# Patient Record
Sex: FEMALE | Race: WHITE | NOT HISPANIC OR LATINO | Employment: OTHER | URBAN - METROPOLITAN AREA
[De-identification: names, ages, dates, MRNs, and addresses within clinical notes are randomized per-mention and may not be internally consistent; named-entity substitution may affect disease eponyms.]

---

## 2017-01-18 ENCOUNTER — COMMUNICATION - HEALTHEAST (OUTPATIENT)
Dept: INTERNAL MEDICINE | Facility: CLINIC | Age: 72
End: 2017-01-18

## 2017-01-18 DIAGNOSIS — M06.9 RA (RHEUMATOID ARTHRITIS) (H): ICD-10-CM

## 2017-03-31 ENCOUNTER — COMMUNICATION - HEALTHEAST (OUTPATIENT)
Dept: INTERNAL MEDICINE | Facility: CLINIC | Age: 72
End: 2017-03-31

## 2017-04-03 ENCOUNTER — COMMUNICATION - HEALTHEAST (OUTPATIENT)
Dept: INTERNAL MEDICINE | Facility: CLINIC | Age: 72
End: 2017-04-03

## 2017-04-03 DIAGNOSIS — E78.5 HYPERLIPEMIA: ICD-10-CM

## 2017-04-03 DIAGNOSIS — F41.9 ANXIETY: ICD-10-CM

## 2017-04-12 ENCOUNTER — RECORDS - HEALTHEAST (OUTPATIENT)
Dept: ADMINISTRATIVE | Facility: OTHER | Age: 72
End: 2017-04-12

## 2017-04-17 ENCOUNTER — OFFICE VISIT - HEALTHEAST (OUTPATIENT)
Dept: INTERNAL MEDICINE | Facility: CLINIC | Age: 72
End: 2017-04-17

## 2017-04-17 DIAGNOSIS — E78.5 HYPERLIPIDEMIA: ICD-10-CM

## 2017-04-17 ASSESSMENT — MIFFLIN-ST. JEOR: SCORE: 1323.93

## 2017-04-25 ENCOUNTER — RECORDS - HEALTHEAST (OUTPATIENT)
Dept: ADMINISTRATIVE | Facility: OTHER | Age: 72
End: 2017-04-25

## 2017-05-05 ENCOUNTER — COMMUNICATION - HEALTHEAST (OUTPATIENT)
Dept: INTERNAL MEDICINE | Facility: CLINIC | Age: 72
End: 2017-05-05

## 2017-05-05 DIAGNOSIS — F41.9 ANXIETY: ICD-10-CM

## 2017-06-12 ENCOUNTER — COMMUNICATION - HEALTHEAST (OUTPATIENT)
Dept: INTERNAL MEDICINE | Facility: CLINIC | Age: 72
End: 2017-06-12

## 2017-06-12 DIAGNOSIS — K21.9 ESOPHAGEAL REFLUX: ICD-10-CM

## 2017-07-13 ENCOUNTER — COMMUNICATION - HEALTHEAST (OUTPATIENT)
Dept: INTERNAL MEDICINE | Facility: CLINIC | Age: 72
End: 2017-07-13

## 2017-07-13 DIAGNOSIS — E78.5 HYPERLIPEMIA: ICD-10-CM

## 2017-08-03 ENCOUNTER — TELEPHONE (OUTPATIENT)
Dept: OBGYN | Facility: CLINIC | Age: 72
End: 2017-08-03

## 2017-08-03 DIAGNOSIS — N95.9 MENOPAUSAL AND POSTMENOPAUSAL DISORDER: ICD-10-CM

## 2017-08-03 DIAGNOSIS — B37.31 YEAST INFECTION OF THE VAGINA: ICD-10-CM

## 2017-08-03 NOTE — TELEPHONE ENCOUNTER
Spoke with Tangela about her prometrium, it was denied because she needs to be seen for repeat pap. Patient moving September 1st and really wanted to see Naseem. Nurse helped patient make appt. And gave temporary refill to get her through until her appointment.

## 2017-08-03 NOTE — TELEPHONE ENCOUNTER
Left  for Tangela to call  Nurse triage to discuss HRT refills. Her last annual was 8/2016 so she needs to be seen in clinic. Colposcopy done 10/2016 with note from Dr Gusman:Normal appearing biopsy!!  The follow up, as we discussed should be a repeat pap and HPV test in 1 year.

## 2017-08-28 ENCOUNTER — OFFICE VISIT (OUTPATIENT)
Dept: OBGYN | Facility: CLINIC | Age: 72
End: 2017-08-28
Attending: OBSTETRICS & GYNECOLOGY
Payer: MEDICARE

## 2017-08-28 ENCOUNTER — RECORDS - HEALTHEAST (OUTPATIENT)
Dept: ADMINISTRATIVE | Facility: OTHER | Age: 72
End: 2017-08-28

## 2017-08-28 VITALS
BODY MASS INDEX: 29.65 KG/M2 | HEIGHT: 64 IN | DIASTOLIC BLOOD PRESSURE: 78 MMHG | HEART RATE: 60 BPM | SYSTOLIC BLOOD PRESSURE: 127 MMHG | WEIGHT: 173.7 LBS

## 2017-08-28 DIAGNOSIS — B37.31 YEAST INFECTION OF THE VAGINA: ICD-10-CM

## 2017-08-28 DIAGNOSIS — B97.7 HIGH RISK HPV INFECTION: ICD-10-CM

## 2017-08-28 DIAGNOSIS — N95.9 MENOPAUSAL AND POSTMENOPAUSAL DISORDER: ICD-10-CM

## 2017-08-28 DIAGNOSIS — R10.32 ABDOMINAL PAIN, LEFT LOWER QUADRANT: Primary | ICD-10-CM

## 2017-08-28 PROCEDURE — 99212 OFFICE O/P EST SF 10 MIN: CPT | Mod: ZF

## 2017-08-28 PROCEDURE — 88175 CYTOPATH C/V AUTO FLUID REDO: CPT | Performed by: OBSTETRICS & GYNECOLOGY

## 2017-08-28 PROCEDURE — G0476 HPV COMBO ASSAY CA SCREEN: HCPCS | Performed by: OBSTETRICS & GYNECOLOGY

## 2017-08-28 PROCEDURE — 87624 HPV HI-RISK TYP POOLED RSLT: CPT | Performed by: OBSTETRICS & GYNECOLOGY

## 2017-08-28 RX ORDER — ESTRADIOL 0.5 MG/1
0.5 TABLET ORAL DAILY
Qty: 90 TABLET | Refills: 4 | Status: SHIPPED | OUTPATIENT
Start: 2017-08-28 | End: 2018-09-07

## 2017-08-28 ASSESSMENT — ANXIETY QUESTIONNAIRES
6. BECOMING EASILY ANNOYED OR IRRITABLE: NOT AT ALL
2. NOT BEING ABLE TO STOP OR CONTROL WORRYING: NOT AT ALL
GAD7 TOTAL SCORE: 2
7. FEELING AFRAID AS IF SOMETHING AWFUL MIGHT HAPPEN: NOT AT ALL
1. FEELING NERVOUS, ANXIOUS, OR ON EDGE: NOT AT ALL
3. WORRYING TOO MUCH ABOUT DIFFERENT THINGS: NOT AT ALL
5. BEING SO RESTLESS THAT IT IS HARD TO SIT STILL: SEVERAL DAYS

## 2017-08-28 ASSESSMENT — PATIENT HEALTH QUESTIONNAIRE - PHQ9
SUM OF ALL RESPONSES TO PHQ QUESTIONS 1-9: 3
5. POOR APPETITE OR OVEREATING: SEVERAL DAYS

## 2017-08-28 NOTE — MR AVS SNAPSHOT
After Visit Summary   8/28/2017    Tangela Chapa    MRN: [id number]           Patient Information     Date Of Birth          1945        Visit Information        Provider Department      8/28/2017 2:30 PM Macarena Gusman MD [facility]        Today's Diagnoses     Abdominal pain, left lower quadrant    -  1    Menopausal and postmenopausal disorder        Yeast infection of the vagina        High risk HPV infection           Follow-ups after your visit        Your next 10 appointments already scheduled     Sep 06, 2017 11:00 AM CDT   ULTRASOUND with [facility] ULTRASOUND   [facility] ([facility])    [facility]  [address]  [address]  [facility] [id number] [phone]              Future tests that were ordered for you today     Open Future Orders        Priority Expected Expires Ordered    US GYN Complete Transvaginal - 75857 (In Clinic) Routine  12/26/2017 8/28/2017            Who to contact     Please call your clinic at [phone] to:    Ask questions about your health    Make or cancel appointments    Discuss your medicines    Learn about your test results    Speak to your doctor   If you have compliments or concerns about an experience at your clinic, or if you wish to file a complaint, please contact [facility] Patient Relations at [phone] or email us at [email or web address]         Additional Information About Your Visit        MyChart Information     [employer] gives you secure access to your electronic health record. If you see a primary care provider, you can also send messages to your care team and make appointments. If you have questions, please call your primary care clinic.  If you do not have a primary care provider, please call [phone] and they will assist you.      [email or web address] is an electronic gateway that provides easy, online access to your  "medical records. With [email or web address], you can request a clinic appointment, read your test results, renew a prescription or communicate with your care team.     To access your existing account, please contact your [facility] or call [phone] for assistance.        Care EveryWhere ID     This is your Care EveryWhere ID. This could be used by other organizations to access your [place] medical records  [id number]        Your Vitals Were     Pulse Height BMI (Body Mass Index)             60 1.626 m (5' 4\") 29.82 kg/m2          Blood Pressure from Last 3 Encounters:   08/28/17 127/78   10/04/16 126/71   08/18/16 140/74    Weight from Last 3 Encounters:   08/28/17 78.8 kg (173 lb 11.2 oz)   10/04/16 79.4 kg (175 lb)   08/18/16 80.6 kg (177 lb 12.8 oz)              We Performed the Following     HPV High Risk Types DNA Cervical     Pap imaged thin layer diagnostic with HPV (select HPV order below)          Today's Medication Changes          These changes are accurate as of: 8/28/17 11:59 PM.  If you have any questions, ask your nurse or doctor.               Start taking these medicines.        Dose/Directions    progesterone 100 MG capsule   Commonly known as:  PROMETRIUM   Used for:  Menopausal and postmenopausal disorder, Yeast infection of the vagina   Started by:  Macarena Gusman MD        Dose:  100 mg   Take 1 capsule (100 mg) by mouth At Bedtime   Quantity:  90 capsule   Refills:  3            Where to get your medicines      These medications were sent to [address] [address]     Phone:  [phone]     estradiol 0.5 MG tablet    estradiol 2 MG vaginal ring    progesterone 100 MG capsule                Primary Care Provider Office Phone # Fax #    Logan Aguilar [phone]       [address] [address]        Equal Access to Services     BAILEY POWELL AH: " Hadii aad ku hadiaino Sotammyali, waaxda luqadaha, qaybta kaalangelica arango, mo ortez. So [facility] [phone].    ATENCIÓN: Si rahul luna, tiene a cadet disposición servicios gratuitos de asistencia lingüística. Llame al [phone].    We comply with applicable federal civil rights laws and [place] laws. We do not discriminate on the basis of race, color, national origin, age, disability sex, sexual orientation or gender identity.            Thank you!     Thank you for choosing [facility]  for your care. Our goal is always to provide you with excellent care. Hearing back from our patients is one way we can continue to improve our services. Please take a few minutes to complete the written survey that you may receive in the mail after your visit with us. Thank you!             Your Updated Medication List - Protect others around you: Learn how to safely use, store and throw away your medicines at www.disposemymeds.org.          This list is accurate as of: 8/28/17 11:59 PM.  Always use your most recent med list.                   Brand Name Dispense Instructions for use Diagnosis    B COMPLEX + C TR PO      Take  by mouth.        calcium 500-125 MG-UNIT Tabs      Take 1 tablet by mouth daily. 2        citalopram 20 MG tablet    celeXA     Take 20 mg by mouth daily.        co-enzyme Q-10 100 MG Caps capsule      Take  by mouth daily.        estradiol 0.5 MG tablet    ESTRACE    90 tablet    Take 1 tablet (0.5 mg) by mouth daily    Menopausal and postmenopausal disorder       estradiol 2 MG vaginal ring    ESTRING    1 each    Place once vaginally and refill as instructed    Menopausal and postmenopausal disorder       EYE DROPS OP      Apply  to eye. Hydro eye        fish oil-omega-3 fatty acids 1000 MG capsule      Take 1 capsule by mouth daily.        flaxseed oil 1000 MG Caps      Take 2 tablets by mouth 2 times daily.        magnesium 100 MG Tabs      Take 3  tablets by mouth At Bedtime.        NASONEX 50 MCG/ACT spray   Generic drug:  mometasone      2 sprays by Both Nostrils route daily.        NONFORMULARY      1 Dose daily henrik 128 apply small amount to eyes at night        priLOSEC 20 MG CR capsule   Generic drug:  omeprazole      Take 1 capsule by mouth as needed.        progesterone 100 MG capsule    PROMETRIUM    90 capsule    Take 1 capsule (100 mg) by mouth At Bedtime    Menopausal and postmenopausal disorder, Yeast infection of the vagina       RESTASIS 0.05 % ophthalmic emulsion   Generic drug:  cycloSPORINE      1 drop every 12 hours.        simvastatin 40 MG tablet    ZOCOR     Take 1 tablet by mouth At Bedtime.        traZODone 100 MG tablet    DESYREL     Take 1 tablet by mouth At Bedtime.        XALATAN OP      Apply 2 drops to eye At Bedtime. 2.5

## 2017-08-28 NOTE — LETTER
2017       RE: Tangela Chapa  [address] [facility] [address]     Dear Colleague,    Thank you for referring your patient, Tangela Chapa, to the [facility] at [facility]. Please see a copy of my visit note below.    Progress Note    SUBJECTIVE:  Tangela Chapa is an 72 year old  , who requests a breast and pelvic exam.     Concerns today include: refill of hormones    Menstrual History:  Menstrual History 2016   Period Cycle (Days) menapausal    Reviewed Today Yes       Last    Lab Results   Component Value Date    PAP NIL 2016       Last   Lab Results   Component Value Date    HPV16 Negative 2016     Last   Lab Results   Component Value Date    HPV18 Negative 2016     Last   Lab Results   Component Value Date    HRHPV Positive 2016       Mammogram current: yes    HISTORY:  Prescription Medications as of 2017             estradiol (ESTRING) 2 MG vaginal ring Place once vaginally and refill as instructed    estradiol (ESTRACE) 0.5 MG tablet Take 1 tablet (0.5 mg) by mouth daily    progesterone (PROMETRIUM) 100 MG capsule Take 1 capsule (100 mg) by mouth At Bedtime    NONFORMULARY 1 Dose daily henrik 128 apply small amount to eyes at night    Latanoprost (XALATAN OP) Apply 2 drops to eye At Bedtime. 2.5    co-enzyme Q-10 (COQ10) 100 MG CAPS Take  by mouth daily.    B Complex-C-Folic Acid (B COMPLEX + C TR PO) Take  by mouth.    Tetrahydrozoline HCl (EYE DROPS OP) Apply  to eye. Hydro eye    citalopram (CELEXA) 20 MG tablet Take 20 mg by mouth daily.    cycloSPORINE (RESTASIS) 0.05 % ophthalmic emulsion 1 drop every 12 hours.    Calcium 500-125 MG-UNIT TABS Take 1 tablet by mouth daily. 2    fish oil-omega-3 fatty acids (FISH OIL) 1000 MG capsule Take 1 capsule by mouth daily.    Flaxseed, Linseed, (FLAXSEED OIL) 1000 MG CAPS Take 2 tablets by mouth 2 times daily.    Magnesium 100 MG TABS Take 3 tablets by  mouth At Bedtime.    mometasone (NASONEX) 50 MCG/ACT nasal spray 2 sprays by Both Nostrils route daily.    omeprazole (PRILOSEC) 20 MG capsule Take 1 capsule by mouth as needed.    simvastatin (ZOCOR) 40 MG tablet Take 1 tablet by mouth At Bedtime.    TRAZodone (DESYREL) 100 MG tablet Take 1 tablet by mouth At Bedtime.        Allergies   Allergen Reactions     Dust Mite Extract      Nkda [No Known Drug Allergies]      Seasonal Allergies        There is no immunization history on file for this patient.    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     Past Surgical History:   Procedure Laterality Date     ABDOMINOPLASTY       ARTHROSCOPY KNEE IRRIGATION AND DEBRIDEMENT      RT-Articular Cartilage     BIOPSY BREAST      benign open brest biopsy     COLPOSCOPY, BIOPSY, COMBINED  2/16/10    TZ-not seen     ENDOMETRIAL SAMPLING (BIOPSY)  3/31/05    benign     GENITOURINARY SURGERY  2011    cystoscopy with +1 trabeculaion     JOINT REPLACEMENT, HIP RT/LT  2008    Joint Replacement Hip RT/LT     RELEASE CARPAL TUNNEL BILATERAL       Family History   Problem Relation Age of Onset     Alcohol/Drug Father      Depression Father      Alcohol/Drug Brother      Breast Cancer Mother 60     and PC xs 2 and MC age 69     Depression Mother      DIABETES Maternal Grandfather      CANCER Brother 68     blood cancer     Social History     Social History     Marital status: Single     Spouse name: N/A     Number of children: N/A     Years of education: N/A     Social History Main Topics     Smoking status: Former Smoker     Smokeless tobacco: Never Used      Comment: quit 30 years ago     Alcohol use 0.0 - 0.5 oz/week      "0 - 1 drink(s) per week      Comment: Rare     Drug use: No     Sexual activity: Not Currently     Partners: Male     Birth control/ protection: Post-menopausal     Other Topics Concern      Service No     Blood Transfusions No     Caffeine Concern No     3-4 pop/d (diet)     Occupational Exposure No     Hobby Hazards No     Sleep Concern Yes     sleeps too much -- tired after 9-10 hrs/nite     Stress Concern Yes     holiday lonliness     Weight Concern Yes     admits to eating more than she uses -- declines wt today     Special Diet No     Back Care No     Exercise Yes     sedentary -- plans to restart     Bike Helmet No     Seat Belt No     Self-Exams No     Social History Narrative    How much exercise per week? Stationary bike, recent weight loss    How much calcium per day? Supplement       How much caffeine per day? 2 cans a day    How much vitamin D per day? supplment    Do you/your family wear seatbelts?  Yes    Do you/your family use safety helmets? Yes    Do you/your family use sunscreen? Yes    Do you/your family keep firearms in the home? Yes, locked    Do you/your family have a smoke detector(s)? Yes        Do you feel safe in your home? Yes    Has anyone ever touched you in an unwanted manner? No     Explain     Updated 8/28 CHIKI Santiago                ROS    EXAM:  Blood pressure 127/78, pulse 60, height 1.626 m (5' 4\"), weight 78.8 kg (173 lb 11.2 oz), not currently breastfeeding. Body mass index is 29.82 kg/(m^2).  General appearance: Pleasant female in no acute distress.     BREAST EXAM:  Breast: Without visible skin changes. No dimpling or lesions seen.   Breasts supple, non-tender with palpation, no dominant mass, nodularity, or nipple discharge noted bilaterally. Axillary nodes negative.      PELVIC EXAM:  EG/BUS: Normal genital architecture without lesions, erythema or abnormal secretions Bartholin's, Urethra, Warden's normal   Urethral meatus: normal   Urethra: no masses, tenderness, or " scarring   Bladder: no masses or tenderness   Vagina: moist, atrophic  Cervix: no lesions and pale, dry, stenotic  Uterus: midposition, and small, smooth, firm, mobile w/o pain  Adnexa: Within normal limits and No masses, nodularity, tenderness  Rectum:anus normal     ASSESSMENT:  Encounter Diagnoses   Name Primary?     Menopausal and postmenopausal disorder      Yeast infection of the vagina      Abdominal pain, left lower quadrant Yes     High risk HPV infection       72 year old Female Pelvic and Breast Exam  HRT Surveillance    PLAN:   Orders Placed This Encounter   Procedures     US GYN Complete Transvaginal - 67025 (In Clinic)     Pap imaged thin layer diagnostic with HPV (select HPV order below)     HPV High Risk Types DNA Cervical   Refill of HT    Return in one year/PRN for preventive care or problems/concerns.     Verbalized understanding and agreement with visit plan.    Macarena Gusman MD, [facility]  OB/GYN    8/29/2017  3:00 PM

## 2017-08-29 ASSESSMENT — ANXIETY QUESTIONNAIRES: GAD7 TOTAL SCORE: 2

## 2017-08-29 NOTE — PROGRESS NOTES
Progress Note    SUBJECTIVE:  Tangela Chapa is an 72 year old  , who requests a breast and pelvic exam.     Concerns today include: refill of hormones    Menstrual History:  Menstrual History 2016   Period Cycle (Days) menapausal    Reviewed Today Yes       Last    Lab Results   Component Value Date    PAP NIL 2016       Last   Lab Results   Component Value Date    HPV16 Negative 2016     Last   Lab Results   Component Value Date    HPV18 Negative 2016     Last   Lab Results   Component Value Date    HRHPV Positive 2016       Mammogram current: yes    HISTORY:  Prescription Medications as of 2017             estradiol (ESTRING) 2 MG vaginal ring Place once vaginally and refill as instructed    estradiol (ESTRACE) 0.5 MG tablet Take 1 tablet (0.5 mg) by mouth daily    progesterone (PROMETRIUM) 100 MG capsule Take 1 capsule (100 mg) by mouth At Bedtime    NONFORMULARY 1 Dose daily henrik 128 apply small amount to eyes at night    Latanoprost (XALATAN OP) Apply 2 drops to eye At Bedtime. 2.5    co-enzyme Q-10 (COQ10) 100 MG CAPS Take  by mouth daily.    B Complex-C-Folic Acid (B COMPLEX + C TR PO) Take  by mouth.    Tetrahydrozoline HCl (EYE DROPS OP) Apply  to eye. Hydro eye    citalopram (CELEXA) 20 MG tablet Take 20 mg by mouth daily.    cycloSPORINE (RESTASIS) 0.05 % ophthalmic emulsion 1 drop every 12 hours.    Calcium 500-125 MG-UNIT TABS Take 1 tablet by mouth daily. 2    fish oil-omega-3 fatty acids (FISH OIL) 1000 MG capsule Take 1 capsule by mouth daily.    Flaxseed, Linseed, (FLAXSEED OIL) 1000 MG CAPS Take 2 tablets by mouth 2 times daily.    Magnesium 100 MG TABS Take 3 tablets by mouth At Bedtime.    mometasone (NASONEX) 50 MCG/ACT nasal spray 2 sprays by Both Nostrils route daily.    omeprazole (PRILOSEC) 20 MG capsule Take 1 capsule by mouth as needed.    simvastatin (ZOCOR) 40 MG tablet Take 1 tablet by mouth At Bedtime.    TRAZodone (DESYREL) 100 MG tablet  Take 1 tablet by mouth At Bedtime.        Allergies   Allergen Reactions     Dust Mite Extract      Nkda [No Known Drug Allergies]      Seasonal Allergies        There is no immunization history on file for this patient.    Obstetric History       T0      L0     SAB0   TAB0   Ectopic0   Multiple0   Live Births0      Past Medical History:   Diagnosis Date     Chronic UTI     controlled w PO and vaginal estrogen tx     YESSICA I (cervical intraepithelial neoplasia I) 14: Pap NIL; HR HPV neg -> D/C screening     Vaginal dysplasia     cryo     Past Surgical History:   Procedure Laterality Date     ABDOMINOPLASTY       ARTHROSCOPY KNEE IRRIGATION AND DEBRIDEMENT      RT-Articular Cartilage     BIOPSY BREAST      benign open brest biopsy     COLPOSCOPY, BIOPSY, COMBINED  2/16/10    TZ-not seen     ENDOMETRIAL SAMPLING (BIOPSY)  3/31/05    benign     GENITOURINARY SURGERY  2011    cystoscopy with +1 trabeculaion     JOINT REPLACEMENT, HIP RT/LT  2008    Joint Replacement Hip RT/LT     RELEASE CARPAL TUNNEL BILATERAL       Family History   Problem Relation Age of Onset     Alcohol/Drug Father      Depression Father      Alcohol/Drug Brother      Breast Cancer Mother 60     and PC xs 2 and MC age 69     Depression Mother      DIABETES Maternal Grandfather      CANCER Brother 68     blood cancer     Social History     Social History     Marital status: Single     Spouse name: N/A     Number of children: N/A     Years of education: N/A     Social History Main Topics     Smoking status: Former Smoker     Smokeless tobacco: Never Used      Comment: quit 30 years ago     Alcohol use 0.0 - 0.5 oz/week     0 - 1 drink(s) per week      Comment: Rare     Drug use: No     Sexual activity: Not Currently     Partners: Male     Birth control/ protection: Post-menopausal     Other Topics Concern      Service No     Blood Transfusions No     Caffeine Concern No     3-4 pop/d (diet)      "Occupational Exposure No     Hobby Hazards No     Sleep Concern Yes     sleeps too much -- tired after 9-10 hrs/nite     Stress Concern Yes     holiday lonliness     Weight Concern Yes     admits to eating more than she uses -- declines wt today     Special Diet No     Back Care No     Exercise Yes     sedentary -- plans to restart     Bike Helmet No     Seat Belt No     Self-Exams No     Social History Narrative    How much exercise per week? Stationary bike, recent weight loss    How much calcium per day? Supplement       How much caffeine per day? 2 cans a day    How much vitamin D per day? supplment    Do you/your family wear seatbelts?  Yes    Do you/your family use safety helmets? Yes    Do you/your family use sunscreen? Yes    Do you/your family keep firearms in the home? Yes, locked    Do you/your family have a smoke detector(s)? Yes        Do you feel safe in your home? Yes    Has anyone ever touched you in an unwanted manner? No     Explain     Updated 8/28 CHIKI Santiago    EXAM:  Blood pressure 127/78, pulse 60, height 1.626 m (5' 4\"), weight 78.8 kg (173 lb 11.2 oz), not currently breastfeeding. Body mass index is 29.82 kg/(m^2).  General appearance: Pleasant female in no acute distress.     BREAST EXAM:  Breast: Without visible skin changes. No dimpling or lesions seen.   Breasts supple, non-tender with palpation, no dominant mass, nodularity, or nipple discharge noted bilaterally. Axillary nodes negative.      PELVIC EXAM:  EG/BUS: Normal genital architecture without lesions, erythema or abnormal secretions Bartholin's, Urethra, Oglala's normal   Urethral meatus: normal   Urethra: no masses, tenderness, or scarring   Bladder: no masses or tenderness   Vagina: moist, atrophic  Cervix: no lesions and pale, dry, stenotic  Uterus: midposition, and small, smooth, firm, mobile w/o pain  Adnexa: Within normal limits and No masses, nodularity, tenderness  Rectum:anus normal "     ASSESSMENT:  Encounter Diagnoses   Name Primary?     Menopausal and postmenopausal disorder      Yeast infection of the vagina      Abdominal pain, left lower quadrant Yes     High risk HPV infection       72 year old Female Pelvic and Breast Exam  HRT Surveillance    PLAN:   Orders Placed This Encounter   Procedures     US GYN Complete Transvaginal - 65542 (In Clinic)     Pap imaged thin layer diagnostic with HPV (select HPV order below)     HPV High Risk Types DNA Cervical   Refill of HT    Return in one year/PRN for preventive care or problems/concerns.     Verbalized understanding and agreement with visit plan.    Macarena Gusman MD, [facility] Staff  OB/GYN    8/29/2017  3:00 PM

## 2017-08-31 LAB
COPATH REPORT: NORMAL
PAP: NORMAL

## 2017-09-01 LAB
FINAL DIAGNOSIS: ABNORMAL
HPV HR 12 DNA CVX QL NAA+PROBE: POSITIVE
HPV16 DNA SPEC QL NAA+PROBE: NEGATIVE
HPV18 DNA SPEC QL NAA+PROBE: NEGATIVE
SPECIMEN DESCRIPTION: ABNORMAL

## 2017-09-06 ENCOUNTER — OFFICE VISIT (OUTPATIENT)
Dept: OBGYN | Facility: CLINIC | Age: 72
End: 2017-09-06
Attending: OBSTETRICS & GYNECOLOGY
Payer: MEDICARE

## 2017-09-06 ENCOUNTER — RECORDS - HEALTHEAST (OUTPATIENT)
Dept: ADMINISTRATIVE | Facility: OTHER | Age: 72
End: 2017-09-06

## 2017-09-06 DIAGNOSIS — R10.32 ABDOMINAL PAIN, LEFT LOWER QUADRANT: ICD-10-CM

## 2017-09-06 PROCEDURE — 76830 TRANSVAGINAL US NON-OB: CPT | Mod: ZF

## 2017-09-06 NOTE — LETTER
9/6/2017     RE: Tangela Chapa  [address] [facility] [address]     Dear Colleague,    Thank you for referring your patient, Tangela Chapa, to the [facility] at [facility]. Please see a copy of my visit note below.    72 year old female presents for gynecologic ultrasound indicated by left sided pelvic pain.  This study was done transvaginally.    Uterine findings:   Presence: Visible Size: Normal 3.7 x 3.8 x 2.7 cm.  Endometrium = 4.3 mm. Irregular   Cx length = 21.3 mm.      Flexion:  Anteverted Position: Midline Margins: Smooth Shape: Normal   Contour: Regular Texture: Homogeneous Cavity: Abnormal Masses: Normal    Pelvic findings:    Right Adnexa: Normal   Left Adnexa: Normal   Bladder:  Normal         Cul - de - sac fluid: None    Ovarian follicles:   Right ovary:  Not visualized      Left ovary:   Not visualized     Comments:  Slightly thickened endometrium for postmenopausal status, clinical correlation recommended. Ovaries not visualized, but no sonographic cause of pain.     RADHA Baron MD

## 2017-09-06 NOTE — MR AVS SNAPSHOT
After Visit Summary   9/6/2017    Tangela Chapa    MRN: [id number]           Patient Information     Date Of Birth          1945        Visit Information        Provider Department      9/6/2017 11:00 AM [facility] ULTRASOUND [facility]        Today's Diagnoses     Abdominal pain, left lower quadrant           Follow-ups after your visit        Who to contact     Please call your clinic at [phone] to:    Ask questions about your health    Make or cancel appointments    Discuss your medicines    Learn about your test results    Speak to your doctor   If you have compliments or concerns about an experience at your clinic, or if you wish to file a complaint, please contact [facility] Patient Relations at [phone] or email us at [email or web address]         Additional Information About Your Visit        MyChart Information     [employer] gives you secure access to your electronic health record. If you see a primary care provider, you can also send messages to your care team and make appointments. If you have questions, please call your primary care clinic.  If you do not have a primary care provider, please call [phone] and they will assist you.      [employer] is an electronic gateway that provides easy, online access to your medical records. With [employer], you can request a clinic appointment, read your test results, renew a prescription or communicate with your care team.     To access your existing account, please contact your [facility] or call [phone] for assistance.        Care EveryWhere ID     This is your Care EveryWhere ID. This could be used by other organizations to access your [facility] medical records  [id number]         Blood Pressure from Last 3 Encounters:   08/28/17 127/78   10/04/16 126/71   08/18/16 140/74    Weight from Last 3 Encounters:   08/28/17 78.8 kg (173 lb 11.2 oz)    10/04/16 79.4 kg (175 lb)   08/18/16 80.6 kg (177 lb 12.8 oz)              We Performed the Following     US GYN Complete Transvaginal - [address] (In Clinic)        Primary Care Provider Office Phone # Fax #    Logan Aguilar [phone]       [facility] [address] [address]        Equal Access to Services     BAILEY POWELL : Hadii aad ku hadasho Soomaali, waaxda luqadaha, qaybta kaalmada adeegyada, waxay idiin hayaan adeeg fermin lamaikn . So [facility] [phone].    ATENCIÓN: Si habla cheryl, tiene a cadet disposición servicios gratuitos de asistencia lingüística. Llame al [phone].    We comply with applicable federal civil rights laws and [place] laws. We do not discriminate on the basis of race, color, national origin, age, disability sex, sexual orientation or gender identity.            Thank you!     Thank you for choosing [facility]  for your care. Our goal is always to provide you with excellent care. Hearing back from our patients is one way we can continue to improve our services. Please take a few minutes to complete the written survey that you may receive in the mail after your visit with us. Thank you!             Your Updated Medication List - Protect others around you: Learn how to safely use, store and throw away your medicines at www.disposemymeds.org.          This list is accurate as of: 9/6/17  3:36 PM.  Always use your most recent med list.                   Brand Name Dispense Instructions for use Diagnosis    B COMPLEX + C TR PO      Take  by mouth.        calcium 500-125 MG-UNIT Tabs      Take 1 tablet by mouth daily. 2        citalopram 20 MG tablet    celeXA     Take 20 mg by mouth daily.        co-enzyme Q-10 100 MG Caps capsule      Take  by mouth daily.        estradiol 0.5 MG tablet    ESTRACE    90 tablet    Take 1 tablet (0.5 mg) by mouth daily    Menopausal and postmenopausal disorder       estradiol 2 MG vaginal  ring    ESTRING    1 each    Place once vaginally and refill as instructed    Menopausal and postmenopausal disorder       EYE DROPS OP      Apply  to eye. Hydro eye        fish oil-omega-3 fatty acids 1000 MG capsule      Take 1 capsule by mouth daily.        flaxseed oil 1000 MG Caps      Take 2 tablets by mouth 2 times daily.        magnesium 100 MG Tabs      Take 3 tablets by mouth At Bedtime.        NASONEX 50 MCG/ACT spray   Generic drug:  mometasone      2 sprays by Both Nostrils route daily.        NONFORMULARY      1 Dose daily henrik 128 apply small amount to eyes at night        priLOSEC 20 MG CR capsule   Generic drug:  omeprazole      Take 1 capsule by mouth as needed.        progesterone 100 MG capsule    PROMETRIUM    90 capsule    Take 1 capsule (100 mg) by mouth At Bedtime    Menopausal and postmenopausal disorder, Yeast infection of the vagina       RESTASIS 0.05 % ophthalmic emulsion   Generic drug:  cycloSPORINE      1 drop every 12 hours.        simvastatin 40 MG tablet    ZOCOR     Take 1 tablet by mouth At Bedtime.        traZODone 100 MG tablet    DESYREL     Take 1 tablet by mouth At Bedtime.        XALATAN OP      Apply 2 drops to eye At Bedtime. 2.5

## 2017-09-06 NOTE — PROGRESS NOTES
72 year old female presents for gynecologic ultrasound indicated by left sided pelvic pain.  This study was done transvaginally.    Uterine findings:   Presence: Visible Size: Normal 3.7 x 3.8 x 2.7 cm.  Endometrium = 4.3 mm. Irregular   Cx length = 21.3 mm.      Flexion:  Anteverted Position: Midline Margins: Smooth Shape: Normal   Contour: Regular Texture: Homogeneous Cavity: Abnormal Masses: Normal    Pelvic findings:    Right Adnexa: Normal   Left Adnexa: Normal   Bladder:  Normal         Cul - de - sac fluid: None    Ovarian follicles:   Right ovary:  Not visualized          Left ovary:   Not visualized       Comments:  Slightly thickened endometrium for postmenopausal status, clinical correlation recommended. Ovaries not visualized, but no sonographic cause of pain.     RADHA Baron MD

## 2017-09-18 ENCOUNTER — OFFICE VISIT (OUTPATIENT)
Dept: OBGYN | Facility: CLINIC | Age: 72
End: 2017-09-18
Attending: OBSTETRICS & GYNECOLOGY
Payer: MEDICARE

## 2017-09-18 VITALS
SYSTOLIC BLOOD PRESSURE: 133 MMHG | WEIGHT: 168 LBS | HEART RATE: 67 BPM | BODY MASS INDEX: 28.84 KG/M2 | DIASTOLIC BLOOD PRESSURE: 80 MMHG

## 2017-09-18 DIAGNOSIS — R93.89 INCREASED ENDOMETRIAL STRIPE THICKNESS: ICD-10-CM

## 2017-09-18 DIAGNOSIS — L08.9 LOCAL INFECTION OF SKIN AND SUBCUTANEOUS TISSUE: Primary | ICD-10-CM

## 2017-09-18 DIAGNOSIS — B97.7 HPV IN FEMALE: ICD-10-CM

## 2017-09-18 PROCEDURE — 88305 TISSUE EXAM BY PATHOLOGIST: CPT | Performed by: OBSTETRICS & GYNECOLOGY

## 2017-09-18 PROCEDURE — 58100 BIOPSY OF UTERUS LINING: CPT | Mod: ZF | Performed by: OBSTETRICS & GYNECOLOGY

## 2017-09-18 RX ORDER — CEPHALEXIN 500 MG/1
500 CAPSULE ORAL 3 TIMES DAILY
Qty: 30 CAPSULE | Refills: 0 | Status: SHIPPED | OUTPATIENT
Start: 2017-09-18 | End: 2017-09-28

## 2017-09-18 ASSESSMENT — PAIN SCALES - GENERAL: PAINLEVEL: NO PAIN (0)

## 2017-09-18 NOTE — MR AVS SNAPSHOT
After Visit Summary   9/18/2017    Tangela Chapa    MRN: [id number]           Patient Information     Date Of Birth          1945        Visit Information        Provider Department      9/18/2017 2:45 PM Macarena Gusman MD [facility]        Today's Diagnoses     Local infection of skin and subcutaneous tissue    -  1    Increased endometrial stripe thickness        HPV in female           Follow-ups after your visit        Who to contact     Please call your clinic at [phone] to:    Ask questions about your health    Make or cancel appointments    Discuss your medicines    Learn about your test results    Speak to your doctor   If you have compliments or concerns about an experience at your clinic, or if you wish to file a complaint, please contact [facility] Patient Relations at [phone] or email us at [email or web address]         Additional Information About Your Visit        MyChart Information     [employer] gives you secure access to your electronic health record. If you see a primary care provider, you can also send messages to your care team and make appointments. If you have questions, please call your primary care clinic.  If you do not have a primary care provider, please call [phone] and they will assist you.      [employer] is an electronic gateway that provides easy, online access to your medical records. With [employer], you can request a clinic appointment, read your test results, renew a prescription or communicate with your care team.     To access your existing account, please contact your [facility] or call [phone] for assistance.        Care EveryWhere ID     This is your Care EveryWhere ID. This could be used by other organizations to access your [facility] medical records  [id number]        Your Vitals Were     Pulse Breastfeeding? BMI (Body Mass Index)              67 No 28.84 kg/m2          Blood Pressure from Last 3 Encounters:   09/18/17 133/80   08/28/17 127/78   10/04/16 126/71    Weight from Last 3 Encounters:   09/18/17 76.2 kg (168 lb)   08/28/17 78.8 kg (173 lb 11.2 oz)   10/04/16 79.4 kg (175 lb)              We Performed the Following     Endometrial Biopsy without Cervical Dilation [[id number]]     Sobeida-Operative Worksheet (WHS)     Surgical pathology exam [[id number]]          Today's Medication Changes          These changes are accurate as of: 9/18/17 11:59 PM.  If you have any questions, ask your nurse or doctor.               Start taking these medicines.        Dose/Directions    cephALEXin 500 MG capsule   Commonly known as:  KEFLEX   Used for:  Local infection of skin and subcutaneous tissue   Started by:  Macarena Gusman MD        Dose:  500 mg   Take 1 capsule (500 mg) by mouth 3 times daily for 10 days   Quantity:  30 capsule   Refills:  0            Where to get your medicines      These medications were sent to [address] [address]     Phone:  [phone]     cephALEXin 500 MG capsule                Primary Care Provider Office Phone # Fax #    Logan Aguilar [phone]       [facility] [address] [place] [address]        Equal Access to Services     BAILEY POWELL AH: Hadii roverto sosa hadasho Soomaali, waaxda luqadaha, qaybta kaalmada conchita, mo ortez. So [facility] [phone].    ATENCIÓN: Si habla español, tiene a cadet disposición servicios gratuitos de asistencia lingüística. Ragini al [phone].    We comply with applicable federal civil rights laws and [place] laws. We do not discriminate on the basis of race, color, national origin, age, disability sex, sexual orientation or gender identity.            Thank you!     Thank you for choosing [facility]  for your care. Our goal is always to  provide you with excellent care. Hearing back from our patients is one way we can continue to improve our services. Please take a few minutes to complete the written survey that you may receive in the mail after your visit with us. Thank you!             Your Updated Medication List - Protect others around you: Learn how to safely use, store and throw away your medicines at www.disposemymeds.org.          This list is accurate as of: 9/18/17 11:59 PM.  Always use your most recent med list.                   Brand Name Dispense Instructions for use Diagnosis    B COMPLEX + C TR PO      Take  by mouth.        calcium 500-125 MG-UNIT Tabs      Take 1 tablet by mouth daily. 2        cephALEXin 500 MG capsule    KEFLEX    30 capsule    Take 1 capsule (500 mg) by mouth 3 times daily for 10 days    Local infection of skin and subcutaneous tissue       citalopram 20 MG tablet    celeXA     Take 20 mg by mouth daily.        co-enzyme Q-10 100 MG Caps capsule      Take  by mouth daily.        estradiol 0.5 MG tablet    ESTRACE    90 tablet    Take 1 tablet (0.5 mg) by mouth daily    Menopausal and postmenopausal disorder       estradiol 2 MG vaginal ring    ESTRING    1 each    Place once vaginally and refill as instructed    Menopausal and postmenopausal disorder       EYE DROPS OP      Apply  to eye. Hydro eye        fish oil-omega-3 fatty acids 1000 MG capsule      Take 1 capsule by mouth daily.        flaxseed oil 1000 MG Caps      Take 2 tablets by mouth 2 times daily.        magnesium 100 MG Tabs      Take 3 tablets by mouth At Bedtime.        NASONEX 50 MCG/ACT spray   Generic drug:  mometasone      2 sprays by Both Nostrils route daily.        NONFORMULARY      1 Dose daily henrik 128 apply small amount to eyes at night        priLOSEC 20 MG CR capsule   Generic drug:  omeprazole      Take 1 capsule by mouth as needed.        progesterone 100 MG capsule    PROMETRIUM    90 capsule    Take 1 capsule (100 mg) by mouth At  Bedtime    Menopausal and postmenopausal disorder, Yeast infection of the vagina       RESTASIS 0.05 % ophthalmic emulsion   Generic drug:  cycloSPORINE      1 drop every 12 hours.        simvastatin 40 MG tablet    ZOCOR     Take 1 tablet by mouth At Bedtime.        traZODone 100 MG tablet    DESYREL     Take 1 tablet by mouth At Bedtime.        XALATAN OP      Apply 2 drops to eye At Bedtime. 2.5

## 2017-09-18 NOTE — LETTER
"9/18/2017       RE: Tangela Chapa  [address] [address]     Dear Colleague,    Thank you for referring your patient, Tangela Chapa, to the [facility] at [facility]. Please see a copy of my visit note below.    73 yo  female w/ u/s showing thickened EM lining in setting of pelvic pain.  Rec EMB.      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     Endometrial Biopsy                                                                       Time Out - \"Pause for the Cause\"  Just before the procedure begins, through verbal and active participation of team members, verify:    Initials   Patient Name    MM   Patient Date of Birth MM   Procedure to be performed  MM   Site, laterality, level or multiples, noting patient position   MM   Relevant images or diagnostics available/displayed   MM   Implants, special equipment or special requirements        MM     Consent:  Written consent signed and scanned into medical record.    Indication: thick EM stripe    Using a medium Mindy speculum, the cervix was visualized. The cervix was prepped with Betadine.  A single tooth tenaculum was applied to the anterior lip of the cervix. The endometrial pipelle was advanced through the cervix without difficulty and a sample collected. One additional pass was made.  The tenaculum was removed from the cervix and the tenaculum site made hemostatic with Silver Nitrate sticks .    EBL: 5cc  Complications:  none  Pathology: EMB sample was sent to pathology.  Tolerance of Procedure:  Patient did tolerate the procedure well.     Patient was instructed to call if she experiences any heavy bleeding, severe cramping, or abnormal vaginal discharge.  May take ibuprofen 400-800 mg PO TID PRN or naproxen 500 mg PO BID for cramping.    Will notify patient of results.    Given persistent HR HPV other, pt desires some type of treatment. " We discussed LEEP as tx option. Pt is aware that continued surveillance is recommended vs. LEEP.    ACOG handout given and pt will be called to schedule office LEEP.     Macarena Gusman MD, FACOG  [facility]  OB/GYN    9/19/2017  9:57 AM

## 2017-09-19 ENCOUNTER — TELEPHONE (OUTPATIENT)
Dept: OBGYN | Facility: CLINIC | Age: 72
End: 2017-09-19

## 2017-09-19 NOTE — PROGRESS NOTES
"73 yo  female w/ u/s showing thickened EM lining in setting of pelvic pain.  Rec EMB.      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     Endometrial Biopsy                                                                       Time Out - \"Pause for the Cause\"  Just before the procedure begins, through verbal and active participation of team members, verify:    Initials   Patient Name    MM   Patient Date of Birth MM   Procedure to be performed  MM   Site, laterality, level or multiples, noting patient position   MM   Relevant images or diagnostics available/displayed   MM   Implants, special equipment or special requirements        MM     Consent:  Written consent signed and scanned into medical record.    Indication: thick EM stripe    Using a medium Mindy speculum, the cervix was visualized. The cervix was prepped with Betadine.  A single tooth tenaculum was applied to the anterior lip of the cervix. The endometrial pipelle was advanced through the cervix without difficulty and a sample collected. One additional pass was made.  The tenaculum was removed from the cervix and the tenaculum site made hemostatic with Silver Nitrate sticks .    EBL: 5cc  Complications:  none  Pathology: EMB sample was sent to pathology.  Tolerance of Procedure:  Patient did tolerate the procedure well.     Patient was instructed to call if she experiences any heavy bleeding, severe cramping, or abnormal vaginal discharge.  May take ibuprofen 400-800 mg PO TID PRN or naproxen 500 mg PO BID for cramping.    Will notify patient of results.    Given persistent HR HPV other, pt desires some type of treatment. We discussed LEEP as tx option. Pt is aware that continued surveillance is recommended vs. LEEP.    ACOG handout given and pt will be called to schedule office LEEP.     Macarena Gusman MD, FACOG  [facility]  OB/GYN    9/19/2017  9:57 AM      "

## 2017-09-20 LAB — COPATH REPORT: NORMAL

## 2017-10-09 ENCOUNTER — RECORDS - HEALTHEAST (OUTPATIENT)
Dept: ADMINISTRATIVE | Facility: OTHER | Age: 72
End: 2017-10-09

## 2017-10-09 ENCOUNTER — OFFICE VISIT (OUTPATIENT)
Dept: OBGYN | Facility: CLINIC | Age: 72
End: 2017-10-09
Attending: OBSTETRICS & GYNECOLOGY
Payer: MEDICARE

## 2017-10-09 VITALS
DIASTOLIC BLOOD PRESSURE: 73 MMHG | WEIGHT: 168.6 LBS | SYSTOLIC BLOOD PRESSURE: 123 MMHG | BODY MASS INDEX: 28.94 KG/M2 | HEART RATE: 64 BPM

## 2017-10-09 DIAGNOSIS — B97.7 HIGH RISK HPV INFECTION: Primary | ICD-10-CM

## 2017-10-09 PROCEDURE — 88307 TISSUE EXAM BY PATHOLOGIST: CPT | Performed by: OBSTETRICS & GYNECOLOGY

## 2017-10-09 PROCEDURE — 57522 CONIZATION OF CERVIX: CPT | Mod: ZF | Performed by: OBSTETRICS & GYNECOLOGY

## 2017-10-09 PROCEDURE — 40000269 ZZH STATISTIC NO CHARGE FACILITY FEE

## 2017-10-09 PROCEDURE — 99212 OFFICE O/P EST SF 10 MIN: CPT | Mod: 25,ZF

## 2017-10-09 ASSESSMENT — PAIN SCALES - GENERAL: PAINLEVEL: NO PAIN (0)

## 2017-10-09 NOTE — LETTER
"10/9/2017       RE: Tangela Chapa  [address] [facility] [address]     Dear Colleague,    Thank you for referring your patient, Tangela Chapa, to the [facility] at [facility]. Please see a copy of my visit note below.      LEEP NOTE    Menstrual History:  Menstrual History 2016   Period Cycle (Days) menapausal    Reviewed Today Yes       Time Out - \"Pause for the Cause\"  Just before the procedure begins, through verbal and active participation of team members, verify:   Initials   Patient Name MM   Patient  MM   Procedure to be performed MM                     Faculty:  Naseem      Indication/History:  persistent HPV    Consent: Verbal and written consent obtained, prior to sedative, discussed risks and benefits of treatment including damage to surrounding tissues, increased risk of  labor, bleeding, infection, and pain and no treatment including risk of dysplasia progression.       Colposcopy findings: no obvious abnormality    Anesthesia: 1% Lidocaine without EPI in radial pattern: 10 mL    Intracervical block  Prep: Betadine    EBL:  3 mL   Complications:  none    Settings:   Cut 50      Coag 50       Blend 50       LOOP size/type: green    Bleeding controlled with ball tipped cautery and pressure.    Pathology:  Jar #1:  Cervix LEEP    Follow up: prn path    Call if bleeding > 1 pph, fever, abdominal pain, foul smelling discharge.     Macarena Gusman MD, FACOG  [facility]  OB/GYN    10/9/2017  2:56 PM         "

## 2017-10-09 NOTE — PROGRESS NOTES
"  LEEP NOTE    Menstrual History:  Menstrual History 2016   Period Cycle (Days) menapausal    Reviewed Today Yes       Time Out - \"Pause for the Cause\"  Just before the procedure begins, through verbal and active participation of team members, verify:   Initials   Patient Name MM   Patient  MM   Procedure to be performed MM                     Faculty:  [employer]      Indication/History:  persistent HPV    Consent: Verbal and written consent obtained, prior to sedative, discussed risks and benefits of treatment including damage to surrounding tissues, increased risk of  labor, bleeding, infection, and pain and no treatment including risk of dysplasia progression.       Colposcopy findings: no obvious abnormality    Anesthesia: 1% Lidocaine without EPI in radial pattern: 10 mL    Intracervical block  Prep: Betadine    EBL:  3 mL   Complications:  none    Settings:   Cut 50      Coag 50       Blend 50       LOOP size/type: green    Bleeding controlled with ball tipped cautery and pressure.    Pathology:  Jar #1:  Cervix LEEP    Follow up: prn path    Call if bleeding > 1 pph, fever, abdominal pain, foul smelling discharge.     Macarena Gusman MD, FACOG  [facility]  OB/GYN    10/9/2017  2:56 PM       "

## 2017-10-09 NOTE — PATIENT INSTRUCTIONS
LEEP (LOOP EXCISION ELECTROCAUTERY PROCEDURE)    The LEEP procedure is done using a local anesthetic to numb the cervix.  While visualizing the area with a colposcopy, a small thing wire loopexcises the abnormal tissue from the cervix.  Cautery is used to control any bleeding.    POST-LEEP INSTRUCTIONS      Limit your activity to 1-2 days following the LEEP procedure.  Avoid strenuous exercises for one week.    To prevent infection or trauma to the surgical site, you should not put anything into the vagina for three weeks.  This means no tampons, no intercourse and no douching.    You may have brown, yellowish, pink or red discharge for a few days after the procedure.  As the cervix heals, your vaginal discharge may be clear and watery for a few weeks then gradually tapering off.    You may also experience some bleeding immediately after the LEEP procedure and/or again 7-10 days later as the cervical area heals.  The bleeding should not be heavier than a normal menses.    Do not be concerned if your next menses is delayed and/or the flow is heavier than normal.  This can also be due to the LEEP procedure.    CALL for any of the following concerns:    Fever over 101 degrees F.    Pain not controlled by over the counter analgesics such as Tylenol or Ibuprofen.    Foul-smelling discharge.    Bleeding: more than one pad an hour for two hours.    Any questions regarding the LEEP procedure.    [facility]: [phone] 24 hours a day *Ask for the MD on call

## 2017-10-09 NOTE — MR AVS SNAPSHOT
After Visit Summary   10/9/2017    Tangela Chapa    MRN: [id number]           Patient Information     Date Of Birth          1945        Visit Information        Provider Department      10/9/2017 2:30 PM Macarena Gusman MD; [facility] RESOURCE PROCEDURE [facility]        Today's Diagnoses     High risk HPV infection    -  1      Care Instructions      LEEP (LOOP EXCISION ELECTROCAUTERY PROCEDURE)    The LEEP procedure is done using a local anesthetic to numb the cervix.  While visualizing the area with a colposcopy, a small thing wire loopexcises the abnormal tissue from the cervix.  Cautery is used to control any bleeding.    POST-LEEP INSTRUCTIONS      Limit your activity to 1-2 days following the LEEP procedure.  Avoid strenuous exercises for one week.    To prevent infection or trauma to the surgical site, you should not put anything into the vagina for three weeks.  This means no tampons, no intercourse and no douching.    You may have brown, yellowish, pink or red discharge for a few days after the procedure.  As the cervix heals, your vaginal discharge may be clear and watery for a few weeks then gradually tapering off.    You may also experience some bleeding immediately after the LEEP procedure and/or again 7-10 days later as the cervical area heals.  The bleeding should not be heavier than a normal menses.    Do not be concerned if your next menses is delayed and/or the flow is heavier than normal.  This can also be due to the LEEP procedure.    CALL for any of the following concerns:    Fever over 101 degrees F.    Pain not controlled by over the counter analgesics such as Tylenol or Ibuprofen.    Foul-smelling discharge.    Bleeding: more than one pad an hour for two hours.    Any questions regarding the LEEP procedure.    [facility]: [phone] 24 hours a day *Ask for the MD on call          Follow-ups after your visit        Follow-up  notes from your care team     Return if symptoms worsen or fail to improve, for pending Pathology results.      Who to contact     Please call your clinic at [phone] to:    Ask questions about your health    Make or cancel appointments    Discuss your medicines    Learn about your test results    Speak to your doctor   If you have compliments or concerns about an experience at your clinic, or if you wish to file a complaint, please contact [facility] Patient Relations at [phone] or email us at [email or web address]         Additional Information About Your Visit        [employer] Information     Home Delivery Service (HDS) gives you secure access to your electronic health record. If you see a primary care provider, you can also send messages to your care team and make appointments. If you have questions, please call your primary care clinic.  If you do not have a primary care provider, please call [phone] and they will assist you.      [employer] Service (HDS) is an electronic gateway that provides easy, online access to your medical records. With [employer] Service (HDS), you can request a clinic appointment, read your test results, renew a prescription or communicate with your care team.     To access your existing account, please contact your [facility] or call [phone] for assistance.        Care EveryWhere ID     This is your Care EveryWhere ID. This could be used by other organizations to access your [place] medical records  [id number]        Your Vitals Were     Pulse Breastfeeding? BMI (Body Mass Index)             64 No 28.94 kg/m2          Blood Pressure from Last 3 Encounters:   10/09/17 123/73   09/18/17 133/80   08/28/17 127/78    Weight from Last 3 Encounters:   10/09/17 76.5 kg (168 lb 9.6 oz)   09/18/17 76.2 kg (168 lb)   08/28/17 78.8 kg (173 lb 11.2 oz)              We Performed the Following     LEEP  without colposcopy [[id number]]     Surgical pathology exam [[id number]]         Primary Care Provider Office Phone # Fax #    Logan Aguilar [phone]       [facility] [address] [address]        Equal Access to Services     BAILEY POWELL : Hadii roverto ku suhao Sotammyali, waaxda luqadaha, qaybta kaalmada adebrandon, mo dashdoyle ortez. So [facility] [phone].    ATENCIÓN: Si habla español, tiene a cadet disposición servicios gratuitos de asistencia lingüística. Ayahame al [phone].    We comply with applicable federal civil rights laws and [place] laws. We do not discriminate on the basis of race, color, national origin, age, disability, sex, sexual orientation, or gender identity.            Thank you!     Thank you for choosing [facility]  for your care. Our goal is always to provide you with excellent care. Hearing back from our patients is one way we can continue to improve our services. Please take a few minutes to complete the written survey that you may receive in the mail after your visit with us. Thank you!             Your Updated Medication List - Protect others around you: Learn how to safely use, store and throw away your medicines at www.disposemymeds.org.          This list is accurate as of: 10/9/17  2:57 PM.  Always use your most recent med list.                   Brand Name Dispense Instructions for use Diagnosis    B COMPLEX + C TR PO      Take  by mouth.        calcium 500-125 MG-UNIT Tabs      Take 1 tablet by mouth daily. 2        citalopram 20 MG tablet    celeXA     Take 20 mg by mouth daily.        co-enzyme Q-10 100 MG Caps capsule      Take  by mouth daily.        estradiol 0.5 MG tablet    ESTRACE    90 tablet    Take 1 tablet (0.5 mg) by mouth daily    Menopausal and postmenopausal disorder       estradiol 2 MG vaginal ring    ESTRING    1 each    Place once vaginally and refill as instructed    Menopausal and postmenopausal disorder       EYE DROPS OP      Apply  to  eye. Hydro eye        fish oil-omega-3 fatty acids 1000 MG capsule      Take 1 capsule by mouth daily.        flaxseed oil 1000 MG Caps      Take 2 tablets by mouth 2 times daily.        magnesium 100 MG Tabs      Take 3 tablets by mouth At Bedtime.        NASONEX 50 MCG/ACT spray   Generic drug:  mometasone      2 sprays by Both Nostrils route daily.        NONFORMULARY      1 Dose daily henrik 128 apply small amount to eyes at night        priLOSEC 20 MG CR capsule   Generic drug:  omeprazole      Take 1 capsule by mouth as needed.        progesterone 100 MG capsule    PROMETRIUM    90 capsule    Take 1 capsule (100 mg) by mouth At Bedtime    Menopausal and postmenopausal disorder, Yeast infection of the vagina       RESTASIS 0.05 % ophthalmic emulsion   Generic drug:  cycloSPORINE      1 drop every 12 hours.        simvastatin 40 MG tablet    ZOCOR     Take 1 tablet by mouth At Bedtime.        traZODone 100 MG tablet    DESYREL     Take 1 tablet by mouth At Bedtime.        XALATAN OP      Apply 2 drops to eye At Bedtime. 2.5

## 2017-10-12 LAB — COPATH REPORT: NORMAL

## 2017-10-17 ENCOUNTER — RECORDS - HEALTHEAST (OUTPATIENT)
Dept: ADMINISTRATIVE | Facility: OTHER | Age: 72
End: 2017-10-17

## 2017-10-23 ENCOUNTER — RECORDS - HEALTHEAST (OUTPATIENT)
Dept: MAMMOGRAPHY | Facility: CLINIC | Age: 72
End: 2017-10-23

## 2017-10-23 DIAGNOSIS — Z12.31 ENCOUNTER FOR SCREENING MAMMOGRAM FOR MALIGNANT NEOPLASM OF BREAST: ICD-10-CM

## 2017-10-25 ENCOUNTER — TELEPHONE (OUTPATIENT)
Dept: OBGYN | Facility: CLINIC | Age: 72
End: 2017-10-25

## 2017-10-25 NOTE — TELEPHONE ENCOUNTER
Received call from Tangela who states she is having some very light bleeding since her LEEP on 10/9. She has had a continuous light bleeding since about 2 days after her LEEP. She also notes a foul odor but states that has also been present since immediately after LEEP. No abdominal pain, fevers, body aches, chills. She wants to wait a few more days to see if discharge improves because this has been the same since her procedure and has not worsened. She has also been more active lately going up and down ladders. Instructed her to call if things worsen or do not improve over weekend. She understood plan and had no further questions.

## 2017-10-26 ENCOUNTER — OFFICE VISIT - HEALTHEAST (OUTPATIENT)
Dept: INTERNAL MEDICINE | Facility: CLINIC | Age: 72
End: 2017-10-26

## 2017-10-26 DIAGNOSIS — E78.2 HYPERLIPEMIA, MIXED: ICD-10-CM

## 2017-10-26 DIAGNOSIS — R10.9 LEFT FLANK PAIN: ICD-10-CM

## 2017-10-26 DIAGNOSIS — Z51.81 MEDICATION MONITORING ENCOUNTER: ICD-10-CM

## 2017-10-26 DIAGNOSIS — K63.5 COLON POLYPS: ICD-10-CM

## 2017-10-26 DIAGNOSIS — Z23 NEED FOR INFLUENZA VACCINATION: ICD-10-CM

## 2017-10-26 LAB
CHOLEST SERPL-MCNC: 185 MG/DL
FASTING STATUS PATIENT QL REPORTED: NO
HDLC SERPL-MCNC: 41 MG/DL
LDLC SERPL CALC-MCNC: 109 MG/DL
TRIGL SERPL-MCNC: 176 MG/DL

## 2017-10-26 ASSESSMENT — MIFFLIN-ST. JEOR: SCORE: 1243.19

## 2017-10-30 ENCOUNTER — RECORDS - HEALTHEAST (OUTPATIENT)
Dept: ADMINISTRATIVE | Facility: OTHER | Age: 72
End: 2017-10-30

## 2017-10-30 ENCOUNTER — TELEPHONE (OUTPATIENT)
Dept: OBGYN | Facility: CLINIC | Age: 72
End: 2017-10-30

## 2017-10-30 ENCOUNTER — COMMUNICATION - HEALTHEAST (OUTPATIENT)
Dept: INTERNAL MEDICINE | Facility: CLINIC | Age: 72
End: 2017-10-30

## 2017-10-30 ENCOUNTER — OFFICE VISIT (OUTPATIENT)
Dept: OBGYN | Facility: CLINIC | Age: 72
End: 2017-10-30
Attending: OBSTETRICS & GYNECOLOGY
Payer: MEDICARE

## 2017-10-30 VITALS
BODY MASS INDEX: 28.84 KG/M2 | HEART RATE: 62 BPM | WEIGHT: 168 LBS | SYSTOLIC BLOOD PRESSURE: 147 MMHG | DIASTOLIC BLOOD PRESSURE: 77 MMHG

## 2017-10-30 DIAGNOSIS — Z98.890 S/P LEEP: Primary | ICD-10-CM

## 2017-10-30 PROCEDURE — 99212 OFFICE O/P EST SF 10 MIN: CPT | Mod: ZF

## 2017-10-30 ASSESSMENT — PAIN SCALES - GENERAL: PAINLEVEL: NO PAIN (0)

## 2017-10-30 NOTE — NURSING NOTE
Chief Complaint   Patient presents with     RECHECK     C/O bleeding /foul odor post LEEP   Alyssa Gomez LPN

## 2017-10-30 NOTE — MR AVS SNAPSHOT
After Visit Summary   10/30/2017    Tangela Chapa    MRN: [id number]           Patient Information     Date Of Birth          1945        Visit Information        Provider Department      10/30/2017 4:15 PM Macarena Gusman MD [facility]        Today's Diagnoses     S/P LEEP    -  1       Follow-ups after your visit        Who to contact     Please call your clinic at [phone] to:    Ask questions about your health    Make or cancel appointments    Discuss your medicines    Learn about your test results    Speak to your doctor   If you have compliments or concerns about an experience at your clinic, or if you wish to file a complaint, please contact [facility] Patient Relations at [phone] or email us at [email or web address]         Additional Information About Your Visit        MyChart Information     [employer] gives you secure access to your electronic health record. If you see a primary care provider, you can also send messages to your care team and make appointments. If you have questions, please call your primary care clinic.  If you do not have a primary care provider, please call [phone] and they will assist you.      [employer]TM[employer] is an electronic gateway that provides easy, online access to your medical records. With [employer]TM[employer], you can request a clinic appointment, read your test results, renew a prescription or communicate with your care team.     To access your existing account, please contact your [facility] or call [phone] for assistance.        Care EveryWhere ID     This is your Care EveryWhere ID. This could be used by other organizations to access your [place] medical records  [id number]        Your Vitals Were     Pulse Breastfeeding? BMI (Body Mass Index)             62 No 28.84 kg/m2          Blood Pressure from Last 3 Encounters:   10/30/17 147/77   10/09/17 123/73    09/18/17 133/80    Weight from Last 3 Encounters:   10/30/17 76.2 kg (168 lb)   10/09/17 76.5 kg (168 lb 9.6 oz)   09/18/17 76.2 kg (168 lb)              Today, you had the following     No orders found for display       Primary Care Provider Office Phone # Fax #    Logan Aguilar [phone]       [facility] [address] [address]        Equal Access to Services     BAILEY POWELL : Hadii aad ku hadasho Soomaali, waaxda luqadaha, qaybta kaalmada adeegyada, waxay idiin hayaan sandy chaudhari . So [facility] [phone].    ATENCIÓN: Si habla español, tiene a cadet disposición servicios gratuitos de asistencia lingüística. [facility] [phone].    We comply with applicable federal civil rights laws and [place] laws. We do not discriminate on the basis of race, color, national origin, age, disability, sex, sexual orientation, or gender identity.            Thank you!     Thank you for choosing [facility]  for your care. Our goal is always to provide you with excellent care. Hearing back from our patients is one way we can continue to improve our services. Please take a few minutes to complete the written survey that you may receive in the mail after your visit with us. Thank you!             Your Updated Medication List - Protect others around you: Learn how to safely use, store and throw away your medicines at www.disposemymeds.org.          This list is accurate as of: 10/30/17  8:05 PM.  Always use your most recent med list.                   Brand Name Dispense Instructions for use Diagnosis    B COMPLEX + C TR PO      Take  by mouth.        calcium 500-125 MG-UNIT Tabs      Take 1 tablet by mouth daily. 2        citalopram 20 MG tablet    celeXA     Take 20 mg by mouth daily.        co-enzyme Q-10 100 MG Caps capsule      Take  by mouth daily.        estradiol 0.5 MG tablet    ESTRACE    90 tablet    Take 1 tablet (0.5 mg) by mouth daily     Menopausal and postmenopausal disorder       estradiol 2 MG vaginal ring    ESTRING    1 each    Place once vaginally and refill as instructed    Menopausal and postmenopausal disorder       EYE DROPS OP      Apply  to eye. Hydro eye        fish oil-omega-3 fatty acids 1000 MG capsule      Take 1 capsule by mouth daily.        flaxseed oil 1000 MG Caps      Take 2 tablets by mouth 2 times daily.        magnesium 100 MG Tabs      Take 3 tablets by mouth At Bedtime.        NASONEX 50 MCG/ACT spray   Generic drug:  mometasone      2 sprays by Both Nostrils route daily.        NONFORMULARY      1 Dose daily henrik 128 apply small amount to eyes at night        priLOSEC 20 MG CR capsule   Generic drug:  omeprazole      Take 1 capsule by mouth as needed.        progesterone 100 MG capsule    PROMETRIUM    90 capsule    Take 1 capsule (100 mg) by mouth At Bedtime    Menopausal and postmenopausal disorder, Yeast infection of the vagina       RESTASIS 0.05 % ophthalmic emulsion   Generic drug:  cycloSPORINE      1 drop every 12 hours.        simvastatin 40 MG tablet    ZOCOR     Take 1 tablet by mouth At Bedtime.        traZODone 100 MG tablet    DESYREL     Take 1 tablet by mouth At Bedtime.        XALATAN OP      Apply 2 drops to eye At Bedtime. 2.5

## 2017-10-30 NOTE — TELEPHONE ENCOUNTER
Spoke to Tangela who continues to have vaginal bleeding with odor noted since her LEEP 10/5/17,scheduled her with Dr Gusman for this afternoon.Pt indicated understanding and agreed with plan.

## 2017-10-31 NOTE — PROGRESS NOTES
Bleeding and malodorous d/c since LEEP 10/9.  Path neg.      Vitals:    10/30/17 1621   BP: 147/77   Pulse: 62   Weight: 76.2 kg (168 lb)     Spec exam w/ raw LEEP bed, no infectious appearance.   Small bleeding from leep bed.     A/p:  73 yo  female w/ ongoing HPV infection s/p LEEP 10/9 with ongoing bleeding.      1. LEEP bed cauterized with silver nitrate and monsels solution.      2. F/u prn    Macarena Gusman MD, FACOG  [facility]  OB/GYN    10/30/2017  8:02 PM

## 2017-12-04 ENCOUNTER — COMMUNICATION - HEALTHEAST (OUTPATIENT)
Dept: INTERNAL MEDICINE | Facility: CLINIC | Age: 72
End: 2017-12-04

## 2017-12-04 DIAGNOSIS — E78.5 HYPERLIPEMIA: ICD-10-CM

## 2018-01-03 ENCOUNTER — COMMUNICATION - HEALTHEAST (OUTPATIENT)
Dept: INTERNAL MEDICINE | Facility: CLINIC | Age: 73
End: 2018-01-03

## 2018-01-04 ENCOUNTER — TRANSFERRED RECORDS (OUTPATIENT)
Dept: HEALTH INFORMATION MANAGEMENT | Facility: CLINIC | Age: 73
End: 2018-01-04

## 2018-01-04 ENCOUNTER — RECORDS - HEALTHEAST (OUTPATIENT)
Dept: ADMINISTRATIVE | Facility: OTHER | Age: 73
End: 2018-01-04

## 2018-01-05 ENCOUNTER — RECORDS - HEALTHEAST (OUTPATIENT)
Dept: ADMINISTRATIVE | Facility: OTHER | Age: 73
End: 2018-01-05

## 2018-01-15 ENCOUNTER — RECORDS - HEALTHEAST (OUTPATIENT)
Dept: ADMINISTRATIVE | Facility: OTHER | Age: 73
End: 2018-01-15

## 2018-01-16 ENCOUNTER — COMMUNICATION - HEALTHEAST (OUTPATIENT)
Dept: INTERNAL MEDICINE | Facility: CLINIC | Age: 73
End: 2018-01-16

## 2018-01-16 DIAGNOSIS — M06.9 RA (RHEUMATOID ARTHRITIS) (H): ICD-10-CM

## 2018-02-26 ENCOUNTER — COMMUNICATION - HEALTHEAST (OUTPATIENT)
Dept: INTERNAL MEDICINE | Facility: CLINIC | Age: 73
End: 2018-02-26

## 2018-02-26 DIAGNOSIS — G47.00 INSOMNIA: ICD-10-CM

## 2018-03-22 ENCOUNTER — COMMUNICATION - HEALTHEAST (OUTPATIENT)
Dept: INTERNAL MEDICINE | Facility: CLINIC | Age: 73
End: 2018-03-22

## 2018-03-22 DIAGNOSIS — K21.9 ESOPHAGEAL REFLUX: ICD-10-CM

## 2018-03-23 ENCOUNTER — COMMUNICATION - HEALTHEAST (OUTPATIENT)
Dept: SCHEDULING | Facility: CLINIC | Age: 73
End: 2018-03-23

## 2018-03-23 DIAGNOSIS — J40 BRONCHITIS: ICD-10-CM

## 2018-05-10 ENCOUNTER — COMMUNICATION - HEALTHEAST (OUTPATIENT)
Dept: INTERNAL MEDICINE | Facility: CLINIC | Age: 73
End: 2018-05-10

## 2018-05-10 DIAGNOSIS — F41.9 ANXIETY: ICD-10-CM

## 2018-06-06 ENCOUNTER — COMMUNICATION - HEALTHEAST (OUTPATIENT)
Dept: INTERNAL MEDICINE | Facility: CLINIC | Age: 73
End: 2018-06-06

## 2018-06-06 DIAGNOSIS — E78.5 HYPERLIPEMIA: ICD-10-CM

## 2018-07-02 ENCOUNTER — OFFICE VISIT - HEALTHEAST (OUTPATIENT)
Dept: PODIATRY | Facility: CLINIC | Age: 73
End: 2018-07-02

## 2018-07-02 DIAGNOSIS — L84 TYLOMA: ICD-10-CM

## 2018-07-02 DIAGNOSIS — L60.0 INGROWN TOENAIL: ICD-10-CM

## 2018-07-06 ENCOUNTER — COMMUNICATION - HEALTHEAST (OUTPATIENT)
Dept: INTERNAL MEDICINE | Facility: CLINIC | Age: 73
End: 2018-07-06

## 2018-07-09 ENCOUNTER — OFFICE VISIT - HEALTHEAST (OUTPATIENT)
Dept: PODIATRY | Facility: CLINIC | Age: 73
End: 2018-07-09

## 2018-07-09 DIAGNOSIS — L60.0 INGROWN TOENAIL: ICD-10-CM

## 2018-09-07 DIAGNOSIS — B37.31 YEAST INFECTION OF THE VAGINA: ICD-10-CM

## 2018-09-07 DIAGNOSIS — N95.9 MENOPAUSAL AND POSTMENOPAUSAL DISORDER: ICD-10-CM

## 2018-09-07 RX ORDER — ESTRADIOL 0.5 MG/1
0.5 TABLET ORAL DAILY
Qty: 30 TABLET | Refills: 0 | Status: SHIPPED | OUTPATIENT
Start: 2018-09-07 | End: 2018-10-29

## 2018-09-07 NOTE — TELEPHONE ENCOUNTER
Received refill request for progesterone.  Last in clinic for annual 8/2017 (several focused visits after that).     Tried to reach Rosmery but received voicemail.  Left message that refill request was received and a one month supply can be sent to pharmacy  - will also send refill of others medicine also - but office visit is due for further refills. Please call [phone] to schedule.

## 2018-09-26 ENCOUNTER — COMMUNICATION - HEALTHEAST (OUTPATIENT)
Dept: INTERNAL MEDICINE | Facility: CLINIC | Age: 73
End: 2018-09-26

## 2018-09-26 DIAGNOSIS — K21.9 ESOPHAGEAL REFLUX: ICD-10-CM

## 2018-09-26 DIAGNOSIS — M06.9 RA (RHEUMATOID ARTHRITIS) (H): ICD-10-CM

## 2018-10-12 ENCOUNTER — OFFICE VISIT - HEALTHEAST (OUTPATIENT)
Dept: INTERNAL MEDICINE | Facility: CLINIC | Age: 73
End: 2018-10-12

## 2018-10-12 DIAGNOSIS — Z23 FLU VACCINE NEED: ICD-10-CM

## 2018-10-12 DIAGNOSIS — Z00.00 MEDICARE ANNUAL WELLNESS VISIT, SUBSEQUENT: ICD-10-CM

## 2018-10-12 DIAGNOSIS — F90.2 ATTENTION DEFICIT HYPERACTIVITY DISORDER (ADHD), COMBINED TYPE: ICD-10-CM

## 2018-10-12 DIAGNOSIS — Z51.81 MEDICATION MONITORING ENCOUNTER: ICD-10-CM

## 2018-10-12 DIAGNOSIS — E78.2 MIXED HYPERLIPIDEMIA: ICD-10-CM

## 2018-10-12 DIAGNOSIS — E66.9 OBESITY WITH BODY MASS INDEX (BMI) OF 30.0 TO 39.9: ICD-10-CM

## 2018-10-12 DIAGNOSIS — D12.6 BENIGN NEOPLASM OF COLON, UNSPECIFIED PART OF COLON: ICD-10-CM

## 2018-10-12 DIAGNOSIS — F33.42 MAJOR DEPRESSIVE DISORDER, RECURRENT EPISODE, IN FULL REMISSION (H): ICD-10-CM

## 2018-10-12 LAB
ALBUMIN SERPL-MCNC: 3.4 G/DL (ref 3.5–5)
ALBUMIN UR-MCNC: NEGATIVE MG/DL
ALP SERPL-CCNC: 61 U/L (ref 45–120)
ALT SERPL W P-5'-P-CCNC: 20 U/L (ref 0–45)
ANION GAP SERPL CALCULATED.3IONS-SCNC: 9 MMOL/L (ref 5–18)
APPEARANCE UR: ABNORMAL
AST SERPL W P-5'-P-CCNC: 19 U/L (ref 0–40)
BACTERIA #/AREA URNS HPF: ABNORMAL HPF
BILIRUB SERPL-MCNC: 0.7 MG/DL (ref 0–1)
BILIRUB UR QL STRIP: NEGATIVE
BUN SERPL-MCNC: 17 MG/DL (ref 8–28)
CALCIUM SERPL-MCNC: 8.6 MG/DL (ref 8.5–10.5)
CHLORIDE BLD-SCNC: 106 MMOL/L (ref 98–107)
CHOLEST SERPL-MCNC: 191 MG/DL
CO2 SERPL-SCNC: 26 MMOL/L (ref 22–31)
COLOR UR AUTO: YELLOW
CREAT SERPL-MCNC: 0.66 MG/DL (ref 0.6–1.1)
ERYTHROCYTE [DISTWIDTH] IN BLOOD BY AUTOMATED COUNT: 12 % (ref 11–14.5)
FASTING STATUS PATIENT QL REPORTED: YES
GFR SERPL CREATININE-BSD FRML MDRD: >60 ML/MIN/1.73M2
GLUCOSE BLD-MCNC: 88 MG/DL (ref 70–125)
GLUCOSE UR STRIP-MCNC: NEGATIVE MG/DL
HCT VFR BLD AUTO: 37.1 % (ref 35–47)
HDLC SERPL-MCNC: 57 MG/DL
HGB BLD-MCNC: 12.4 G/DL (ref 12–16)
HGB UR QL STRIP: ABNORMAL
KETONES UR STRIP-MCNC: NEGATIVE MG/DL
LDLC SERPL CALC-MCNC: 109 MG/DL
LEUKOCYTE ESTERASE UR QL STRIP: ABNORMAL
MAGNESIUM SERPL-MCNC: 2.2 MG/DL (ref 1.8–2.6)
MCH RBC QN AUTO: 30.9 PG (ref 27–34)
MCHC RBC AUTO-ENTMCNC: 33.4 G/DL (ref 32–36)
MCV RBC AUTO: 92 FL (ref 80–100)
NITRATE UR QL: NEGATIVE
PH UR STRIP: 6 [PH] (ref 5–8)
PLATELET # BLD AUTO: 249 THOU/UL (ref 140–440)
PMV BLD AUTO: 7.6 FL (ref 7–10)
POTASSIUM BLD-SCNC: 3.9 MMOL/L (ref 3.5–5)
PROT SERPL-MCNC: 6.4 G/DL (ref 6–8)
RBC # BLD AUTO: 4.02 MILL/UL (ref 3.8–5.4)
RBC #/AREA URNS AUTO: ABNORMAL HPF
SODIUM SERPL-SCNC: 141 MMOL/L (ref 136–145)
SP GR UR STRIP: 1.02 (ref 1–1.03)
SQUAMOUS #/AREA URNS AUTO: ABNORMAL LPF
TRIGL SERPL-MCNC: 125 MG/DL
UROBILINOGEN UR STRIP-ACNC: ABNORMAL
WBC #/AREA URNS AUTO: ABNORMAL HPF
WBC: 6.3 THOU/UL (ref 4–11)

## 2018-10-12 ASSESSMENT — MIFFLIN-ST. JEOR: SCORE: 1230.18

## 2018-10-14 LAB — BACTERIA SPEC CULT: ABNORMAL

## 2018-10-15 ENCOUNTER — COMMUNICATION - HEALTHEAST (OUTPATIENT)
Dept: INTERNAL MEDICINE | Facility: CLINIC | Age: 73
End: 2018-10-15

## 2018-10-29 ENCOUNTER — OFFICE VISIT (OUTPATIENT)
Dept: OBGYN | Facility: CLINIC | Age: 73
End: 2018-10-29
Attending: OBSTETRICS & GYNECOLOGY
Payer: MEDICARE

## 2018-10-29 ENCOUNTER — RECORDS - HEALTHEAST (OUTPATIENT)
Dept: ADMINISTRATIVE | Facility: OTHER | Age: 73
End: 2018-10-29

## 2018-10-29 VITALS
DIASTOLIC BLOOD PRESSURE: 75 MMHG | HEIGHT: 64 IN | WEIGHT: 168 LBS | SYSTOLIC BLOOD PRESSURE: 124 MMHG | BODY MASS INDEX: 28.68 KG/M2 | HEART RATE: 61 BPM

## 2018-10-29 DIAGNOSIS — B37.31 YEAST INFECTION OF THE VAGINA: ICD-10-CM

## 2018-10-29 DIAGNOSIS — N95.9 MENOPAUSAL AND POSTMENOPAUSAL DISORDER: ICD-10-CM

## 2018-10-29 DIAGNOSIS — Z86.19 HISTORY OF HPV INFECTION: Primary | ICD-10-CM

## 2018-10-29 LAB
HPV_EXT - HISTORICAL: ABNORMAL
PAP SMEAR - HIM PATIENT REPORTED: NORMAL

## 2018-10-29 PROCEDURE — G0463 HOSPITAL OUTPT CLINIC VISIT: HCPCS | Mod: ZF

## 2018-10-29 PROCEDURE — 88175 CYTOPATH C/V AUTO FLUID REDO: CPT | Performed by: OBSTETRICS & GYNECOLOGY

## 2018-10-29 PROCEDURE — 87624 HPV HI-RISK TYP POOLED RSLT: CPT | Performed by: OBSTETRICS & GYNECOLOGY

## 2018-10-29 PROCEDURE — G0476 HPV COMBO ASSAY CA SCREEN: HCPCS | Performed by: OBSTETRICS & GYNECOLOGY

## 2018-10-29 RX ORDER — SIMVASTATIN 40 MG
40 TABLET ORAL AT BEDTIME
Qty: 30 TABLET | Status: CANCELLED | OUTPATIENT
Start: 2018-10-29

## 2018-10-29 RX ORDER — ESTRADIOL 0.5 MG/1
0.5 TABLET ORAL DAILY
Qty: 90 TABLET | Refills: 3 | Status: SHIPPED | OUTPATIENT
Start: 2018-10-29 | End: 2019-11-26

## 2018-10-29 ASSESSMENT — ANXIETY QUESTIONNAIRES
1. FEELING NERVOUS, ANXIOUS, OR ON EDGE: SEVERAL DAYS
6. BECOMING EASILY ANNOYED OR IRRITABLE: SEVERAL DAYS
5. BEING SO RESTLESS THAT IT IS HARD TO SIT STILL: SEVERAL DAYS
3. WORRYING TOO MUCH ABOUT DIFFERENT THINGS: SEVERAL DAYS
2. NOT BEING ABLE TO STOP OR CONTROL WORRYING: SEVERAL DAYS
7. FEELING AFRAID AS IF SOMETHING AWFUL MIGHT HAPPEN: SEVERAL DAYS
GAD7 TOTAL SCORE: 7

## 2018-10-29 ASSESSMENT — PAIN SCALES - GENERAL: PAINLEVEL: NO PAIN (0)

## 2018-10-29 ASSESSMENT — PATIENT HEALTH QUESTIONNAIRE - PHQ9
SUM OF ALL RESPONSES TO PHQ QUESTIONS 1-9: 1
5. POOR APPETITE OR OVEREATING: SEVERAL DAYS

## 2018-10-29 NOTE — MR AVS SNAPSHOT
"              After Visit Summary   10/29/2018    Tangela Chapa    MRN: [id number]           Patient Information     Date Of Birth          1945        Visit Information        Provider Department      10/29/2018 3:00 PM Macarena Gusman MD [facility]        Today's Diagnoses     History of HPV infection    -  1    Menopausal and postmenopausal disorder        Yeast infection of the vagina           Follow-ups after your visit        Who to contact     Please call your clinic at [phone] to:    Ask questions about your health    Make or cancel appointments    Discuss your medicines    Learn about your test results    Speak to your doctor            Additional Information About Your Visit        MyChart Information     [employer] gives you secure access to your electronic health record. If you see a primary care provider, you can also send messages to your care team and make appointments. If you have questions, please call your primary care clinic.  If you do not have a primary care provider, please call [phone] and they will assist you.      [employer] is an electronic gateway that provides easy, online access to your medical records. With [employer], you can request a clinic appointment, read your test results, renew a prescription or communicate with your care team.     To access your existing account, please contact your [facility] or call [phone] for assistance.        Care EveryWhere ID     This is your Care EveryWhere ID. This could be used by other organizations to access your [place] medical records  [id number]        Your Vitals Were     Pulse Height Breastfeeding? BMI (Body Mass Index)          61 1.626 m (5' 4\") No 28.84 kg/m2         Blood Pressure from Last 3 Encounters:   10/29/18 124/75   10/30/17 147/77   10/09/17 123/73    Weight from Last 3 Encounters:   10/29/18 76.2 kg (168 lb)   10/30/17 76.2 kg (168 lb)   10/09/17 76.5 " kg (168 lb 9.6 oz)              We Performed the Following     HPV High Risk Types DNA Cervical     Pap imaged thin layer diagnostic with HPV (select HPV order below)          Where to get your medicines      These medications were sent to [facility] [address]  [address] [address]     Phone:  [phone]     estradiol 0.5 MG tablet    estradiol 2 MG vaginal ring    progesterone 100 MG capsule          Primary Care Provider Office Phone # Fax #    Logan Aguilar [phone]       [facility] [address] [address]        Equal Access to Services     [facility]: Hadii aad ku hadasho Soomaali, waaxda luqadaha, qaybta kaalmada adeegyada, mo annein haydoyle chaudhari . So [facility] [phone].    ATENCIÓN: Si habla español, tiene a cadet disposición servicios gratuitos de asistencia lingüística. [facility] [phone].    We comply with applicable federal civil rights laws and [place] laws. We do not discriminate on the basis of race, color, national origin, age, disability, sex, sexual orientation, or gender identity.            Thank you!     Thank you for choosing [facility]  for your care. Our goal is always to provide you with excellent care. Hearing back from our patients is one way we can continue to improve our services. Please take a few minutes to complete the written survey that you may receive in the mail after your visit with us. Thank you!             Your Updated Medication List - Protect others around you: Learn how to safely use, store and throw away your medicines at www.disposemymeds.org.          This list is accurate as of 10/29/18 11:59 PM.  Always use your most recent med list.                   Brand Name Dispense Instructions for use Diagnosis    B COMPLEX + C TR PO      Take  by mouth.        calcium 500-125 MG-UNIT Tabs      Take 1 tablet by mouth daily. 2         citalopram 20 MG tablet    celeXA     Take 20 mg by mouth daily.        co-enzyme Q-10 100 MG Caps capsule      Take  by mouth daily.        estradiol 0.5 MG tablet    ESTRACE    90 tablet    Take 1 tablet (0.5 mg) by mouth daily    Menopausal and postmenopausal disorder       estradiol 2 MG vaginal ring    ESTRING    1 each    Place once vaginally and refill as instructed    Menopausal and postmenopausal disorder       EYE DROPS OP      Apply  to eye. Hydro eye        fish oil-omega-3 fatty acids 1000 MG capsule      Take 1 capsule by mouth daily.        flaxseed oil 1000 MG Caps      Take 2 tablets by mouth 2 times daily.        magnesium 100 MG Tabs      Take 3 tablets by mouth At Bedtime.        NASONEX 50 MCG/ACT spray   Generic drug:  mometasone      2 sprays by Both Nostrils route daily.        NONFORMULARY      1 Dose daily henrik 128 apply small amount to eyes at night        priLOSEC 20 MG CR capsule   Generic drug:  omeprazole      Take 1 capsule by mouth as needed.        progesterone 100 MG capsule    PROMETRIUM    90 capsule    Take 1 capsule (100 mg) by mouth At Bedtime    Menopausal and postmenopausal disorder, Yeast infection of the vagina       RESTASIS 0.05 % ophthalmic emulsion   Generic drug:  cycloSPORINE      1 drop every 12 hours.        simvastatin 40 MG tablet    ZOCOR     Take 1 tablet by mouth At Bedtime.        traZODone 100 MG tablet    DESYREL     Take 1 tablet by mouth At Bedtime.        XALATAN OP      Apply 2 drops to eye At Bedtime. 2.5

## 2018-10-29 NOTE — LETTER
10/29/2018       RE: Tangela Chapa  [address] [facility] [address]     Dear Colleague,    Thank you for referring your patient, Tangela Chapa, to the [facility] at [facility]. Please see a copy of my visit note below.    Progress Note    SUBJECTIVE:  Tangela Chapa is an 73 year old , who requests a breast and pelvic exam.    Concerns today include: hormone replacement refill. Declines any dose change or discontinuation of hormones.     Menstrual History:  Menstrual History 2016   Period Cycle (Days) menapausal    Reviewed Today Yes       Last    Lab Results   Component Value Date    PAP NIL 2017     History of abnormal Pap smear:   NO - age 67 - see link Cervical Cytology Screening Guidelines  Last 3 Pap and HPV Results:   PAP / HPV Latest Ref Rng & Units 2017   PAP - NIL NIL NIL   HPV 16 DNA NEG:Negative Negative Negative -   HPV 18 DNA NEG:Negative Negative Negative -   OTHER HR HPV NEG:Negative Positive(A) Positive(A) -   HPVSUR RESULT - - - -       Last   Lab Results   Component Value Date    HPV16 Negative 2017     Last   Lab Results   Component Value Date    HPV18 Negative 2017     Last   Lab Results   Component Value Date    HRHPV Positive 2017       Mammogram current: yes and not applicable    HISTORY:  Prescription Medications as of 10/30/2018             B Complex-C-Folic Acid (B COMPLEX + C TR PO) Take  by mouth.    Calcium 500-125 MG-UNIT TABS Take 1 tablet by mouth daily. 2    citalopram (CELEXA) 20 MG tablet Take 20 mg by mouth daily.    co-enzyme Q-10 (COQ10) 100 MG CAPS Take  by mouth daily.    cycloSPORINE (RESTASIS) 0.05 % ophthalmic emulsion 1 drop every 12 hours.    estradiol (ESTRACE) 0.5 MG tablet Take 1 tablet (0.5 mg) by mouth daily    estradiol (ESTRING) 2 MG vaginal ring Place once vaginally and refill as instructed    fish oil-omega-3 fatty acids (FISH OIL)  1000 MG capsule Take 1 capsule by mouth daily.    Flaxseed, Linseed, (FLAXSEED OIL) 1000 MG CAPS Take 2 tablets by mouth 2 times daily.    Latanoprost (XALATAN OP) Apply 2 drops to eye At Bedtime. 2.5    Magnesium 100 MG TABS Take 3 tablets by mouth At Bedtime.    mometasone (NASONEX) 50 MCG/ACT nasal spray 2 sprays by Both Nostrils route daily.    NONFORMULARY 1 Dose daily henrik 128 apply small amount to eyes at night    omeprazole (PRILOSEC) 20 MG capsule Take 1 capsule by mouth as needed.    progesterone (PROMETRIUM) 100 MG capsule Take 1 capsule (100 mg) by mouth At Bedtime    simvastatin (ZOCOR) 40 MG tablet Take 1 tablet by mouth At Bedtime.    Tetrahydrozoline HCl (EYE DROPS OP) Apply  to eye. Hydro eye    TRAZodone (DESYREL) 100 MG tablet Take 1 tablet by mouth At Bedtime.        Allergies   Allergen Reactions     Dust Mite Extract      Nkda [No Known Drug Allergies]      Seasonal Allergies        There is no immunization history on file for this patient.    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     Past Surgical History:   Procedure Laterality Date     ABDOMINOPLASTY       ARTHROSCOPY KNEE IRRIGATION AND DEBRIDEMENT      RT-Articular Cartilage     BIOPSY BREAST      benign open brest biopsy     COLPOSCOPY, BIOPSY, COMBINED  2/16/10    TZ-not seen     ENDOMETRIAL SAMPLING (BIOPSY)  3/31/05    benign     GENITOURINARY SURGERY  2011    cystoscopy with +1 trabeculaion     JOINT REPLACEMENT, HIP RT/LT  2008    Joint Replacement Hip RT/LT     RELEASE CARPAL TUNNEL BILATERAL       Family History   Problem Relation Age of Onset     Alcohol/Drug Father      Depression Father      Alcohol/Drug  "Brother      Breast Cancer Mother 60     and PC xs 2 and MC age 69     Depression Mother      Diabetes Maternal Grandfather      Cancer Brother 68     blood cancer     Social History     Social History     Marital status: Single     Spouse name: N/A     Number of children: N/A     Years of education: N/A     Social History Main Topics     Smoking status: Former Smoker     Smokeless tobacco: Never Used      Comment: quit 30 years ago     Alcohol use 0.0 - 0.5 oz/week     0 - 1 drink(s) per week      Comment: Rare     Drug use: No     Sexual activity: Not Currently     Partners: Male     Birth control/ protection: Post-menopausal     Other Topics Concern      Service No     Blood Transfusions No     Caffeine Concern No     3-4 pop/d (diet)     Occupational Exposure No     Hobby Hazards No     Sleep Concern Yes     sleeps too much -- tired after 9-10 hrs/nite     Stress Concern Yes     holiday lonliness     Weight Concern Yes     admits to eating more than she uses -- declines wt today     Special Diet No     Back Care No     Exercise Yes     sedentary -- plans to restart     Bike Helmet No     Seat Belt No     Self-Exams No     Social History Narrative    How much exercise per week? Stationary bike, recent weight loss    How much calcium per day? Supplement       How much caffeine per day? 2 cans a day    How much vitamin D per day? supplment    Do you/your family wear seatbelts?  Yes    Do you/your family use safety helmets? Yes    Do you/your family use sunscreen? Yes    Do you/your family keep firearms in the home? Yes, locked    Do you/your family have a smoke detector(s)? Yes        Do you feel safe in your home? Yes    Has anyone ever touched you in an unwanted manner? No     Explain     Updated 8/28 CHIKI Santiago              EXAM:  Blood pressure 124/75, pulse 61, height 1.626 m (5' 4\"), weight 76.2 kg (168 lb), not currently breastfeeding. Body mass index is 28.84 kg/(m^2).  General appearance: Pleasant " female in no acute distress.     BREAST EXAM:  Breast: Without visible skin changes. No dimpling or lesions seen.   Breasts supple, non-tender with palpation, no dominant mass, nodularity, or nipple discharge noted bilaterally. Axillary nodes negative.      PELVIC EXAM:  EG/BUS: Normal genital architecture without lesions, erythema or abnormal secretions. Normal genital architecture without lesions, erythema or abnormal secretions Bartholin's, Urethra, [place]'s normal   Urethral meatus: normal   Urethra: no masses, tenderness, or scarring   Bladder: no masses or tenderness   Vagina: moist, pink, rugae and atrophic, thin, dry with odorless and physiologic discharge  secretions  Cervix: no lesions and pale, dry, stenotic  Uterus: midposition, and small, smooth, firm, mobile w/o pain  Adnexa: Within normal limits and No masses, nodularity, tenderness  Rectum:anus normal       ASSESSMENT:  Encounter Diagnoses   Name Primary?     Menopausal and postmenopausal disorder      Yeast infection of the vagina      History of HPV infection Yes      73 year old Female Pelvic and Breast Exam  HRT Surveillance    PLAN:   Orders Placed This Encounter   Procedures     Pap imaged thin layer diagnostic with HPV (select HPV order below)     HPV High Risk Types DNA Cervical     Had LEEP and EMB 10/17 with negative findings.  Due for f/u LEEP.  Return in one year/PRN for preventive care or problems/concerns.    Refill of rx for HT.      Verbalized understanding and agreement with visit plan.    Macarena Gusman MD, FACOG  [facility]  OB/GYN    10/30/2018  10:31 AM

## 2018-10-30 ASSESSMENT — ANXIETY QUESTIONNAIRES: GAD7 TOTAL SCORE: 7

## 2018-10-30 NOTE — PROGRESS NOTES
Progress Note    SUBJECTIVE:  Tangela Chapa is an 73 year old , who requests a breast and pelvic exam.    Concerns today include: hormone replacement refill. Declines any dose change or discontinuation of hormones.     Menstrual History:  Menstrual History 2016   Period Cycle (Days) menapausal    Reviewed Today Yes       Last    Lab Results   Component Value Date    PAP NIL 2017     History of abnormal Pap smear:   NO - age 67 - see link Cervical Cytology Screening Guidelines  Last 3 Pap and HPV Results:   PAP / HPV Latest Ref Rng & Units 2017   PAP - NIL NIL NIL   HPV 16 DNA NEG:Negative Negative Negative -   HPV 18 DNA NEG:Negative Negative Negative -   OTHER HR HPV NEG:Negative Positive(A) Positive(A) -   HPVSUR RESULT - - - -       Last   Lab Results   Component Value Date    HPV16 Negative 2017     Last   Lab Results   Component Value Date    HPV18 Negative 2017     Last   Lab Results   Component Value Date    HRHPV Positive 2017       Mammogram current: yes and not applicable    HISTORY:  Prescription Medications as of 10/30/2018             B Complex-C-Folic Acid (B COMPLEX + C TR PO) Take  by mouth.    Calcium 500-125 MG-UNIT TABS Take 1 tablet by mouth daily. 2    citalopram (CELEXA) 20 MG tablet Take 20 mg by mouth daily.    co-enzyme Q-10 (COQ10) 100 MG CAPS Take  by mouth daily.    cycloSPORINE (RESTASIS) 0.05 % ophthalmic emulsion 1 drop every 12 hours.    estradiol (ESTRACE) 0.5 MG tablet Take 1 tablet (0.5 mg) by mouth daily    estradiol (ESTRING) 2 MG vaginal ring Place once vaginally and refill as instructed    fish oil-omega-3 fatty acids (FISH OIL) 1000 MG capsule Take 1 capsule by mouth daily.    Flaxseed, Linseed, (FLAXSEED OIL) 1000 MG CAPS Take 2 tablets by mouth 2 times daily.    Latanoprost (XALATAN OP) Apply 2 drops to eye At Bedtime. 2.5    Magnesium 100 MG TABS Take 3 tablets by mouth At Bedtime.    mometasone (NASONEX) 50  MCG/ACT nasal spray 2 sprays by Both Nostrils route daily.    NONFORMULARY 1 Dose daily henrik 128 apply small amount to eyes at night    omeprazole (PRILOSEC) 20 MG capsule Take 1 capsule by mouth as needed.    progesterone (PROMETRIUM) 100 MG capsule Take 1 capsule (100 mg) by mouth At Bedtime    simvastatin (ZOCOR) 40 MG tablet Take 1 tablet by mouth At Bedtime.    Tetrahydrozoline HCl (EYE DROPS OP) Apply  to eye. Hydro eye    TRAZodone (DESYREL) 100 MG tablet Take 1 tablet by mouth At Bedtime.        Allergies   Allergen Reactions     Dust Mite Extract      Nkda [No Known Drug Allergies]      Seasonal Allergies        There is no immunization history on file for this patient.    Obstetric History       T0      L0     SAB0   TAB0   Ectopic0   Multiple0   Live Births0      Past Medical History:   Diagnosis Date     Chronic UTI     controlled w PO and vaginal estrogen tx     YESSICA I (cervical intraepithelial neoplasia I) 14: Pap NIL; HR HPV neg -> D/C screening     Vaginal dysplasia     cryo     Past Surgical History:   Procedure Laterality Date     ABDOMINOPLASTY       ARTHROSCOPY KNEE IRRIGATION AND DEBRIDEMENT      RT-Articular Cartilage     BIOPSY BREAST      benign open brest biopsy     COLPOSCOPY, BIOPSY, COMBINED  2/16/10    TZ-not seen     ENDOMETRIAL SAMPLING (BIOPSY)  3/31/05    benign     GENITOURINARY SURGERY  2011    cystoscopy with +1 trabeculaion     JOINT REPLACEMENT, HIP RT/LT  2008    Joint Replacement Hip RT/LT     RELEASE CARPAL TUNNEL BILATERAL       Family History   Problem Relation Age of Onset     Alcohol/Drug Father      Depression Father      Alcohol/Drug Brother      Breast Cancer Mother 60     and PC xs 2 and MC age 69     Depression Mother      Diabetes Maternal Grandfather      Cancer Brother 68     blood cancer     Social History     Social History     Marital status: Single     Spouse name: N/A     Number of children: N/A     Years of  "education: N/A     Social History Main Topics     Smoking status: Former Smoker     Smokeless tobacco: Never Used      Comment: quit 30 years ago     Alcohol use 0.0 - 0.5 oz/week     0 - 1 drink(s) per week      Comment: Rare     Drug use: No     Sexual activity: Not Currently     Partners: Male     Birth control/ protection: Post-menopausal     Other Topics Concern      Service No     Blood Transfusions No     Caffeine Concern No     3-4 pop/d (diet)     Occupational Exposure No     Hobby Hazards No     Sleep Concern Yes     sleeps too much -- tired after 9-10 hrs/nite     Stress Concern Yes     holiday lonliness     Weight Concern Yes     admits to eating more than she uses -- declines wt today     Special Diet No     Back Care No     Exercise Yes     sedentary -- plans to restart     Bike Helmet No     Seat Belt No     Self-Exams No     Social History Narrative    How much exercise per week? Stationary bike, recent weight loss    How much calcium per day? Supplement       How much caffeine per day? 2 cans a day    How much vitamin D per day? supplment    Do you/your family wear seatbelts?  Yes    Do you/your family use safety helmets? Yes    Do you/your family use sunscreen? Yes    Do you/your family keep firearms in the home? Yes, locked    Do you/your family have a smoke detector(s)? Yes        Do you feel safe in your home? Yes    Has anyone ever touched you in an unwanted manner? No     Explain     Updated 8/28 CHIKI Santiago    EXAM:  Blood pressure 124/75, pulse 61, height 1.626 m (5' 4\"), weight 76.2 kg (168 lb), not currently breastfeeding. Body mass index is 28.84 kg/(m^2).  General appearance: Pleasant female in no acute distress.     BREAST EXAM:  Breast: Without visible skin changes. No dimpling or lesions seen.   Breasts supple, non-tender with palpation, no dominant mass, nodularity, or nipple discharge noted bilaterally. Axillary nodes negative.      PELVIC EXAM:  EG/BUS: " Normal genital architecture without lesions, erythema or abnormal secretions. Normal genital architecture without lesions, erythema or abnormal secretions Bartholin's, Urethra, [place]'s normal   Urethral meatus: normal   Urethra: no masses, tenderness, or scarring   Bladder: no masses or tenderness   Vagina: moist, pink, rugae and atrophic, thin, dry with odorless and physiologic discharge  secretions  Cervix: no lesions and pale, dry, stenotic  Uterus: midposition, and small, smooth, firm, mobile w/o pain  Adnexa: Within normal limits and No masses, nodularity, tenderness  Rectum:anus normal       ASSESSMENT:  Encounter Diagnoses   Name Primary?     Menopausal and postmenopausal disorder      Yeast infection of the vagina      History of HPV infection Yes      73 year old Female Pelvic and Breast Exam  HRT Surveillance    PLAN:   Orders Placed This Encounter   Procedures     Pap imaged thin layer diagnostic with HPV (select HPV order below)     HPV High Risk Types DNA Cervical     Had LEEP and EMB 10/17 with negative findings.  Due for f/u LEEP.  Return in one year/PRN for preventive care or problems/concerns.    Refill of rx for HT.      Verbalized understanding and agreement with visit plan.    Macarena Gusman MD, FACOG  [facility]  OB/GYN    10/30/2018  10:31 AM

## 2018-11-01 LAB
COPATH REPORT: NORMAL
PAP: NORMAL

## 2018-11-02 LAB
FINAL DIAGNOSIS: ABNORMAL
HPV HR 12 DNA CVX QL NAA+PROBE: POSITIVE
HPV16 DNA SPEC QL NAA+PROBE: NEGATIVE
HPV18 DNA SPEC QL NAA+PROBE: NEGATIVE
SPECIMEN DESCRIPTION: ABNORMAL
SPECIMEN SOURCE CVX/VAG CYTO: ABNORMAL

## 2018-11-07 ENCOUNTER — COMMUNICATION - HEALTHEAST (OUTPATIENT)
Dept: INTERNAL MEDICINE | Facility: CLINIC | Age: 73
End: 2018-11-07

## 2018-11-07 DIAGNOSIS — Z12.31 ENCOUNTER FOR SCREENING MAMMOGRAM FOR BREAST CANCER: ICD-10-CM

## 2018-11-24 ENCOUNTER — COMMUNICATION - HEALTHEAST (OUTPATIENT)
Dept: INTERNAL MEDICINE | Facility: CLINIC | Age: 73
End: 2018-11-24

## 2018-11-24 DIAGNOSIS — F41.9 ANXIETY: ICD-10-CM

## 2018-12-08 ENCOUNTER — COMMUNICATION - HEALTHEAST (OUTPATIENT)
Dept: INTERNAL MEDICINE | Facility: CLINIC | Age: 73
End: 2018-12-08

## 2018-12-08 DIAGNOSIS — E78.5 HYPERLIPEMIA: ICD-10-CM

## 2018-12-26 ENCOUNTER — COMMUNICATION - HEALTHEAST (OUTPATIENT)
Dept: INTERNAL MEDICINE | Facility: CLINIC | Age: 73
End: 2018-12-26

## 2018-12-26 DIAGNOSIS — K21.9 ESOPHAGEAL REFLUX: ICD-10-CM

## 2019-01-23 ENCOUNTER — HOSPITAL ENCOUNTER (OUTPATIENT)
Dept: MAMMOGRAPHY | Facility: CLINIC | Age: 74
Discharge: HOME | End: 2019-01-23
Attending: INTERNAL MEDICINE

## 2019-01-23 DIAGNOSIS — Z12.31 VISIT FOR SCREENING MAMMOGRAM: ICD-10-CM

## 2019-01-28 ENCOUNTER — RECORDS - HEALTHEAST (OUTPATIENT)
Dept: ADMINISTRATIVE | Facility: OTHER | Age: 74
End: 2019-01-28

## 2019-01-28 ENCOUNTER — OFFICE VISIT (OUTPATIENT)
Dept: OBGYN | Facility: CLINIC | Age: 74
End: 2019-01-28
Attending: OBSTETRICS & GYNECOLOGY
Payer: MEDICARE

## 2019-01-28 VITALS
BODY MASS INDEX: 28.84 KG/M2 | SYSTOLIC BLOOD PRESSURE: 130 MMHG | HEIGHT: 64 IN | HEART RATE: 57 BPM | DIASTOLIC BLOOD PRESSURE: 78 MMHG

## 2019-01-28 DIAGNOSIS — N76.0 BV (BACTERIAL VAGINOSIS): Primary | ICD-10-CM

## 2019-01-28 DIAGNOSIS — B96.89 BV (BACTERIAL VAGINOSIS): Primary | ICD-10-CM

## 2019-01-28 DIAGNOSIS — R87.810 CERVICAL HIGH RISK HUMAN PAPILLOMAVIRUS (HPV) DNA TEST POSITIVE: ICD-10-CM

## 2019-01-28 PROCEDURE — G0463 HOSPITAL OUTPT CLINIC VISIT: HCPCS | Mod: ZF

## 2019-01-28 PROCEDURE — 88305 TISSUE EXAM BY PATHOLOGIST: CPT | Performed by: OBSTETRICS & GYNECOLOGY

## 2019-01-28 PROCEDURE — 57454 BX/CURETT OF CERVIX W/SCOPE: CPT | Mod: ZF | Performed by: OBSTETRICS & GYNECOLOGY

## 2019-01-28 RX ORDER — METRONIDAZOLE 500 MG/1
500 TABLET ORAL 2 TIMES DAILY
Qty: 14 TABLET | Refills: 0 | Status: SHIPPED | OUTPATIENT
Start: 2019-01-28 | End: 2019-02-04

## 2019-01-28 NOTE — LETTER
"  RE: Tangela Chapa   [address]  [facility] [address]     Dear Colleague,    Thank you for referring your patient, Tangela Chapa, to the [facility] at [facility]. Please see a copy of my visit note below.    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     Menstrual History:  Menstrual History 2016   Period Cycle (Days) menapausal    Reviewed Today Yes       Lab Results   Component Value Date    PAP NIL 10/29/2018    PAP NIL 2017    PAP NIL 2016       Last   Lab Results   Component Value Date    HPV16 Negative 10/29/2018     Last   Lab Results   Component Value Date    HPV18 Negative 10/29/2018     Last   Lab Results   Component Value Date    HRHPV Positive 10/29/2018       Dates/results of previous cervical pathology: HPV +         Dates of previous cervical pathology treatment: LEEP     History   Smoking Status     Former Smoker   Smokeless Tobacco     Never Used     Comment: quit 30 years ago       Allergies as of 2019 - Reviewed 2019   Allergen Reaction Noted     Dust mite extract  2012     Nkda [no known drug allergies]  2013     Seasonal allergies  2012        Colposcopy Procedure:    Consent:  Details of the colposcopic procedure were reviewed. Risks, benefits of treatment, and alternate forms of evaluation were discussed.  Patient's questions were elicited and answered.   Written consent was obtained and scanned into medical record.     Verification of Procedure  Just before the procedure begins, through verbal and active participation of team members, I verified:   Initials   Patient Name smd   Patient  smd   Procedure to be performed smd       OBJECTIVE: /78 (BP Location: Left arm, Patient Position: Chair)   Pulse 57   Ht 1.626 m (5' 4\")   Breastfeeding? No   BMI 28.84 " kg/m       Pelvic Exam:  EG/BUS: Normal genital architecture without lesions, erythema or abnormal secretions. Bartholin's, Urethra, [place]'s glands are normal.   Vagina: atrophic, thin, dry with creamy, white and foul smellingsecretions  Cervix: no lesions and scar c/w prior LEEP  Rectum:anus normal    PROCEDURE:    Wet prep + clue cells and whiff test.     Acetic acid and/or Lugol's solution applied to cervix.  Colposcopic exam of the cervix and apex of the vagina was conducted in the usual fashion.     Findings:  SCJ was NOT seen entirely and the exam UNsatisfactory.    There were no concerning findings    A random biospy was  obtained at 7 and placed in labeled Formalin Jar.    ECC: was obtained and placed in labeled Formalin Jar.      Assessment: 74 yo  with Persistant HVP HR not 16 or 18.  BV.     Plan:     BV- rx sent    Biopsies sent to pathology.  Will contact patient with results and recommended follow-up plan.     Patient advised to contact clinic with heavy vaginal bleeding, fever over 101 degrees F, or any other concerns.    Advised that evaluation of sexual contacts is NOT warranted as she can not get the same virus again. Risk of exposure to a NEW virus is possible with partner change.    Verbalized understanding and agreement with plan.    Macarena Gusman MD, [facility]  OB/GYN    1/28/2019  3:57 PM

## 2019-01-28 NOTE — PROGRESS NOTES
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     Menstrual History:  Menstrual History 2016   Period Cycle (Days) menapausal    Reviewed Today Yes       Lab Results   Component Value Date    PAP NIL 10/29/2018    PAP NIL 2017    PAP NIL 2016       Last   Lab Results   Component Value Date    HPV16 Negative 10/29/2018     Last   Lab Results   Component Value Date    HPV18 Negative 10/29/2018     Last   Lab Results   Component Value Date    HRHPV Positive 10/29/2018       Dates/results of previous cervical pathology: HPV +         Dates of previous cervical pathology treatment: LEEP     History   Smoking Status     Former Smoker   Smokeless Tobacco     Never Used     Comment: quit 30 years ago       Allergies as of 2019 - Reviewed 2019   Allergen Reaction Noted     Dust mite extract  2012     Nkda [no known drug allergies]  2013     Seasonal allergies  2012        Colposcopy Procedure:    Consent:  Details of the colposcopic procedure were reviewed. Risks, benefits of treatment, and alternate forms of evaluation were discussed.  Patient's questions were elicited and answered.   Written consent was obtained and scanned into medical record.     Verification of Procedure  Just before the procedure begins, through verbal and active participation of team members, I verified:   Initials   Patient Name smd   Patient  smd   Procedure to be performed smd       OBJECTIVE: /78 (BP Location: Left arm, Patient Position: Chair)   Pulse 57   Ht 1.626 m (5' 4\")   Breastfeeding? No   BMI 28.84 kg/m      Pelvic Exam:  EG/BUS: Normal genital architecture without lesions, erythema or abnormal secretions. Bartholin's, Urethra, [place]'s glands are normal.   Vagina: atrophic, thin, dry with creamy, white and foul smellingsecretions  Cervix: no lesions and scar c/w prior " LEEP  Rectum:anus normal    PROCEDURE:    Wet prep + clue cells and whiff test.     Acetic acid and/or Lugol's solution applied to cervix.  Colposcopic exam of the cervix and apex of the vagina was conducted in the usual fashion.     Findings:  SCJ was NOT seen entirely and the exam UNsatisfactory.    There were no concerning findings    A random biospy was  obtained at 7 and placed in labeled Formalin Jar.    ECC: was obtained and placed in labeled Formalin Jar.      Assessment: 72 yo  with Persistant HVP HR not 16 or 18.  BV.     Plan:     BV- rx sent    Biopsies sent to pathology.  Will contact patient with results and recommended follow-up plan.     Patient advised to contact clinic with heavy vaginal bleeding, fever over 101 degrees F, or any other concerns.    Advised that evaluation of sexual contacts is NOT warranted as she can not get the same virus again. Risk of exposure to a NEW virus is possible with partner change.    Verbalized understanding and agreement with plan.    Macarena Gusman MD, [facility] Staff  OB/GYN    1/28/2019  3:57 PM

## 2019-01-28 NOTE — PATIENT INSTRUCTIONS

## 2019-01-31 LAB — COPATH REPORT: NORMAL

## 2019-02-06 ENCOUNTER — TELEPHONE (OUTPATIENT)
Dept: OBGYN | Facility: CLINIC | Age: 74
End: 2019-02-06

## 2019-03-12 ENCOUNTER — OFFICE VISIT (OUTPATIENT)
Dept: OBGYN | Facility: CLINIC | Age: 74
End: 2019-03-12
Payer: MEDICARE

## 2019-03-12 VITALS
BODY MASS INDEX: 28.68 KG/M2 | SYSTOLIC BLOOD PRESSURE: 130 MMHG | WEIGHT: 168 LBS | HEART RATE: 78 BPM | DIASTOLIC BLOOD PRESSURE: 80 MMHG | HEIGHT: 64 IN

## 2019-03-12 DIAGNOSIS — N89.8 VAGINAL ODOR: Primary | ICD-10-CM

## 2019-03-12 LAB
CLUE CELLS: NORMAL
TRICHOMONAS (WET PREP): NORMAL
YEAST (WET PREP): NORMAL

## 2019-03-12 PROCEDURE — G0463 HOSPITAL OUTPT CLINIC VISIT: HCPCS

## 2019-03-12 PROCEDURE — 87210 SMEAR WET MOUNT SALINE/INK: CPT | Mod: ZF | Performed by: OBSTETRICS & GYNECOLOGY

## 2019-03-12 ASSESSMENT — MIFFLIN-ST. JEOR: SCORE: 1262.04

## 2019-03-12 NOTE — PROGRESS NOTES
"SUBJECTIVE:   73 year old  female with h/o persistent HPV and complaint of foul vaginal discharge for several months  Denies abnormal vaginal bleeding or significant pelvic pain or fever. Reports chronic recurrent UTI with \"strongly smelling urine\". Denies history of known exposure to STD. Denies dyspareunia.    No LMP recorded. Patient is postmenopausal.    OBJECTIVE: /80   Pulse 78   Ht 1.626 m (5' 4\")   Wt 76.2 kg (168 lb)   BMI 28.84 kg/m    She appears well, afebrile.  Abdomen: benign, soft, nontender, no masses.  Pelvic Exam: normal vagina and vulva, normal cervix without lesions or tenderness, uterus normal size anteverted, adenxa normal in size without tenderness    Wet prep exam was performed and demonstrated normal epithelial cells    ASSESSMENT:   Vaginal odor   Wet prep was normal with no signs of bacterial vaginosis and urinalysis pending. Odor may be variation of normal physiology.    PLAN:  Normal pelvic and wet prep exam means clinical index of suspicion for infection or inflammation of lower genital tract.     Treatment:   Urinalysis pending - will treat definitively if culture is positive.    Return to clinic if symptoms do not resolve or worsen.    Sy Tejeda, MS3    I was present with the medical student who participated in the service and in the documentation of the note. I have verified the history and personally performed the physical exam and medical decision making. I agree with the assessment and plan of care as documented in the note.    Macarena Gusman MD      "

## 2019-04-04 ENCOUNTER — OFFICE VISIT - HEALTHEAST (OUTPATIENT)
Dept: INTERNAL MEDICINE | Facility: CLINIC | Age: 74
End: 2019-04-04

## 2019-04-04 ENCOUNTER — COMMUNICATION - HEALTHEAST (OUTPATIENT)
Dept: SCHEDULING | Facility: CLINIC | Age: 74
End: 2019-04-04

## 2019-04-04 DIAGNOSIS — J20.9 ACUTE BRONCHITIS, UNSPECIFIED ORGANISM: ICD-10-CM

## 2019-04-04 ASSESSMENT — MIFFLIN-ST. JEOR: SCORE: 1215.29

## 2019-05-07 ENCOUNTER — COMMUNICATION - HEALTHEAST (OUTPATIENT)
Dept: INTERNAL MEDICINE | Facility: CLINIC | Age: 74
End: 2019-05-07

## 2019-05-07 DIAGNOSIS — G47.00 INSOMNIA: ICD-10-CM

## 2019-05-29 ENCOUNTER — COMMUNICATION - HEALTHEAST (OUTPATIENT)
Dept: SCHEDULING | Facility: CLINIC | Age: 74
End: 2019-05-29

## 2019-05-31 ENCOUNTER — OFFICE VISIT - HEALTHEAST (OUTPATIENT)
Dept: INTERNAL MEDICINE | Facility: CLINIC | Age: 74
End: 2019-05-31

## 2019-05-31 DIAGNOSIS — F33.42 MAJOR DEPRESSIVE DISORDER, RECURRENT EPISODE, IN FULL REMISSION (H): ICD-10-CM

## 2019-05-31 DIAGNOSIS — J20.9 ACUTE BRONCHITIS, UNSPECIFIED ORGANISM: ICD-10-CM

## 2019-05-31 ASSESSMENT — MIFFLIN-ST. JEOR: SCORE: 1217.14

## 2019-06-10 ENCOUNTER — COMMUNICATION - HEALTHEAST (OUTPATIENT)
Dept: INTERNAL MEDICINE | Facility: CLINIC | Age: 74
End: 2019-06-10

## 2019-06-26 ENCOUNTER — COMMUNICATION - HEALTHEAST (OUTPATIENT)
Dept: INTERNAL MEDICINE | Facility: CLINIC | Age: 74
End: 2019-06-26

## 2019-06-26 ENCOUNTER — COMMUNICATION - HEALTHEAST (OUTPATIENT)
Dept: SCHEDULING | Facility: CLINIC | Age: 74
End: 2019-06-26

## 2019-06-26 DIAGNOSIS — J30.2 SEASONAL ALLERGIC RHINITIS, UNSPECIFIED TRIGGER: ICD-10-CM

## 2019-06-27 ENCOUNTER — OFFICE VISIT - HEALTHEAST (OUTPATIENT)
Dept: INTERNAL MEDICINE | Facility: CLINIC | Age: 74
End: 2019-06-27

## 2019-06-27 DIAGNOSIS — R05.9 COUGH: ICD-10-CM

## 2019-06-27 DIAGNOSIS — L30.9 DERMATITIS: ICD-10-CM

## 2019-07-03 ENCOUNTER — RECORDS - HEALTHEAST (OUTPATIENT)
Dept: ADMINISTRATIVE | Facility: OTHER | Age: 74
End: 2019-07-03

## 2019-07-03 ENCOUNTER — OFFICE VISIT - HEALTHEAST (OUTPATIENT)
Dept: ALLERGY | Facility: CLINIC | Age: 74
End: 2019-07-03

## 2019-07-03 DIAGNOSIS — R05.9 COUGH: ICD-10-CM

## 2019-07-03 DIAGNOSIS — R09.81 NASAL CONGESTION: ICD-10-CM

## 2019-07-03 DIAGNOSIS — Z01.82 ENCOUNTER FOR ALLERGY TESTING: ICD-10-CM

## 2019-07-03 ASSESSMENT — MIFFLIN-ST. JEOR: SCORE: 1217.11

## 2019-07-16 ENCOUNTER — COMMUNICATION - HEALTHEAST (OUTPATIENT)
Dept: INTERNAL MEDICINE | Facility: CLINIC | Age: 74
End: 2019-07-16

## 2019-07-16 ENCOUNTER — RECORDS - HEALTHEAST (OUTPATIENT)
Dept: ADMINISTRATIVE | Facility: OTHER | Age: 74
End: 2019-07-16

## 2019-07-16 DIAGNOSIS — J30.9 ALLERGIC RHINITIS: ICD-10-CM

## 2019-07-29 ENCOUNTER — COMMUNICATION - HEALTHEAST (OUTPATIENT)
Dept: SCHEDULING | Facility: CLINIC | Age: 74
End: 2019-07-29

## 2019-08-06 ENCOUNTER — OFFICE VISIT - HEALTHEAST (OUTPATIENT)
Dept: INTERNAL MEDICINE | Facility: CLINIC | Age: 74
End: 2019-08-06

## 2019-08-06 DIAGNOSIS — R05.9 COUGH: ICD-10-CM

## 2019-08-06 DIAGNOSIS — J32.9 CHRONIC SINUSITIS, UNSPECIFIED LOCATION: ICD-10-CM

## 2019-08-06 DIAGNOSIS — Z13.820 SCREENING FOR OSTEOPOROSIS: ICD-10-CM

## 2019-08-06 DIAGNOSIS — Z11.59 NEED FOR HEPATITIS C SCREENING TEST: ICD-10-CM

## 2019-08-06 ASSESSMENT — MIFFLIN-ST. JEOR: SCORE: 1221.64

## 2019-08-13 ENCOUNTER — RECORDS - HEALTHEAST (OUTPATIENT)
Dept: ADMINISTRATIVE | Facility: OTHER | Age: 74
End: 2019-08-13

## 2019-08-19 ENCOUNTER — HOSPITAL ENCOUNTER (OUTPATIENT)
Dept: CT IMAGING | Facility: HOSPITAL | Age: 74
Discharge: HOME | End: 2019-08-19
Attending: INTERNAL MEDICINE

## 2019-08-19 DIAGNOSIS — R05.9 COUGH: ICD-10-CM

## 2019-08-19 DIAGNOSIS — J32.9 CHRONIC SINUSITIS, UNSPECIFIED LOCATION: ICD-10-CM

## 2019-08-20 ENCOUNTER — COMMUNICATION - HEALTHEAST (OUTPATIENT)
Dept: INTERNAL MEDICINE | Facility: CLINIC | Age: 74
End: 2019-08-20

## 2019-09-20 ENCOUNTER — COMMUNICATION - HEALTHEAST (OUTPATIENT)
Dept: INTERNAL MEDICINE | Facility: CLINIC | Age: 74
End: 2019-09-20

## 2019-09-20 DIAGNOSIS — M06.9 RA (RHEUMATOID ARTHRITIS) (H): ICD-10-CM

## 2019-09-27 ENCOUNTER — COMMUNICATION - HEALTHEAST (OUTPATIENT)
Dept: INTERNAL MEDICINE | Facility: CLINIC | Age: 74
End: 2019-09-27

## 2019-09-27 DIAGNOSIS — K21.9 ESOPHAGEAL REFLUX: ICD-10-CM

## 2019-10-03 ENCOUNTER — HEALTH MAINTENANCE LETTER (OUTPATIENT)
Age: 74
End: 2019-10-03

## 2019-10-31 ENCOUNTER — RECORDS - HEALTHEAST (OUTPATIENT)
Dept: ADMINISTRATIVE | Facility: OTHER | Age: 74
End: 2019-10-31

## 2019-11-26 DIAGNOSIS — B37.31 YEAST INFECTION OF THE VAGINA: ICD-10-CM

## 2019-11-26 DIAGNOSIS — N95.9 MENOPAUSAL AND POSTMENOPAUSAL DISORDER: ICD-10-CM

## 2019-11-26 RX ORDER — ESTRADIOL 0.5 MG/1
0.5 TABLET ORAL DAILY
Qty: 30 TABLET | Refills: 0 | Status: SHIPPED | OUTPATIENT
Start: 2019-11-26 | End: 2021-03-29

## 2019-11-26 NOTE — TELEPHONE ENCOUNTER
Received refill request for estradiol & progesterone. Has clinic appointment in December. One month refill sent.

## 2019-11-29 ENCOUNTER — COMMUNICATION - HEALTHEAST (OUTPATIENT)
Dept: INTERNAL MEDICINE | Facility: CLINIC | Age: 74
End: 2019-11-29

## 2019-11-29 DIAGNOSIS — F41.9 ANXIETY: ICD-10-CM

## 2019-12-03 ENCOUNTER — COMMUNICATION - HEALTHEAST (OUTPATIENT)
Dept: INTERNAL MEDICINE | Facility: CLINIC | Age: 74
End: 2019-12-03

## 2019-12-03 DIAGNOSIS — E78.5 HYPERLIPEMIA: ICD-10-CM

## 2019-12-11 ENCOUNTER — OFFICE VISIT (OUTPATIENT)
Dept: OBGYN | Facility: CLINIC | Age: 74
End: 2019-12-11
Attending: OBSTETRICS & GYNECOLOGY
Payer: MEDICARE

## 2019-12-11 ENCOUNTER — RECORDS - HEALTHEAST (OUTPATIENT)
Dept: ADMINISTRATIVE | Facility: OTHER | Age: 74
End: 2019-12-11

## 2019-12-11 VITALS
HEART RATE: 63 BPM | SYSTOLIC BLOOD PRESSURE: 133 MMHG | BODY MASS INDEX: 29.81 KG/M2 | HEIGHT: 64 IN | WEIGHT: 174.6 LBS | DIASTOLIC BLOOD PRESSURE: 81 MMHG

## 2019-12-11 DIAGNOSIS — R30.0 DYSURIA: ICD-10-CM

## 2019-12-11 DIAGNOSIS — Z01.419 ENCOUNTER FOR GYNECOLOGICAL EXAMINATION WITHOUT ABNORMAL FINDING: ICD-10-CM

## 2019-12-11 DIAGNOSIS — B97.7 HPV IN FEMALE: Primary | ICD-10-CM

## 2019-12-11 LAB
ALBUMIN UR-MCNC: NEGATIVE MG/DL
APPEARANCE UR: CLEAR
BILIRUB UR QL STRIP: NEGATIVE
COLOR UR AUTO: YELLOW
GLUCOSE UR STRIP-MCNC: NEGATIVE MG/DL
HGB UR QL STRIP: ABNORMAL
HPV_EXT - HISTORICAL: ABNORMAL
KETONES UR STRIP-MCNC: NEGATIVE MG/DL
LEUKOCYTE ESTERASE UR QL STRIP: ABNORMAL
NITRATE UR QL: NEGATIVE
PAP SMEAR - HIM PATIENT REPORTED: NORMAL
PH UR STRIP: 5.5 PH (ref 5–7)
SP GR UR STRIP: >1.03 (ref 1–1.03)
UROBILINOGEN UR STRIP-ACNC: 0.2 EU/DL (ref 0.2–1)

## 2019-12-11 PROCEDURE — G0463 HOSPITAL OUTPT CLINIC VISIT: HCPCS | Mod: ZF

## 2019-12-11 PROCEDURE — 87186 SC STD MICRODIL/AGAR DIL: CPT | Performed by: OBSTETRICS & GYNECOLOGY

## 2019-12-11 PROCEDURE — 88175 CYTOPATH C/V AUTO FLUID REDO: CPT | Performed by: OBSTETRICS & GYNECOLOGY

## 2019-12-11 PROCEDURE — 87088 URINE BACTERIA CULTURE: CPT | Performed by: OBSTETRICS & GYNECOLOGY

## 2019-12-11 PROCEDURE — 87624 HPV HI-RISK TYP POOLED RSLT: CPT | Performed by: OBSTETRICS & GYNECOLOGY

## 2019-12-11 PROCEDURE — G0476 HPV COMBO ASSAY CA SCREEN: HCPCS | Performed by: OBSTETRICS & GYNECOLOGY

## 2019-12-11 PROCEDURE — 87086 URINE CULTURE/COLONY COUNT: CPT | Performed by: OBSTETRICS & GYNECOLOGY

## 2019-12-11 PROCEDURE — 81003 URINALYSIS AUTO W/O SCOPE: CPT

## 2019-12-11 ASSESSMENT — ANXIETY QUESTIONNAIRES
5. BEING SO RESTLESS THAT IT IS HARD TO SIT STILL: NOT AT ALL
GAD7 TOTAL SCORE: 5
6. BECOMING EASILY ANNOYED OR IRRITABLE: NOT AT ALL
7. FEELING AFRAID AS IF SOMETHING AWFUL MIGHT HAPPEN: SEVERAL DAYS
3. WORRYING TOO MUCH ABOUT DIFFERENT THINGS: SEVERAL DAYS
2. NOT BEING ABLE TO STOP OR CONTROL WORRYING: SEVERAL DAYS
1. FEELING NERVOUS, ANXIOUS, OR ON EDGE: SEVERAL DAYS

## 2019-12-11 ASSESSMENT — PATIENT HEALTH QUESTIONNAIRE - PHQ9
SUM OF ALL RESPONSES TO PHQ QUESTIONS 1-9: 5
5. POOR APPETITE OR OVEREATING: SEVERAL DAYS

## 2019-12-11 ASSESSMENT — MIFFLIN-ST. JEOR: SCORE: 1286.98

## 2019-12-11 NOTE — LETTER
2019       RE: Tangela Chapa  [address] [address]     Dear Colleague,    Thank you for referring your patient, Tangela Chapa, to the [facility] at [facility]. Please see a copy of my visit note below.    Progress Note    SUBJECTIVE:  Tangela Chapa is an 74 year old , who requests a breast and pelvic exam.    Patient is followed by Logan Aguilar  for primary care.    Concerns today include: follow up pap w/ h/o HPV    Menstrual History:  Menstrual History 2016   Period Cycle (Days) menapausal    Reviewed Today Yes     Last    Lab Results   Component Value Date    PAP NIL 10/29/2018     Last   Lab Results   Component Value Date    HPV16 Negative 10/29/2018     Last   Lab Results   Component Value Date    HPV18 Negative 10/29/2018     Last   Lab Results   Component Value Date    HRHPV Positive 10/29/2018     Mammogram current: yes    HISTORY:  Prescription Medications as of 2019       Rx Number Disp Refills Start End Last Dispensed Date Next Fill Date Owning Pharmacy    B Complex-C-Folic Acid (B COMPLEX + C TR PO)            Sig: Take  by mouth.    Class: Historical    Route: Oral    Calcium 500-125 MG-UNIT TABS            Sig: Take 1 tablet by mouth daily. 2    Class: Historical    Route: Oral    citalopram (CELEXA) 20 MG tablet            Sig: Take 20 mg by mouth daily.    Class: Historical    Route: Oral    co-enzyme Q-10 (COQ10) 100 MG CAPS            Sig: Take  by mouth daily.    Class: Historical    Route: Oral    cycloSPORINE (RESTASIS) 0.05 % ophthalmic emulsion            Si drop every 12 hours.    Class: Historical    estradiol (ESTRACE) 0.5 MG tablet  30 tablet 0 2019    [place] [address]    Sig: Take 1 tablet (0.5 mg) by mouth daily    Class: E-Prescribe    Route: Oral    estradiol (ESTRACE) 0.5 MG tablet  90 tablet 2 10/22/2014 2015   [facility]  [address]    Sig: Take 1 tablet (0.5 mg) by mouth daily    Class: E-Prescribe    Route: Oral    estradiol (ESTRING) 2 MG vaginal ring  1 each 11 10/29/2018    [address]    Sig: Place once vaginally and refill as instructed    Class: E-Prescribe    fish oil-omega-3 fatty acids (FISH OIL) 1000 MG capsule            Sig: Take 1 capsule by mouth daily.    Class: Historical    Route: Oral    Flaxseed, Linseed, (FLAXSEED OIL) 1000 MG CAPS            Sig: Take 2 tablets by mouth 2 times daily.    Class: Historical    Route: Oral    Latanoprost (XALATAN OP)            Sig: Apply 2 drops to eye At Bedtime. 2.5    Class: Historical    Route: Ophthalmic    Magnesium 100 MG TABS            Sig: Take 3 tablets by mouth At Bedtime.    Class: Historical    Route: Oral    mometasone (NASONEX) 50 MCG/ACT nasal spray            Si sprays by Both Nostrils route daily.    Class: Historical    Route: Both Nostrils    NONFORMULARY            Si Dose daily henrik 128 apply small amount to eyes at night    Class: Historical    omeprazole (PRILOSEC) 20 MG capsule            Sig: Take 1 capsule by mouth as needed.    Class: Historical    Route: Oral    PHENTERMINE HCL PO        [address]    Class: Historical    Route: Oral    progesterone (PROMETRIUM) 100 MG capsule  30 capsule 0 2019    [address]    Sig: Take 1 capsule (100 mg) by mouth At Bedtime    Class: E-Prescribe    Route: Oral    simvastatin (ZOCOR) 40 MG tablet            Sig: Take 1 tablet by mouth At Bedtime.    Class: Historical    Route: Oral    Tetrahydrozoline HCl (EYE DROPS OP)            Sig: Apply  to eye. Hydro eye    Class: Historical    Route: Ophthalmic    TRAZodone (DESYREL) 100 MG tablet            Sig: Take 1 tablet by mouth At Bedtime.    Class: Historical    Route: Oral        Allergies   Allergen Reactions     Dust Mite  Extract      Nkda [No Known Drug Allergies]      Seasonal Allergies        There is no immunization history on file for this patient.    OB History    Para Term  AB Living   0 0 0 0 0 0   SAB TAB Ectopic Multiple Live Births   0 0 0 0 0     Past Medical History:   Diagnosis Date     Chronic UTI     controlled w PO and vaginal estrogen tx     YESSICA I (cervical intraepithelial neoplasia I) 14: Pap NIL; HR HPV neg -> D/C screening     Vaginal dysplasia     cryo     Past Surgical History:   Procedure Laterality Date     ABDOMINOPLASTY       ARTHROSCOPY KNEE IRRIGATION AND DEBRIDEMENT      RT-Articular Cartilage     BIOPSY BREAST      benign open brest biopsy     COLPOSCOPY, BIOPSY, COMBINED  2/16/10    TZ-not seen     ENDOMETRIAL SAMPLING (BIOPSY)  3/31/05    benign     GENITOURINARY SURGERY  2011    cystoscopy with +1 trabeculaion     JOINT REPLACEMENT, HIP RT/LT      Joint Replacement Hip RT/LT     RELEASE CARPAL TUNNEL BILATERAL       Family History   Problem Relation Age of Onset     Alcohol/Drug Father      Depression Father      Alcohol/Drug Brother      Breast Cancer Mother 60        and PC xs 2 and MC age 69     Depression Mother      Diabetes Maternal Grandfather      Cancer Brother 68        blood cancer     Social History     Socioeconomic History     Marital status: Single     Spouse name: None     Number of children: None     Years of education: None     Highest education level: None   Occupational History     None   Social Needs     Financial resource strain: None     Food insecurity:     Worry: None     Inability: None     Transportation needs:     Medical: None     Non-medical: None   Tobacco Use     Smoking status: Former Smoker     Smokeless tobacco: Never Used     Tobacco comment: quit 30 years ago   Substance and Sexual Activity     Alcohol use: Yes     Alcohol/week: 0.0 - 0.8 standard drinks     Comment: Rare     Drug use: No     Sexual activity:  "Not Currently     Partners: Male     Birth control/protection: Post-menopausal   Lifestyle     Physical activity:     Days per week: None     Minutes per session: None     Stress: None   Relationships     Social connections:     Talks on phone: None     Gets together: None     Attends [employer] service: None     Active member of club or organization: None     Attends meetings of clubs or organizations: None     Relationship status: None     Intimate partner violence:     Fear of current or ex partner: None     Emotionally abused: None     Physically abused: None     Forced sexual activity: None   Other Topics Concern      Service No     Blood Transfusions No     Caffeine Concern No     Comment: 3-4 pop/d (diet)     Occupational Exposure No     Hobby Hazards No     Sleep Concern Yes     Comment: sleeps too much -- tired after 9-10 hrs/nite     Stress Concern Yes     Comment: holiday lonliness     Weight Concern Yes     Comment: admits to eating more than she uses -- declines wt today     Special Diet No     Back Care No     Exercise Yes     Comment: sedentary -- plans to restart     Bike Helmet No     Seat Belt No     Self-Exams No     Parent/sibling w/ CABG, MI or angioplasty before 65F 55M? Not Asked   Social History Narrative    How much exercise per week? Stationary bike, recent weight loss    How much calcium per day? Supplement       How much caffeine per day? 2 cans a day    How much vitamin D per day? supplment    Do you/your family wear seatbelts?  Yes    Do you/your family use safety helmets? Yes    Do you/your family use sunscreen? Yes    Do you/your family keep firearms in the home? Yes, locked    Do you/your family have a smoke detector(s)? Yes        Do you feel safe in your home? Yes    Has anyone ever touched you in an unwanted manner? No     Explain     Updated 8/28 CHIKI Santiago            ROS    EXAM:  Blood pressure 133/81, pulse 63, height 1.626 m (5' 4\"), weight 79.2 kg (174 lb 9.6 oz), not " currently breastfeeding. Body mass index is 29.97 kg/m .  General appearance: Pleasant female in no acute distress.     BREAST EXAM:  Breast: Without visible skin changes. No dimpling or lesions seen.   Breasts supple, non-tender with palpation, no dominant mass, nodularity, or nipple discharge noted bilaterally. Axillary nodes negative.      PELVIC EXAM:  EG/BUS: Normal genital architecture without lesions, erythema or abnormal secretions. Normal genital architecture without lesions, erythema or abnormal secretions Bartholin's, Urethra, [place] normal   Urethral meatus: normal   Urethra: no masses, tenderness, or scarring   Bladder: no masses or tenderness   Vagina: moist, pink. Ring present creamy, white and odorless  secretions  Cervix: no lesions  Uterus: midposition,  Adnexa: Within normal limits and No masses, nodularity, tenderness  Rectum:anus normal       ASSESSMENT:  Encounter Diagnoses   Name Primary?     HPV in female Yes     Dysuria       74 year old Female Pelvic and Breast Exam    PLAN:   Orders Placed This Encounter   Procedures     Pap imaged thin layer diagnostic with HPV (select HPV order below)     HPV High Risk Types DNA Cervical     Clinitek Urine Macroscopic POCT     Return in one year/PRN for preventive care or problems/concerns.     Verbalized understanding and agreement with visit plan.    Macarena Gusman MD, [facility]  OB/GYN  12/13/2019  5:28 PM

## 2019-12-12 ASSESSMENT — ANXIETY QUESTIONNAIRES: GAD7 TOTAL SCORE: 5

## 2019-12-13 LAB
BACTERIA SPEC CULT: ABNORMAL
BACTERIA SPEC CULT: ABNORMAL
SPECIMEN SOURCE: ABNORMAL

## 2019-12-13 NOTE — PROGRESS NOTES
Progress Note    SUBJECTIVE:  Tangela Chapa is an 74 year old , who requests a breast and pelvic exam.    Patient is followed by Logan Aguilar  for primary care.    Concerns today include: follow up pap w/ h/o HPV    Menstrual History:  Menstrual History 2016   Period Cycle (Days) menapausal    Reviewed Today Yes     Last    Lab Results   Component Value Date    PAP NIL 10/29/2018     Last   Lab Results   Component Value Date    HPV16 Negative 10/29/2018     Last   Lab Results   Component Value Date    HPV18 Negative 10/29/2018     Last   Lab Results   Component Value Date    HRHPV Positive 10/29/2018     Mammogram current: yes    HISTORY:  Prescription Medications as of 2019       Rx Number Disp Refills Start End Last Dispensed Date Next Fill Date Owning Pharmacy    B Complex-C-Folic Acid (B COMPLEX + C TR PO)            Sig: Take  by mouth.    Class: Historical    Route: Oral    Calcium 500-125 MG-UNIT TABS            Sig: Take 1 tablet by mouth daily. 2    Class: Historical    Route: Oral    citalopram (CELEXA) 20 MG tablet            Sig: Take 20 mg by mouth daily.    Class: Historical    Route: Oral    co-enzyme Q-10 (COQ10) 100 MG CAPS            Sig: Take  by mouth daily.    Class: Historical    Route: Oral    cycloSPORINE (RESTASIS) 0.05 % ophthalmic emulsion            Si drop every 12 hours.    Class: Historical    estradiol (ESTRACE) 0.5 MG tablet  30 tablet 0 2019    [address]    Sig: Take 1 tablet (0.5 mg) by mouth daily    Class: E-Prescribe    Route: Oral    estradiol (ESTRACE) 0.5 MG tablet  90 tablet 2 10/22/2014 2015   [place] [address] [address]     Sig: Take 1 tablet (0.5 mg) by mouth daily    Class: E-Prescribe    Route: Oral    estradiol (ESTRING) 2 MG vaginal ring  1 each 11 10/29/2018    [address]    Sig: Place once vaginally and refill as instructed    Class:  E-Prescribe    fish oil-omega-3 fatty acids (FISH OIL) 1000 MG capsule            Sig: Take 1 capsule by mouth daily.    Class: Historical    Route: Oral    Flaxseed, Linseed, (FLAXSEED OIL) 1000 MG CAPS            Sig: Take 2 tablets by mouth 2 times daily.    Class: Historical    Route: Oral    Latanoprost (XALATAN OP)            Sig: Apply 2 drops to eye At Bedtime. 2.5    Class: Historical    Route: Ophthalmic    Magnesium 100 MG TABS            Sig: Take 3 tablets by mouth At Bedtime.    Class: Historical    Route: Oral    mometasone (NASONEX) 50 MCG/ACT nasal spray            Si sprays by Both Nostrils route daily.    Class: Historical    Route: Both Nostrils    NONFORMULARY            Si Dose daily henrik 128 apply small amount to eyes at night    Class: Historical    omeprazole (PRILOSEC) 20 MG capsule            Sig: Take 1 capsule by mouth as needed.    Class: Historical    Route: Oral    PHENTERMINE HCL PO        [address]    Class: Historical    Route: Oral    progesterone (PROMETRIUM) 100 MG capsule  30 capsule 0 2019    [address]    Sig: Take 1 capsule (100 mg) by mouth At Bedtime    Class: E-Prescribe    Route: Oral    simvastatin (ZOCOR) 40 MG tablet            Sig: Take 1 tablet by mouth At Bedtime.    Class: Historical    Route: Oral    Tetrahydrozoline HCl (EYE DROPS OP)            Sig: Apply  to eye. Hydro eye    Class: Historical    Route: Ophthalmic    TRAZodone (DESYREL) 100 MG tablet            Sig: Take 1 tablet by mouth At Bedtime.    Class: Historical    Route: Oral        Allergies   Allergen Reactions     Dust Mite Extract      Nkda [No Known Drug Allergies]      Seasonal Allergies        There is no immunization history on file for this patient.    OB History    Para Term  AB Living   0 0 0 0 0 0   SAB TAB Ectopic Multiple Live Births   0 0 0 0 0     Past Medical History:   Diagnosis Date      Chronic UTI 2010    controlled w PO and vaginal estrogen tx     YESSICA I (cervical intraepithelial neoplasia I) 16 Feb 2010 5/28/14: Pap NIL; HR HPV neg -> D/C screening     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     Past Surgical History:   Procedure Laterality Date     ABDOMINOPLASTY       ARTHROSCOPY KNEE IRRIGATION AND DEBRIDEMENT      RT-Articular Cartilage     BIOPSY BREAST      benign open brest biopsy     COLPOSCOPY, BIOPSY, COMBINED  2/16/10    TZ-not seen     ENDOMETRIAL SAMPLING (BIOPSY)  3/31/05    benign     GENITOURINARY SURGERY  6 April 2011    cystoscopy with +1 trabeculaion     JOINT REPLACEMENT, HIP RT/LT  2008    Joint Replacement Hip RT/LT     RELEASE CARPAL TUNNEL BILATERAL       Family History   Problem Relation Age of Onset     Alcohol/Drug Father      Depression Father      Alcohol/Drug Brother      Breast Cancer Mother 60        and PC xs 2 and MC age 69     Depression Mother      Diabetes Maternal Grandfather      Cancer Brother 68        blood cancer     Social History     Socioeconomic History     Marital status: Single     Spouse name: None     Number of children: None     Years of education: None     Highest education level: None   Occupational History     None   Social Needs     Financial resource strain: None     Food insecurity:     Worry: None     Inability: None     Transportation needs:     Medical: None     Non-medical: None   Tobacco Use     Smoking status: Former Smoker     Smokeless tobacco: Never Used     Tobacco comment: quit 30 years ago   Substance and Sexual Activity     Alcohol use: Yes     Alcohol/week: 0.0 - 0.8 standard drinks     Comment: Rare     Drug use: No     Sexual activity: Not Currently     Partners: Male     Birth control/protection: Post-menopausal   Lifestyle     Physical activity:     Days per week: None     Minutes per session: None     Stress: None   Relationships     Social connections:     Talks on phone: None     Gets together: None     Attends Latter day  "service: None     Active member of club or organization: None     Attends meetings of clubs or organizations: None     Relationship status: None     Intimate partner violence:     Fear of current or ex partner: None     Emotionally abused: None     Physically abused: None     Forced sexual activity: None   Other Topics Concern      Service No     Blood Transfusions No     Caffeine Concern No     Comment: 3-4 pop/d (diet)     Occupational Exposure No     Hobby Hazards No     Sleep Concern Yes     Comment: sleeps too much -- tired after 9-10 hrs/nite     Stress Concern Yes     Comment: holiday lonliness     Weight Concern Yes     Comment: admits to eating more than she uses -- declines wt today     Special Diet No     Back Care No     Exercise Yes     Comment: sedentary -- plans to restart     Bike Helmet No     Seat Belt No     Self-Exams No     Parent/sibling w/ CABG, MI or angioplasty before 65F 55M? Not Asked   Social History Narrative    How much exercise per week? Stationary bike, recent weight loss    How much calcium per day? Supplement       How much caffeine per day? 2 cans a day    How much vitamin D per day? supplment    Do you/your family wear seatbelts?  Yes    Do you/your family use safety helmets? Yes    Do you/your family use sunscreen? Yes    Do you/your family keep firearms in the home? Yes, locked    Do you/your family have a smoke detector(s)? Yes        Do you feel safe in your home? Yes    Has anyone ever touched you in an unwanted manner? No     Explain     Updated 8/28 CHIKI Santiago            ROS    EXAM:  Blood pressure 133/81, pulse 63, height 1.626 m (5' 4\"), weight 79.2 kg (174 lb 9.6 oz), not currently breastfeeding. Body mass index is 29.97 kg/m .  General appearance: Pleasant female in no acute distress.     BREAST EXAM:  Breast: Without visible skin changes. No dimpling or lesions seen.   Breasts supple, non-tender with palpation, no dominant mass, nodularity, or nipple discharge " noted bilaterally. Axillary nodes negative.      PELVIC EXAM:  EG/BUS: Normal genital architecture without lesions, erythema or abnormal secretions. Normal genital architecture without lesions, erythema or abnormal secretions Bartholin's, Urethra, [place]'s normal   Urethral meatus: normal   Urethra: no masses, tenderness, or scarring   Bladder: no masses or tenderness   Vagina: moist, pink. Ring present creamy, white and odorless  secretions  Cervix: no lesions  Uterus: midposition,  Adnexa: Within normal limits and No masses, nodularity, tenderness  Rectum:anus normal       ASSESSMENT:  Encounter Diagnoses   Name Primary?     HPV in female Yes     Dysuria       74 year old Female Pelvic and Breast Exam    PLAN:   Orders Placed This Encounter   Procedures     Pap imaged thin layer diagnostic with HPV (select HPV order below)     HPV High Risk Types DNA Cervical     Clinitek Urine Macroscopic POCT     Return in one year/PRN for preventive care or problems/concerns.     Verbalized understanding and agreement with visit plan.    Macarena Gusman MD, [facility] Staff  OB/GYN    12/13/2019  5:28 PM

## 2019-12-15 DIAGNOSIS — N39.0 URINARY TRACT INFECTION WITHOUT HEMATURIA, SITE UNSPECIFIED: Primary | ICD-10-CM

## 2019-12-15 RX ORDER — NITROFURANTOIN MACROCRYSTAL 100 MG
100 CAPSULE ORAL 2 TIMES DAILY
Qty: 14 CAPSULE | Refills: 0 | Status: SHIPPED | OUTPATIENT
Start: 2019-12-15 | End: 2019-12-17

## 2019-12-16 LAB
COPATH REPORT: NORMAL
PAP: NORMAL

## 2019-12-17 ENCOUNTER — TELEPHONE (OUTPATIENT)
Dept: OBGYN | Facility: CLINIC | Age: 74
End: 2019-12-17

## 2019-12-17 DIAGNOSIS — N39.0 URINARY TRACT INFECTION: Primary | ICD-10-CM

## 2019-12-17 RX ORDER — NITROFURANTOIN 25; 75 MG/1; MG/1
100 CAPSULE ORAL 2 TIMES DAILY
Qty: 14 CAPSULE | Refills: 0 | Status: SHIPPED | OUTPATIENT
Start: 2019-12-17 | End: 2021-09-07

## 2019-12-17 NOTE — TELEPHONE ENCOUNTER
Spoke with Briseida, Pharmacist at [facility], asking for clarification for macrodantin prescription as it is typically given 4x/day and this is prescribed for 2x/day which is more typical for macrobid.    Discussed with Dr. Gusman who advised changing to macrobid. This was done and Briseida at [facility] notified.

## 2019-12-19 ENCOUNTER — RECORDS - HEALTHEAST (OUTPATIENT)
Dept: HEALTH INFORMATION MANAGEMENT | Facility: CLINIC | Age: 74
End: 2019-12-19

## 2019-12-20 DIAGNOSIS — N95.9 MENOPAUSAL AND POSTMENOPAUSAL DISORDER: ICD-10-CM

## 2019-12-20 RX ORDER — ESTRADIOL 0.5 MG/1
0.5 TABLET ORAL DAILY
Qty: 90 TABLET | Refills: 3 | Status: SHIPPED | OUTPATIENT
Start: 2019-12-20 | End: 2020-12-29

## 2020-01-14 NOTE — ADDENDUM NOTE
Addended by: TAVO KNIGHT on: 1/13/2020 08:00 PM     Modules accepted: Orders, Level of Service, SmartSet

## 2020-02-05 ENCOUNTER — RECORDS - HEALTHEAST (OUTPATIENT)
Dept: ADMINISTRATIVE | Facility: OTHER | Age: 75
End: 2020-02-05

## 2020-02-05 ENCOUNTER — OFFICE VISIT (OUTPATIENT)
Dept: OBGYN | Facility: CLINIC | Age: 75
End: 2020-02-05
Attending: OBSTETRICS & GYNECOLOGY
Payer: MEDICARE

## 2020-02-05 VITALS — SYSTOLIC BLOOD PRESSURE: 120 MMHG | DIASTOLIC BLOOD PRESSURE: 76 MMHG | HEART RATE: 66 BPM

## 2020-02-05 DIAGNOSIS — B97.7 HPV IN FEMALE: Primary | ICD-10-CM

## 2020-02-05 PROCEDURE — G0463 HOSPITAL OUTPT CLINIC VISIT: HCPCS | Mod: ZF

## 2020-02-05 PROCEDURE — 57454 BX/CURETT OF CERVIX W/SCOPE: CPT | Performed by: OBSTETRICS & GYNECOLOGY

## 2020-02-05 PROCEDURE — 88305 TISSUE EXAM BY PATHOLOGIST: CPT | Performed by: OBSTETRICS & GYNECOLOGY

## 2020-02-05 ASSESSMENT — ANXIETY QUESTIONNAIRES
3. WORRYING TOO MUCH ABOUT DIFFERENT THINGS: SEVERAL DAYS
1. FEELING NERVOUS, ANXIOUS, OR ON EDGE: SEVERAL DAYS
GAD7 TOTAL SCORE: 4
6. BECOMING EASILY ANNOYED OR IRRITABLE: NOT AT ALL
5. BEING SO RESTLESS THAT IT IS HARD TO SIT STILL: NOT AT ALL
2. NOT BEING ABLE TO STOP OR CONTROL WORRYING: SEVERAL DAYS
7. FEELING AFRAID AS IF SOMETHING AWFUL MIGHT HAPPEN: NOT AT ALL

## 2020-02-05 ASSESSMENT — PATIENT HEALTH QUESTIONNAIRE - PHQ9
5. POOR APPETITE OR OVEREATING: SEVERAL DAYS
SUM OF ALL RESPONSES TO PHQ QUESTIONS 1-9: 1

## 2020-02-05 NOTE — LETTER
2020     RE: Tangela Chapa  [address] [facility] [address]     Dear Colleague,    Thank you for referring your patient, Tangela Chapa, to the [facility] at [facility]. Please see a copy of my visit note below.    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  S/p LEEP 10/2017 in effort to eradicate HPV.      Menstrual History:  Menstrual History 2016   Period Cycle (Days) menapausal    Reviewed Today Yes       Lab Results   Component Value Date    PAP NIL 2019    PAP NIL 10/29/2018    PAP NIL 2017       Last   Lab Results   Component Value Date    HPV16 Negative 2019     Last   Lab Results   Component Value Date    HPV18 Negative 2019     Last   Lab Results   Component Value Date    HRHPV Positive 2019       Dates/results of previous cervical pathology: YESSICA 1, nothing High Grade        Dates of previous cervical pathology treatment:  LEEP    History   Smoking Status     Former Smoker   Smokeless Tobacco     Never Used     Comment: quit 30 years ago       Allergies as of 2020 - Reviewed 2020   Allergen Reaction Noted     Dust mite extract  2012     Nkda [no known drug allergies]  2013     Seasonal allergies  2012        Colposcopy Procedure:    Consent:  Details of the colposcopic procedure were reviewed. Risks, benefits of treatment, and alternate forms of evaluation were discussed.  Patient's questions were elicited and answered.   Written consent was obtained and scanned into medical record.     Verification of Procedure  Just before the procedure begins, through verbal and active participation of team members, I verified:   Initials   Patient Name smd   Patient  smd   Procedure to be performed smd       OBJECTIVE: /76 (BP Location: Left arm, Patient Position: Chair)    Pulse 66     Pelvic Exam:  EG/BUS: Normal genital architecture without lesions, erythema or abnormal secretions. Bartholin's, Urethra, [place]'s glands are normal.   Vagina: moist, pink, rugae with creamy, white, odorless secretions, Estring in place. Removed for procedure.   Cervix: no lesions and appears c/w prior LEEP.   Rectum:anus normal    PROCEDURE:    Acetic acid and/or Lugol's solution applied to cervix.  Colposcopic exam of the cervix and apex of the vagina was conducted in the usual fashion.     Findings:  SCJ was NOT seen entirely and the exam UNsatisfactory.    There were no visible lesions and no concerning findings    ECC: was obtained and placed in labeled Formalin Jar.      Assessment: Normal exam without visible pathology, HPV persistent.     Plan:   Biopsies sent to pathology.  Will contact patient with results and recommended follow-up plan.       Patient advised to contact clinic with heavy vaginal bleeding, fever over 101 degrees F, or any other concerns.    Advised that evaluation of sexual contacts is NOT warranted as she can not get the same virus again. Risk of exposure to a NEW virus is possible with partner change.    Verbalized understanding and agreement with plan.    Macarena Gusman MD, FACOG  [facility]  OB/GYN    2/5/2020  1:41 PM

## 2020-02-05 NOTE — PROGRESS NOTES
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  S/p LEEP 10/2017 in effort to eradicate HPV.      Menstrual History:  Menstrual History 2016   Period Cycle (Days) menapausal    Reviewed Today Yes       Lab Results   Component Value Date    PAP NIL 2019    PAP NIL 10/29/2018    PAP NIL 2017       Last   Lab Results   Component Value Date    HPV16 Negative 2019     Last   Lab Results   Component Value Date    HPV18 Negative 2019     Last   Lab Results   Component Value Date    HRHPV Positive 2019       Dates/results of previous cervical pathology: YESSICA 1, nothing High Grade        Dates of previous cervical pathology treatment:  LEEP    History   Smoking Status     Former Smoker   Smokeless Tobacco     Never Used     Comment: quit 30 years ago       Allergies as of 2020 - Reviewed 2020   Allergen Reaction Noted     Dust mite extract  2012     Nkda [no known drug allergies]  2013     Seasonal allergies  2012        Colposcopy Procedure:    Consent:  Details of the colposcopic procedure were reviewed. Risks, benefits of treatment, and alternate forms of evaluation were discussed.  Patient's questions were elicited and answered.   Written consent was obtained and scanned into medical record.     Verification of Procedure  Just before the procedure begins, through verbal and active participation of team members, I verified:   Initials   Patient Name smd   Patient  smd   Procedure to be performed smd       OBJECTIVE: /76 (BP Location: Left arm, Patient Position: Chair)   Pulse 66     Pelvic Exam:  EG/BUS: Normal genital architecture without lesions, erythema or abnormal secretions. Bartholin's, Urethra, [place] glands are normal.   Vagina: moist, pink, rugae with creamy, white, odorless secretions, Estring in place. Removed for procedure.   Cervix: no  lesions and appears c/w prior LEEP.   Rectum:anus normal    PROCEDURE:    Acetic acid and/or Lugol's solution applied to cervix.  Colposcopic exam of the cervix and apex of the vagina was conducted in the usual fashion.     Findings:  SCJ was NOT seen entirely and the exam UNsatisfactory.    There were no visible lesions and no concerning findings    ECC: was obtained and placed in labeled Formalin Jar.      Assessment: Normal exam without visible pathology, HPV persistent.     Plan:   Biopsies sent to pathology.  Will contact patient with results and recommended follow-up plan.       Patient advised to contact clinic with heavy vaginal bleeding, fever over 101 degrees F, or any other concerns.    Advised that evaluation of sexual contacts is NOT warranted as she can not get the same virus again. Risk of exposure to a NEW virus is possible with partner change.    Verbalized understanding and agreement with plan.    Macarena Gusman MD, FACOG  [facility]  OB/GYN    2/5/2020  1:41 PM

## 2020-02-06 ASSESSMENT — ANXIETY QUESTIONNAIRES: GAD7 TOTAL SCORE: 4

## 2020-02-08 ENCOUNTER — HEALTH MAINTENANCE LETTER (OUTPATIENT)
Age: 75
End: 2020-02-08

## 2020-02-10 ENCOUNTER — RECORDS - HEALTHEAST (OUTPATIENT)
Dept: MAMMOGRAPHY | Facility: CLINIC | Age: 75
End: 2020-02-10

## 2020-02-10 DIAGNOSIS — Z12.31 ENCOUNTER FOR SCREENING MAMMOGRAM FOR MALIGNANT NEOPLASM OF BREAST: ICD-10-CM

## 2020-02-14 LAB — COPATH REPORT: NORMAL

## 2020-03-02 ENCOUNTER — OFFICE VISIT - HEALTHEAST (OUTPATIENT)
Dept: PODIATRY | Facility: CLINIC | Age: 75
End: 2020-03-02

## 2020-03-02 DIAGNOSIS — I73.00 RAYNAUD'S DISEASE WITHOUT GANGRENE: ICD-10-CM

## 2020-03-02 ASSESSMENT — MIFFLIN-ST. JEOR: SCORE: 1221.64

## 2020-03-03 ENCOUNTER — COMMUNICATION - HEALTHEAST (OUTPATIENT)
Dept: INTERNAL MEDICINE | Facility: CLINIC | Age: 75
End: 2020-03-03

## 2020-03-03 DIAGNOSIS — F41.9 ANXIETY: ICD-10-CM

## 2020-04-30 ENCOUNTER — COMMUNICATION - HEALTHEAST (OUTPATIENT)
Dept: INTERNAL MEDICINE | Facility: CLINIC | Age: 75
End: 2020-04-30

## 2020-04-30 DIAGNOSIS — G47.00 INSOMNIA: ICD-10-CM

## 2020-05-18 ENCOUNTER — COMMUNICATION - HEALTHEAST (OUTPATIENT)
Dept: VASCULAR SURGERY | Facility: CLINIC | Age: 75
End: 2020-05-18

## 2020-05-19 ENCOUNTER — AMBULATORY - HEALTHEAST (OUTPATIENT)
Dept: PODIATRY | Facility: CLINIC | Age: 75
End: 2020-05-19

## 2020-05-19 DIAGNOSIS — I73.00 RAYNAUD'S DISEASE WITHOUT GANGRENE: ICD-10-CM

## 2020-07-31 ENCOUNTER — COMMUNICATION - HEALTHEAST (OUTPATIENT)
Dept: INTERNAL MEDICINE | Facility: CLINIC | Age: 75
End: 2020-07-31

## 2020-07-31 DIAGNOSIS — G47.00 INSOMNIA: ICD-10-CM

## 2020-09-03 ENCOUNTER — COMMUNICATION - HEALTHEAST (OUTPATIENT)
Dept: INTERNAL MEDICINE | Facility: CLINIC | Age: 75
End: 2020-09-03

## 2020-09-03 DIAGNOSIS — F41.9 ANXIETY: ICD-10-CM

## 2020-09-14 ENCOUNTER — COMMUNICATION - HEALTHEAST (OUTPATIENT)
Dept: PODIATRY | Facility: CLINIC | Age: 75
End: 2020-09-14

## 2020-09-14 DIAGNOSIS — I73.00 RAYNAUD'S DISEASE WITHOUT GANGRENE: ICD-10-CM

## 2020-09-22 ENCOUNTER — COMMUNICATION - HEALTHEAST (OUTPATIENT)
Dept: INTERNAL MEDICINE | Facility: CLINIC | Age: 75
End: 2020-09-22

## 2020-09-22 DIAGNOSIS — M06.9 RA (RHEUMATOID ARTHRITIS) (H): ICD-10-CM

## 2020-10-04 ENCOUNTER — COMMUNICATION - HEALTHEAST (OUTPATIENT)
Dept: PODIATRY | Facility: CLINIC | Age: 75
End: 2020-10-04

## 2020-10-04 DIAGNOSIS — I73.00 RAYNAUD'S DISEASE WITHOUT GANGRENE: ICD-10-CM

## 2020-10-05 ENCOUNTER — AMBULATORY - HEALTHEAST (OUTPATIENT)
Dept: PODIATRY | Facility: CLINIC | Age: 75
End: 2020-10-05

## 2020-10-05 ENCOUNTER — COMMUNICATION - HEALTHEAST (OUTPATIENT)
Dept: VASCULAR SURGERY | Facility: CLINIC | Age: 75
End: 2020-10-05

## 2020-10-05 DIAGNOSIS — I73.00 RAYNAUD'S DISEASE WITHOUT GANGRENE: ICD-10-CM

## 2020-10-29 ENCOUNTER — COMMUNICATION - HEALTHEAST (OUTPATIENT)
Dept: INTERNAL MEDICINE | Facility: CLINIC | Age: 75
End: 2020-10-29

## 2020-10-29 DIAGNOSIS — G47.00 INSOMNIA: ICD-10-CM

## 2020-11-07 ENCOUNTER — HEALTH MAINTENANCE LETTER (OUTPATIENT)
Age: 75
End: 2020-11-07

## 2020-11-07 ENCOUNTER — AMBULATORY - HEALTHEAST (OUTPATIENT)
Dept: NURSING | Facility: CLINIC | Age: 75
End: 2020-11-07

## 2020-11-10 ENCOUNTER — COMMUNICATION - HEALTHEAST (OUTPATIENT)
Dept: SCHEDULING | Facility: CLINIC | Age: 75
End: 2020-11-10

## 2020-11-13 ENCOUNTER — OFFICE VISIT - HEALTHEAST (OUTPATIENT)
Dept: INTERNAL MEDICINE | Facility: CLINIC | Age: 75
End: 2020-11-13

## 2020-11-13 DIAGNOSIS — F33.42 MAJOR DEPRESSIVE DISORDER, RECURRENT EPISODE, IN FULL REMISSION (H): ICD-10-CM

## 2020-11-13 DIAGNOSIS — R35.0 URINARY FREQUENCY: ICD-10-CM

## 2020-11-13 DIAGNOSIS — M54.6 BILATERAL THORACIC BACK PAIN: ICD-10-CM

## 2020-11-13 DIAGNOSIS — E78.2 MIXED HYPERLIPIDEMIA: ICD-10-CM

## 2020-11-13 DIAGNOSIS — Z51.81 ENCOUNTER FOR THERAPEUTIC DRUG MONITORING: ICD-10-CM

## 2020-11-13 LAB
ALBUMIN SERPL-MCNC: 3.9 G/DL (ref 3.5–5)
ALBUMIN UR-MCNC: NEGATIVE MG/DL
ALP SERPL-CCNC: 73 U/L (ref 45–120)
ALT SERPL W P-5'-P-CCNC: 26 U/L (ref 0–45)
ANION GAP SERPL CALCULATED.3IONS-SCNC: 11 MMOL/L (ref 5–18)
APPEARANCE UR: CLEAR
AST SERPL W P-5'-P-CCNC: 22 U/L (ref 0–40)
BACTERIA #/AREA URNS HPF: ABNORMAL HPF
BILIRUB SERPL-MCNC: 0.6 MG/DL (ref 0–1)
BILIRUB UR QL STRIP: NEGATIVE
BUN SERPL-MCNC: 18 MG/DL (ref 8–28)
CALCIUM SERPL-MCNC: 10 MG/DL (ref 8.5–10.5)
CHLORIDE BLD-SCNC: 107 MMOL/L (ref 98–107)
CHOLEST SERPL-MCNC: 187 MG/DL
CO2 SERPL-SCNC: 22 MMOL/L (ref 22–31)
COLOR UR AUTO: YELLOW
CREAT SERPL-MCNC: 0.8 MG/DL (ref 0.6–1.1)
ERYTHROCYTE [DISTWIDTH] IN BLOOD BY AUTOMATED COUNT: 12.5 % (ref 11–14.5)
FASTING STATUS PATIENT QL REPORTED: YES
GFR SERPL CREATININE-BSD FRML MDRD: >60 ML/MIN/1.73M2
GLUCOSE BLD-MCNC: 113 MG/DL (ref 70–125)
GLUCOSE UR STRIP-MCNC: NEGATIVE MG/DL
HCT VFR BLD AUTO: 45.9 % (ref 35–47)
HDLC SERPL-MCNC: 55 MG/DL
HGB BLD-MCNC: 15.4 G/DL (ref 12–16)
HGB UR QL STRIP: ABNORMAL
KETONES UR STRIP-MCNC: NEGATIVE MG/DL
LDLC SERPL CALC-MCNC: 107 MG/DL
LEUKOCYTE ESTERASE UR QL STRIP: ABNORMAL
MCH RBC QN AUTO: 32.1 PG (ref 27–34)
MCHC RBC AUTO-ENTMCNC: 33.6 G/DL (ref 32–36)
MCV RBC AUTO: 96 FL (ref 80–100)
NITRATE UR QL: NEGATIVE
PH UR STRIP: 5.5 [PH] (ref 5–8)
PLATELET # BLD AUTO: 215 THOU/UL (ref 140–440)
PMV BLD AUTO: 8.3 FL (ref 7–10)
POTASSIUM BLD-SCNC: 4.4 MMOL/L (ref 3.5–5)
PROT SERPL-MCNC: 7.3 G/DL (ref 6–8)
RBC # BLD AUTO: 4.8 MILL/UL (ref 3.8–5.4)
RBC #/AREA URNS AUTO: ABNORMAL HPF
SODIUM SERPL-SCNC: 140 MMOL/L (ref 136–145)
SP GR UR STRIP: >=1.03 (ref 1–1.03)
SQUAMOUS #/AREA URNS AUTO: ABNORMAL LPF
TRIGL SERPL-MCNC: 127 MG/DL
UROBILINOGEN UR STRIP-ACNC: ABNORMAL
WBC #/AREA URNS AUTO: ABNORMAL HPF
WBC: 7.1 THOU/UL (ref 4–11)

## 2020-11-13 ASSESSMENT — MIFFLIN-ST. JEOR: SCORE: 1254.98

## 2020-11-14 LAB — BACTERIA SPEC CULT: ABNORMAL

## 2020-11-25 ENCOUNTER — RECORDS - HEALTHEAST (OUTPATIENT)
Dept: ADMINISTRATIVE | Facility: OTHER | Age: 75
End: 2020-11-25

## 2020-12-03 ENCOUNTER — COMMUNICATION - HEALTHEAST (OUTPATIENT)
Dept: INTERNAL MEDICINE | Facility: CLINIC | Age: 75
End: 2020-12-03

## 2020-12-03 DIAGNOSIS — E78.5 HYPERLIPEMIA: ICD-10-CM

## 2020-12-04 DIAGNOSIS — N95.9 MENOPAUSAL AND POSTMENOPAUSAL DISORDER: ICD-10-CM

## 2020-12-04 RX ORDER — ESTRADIOL 2 MG/1
RING VAGINAL
Qty: 1 EACH | Refills: 3 | Status: SHIPPED | OUTPATIENT
Start: 2020-12-04 | End: 2021-02-09

## 2020-12-16 DIAGNOSIS — N95.9 MENOPAUSAL AND POSTMENOPAUSAL DISORDER: ICD-10-CM

## 2020-12-21 ENCOUNTER — COMMUNICATION - HEALTHEAST (OUTPATIENT)
Dept: INTERNAL MEDICINE | Facility: CLINIC | Age: 75
End: 2020-12-21

## 2020-12-21 DIAGNOSIS — K21.9 ESOPHAGEAL REFLUX: ICD-10-CM

## 2020-12-29 DIAGNOSIS — N95.9 MENOPAUSAL AND POSTMENOPAUSAL DISORDER: ICD-10-CM

## 2020-12-29 RX ORDER — ESTRADIOL 0.5 MG/1
0.5 TABLET ORAL DAILY
Qty: 90 TABLET | Refills: 0 | Status: SHIPPED | OUTPATIENT
Start: 2020-12-29 | End: 2021-02-09

## 2021-01-22 ENCOUNTER — COMMUNICATION - HEALTHEAST (OUTPATIENT)
Dept: INTERNAL MEDICINE | Facility: CLINIC | Age: 76
End: 2021-01-22

## 2021-02-06 ASSESSMENT — ENCOUNTER SYMPTOMS
EYE WATERING: 0
BLOOD IN STOOL: 0
RECTAL PAIN: 0
EYE PAIN: 0
VOMITING: 0
HEARTBURN: 1
CONSTIPATION: 0
JAUNDICE: 0
BOWEL INCONTINENCE: 0
DIARRHEA: 0
EYE REDNESS: 0
BLOATING: 0
DOUBLE VISION: 0
NAUSEA: 0
EYE IRRITATION: 1
ABDOMINAL PAIN: 0

## 2021-02-06 ASSESSMENT — ANXIETY QUESTIONNAIRES
4. TROUBLE RELAXING: NOT AT ALL
7. FEELING AFRAID AS IF SOMETHING AWFUL MIGHT HAPPEN: SEVERAL DAYS
1. FEELING NERVOUS, ANXIOUS, OR ON EDGE: SEVERAL DAYS
5. BEING SO RESTLESS THAT IT IS HARD TO SIT STILL: NOT AT ALL
7. FEELING AFRAID AS IF SOMETHING AWFUL MIGHT HAPPEN: SEVERAL DAYS
6. BECOMING EASILY ANNOYED OR IRRITABLE: NOT AT ALL
3. WORRYING TOO MUCH ABOUT DIFFERENT THINGS: NOT AT ALL
GAD7 TOTAL SCORE: 2
GAD7 TOTAL SCORE: 2
2. NOT BEING ABLE TO STOP OR CONTROL WORRYING: NOT AT ALL

## 2021-02-07 ASSESSMENT — ANXIETY QUESTIONNAIRES: GAD7 TOTAL SCORE: 2

## 2021-02-09 ENCOUNTER — AMBULATORY - HEALTHEAST (OUTPATIENT)
Dept: MULTI SPECIALTY CLINIC | Facility: CLINIC | Age: 76
End: 2021-02-09

## 2021-02-09 ENCOUNTER — OFFICE VISIT (OUTPATIENT)
Dept: OBGYN | Facility: CLINIC | Age: 76
End: 2021-02-09
Attending: OBSTETRICS & GYNECOLOGY
Payer: COMMERCIAL

## 2021-02-09 ENCOUNTER — RECORDS - HEALTHEAST (OUTPATIENT)
Dept: ADMINISTRATIVE | Facility: OTHER | Age: 76
End: 2021-02-09

## 2021-02-09 VITALS
BODY MASS INDEX: 30.42 KG/M2 | HEIGHT: 64 IN | DIASTOLIC BLOOD PRESSURE: 79 MMHG | SYSTOLIC BLOOD PRESSURE: 147 MMHG | WEIGHT: 178.2 LBS | HEART RATE: 45 BPM

## 2021-02-09 DIAGNOSIS — B97.7 HPV IN FEMALE: Primary | ICD-10-CM

## 2021-02-09 DIAGNOSIS — N95.9 MENOPAUSAL AND POSTMENOPAUSAL DISORDER: ICD-10-CM

## 2021-02-09 LAB
HPV_EXT - HISTORICAL: ABNORMAL
PAP SMEAR - HIM PATIENT REPORTED: NORMAL

## 2021-02-09 PROCEDURE — 87624 HPV HI-RISK TYP POOLED RSLT: CPT | Performed by: OBSTETRICS & GYNECOLOGY

## 2021-02-09 PROCEDURE — G0463 HOSPITAL OUTPT CLINIC VISIT: HCPCS

## 2021-02-09 PROCEDURE — 88175 CYTOPATH C/V AUTO FLUID REDO: CPT | Mod: TC | Performed by: OBSTETRICS & GYNECOLOGY

## 2021-02-09 PROCEDURE — 99397 PER PM REEVAL EST PAT 65+ YR: CPT | Performed by: OBSTETRICS & GYNECOLOGY

## 2021-02-09 RX ORDER — ESTRADIOL 2 MG/1
RING VAGINAL
Qty: 1 EACH | Refills: 3 | Status: SHIPPED | OUTPATIENT
Start: 2021-02-09 | End: 2022-05-09

## 2021-02-09 RX ORDER — ESTRADIOL 0.5 MG/1
0.5 TABLET ORAL DAILY
Qty: 90 TABLET | Refills: 3 | Status: SHIPPED | OUTPATIENT
Start: 2021-02-09 | End: 2022-03-18

## 2021-02-09 ASSESSMENT — ANXIETY QUESTIONNAIRES
3. WORRYING TOO MUCH ABOUT DIFFERENT THINGS: SEVERAL DAYS
6. BECOMING EASILY ANNOYED OR IRRITABLE: NOT AT ALL
GAD7 TOTAL SCORE: 2
5. BEING SO RESTLESS THAT IT IS HARD TO SIT STILL: NOT AT ALL
7. FEELING AFRAID AS IF SOMETHING AWFUL MIGHT HAPPEN: NOT AT ALL
2. NOT BEING ABLE TO STOP OR CONTROL WORRYING: NOT AT ALL
1. FEELING NERVOUS, ANXIOUS, OR ON EDGE: SEVERAL DAYS

## 2021-02-09 ASSESSMENT — PATIENT HEALTH QUESTIONNAIRE - PHQ9: 5. POOR APPETITE OR OVEREATING: NOT AT ALL

## 2021-02-09 ASSESSMENT — MIFFLIN-ST. JEOR: SCORE: 1298.31

## 2021-02-09 NOTE — PROGRESS NOTES
[facility]  Gynecology Visit    Reason for Visit: annual exam     HPI:    Tangela Chapa is a 75 year old here for annual exam and pap smear. She has a hx of persistent HR HPV positive pap smears, s/p LEEP in 2017.     Pap smear hx:  2/5/20 - colpo visually normal, ECC negative  12/11/19 - pap NILM, HR HPV positive  1/29/19 - colpo biopsy normal, ECC negative  10/29/18 - pap NILM, HR HPV positive  10/9/17 - LEEP negative  8/28/17 - pap NILM, HR HPV positive  10/4/16 - colpo biopsy normal, ECC negative  8/18/16 - pap NILM, HR HPV positive     GYN History  Periods: postmenopausal, no postmenopausal bleeding  Sexually partners-male, but not for some time  History of STIs: no  Pap Smears: As above  History of abnormal paps: as above  Contraception: n/a      PMH  YESSICA 1, persistent HPV positive pap   Vaginal dysplasia     PSH  Past Surgical History:   Procedure Laterality Date     ABDOMINOPLASTY       ARTHROSCOPY KNEE IRRIGATION AND DEBRIDEMENT      RT-Articular Cartilage     BIOPSY BREAST      benign open brest biopsy     COLPOSCOPY, BIOPSY, COMBINED  2/16/10    TZ-not seen     ENDOMETRIAL SAMPLING (BIOPSY)  3/31/05    benign     GENITOURINARY SURGERY  6 April 2011    cystoscopy with +1 trabeculaion     JOINT REPLACEMENT, HIP RT/LT  2008    Joint Replacement Hip RT/LT     RELEASE CARPAL TUNNEL BILATERAL         Medications    Current Outpatient Medications:      B Complex-C-Folic Acid (B COMPLEX + C TR PO), Take  by mouth., Disp: , Rfl:      Calcium 500-125 MG-UNIT TABS, Take 1 tablet by mouth daily. 2, Disp: , Rfl:      citalopram (CELEXA) 20 MG tablet, Take 20 mg by mouth daily., Disp: , Rfl:      co-enzyme Q-10 (COQ10) 100 MG CAPS, Take  by mouth daily., Disp: , Rfl:      cycloSPORINE (RESTASIS) 0.05 % ophthalmic emulsion, 1 drop every 12 hours., Disp: , Rfl:      estradiol (ESTRACE) 0.5 MG tablet, Take 1 tablet (0.5 mg) by mouth daily, Disp: 90 tablet, Rfl: 0     estradiol (ESTRACE) 0.5 MG tablet, Take 1  tablet (0.5 mg) by mouth daily, Disp: 30 tablet, Rfl: 0     estradiol (ESTRING) 2 MG vaginal ring, Place once vaginally and refill as instructed, Disp: 1 each, Rfl: 3     fish oil-omega-3 fatty acids (FISH OIL) 1000 MG capsule, Take 1 capsule by mouth daily., Disp: , Rfl:      Flaxseed, Linseed, (FLAXSEED OIL) 1000 MG CAPS, Take 2 tablets by mouth 2 times daily., Disp: , Rfl:      Latanoprost (XALATAN OP), Apply 2 drops to eye At Bedtime. 2.5, Disp: , Rfl:      Magnesium 100 MG TABS, Take 3 tablets by mouth At Bedtime., Disp: , Rfl:      mometasone (NASONEX) 50 MCG/ACT nasal spray, 2 sprays by Both Nostrils route daily., Disp: , Rfl:      nitroFURantoin macrocrystal-monohydrate (MACROBID) 100 MG capsule, Take 1 capsule (100 mg) by mouth 2 times daily, Disp: 14 capsule, Rfl: 0     NONFORMULARY, 1 Dose daily henrik 128 apply small amount to eyes at night, Disp: , Rfl:      omeprazole (PRILOSEC) 20 MG capsule, Take 1 capsule by mouth as needed., Disp: , Rfl:      PHENTERMINE HCL PO, , Disp: , Rfl:      progesterone (PROMETRIUM) 100 MG capsule, Take 1 capsule (100 mg) by mouth At Bedtime, Disp: 90 capsule, Rfl: 3     simvastatin (ZOCOR) 40 MG tablet, Take 1 tablet by mouth At Bedtime., Disp: , Rfl:      Tetrahydrozoline HCl (EYE DROPS OP), Apply  to eye. Hydro eye, Disp: , Rfl:      TRAZodone (DESYREL) 100 MG tablet, Take 1 tablet by mouth At Bedtime., Disp: , Rfl:     Allergies  Allergies   Allergen Reactions     Dust Mite Extract      Nkda [No Known Drug Allergies]      Seasonal Allergies        Family Hx  Mother with history of breast cancer in 60s. Otherwise no known family history of breast, ovarian, uterine, or cervical cancer. No known family hx of bleeding disorder, clotting disorder.     Social Hx  Tobacco use - not currently, former smoker   Drug use - no  Retired, lives alone. Feels safe at home.     ROS: 10-Point ROS negative except as noted in HPI    Physical Exam  There were no vitals taken for this  visit.  Gen: Well-appearing, NAD  HEENT: Normocephalic, atraumatic  Neck: Thyroid is not enlarged, no appreciable masses palpated. Non-tender  Abd: Soft, non-tender, non-distended  Ext: No LE edema, extremities warm and well perfused    Breast: Symmetric, no nipple discharge or retraction, no axillary lymphadenopathy, no palpable nodules or masses bilaterally=    Pelvic:  Normal appearing external female genitalia. Normal hair distribution. Vagina is without lesions.  discharge. Cervix healthy, no lesions, no cervical motion tenderness. Uterus is small, mobile, non-tender. No adnexal tenderness or masses      Assessment/Plan:  Tangela Chapa is a 75 year old  here for annual exam and pap smear due to history of persistent HR HPV positive pap smears.    # Routine preventative care  - Pap smear: obtained today, will call or MyChart with results  - Mammogram: 1/2019 BIRADS-1, due for repeat  - Colonoscopy: last 1/2018, no further screening recommended    - Immunizations: will get covid vaccine when able.       Return to clinic annually    Macarena Gusman MD, FACOG  (she/her/hers)    Department of Ob/Gyn/Women's Health  [employer] [facility] [employer]  [facility] [facility]  [address] [place] [address]  [email or web address]  p. [phone]  f. [phone]

## 2021-02-09 NOTE — LETTER
2/9/2021       RE: Tangela Chapa  [address] [facility] [address]     Dear Colleague,    Thank you for referring your patient, Tangela Chapa, to the [facility] [place] at [facility]. Please see a copy of my visit note below.    [facility]  Gynecology Visit    Reason for Visit: annual exam     HPI:    Tangela Chapa is a 75 year old here for annual exam and pap smear. She has a hx of persistent HR HPV positive pap smears, s/p LEEP in 2017.     Pap smear hx:  2/5/20 - colpo visually normal, ECC negative  12/11/19 - pap NILM, HR HPV positive  1/29/19 - colpo biopsy normal, ECC negative  10/29/18 - pap NILM, HR HPV positive  10/9/17 - LEEP negative  8/28/17 - pap NILM, HR HPV positive  10/4/16 - colpo biopsy normal, ECC negative  8/18/16 - pap NILM, HR HPV positive     GYN History  Periods: postmenopausal, no postmenopausal bleeding  Sexually partners-male, but not for some time  History of STIs: no  Pap Smears: As above  History of abnormal paps: as above  Contraception: n/a      PMH  YESSICA 1, persistent HPV positive pap   Vaginal dysplasia     PSH  Past Surgical History:   Procedure Laterality Date     ABDOMINOPLASTY       ARTHROSCOPY KNEE IRRIGATION AND DEBRIDEMENT      RT-Articular Cartilage     BIOPSY BREAST      benign open brest biopsy     COLPOSCOPY, BIOPSY, COMBINED  2/16/10    TZ-not seen     ENDOMETRIAL SAMPLING (BIOPSY)  3/31/05    benign     GENITOURINARY SURGERY  6 April 2011    cystoscopy with +1 trabeculaion     JOINT REPLACEMENT, HIP RT/LT  2008    Joint Replacement Hip RT/LT     RELEASE CARPAL TUNNEL BILATERAL         Medications    Current Outpatient Medications:      B Complex-C-Folic Acid (B COMPLEX + C TR PO), Take  by mouth., Disp: , Rfl:      Calcium 500-125 MG-UNIT TABS, Take 1 tablet by mouth daily. 2, Disp: , Rfl:      citalopram (CELEXA) 20 MG tablet, Take 20 mg by mouth daily., Disp: , Rfl:       co-enzyme Q-10 (COQ10) 100 MG CAPS, Take  by mouth daily., Disp: , Rfl:      cycloSPORINE (RESTASIS) 0.05 % ophthalmic emulsion, 1 drop every 12 hours., Disp: , Rfl:      estradiol (ESTRACE) 0.5 MG tablet, Take 1 tablet (0.5 mg) by mouth daily, Disp: 90 tablet, Rfl: 0     estradiol (ESTRACE) 0.5 MG tablet, Take 1 tablet (0.5 mg) by mouth daily, Disp: 30 tablet, Rfl: 0     estradiol (ESTRING) 2 MG vaginal ring, Place once vaginally and refill as instructed, Disp: 1 each, Rfl: 3     fish oil-omega-3 fatty acids (FISH OIL) 1000 MG capsule, Take 1 capsule by mouth daily., Disp: , Rfl:      Flaxseed, Linseed, (FLAXSEED OIL) 1000 MG CAPS, Take 2 tablets by mouth 2 times daily., Disp: , Rfl:      Latanoprost (XALATAN OP), Apply 2 drops to eye At Bedtime. 2.5, Disp: , Rfl:      Magnesium 100 MG TABS, Take 3 tablets by mouth At Bedtime., Disp: , Rfl:      mometasone (NASONEX) 50 MCG/ACT nasal spray, 2 sprays by Both Nostrils route daily., Disp: , Rfl:      nitroFURantoin macrocrystal-monohydrate (MACROBID) 100 MG capsule, Take 1 capsule (100 mg) by mouth 2 times daily, Disp: 14 capsule, Rfl: 0     NONFORMULARY, 1 Dose daily henrik 128 apply small amount to eyes at night, Disp: , Rfl:      omeprazole (PRILOSEC) 20 MG capsule, Take 1 capsule by mouth as needed., Disp: , Rfl:      PHENTERMINE HCL PO, , Disp: , Rfl:      progesterone (PROMETRIUM) 100 MG capsule, Take 1 capsule (100 mg) by mouth At Bedtime, Disp: 90 capsule, Rfl: 3     simvastatin (ZOCOR) 40 MG tablet, Take 1 tablet by mouth At Bedtime., Disp: , Rfl:      Tetrahydrozoline HCl (EYE DROPS OP), Apply  to eye. Hydro eye, Disp: , Rfl:      TRAZodone (DESYREL) 100 MG tablet, Take 1 tablet by mouth At Bedtime., Disp: , Rfl:     Allergies  Allergies   Allergen Reactions     Dust Mite Extract      Nkda [No Known Drug Allergies]      Seasonal Allergies        Family Hx  Mother with history of breast cancer in 60s. Otherwise no known family history of breast, ovarian,  uterine, or cervical cancer. No known family hx of bleeding disorder, clotting disorder.     Social Hx  Tobacco use - not currently, former smoker   Drug use - no  Retired, lives alone. Feels safe at home.     ROS: 10-Point ROS negative except as noted in HPI    Physical Exam  There were no vitals taken for this visit.  Gen: Well-appearing, NAD  HEENT: Normocephalic, atraumatic  Neck: Thyroid is not enlarged, no appreciable masses palpated. Non-tender  Abd: Soft, non-tender, non-distended  Ext: No LE edema, extremities warm and well perfused    Breast: Symmetric, no nipple discharge or retraction, no axillary lymphadenopathy, no palpable nodules or masses bilaterally=    Pelvic:  Normal appearing external female genitalia. Normal hair distribution. Vagina is without lesions.  discharge. Cervix healthy, no lesions, no cervical motion tenderness. Uterus is small, mobile, non-tender. No adnexal tenderness or masses      Assessment/Plan:  Tangela Chapa is a 75 year old  here for annual exam and pap smear due to history of persistent HR HPV positive pap smears.    # Routine preventative care  - Pap smear: obtained today, will call or MyChart with results  - Mammogram: 1/2019 BIRADS-1, due for repeat  - Colonoscopy: last 1/2018, no further screening recommended    - Immunizations: will get covid vaccine when able.       Return to clinic annually    Macarena Gusman MD, FACOG  (she/her/hers)    Department of Ob/Gyn/Women's Health  [employer]  [place] [facility]  [address] [place] [address]  [email or web address]  p. [phone]  f. [phone]

## 2021-02-12 ENCOUNTER — TELEPHONE (OUTPATIENT)
Dept: OBGYN | Facility: CLINIC | Age: 76
End: 2021-02-12

## 2021-02-12 ENCOUNTER — RECORDS - HEALTHEAST (OUTPATIENT)
Dept: HEALTH INFORMATION MANAGEMENT | Facility: CLINIC | Age: 76
End: 2021-02-12

## 2021-02-12 LAB
COPATH REPORT: NORMAL
PAP: NORMAL

## 2021-02-12 NOTE — TELEPHONE ENCOUNTER
----- Message from Jessica Tilley RN sent at 2/12/2021  3:09 PM CST -----  Regarding: pap results  Hx of HSV - does she need to do anything further

## 2021-02-12 NOTE — TELEPHONE ENCOUNTER
Tried to reach Elizabeth but received voicemail.  Left message that we are still awaiting HPV results from this week.  Once those are received, Dr Gusman will make follow up recommendations. To call back if further questions.

## 2021-02-12 NOTE — TELEPHONE ENCOUNTER
Message received from patient regarding questions about pap smear and HPV results.    Called and left voicemail message instructing patient to call back to discuss questions.

## 2021-02-24 ENCOUNTER — TELEPHONE (OUTPATIENT)
Dept: OBGYN | Facility: CLINIC | Age: 76
End: 2021-02-24

## 2021-02-24 NOTE — TELEPHONE ENCOUNTER
Message received from patient regarding pap smear results. Pap results NIL, HPV positive. Colposcopy recommended by Dr. Gusman.     Called and spoke with patient, discussed results and recommendation for colposcopy. Pt requested to be transferred to schedule. Pt verbalized understanding and denied further questions. Call transferred as requested.

## 2021-03-04 ENCOUNTER — COMMUNICATION - HEALTHEAST (OUTPATIENT)
Dept: PODIATRY | Facility: CLINIC | Age: 76
End: 2021-03-04

## 2021-03-04 DIAGNOSIS — I73.00 RAYNAUD'S DISEASE WITHOUT GANGRENE: ICD-10-CM

## 2021-03-05 ENCOUNTER — AMBULATORY - HEALTHEAST (OUTPATIENT)
Dept: VASCULAR SURGERY | Facility: CLINIC | Age: 76
End: 2021-03-05

## 2021-03-05 ENCOUNTER — COMMUNICATION - HEALTHEAST (OUTPATIENT)
Dept: PODIATRY | Facility: CLINIC | Age: 76
End: 2021-03-05

## 2021-03-05 DIAGNOSIS — I73.00 RAYNAUD'S DISEASE WITHOUT GANGRENE: ICD-10-CM

## 2021-03-08 ENCOUNTER — COMMUNICATION - HEALTHEAST (OUTPATIENT)
Dept: INTERNAL MEDICINE | Facility: CLINIC | Age: 76
End: 2021-03-08

## 2021-03-22 ENCOUNTER — OFFICE VISIT - HEALTHEAST (OUTPATIENT)
Dept: PODIATRY | Facility: CLINIC | Age: 76
End: 2021-03-22

## 2021-03-22 ENCOUNTER — AMBULATORY - HEALTHEAST (OUTPATIENT)
Dept: VASCULAR SURGERY | Facility: CLINIC | Age: 76
End: 2021-03-22

## 2021-03-22 DIAGNOSIS — M79.671 RIGHT FOOT PAIN: ICD-10-CM

## 2021-03-27 ENCOUNTER — HEALTH MAINTENANCE LETTER (OUTPATIENT)
Age: 76
End: 2021-03-27

## 2021-03-29 ENCOUNTER — ANCILLARY PROCEDURE (OUTPATIENT)
Dept: MAMMOGRAPHY | Facility: CLINIC | Age: 76
End: 2021-03-29
Attending: OBSTETRICS & GYNECOLOGY
Payer: COMMERCIAL

## 2021-03-29 ENCOUNTER — RECORDS - HEALTHEAST (OUTPATIENT)
Dept: ADMINISTRATIVE | Facility: OTHER | Age: 76
End: 2021-03-29

## 2021-03-29 ENCOUNTER — OFFICE VISIT (OUTPATIENT)
Dept: OBGYN | Facility: CLINIC | Age: 76
End: 2021-03-29
Attending: OBSTETRICS & GYNECOLOGY
Payer: COMMERCIAL

## 2021-03-29 VITALS
BODY MASS INDEX: 30.04 KG/M2 | HEART RATE: 51 BPM | DIASTOLIC BLOOD PRESSURE: 81 MMHG | SYSTOLIC BLOOD PRESSURE: 168 MMHG | WEIGHT: 175 LBS

## 2021-03-29 DIAGNOSIS — B97.7 HPV IN FEMALE: Primary | ICD-10-CM

## 2021-03-29 DIAGNOSIS — R87.810 CERVICAL HIGH RISK HUMAN PAPILLOMAVIRUS (HPV) DNA TEST POSITIVE: ICD-10-CM

## 2021-03-29 DIAGNOSIS — Z12.31 VISIT FOR SCREENING MAMMOGRAM: ICD-10-CM

## 2021-03-29 PROCEDURE — 57454 BX/CURETT OF CERVIX W/SCOPE: CPT | Performed by: OBSTETRICS & GYNECOLOGY

## 2021-03-29 PROCEDURE — 77067 SCR MAMMO BI INCL CAD: CPT | Mod: 26 | Performed by: STUDENT IN AN ORGANIZED HEALTH CARE EDUCATION/TRAINING PROGRAM

## 2021-03-29 PROCEDURE — G0463 HOSPITAL OUTPT CLINIC VISIT: HCPCS | Mod: 25

## 2021-03-29 PROCEDURE — 999N000105 HC STATISTIC NO DOCUMENTATION TO SUPPORT CHARGE

## 2021-03-29 PROCEDURE — 77067 SCR MAMMO BI INCL CAD: CPT

## 2021-03-29 PROCEDURE — 88305 TISSUE EXAM BY PATHOLOGIST: CPT | Mod: 26 | Performed by: PATHOLOGY

## 2021-03-29 PROCEDURE — 88305 TISSUE EXAM BY PATHOLOGIST: CPT | Mod: TC | Performed by: OBSTETRICS & GYNECOLOGY

## 2021-03-29 ASSESSMENT — PAIN SCALES - GENERAL: PAINLEVEL: NO PAIN (0)

## 2021-03-29 NOTE — PROGRESS NOTES
Colposcopy Visit/Procedure Note:    Tangela Chapa is an 76 year old, , who comes in for diagnosis of persistent HR HPV other, despite LEEP in 2017.      Menstrual History:  Menstrual History 2016   Period Cycle (Days) menapausal    Reviewed Today Yes       Lab Results   Component Value Date    PAP NIL 2021    PAP NIL 2019    PAP NIL 10/29/2018       Last   Lab Results   Component Value Date    HPV16 Negative 2021     Last   Lab Results   Component Value Date    HPV18 Negative 2021     Last   Lab Results   Component Value Date    HRHPV Positive 2021       Dates/results of previous cervical pathology:   Pap smear hx:  20 - colpo visually normal, ECC negative  19 - pap NILM, HR HPV positive  19 - colpo biopsy normal, ECC negative  10/29/18 - pap NILM, HR HPV positive  10/9/17 - LEEP negative  17 - pap NILM, HR HPV positive  10/4/16 - colpo biopsy normal, ECC negative  16 - pap NILM, HR HPV positive            Dates of previous cervical pathology treatment: LEEP .     History   Smoking Status     Former Smoker   Smokeless Tobacco     Never Used     Comment: quit 30 years ago       Allergies as of 2021 - Reviewed 2021   Allergen Reaction Noted     Dust mite extract  2012     Nkda [no known drug allergies]  2013     Seasonal allergies  2012        Colposcopy Procedure:    Consent:  Details of the colposcopic procedure were reviewed. Risks, benefits of treatment, and alternate forms of evaluation were discussed.  Patient's questions were elicited and answered.   Written consent was obtained and scanned into medical record.     Verification of Procedure  Just before the procedure begins, through verbal and active participation of team members, I verified:   Initials   Patient Name smd   Patient  smd   Procedure to be performed smd       OBJECTIVE: BP (!) 168/81   Pulse 51   Wt 79.4 kg (175 lb)   Breastfeeding No    BMI 30.04 kg/m      Pelvic Exam:  EG/BUS: Normal genital architecture without lesions, erythema or abnormal secretions. Bartholin's, Urethra, [place]'s glands are normal.   Vagina: moist, pink, rugae with creamy, white and odorlesssecretions  Cervix: no lesions, scar c/w prior LEEP.   Rectum:anus normal    PROCEDURE:    Acetic acid and/or Lugol's solution applied to cervix.  Colposcopic exam of the cervix and apex of the vagina was conducted in the usual fashion.     Findings:  SCJ was NOT seen entirely and the exam UNsatisfactory.    There were no visible lesions    Biopsies were  obtained at 11 and placed in labeled Formalin Jar.    ECC: was  obtained and placed in labeled Formalin Jar.    Assessment  HR HPV other.  Normal exam without visible pathology    Plan:     Biopsies sent to pathology.  Will contact patient with results and recommended follow-up plan.      Patient advised to contact clinic with heavy vaginal bleeding, fever over 101 degrees F, or any other concerns.    Advised that evaluation of sexual contacts is NOT warranted as she can not get the same virus again. Risk of exposure to a NEW virus is possible with partner change.    Verbalized understanding and agreement with plan.      Macarena Gusman MD, FACOG  (she/her/hers)    Department of Ob/Gyn/Women's Health  [employer]  [facility]  [address] [place] [address]. [place] [address]  [email or web address]  p. [phone]  f. [phone]

## 2021-03-29 NOTE — LETTER
3/29/2021       RE: Tangela Chapa  [address] [facility] [address]     Dear Colleague,    Thank you for referring your patient, Tangela Chapa, to the [facility] [place] at [facility]. Please see a copy of my visit note below.    Colposcopy Visit/Procedure Note:    Tangela Chapa is an 76 year old, , who comes in for diagnosis of persistent HR HPV other, despite LEEP in 2017.      Menstrual History:  Menstrual History 2016   Period Cycle (Days) menapausal    Reviewed Today Yes       Lab Results   Component Value Date    PAP NIL 2021    PAP NIL 2019    PAP NIL 10/29/2018       Last   Lab Results   Component Value Date    HPV16 Negative 2021     Last   Lab Results   Component Value Date    HPV18 Negative 2021     Last   Lab Results   Component Value Date    HRHPV Positive 2021       Dates/results of previous cervical pathology:   Pap smear hx:  20 - colpo visually normal, ECC negative  19 - pap NILM, HR HPV positive  19 - colpo biopsy normal, ECC negative  10/29/18 - pap NILM, HR HPV positive  10/9/17 - LEEP negative  17 - pap NILM, HR HPV positive  10/4/16 - colpo biopsy normal, ECC negative  16 - pap NILM, HR HPV positive            Dates of previous cervical pathology treatment: LEEP .     History   Smoking Status     Former Smoker   Smokeless Tobacco     Never Used     Comment: quit 30 years ago       Allergies as of 2021 - Reviewed 2021   Allergen Reaction Noted     Dust mite extract  2012     Nkda [no known drug allergies]  2013     Seasonal allergies  2012        Colposcopy Procedure:    Consent:  Details of the colposcopic procedure were reviewed. Risks, benefits of treatment, and alternate forms of evaluation were discussed.  Patient's questions were elicited and answered.   Written consent was obtained and scanned into  medical record.     Verification of Procedure  Just before the procedure begins, through verbal and active participation of team members, I verified:   Initials   Patient Name smd   Patient  smd   Procedure to be performed smd       OBJECTIVE: BP (!) 168/81   Pulse 51   Wt 79.4 kg (175 lb)   Breastfeeding No   BMI 30.04 kg/m      Pelvic Exam:  EG/BUS: Normal genital architecture without lesions, erythema or abnormal secretions. Bartholin's, Urethra, [place]'s glands are normal.   Vagina: moist, pink, rugae with creamy, white and odorlesssecretions  Cervix: no lesions, scar c/w prior LEEP.   Rectum:anus normal    PROCEDURE:    Acetic acid and/or Lugol's solution applied to cervix.  Colposcopic exam of the cervix and apex of the vagina was conducted in the usual fashion.     Findings:  SCJ was NOT seen entirely and the exam UNsatisfactory.    There were no visible lesions    Biopsies were  obtained at 11 and placed in labeled Formalin Jar.    ECC: was  obtained and placed in labeled Formalin Jar.    Assessment  HR HPV other.  Normal exam without visible pathology    Plan:     Biopsies sent to pathology.  Will contact patient with results and recommended follow-up plan.      Patient advised to contact clinic with heavy vaginal bleeding, fever over 101 degrees F, or any other concerns.    Advised that evaluation of sexual contacts is NOT warranted as she can not get the same virus again. Risk of exposure to a NEW virus is possible with partner change.    Verbalized understanding and agreement with plan.      Macarena Gusman MD, FACOG  (she/her/hers)    Department of Ob/Gyn/Women's Health  [employer] [facility] [employer] [facility] [employer]  [facility] [facility]  [address] [place], [place] [address] [address]  [email or web address]  p. [phone]  f. [phone]

## 2021-03-31 LAB — COPATH REPORT: NORMAL

## 2021-05-13 ENCOUNTER — COMMUNICATION - HEALTHEAST (OUTPATIENT)
Dept: PODIATRY | Facility: CLINIC | Age: 76
End: 2021-05-13

## 2021-05-13 DIAGNOSIS — I73.00 RAYNAUD'S DISEASE WITHOUT GANGRENE: ICD-10-CM

## 2021-05-24 ENCOUNTER — RECORDS - HEALTHEAST (OUTPATIENT)
Dept: ADMINISTRATIVE | Facility: CLINIC | Age: 76
End: 2021-05-24

## 2021-05-25 ENCOUNTER — RECORDS - HEALTHEAST (OUTPATIENT)
Dept: ADMINISTRATIVE | Facility: CLINIC | Age: 76
End: 2021-05-25

## 2021-05-28 ENCOUNTER — RECORDS - HEALTHEAST (OUTPATIENT)
Dept: ADMINISTRATIVE | Facility: CLINIC | Age: 76
End: 2021-05-28

## 2021-05-29 ENCOUNTER — RECORDS - HEALTHEAST (OUTPATIENT)
Dept: ADMINISTRATIVE | Facility: CLINIC | Age: 76
End: 2021-05-29

## 2021-05-31 ENCOUNTER — RECORDS - HEALTHEAST (OUTPATIENT)
Dept: ADMINISTRATIVE | Facility: CLINIC | Age: 76
End: 2021-05-31

## 2021-06-01 ENCOUNTER — RECORDS - HEALTHEAST (OUTPATIENT)
Dept: ADMINISTRATIVE | Facility: CLINIC | Age: 76
End: 2021-06-01

## 2021-06-24 ENCOUNTER — COMMUNICATION - HEALTHEAST (OUTPATIENT)
Dept: PODIATRY | Facility: CLINIC | Age: 76
End: 2021-06-24

## 2021-06-24 DIAGNOSIS — I73.00 RAYNAUD'S DISEASE WITHOUT GANGRENE: ICD-10-CM

## 2021-07-20 VITALS — BODY MASS INDEX: 30.73 KG/M2 | WEIGHT: 167 LBS | HEIGHT: 62 IN | WEIGHT: 167.13 LBS | BODY MASS INDEX: 30.17 KG/M2

## 2021-07-20 VITALS
HEART RATE: 42 BPM | WEIGHT: 175 LBS | BODY MASS INDEX: 31.01 KG/M2 | SYSTOLIC BLOOD PRESSURE: 134 MMHG | OXYGEN SATURATION: 98 % | DIASTOLIC BLOOD PRESSURE: 78 MMHG | HEIGHT: 63 IN

## 2021-07-20 VITALS — HEIGHT: 62 IN | WEIGHT: 167 LBS | BODY MASS INDEX: 30.73 KG/M2

## 2021-07-20 VITALS — WEIGHT: 168.5 LBS | HEIGHT: 63 IN | BODY MASS INDEX: 29.86 KG/M2

## 2021-07-20 VITALS — WEIGHT: 168.9 LBS | BODY MASS INDEX: 28.83 KG/M2 | HEIGHT: 64 IN

## 2021-07-20 VITALS — WEIGHT: 168 LBS | BODY MASS INDEX: 30.91 KG/M2 | HEIGHT: 62 IN

## 2021-07-20 VITALS
SYSTOLIC BLOOD PRESSURE: 142 MMHG | RESPIRATION RATE: 18 BRPM | TEMPERATURE: 98.5 F | DIASTOLIC BLOOD PRESSURE: 80 MMHG | HEART RATE: 58 BPM

## 2021-07-20 VITALS
HEIGHT: 62 IN | WEIGHT: 168 LBS | OXYGEN SATURATION: 94 % | DIASTOLIC BLOOD PRESSURE: 68 MMHG | BODY MASS INDEX: 30.91 KG/M2 | SYSTOLIC BLOOD PRESSURE: 118 MMHG | HEART RATE: 64 BPM

## 2021-07-20 VITALS — WEIGHT: 186.7 LBS | HEIGHT: 64 IN | BODY MASS INDEX: 31.88 KG/M2

## 2021-07-20 VITALS — BODY MASS INDEX: 30.91 KG/M2 | WEIGHT: 168 LBS | HEIGHT: 62 IN

## 2021-07-20 NOTE — PROGRESS NOTES
Re Chapa returned to the clinic today complaining of painful  ingrown toenails on both sides of both great toenails.      OBJECTIVE FINDINGS:  Nails bilateral feet, normal length and normal color.  The medial and lateral borders of both great toenails are severely incurvated.  Skin bilaterally warm, supple, and intact.  DP and PT pulses +2/4  bilaterally.  Capillary refill less than 2 seconds bilaterally.  Negative  clonus and negative Babinski bilaterally.  Range of motion within normal  limits, bilateral feet and muscle power +5/5 within all compartments of both  lower extremities.  There is large round raised hyperkeratotic nucleated  lesion on the lateral aspect of right great toe, which is somewhat tender on  palpation.      ASSESSMENT: Ingrown toenail both great toes.      PLAN:  I performed a partial nail excision and matrixectomy of the medial and lateral borders of both great toenails today under local anesthesia of 2% lidocaine plain via the phenol and alcohol technique.  The patient tolerated the procedures and anesthesia well.  She was given both written and verbal postoperative instructions.

## 2021-07-20 NOTE — PROGRESS NOTES
ASSESSMENT:  1. Cough  Percent Dr. Parkinson for symptoms of acute bronchitis in 4/4.  She was treated with Zithromax.  She was seen a second time on 5/31 again with persistent cough.  She received Ceftin then.  Symptoms have improved somewhat, but cough is still troublesome.  I would favor trying albuterol for to see if she may have cough equivalent bronchospasm following her bronchitis.  - albuterol (PROAIR HFA;PROVENTIL HFA;VENTOLIN HFA) 90 mcg/actuation inhaler; Inhale 2 puffs every 6 (six) hours as needed for wheezing.  Dispense: 1 each; Refill: 0    2. Dermatitis  She has developed an extensive pruritic dermatitis on the shins.  She does gardening.  There are no vesicular lesions to suggest poison ivy  - triamcinolone (KENALOG) 0.1 % cream; Apply twice daily to involved area until resolved  Dispense: 80 g; Refill: 1    3.  Possible seasonal allergies  She complains of rhinitis and eye irritation.  She is interested in an allergy assessment    PLAN:  Patient Instructions   1.  Triamcinolone 1% cream:   use twice daily  For dermatitis on legs    2.  Zyrtec 10 mg twice daily as needed for itching    3.  Albuterol two puffs four times daily as needed for cough.    4.  See for yearly exam    5.  Call if rash is not better in two weeks    6.  Yearly exam due in October 7.  Allergy consultation    There are no discontinued medications.    Return in about 5 months (around 11/27/2019) for Annual physical.      ASSESSED PROBLEMS:  1. Cough  albuterol (PROAIR HFA;PROVENTIL HFA;VENTOLIN HFA) 90 mcg/actuation inhaler   2. Dermatitis  triamcinolone (KENALOG) 0.1 % cream       CHIEF COMPLAINT:  Chief Complaint   Patient presents with     Rash     pt states red and itchy rash in both legs      Cough     pt states productive cough        HISTORY OF PRESENT ILLNESS:  Tangela is a 74 y.o. female presenting to the clinic today with complaints of a rash and a cough.     Rash: She has an itchy rash on her bilateral lower legs. She  was initially using Calamine lotion, which was helping a little, but she has since switched to Cortizone, which has been helping a little more. She has not been hiking in the woods but has spent some time in the sun. She has been doing gardening recently, but she does not use chemicals and thinks it was a couple days after gardening that the rash showed up. There are not weeds or ditches that she would have walked through. She has not done anything new or unusual for her that she can think of. She has been around paint and varnish, but it has not been on her legs. The only medications she has changed is her stool softener and Mucinex. She purchased Claritin but that has not helped.     Cough: She has continued to have a cough. Taking Mucinex helped, but it has not resolved. She does not have any wheezing, but she occasionally has a little pressure in the chest. She has not been short of breath. The cough is during the day and at night. It is productive. She has been able to ride the stationary bike without triggering the cough. She has never used an inhaler before.     REVIEW OF SYSTEMS:   She has dry eyes and takes medications for them. She has not been sleeping well with the cough and rash. All other systems are negative.    PFSH:  She has been doing some gardening. She has been riding a stationary bike. She used to smoke.     TOBACCO USE:  Social History     Tobacco Use   Smoking Status Former Smoker   Smokeless Tobacco Never Used       VITALS:  Vitals:    06/27/19 1352   BP: 128/74   Patient Site: Left Arm   Patient Position: Sitting   Cuff Size: Adult Regular   Pulse: 64   Resp: 20   Weight: 167 lb 2 oz (75.8 kg)     Wt Readings from Last 3 Encounters:   06/27/19 167 lb 2 oz (75.8 kg)   05/31/19 167 lb (75.8 kg)   04/04/19 168 lb (76.2 kg)     Body mass index is 30.17 kg/m .    PHYSICAL EXAM:  Constitutional:   Reveals an alert, talkative woman. Affect appropriate. Does not appear acutely ill.  Vitals: per  nursing notes.  HEENT:  Oropharynx:   Mouth and throat clear, no thrush or exudate.  Neck:  Supple, no carotid bruits or adenopathy.  Back:  No spine or CVA pain.  Lungs: Clear to A&P without rales or wheezes.  Respiratory effort normal.  Skin: Erythematous, patchy dermatitis on anterior aspects of lower legs with minimal dermatitis above knees. No involvement of arms or back. No vesicles.   Psychiatric:  Memory intact, mood appropriate.    ADDITIONAL HISTORY SUMMARIZED (2): Reviewed nurse triage from yesterday regarding rash and cough  DECISION TO OBTAIN EXTRA INFORMATION (1): None.   RADIOLOGY TESTS (1): None.  LABS (1): None.  MEDICINE TESTS (1): None.  INDEPENDENT REVIEW (2 each): None.     The visit lasted a total of 20 minutes face to face with the patient. Over 50% of the time was spent counseling and educating the patient about her rash and cough.    ILogan, am scribing for and in the presence of, Dr. Aguilar.    I, Logan Aguilar MD, personally performed the services described in this documentation, as scribed by Logan Hernandez in my presence, and it is both accurate and complete.    Dragon dictation was used for this note.  Speech recognition errors are a possibility.    MEDICATIONS:  Current Outpatient Medications   Medication Sig Dispense Refill     CALCIUM ORAL Take 1 tablet by mouth 2 (two) times a day. 600-200 MG-UNIT Oral Tablet       cholecalciferol, vitamin D3, 1,000 unit tablet Take 1,000 Units by mouth daily.       citalopram (CELEXA) 20 MG tablet TAKE ONE TABLET BY MOUTH ONCE DAILY 90 tablet 3     co-enzyme Q-10 30 mg capsule Take 30 mg by mouth 3 (three) times a day.       cycloSPORINE (RESTASIS) 0.05 % ophthalmic emulsion Administer 1 drop to both eyes every 12 (twelve) hours. Daily.       DOCOSAHEXANOIC ACID/EPA (FISH OIL ORAL) Take 4 capsules by mouth daily. 1000 mg oral capsule.       erythromycin with ethanol (ERYDERM) 2 % external solution APPLY SPARINGLY TO AFFECTED AREA(S)  TWICE DAILY. 60 mL 2     estradiol (ESTRING) 2 mg vaginal ring Insert 2 mg into the vagina every 3 (three) months. follow package directions       estrogens conjugated, synthetic A, (CENESTIN) 0.625 MG tablet Take 0.625 mg by mouth daily.       latanoprost (XALATAN) 0.005 % ophthalmic solution        magnesium 30 mg tablet Take 30 mg by mouth 2 (two) times a day.       mometasone (NASONEX) 50 mcg/actuation nasal spray 2 sprays into each nostril daily.       MULTIVITAMIN ORAL Take 1 tablet by mouth daily. TABS       NEVANAC 0.1 % ophthalmic suspension        omeprazole (PRILOSEC) 20 MG capsule TAKE ONE CAPSULE BY MOUTH DAILY. 90 capsule 2     phentermine 37.5 MG capsule Take 37.5 mg by mouth every morning.       pravastatin (PRAVACHOL) 40 MG tablet TAKE ONE TABLET DAILY IN THE EVENING. 90 tablet 3     prednisoLONE acetate (PRED-FORTE) 1 % ophthalmic suspension        progesterone (PROMETRIUM) 100 MG capsule        sulindac (CLINORIL) 200 MG tablet TAKE 1 TABLET BY MOUTH 2 TIMES DAILY 180 tablet 3     traZODone (DESYREL) 100 MG tablet TAKE 1/2- 1 TABLET BY MOUTH AT BEDTIME. 90 tablet 3     vitamin E 100 UNIT capsule Take 100 Units by mouth daily.       albuterol (PROAIR HFA;PROVENTIL HFA;VENTOLIN HFA) 90 mcg/actuation inhaler Inhale 2 puffs every 6 (six) hours as needed for wheezing. 1 each 0     triamcinolone (KENALOG) 0.1 % cream Apply twice daily to involved area until resolved 80 g 1     No current facility-administered medications for this visit.        Total data points: 2

## 2021-07-20 NOTE — TELEPHONE ENCOUNTER
Refill Approved    Rx renewed per Medication Renewal Policy. Medication was last renewed on 12/4/19.    Hailey Naqvi, [facility] Triage/Med Refill 12/4/2020     Requested Prescriptions   Pending Prescriptions Disp Refills     pravastatin (PRAVACHOL) 40 MG tablet [Pharmacy Med Name: PRAVASTATIN NA 40 MG TAB 40 Tablet] 90 tablet 3     Sig: TAKE ONE TABLET DAILY       Statins Refill Protocol (Hmg CoA Reductase Inhibitors) Passed - 12/3/2020 11:57 AM        Passed - PCP or prescribing provider visit in past 12 months      Last office visit with prescriber/PCP: 11/13/2020 Logan Aguilar MD OR same dept: 11/13/2020 Logan Aguilar MD OR same specialty: 11/13/2020 Logan Aguilar MD  Last physical: 10/12/2018 Last MTM visit: Visit date not found   Next visit within 3 mo: Visit date not found  Next physical within 3 mo: Visit date not found  Prescriber OR PCP: Logan Aguilar MD  Last diagnosis associated with med order: 1. Hyperlipemia  - pravastatin (PRAVACHOL) 40 MG tablet [Pharmacy Med Name: PRAVASTATIN NA 40 MG TAB 40 Tablet]; TAKE ONE TABLET DAILY  Dispense: 90 tablet; Refill: 3    If protocol passes may refill for 12 months if within 3 months of last provider visit (or a total of 15 months).

## 2021-07-20 NOTE — TELEPHONE ENCOUNTER
Pt LOV on 3/2020, was seen for Raynauds disease without gangrene bilateral feet. She is requesting a 3 month refill of pentoxifylline. Please advise.

## 2021-07-20 NOTE — TELEPHONE ENCOUNTER
Patient states she has had a cough, productive for atleast 2 months.  She was given a Z-PAC, and states it did  Nothing.  She states she thinks she has pneumonia.  Patient only wants to see Dr. Aguilar, and an appointment was made for Friday at 2:35pm.    Jessica Chacon RN  [facility] Triage/refill nurse

## 2021-07-20 NOTE — TELEPHONE ENCOUNTER
RN cannot approve Refill Request    RN can NOT refill this medication PCP messaged that patient is overdue for Office Visit. Last office visit: 8/6/2019 Logan Aguilar MD Last Physical: 10/12/2018 Last MTM visit: Visit date not found Last visit same specialty: 8/6/2019 Logan Aguilar MD.  Next visit within 3 mo: Visit date not found  Next physical within 3 mo: Visit date not found      Madelyn Quintanilla, [facility] Triage/Med Refill 9/5/2020    Requested Prescriptions   Pending Prescriptions Disp Refills     citalopram (CELEXA) 20 MG tablet [Pharmacy Med Name: CITALOPRAM HBR 20 MG TABLET] 90 tablet 0     Sig: TAKE ONE TABLET BY MOUTH ONCE DAILY       [facility] Refill Protocol  Failed - 9/3/2020  8:03 AM        Failed - PCP or prescribing provider visit in last year     Last office visit with prescriber/PCP: 8/6/2019 Logan Aguilar MD OR same dept: Visit date not found OR same specialty: 8/6/2019 Logan Aguilar MD  Last physical: 10/12/2018 Last MTM visit: Visit date not found   Next visit within 3 mo: Visit date not found  Next physical within 3 mo: Visit date not found  Prescriber OR PCP: Logan Aguilar MD  Last diagnosis associated with med order: 1. Anxiety  - citalopram (CELEXA) 20 MG tablet [Pharmacy Med Name: CITALOPRAM HBR 20 MG TABLET]; TAKE ONE TABLET BY MOUTH ONCE DAILY  Dispense: 90 tablet; Refill: 0    If protocol passes may refill for 12 months if within 3 months of last provider visit (or a total of 15 months).

## 2021-07-20 NOTE — PATIENT INSTRUCTIONS - HE
1.  Chest x-ray was done and reviewed.  There were no vertebral fractures, pulmonary infiltrates or masses.    2.  Conservative care for back pain was reviewed including using local heat, gentle stretching, and sulindac on discomfort is severe.  Call if not improving in 2 weeks    3.  Labs as outlined for follow-up of mixed hyperlipidemia and medication monitoring.  She will be notified of test results    4.  Next mammogram is due in February    5.  Annual wellness visit in 1 year

## 2021-07-20 NOTE — PATIENT INSTRUCTIONS - HE
1.  Ceftin 500 mg twice daily for ten days    2.  Robitussin DM for cough    3.  Annual wellness due in October    4.  Call if symptoms do not improve with Ceftin

## 2021-07-20 NOTE — TELEPHONE ENCOUNTER
Triage call:   Concerns about allergies- nose drainage and coughing- large amounts mucous- Mucinex helps during the day but not at night- has a large amount of mucous production which sometimes makes her feel like she is choking but able to clear the mucous. Indicates that the mucous used to be colored yellow or green but has since changed since she started mucinex.     Also developed Hives on her calves- very itchy and not sleeping - denies exposure to environmental - bright red and raised- has not broken the skin with scratching. Only new medications she has been on is a new stool softner and brand name mucinex.      Patient would also like a referral to an allergist.     Triaged to be seen today- reviewed additional care advice and patient verbalizes understanding. Patient warm transferred to scheduling for appointment. Patient only wants to see her PCP- Patient warm transferred to scheduling for appointment. Appointment @ 1:45 pm tomorrow with PCP.      Regine Ibrahim RN BSBA [facility] Triage/Med Refill 6/26/2019 10:50 AM    Reason for Disposition    SEVERE itching    Protocols used: RASH OR REDNESS - WIDESPREAD-A-OH

## 2021-07-20 NOTE — PROGRESS NOTES
FOOT AND ANKLE SURGERY/PODIATRY Progress Note        ASSESSMENT:   Neuritis right foot  Right foot pain    HPI: Tangela Chapa was seen again today complaining of a small painful lump on the bottom of her right heel.  Patient indicated that she has had this pain for approximately 2 months.  The pain is aggravated with weightbearing.  She does not recall any particular trauma to the right foot.  The pain is easily relieved with nonweightbearing.  She has not had any redness, swelling, drainage or bleeding.  The pain is mild to moderate.  The patient also complained of pain, burning and tingling along the big toe joint of her right foot.  This area of her foot is aggravated by touch.  She has no pain while wearing her shoe gear.  She has some mild pain while resting at night.  The pain is intermittent.  She has not had any redness or swelling of the right foot.  Denies any other previous treatment.    Past Medical History:   Diagnosis Date     Herpes zoster     Left facial        Past Surgical History:   Procedure Laterality Date     ABDOMINOPLASTY       BREAST BIOPSY Left     benign x2     CATARACT EXTRACTION W/ INTRAOCULAR LENS IMPLANT Left 01/2016     CATARACT EXTRACTION, BILATERAL  04/2015     CERVICAL BIOPSY  W/ LOOP ELECTRODE EXCISION  10/09/2017     MI BIOPSY OF BREAST, INCISIONAL      Description: Biopsy Breast Open;  Recorded: 06/06/2008;  Comments: BENIGN     MI KNEE SCOPE,DIAGNOSTIC      Description: Arthroscopy Knee Right;  Recorded: 06/06/2008;  Comments: FOR MENISCUS TEAR     MI OSTEOTOMY METATARSAL (NOT 1ST)      Description: Metatarsal Osteotomy Of The Fifth Metatarsal;  Proc Date: 02/01/2007;     MI TOTAL HIP ARTHROPLASTY      Description: Total Hip Replacement;  Proc Date: 06/01/2008;  Comments: RIGHT     MI TOTAL HIP ARTHROPLASTY      Description: Total Hip Replacement;  Proc Date: 08/01/2008;       Allergies   Allergen Reactions     Mite Extract Unknown     Simvastatin Myalgia         Current  Outpatient Medications:      albuterol (PROAIR HFA;PROVENTIL HFA;VENTOLIN HFA) 90 mcg/actuation inhaler, Inhale 2 puffs every 6 (six) hours as needed for wheezing., Disp: 1 each, Rfl: 0     CALCIUM ORAL, Take 1 tablet by mouth 2 (two) times a day. 600-200 MG-UNIT Oral Tablet, Disp: , Rfl:      cholecalciferol, vitamin D3, 1,000 unit tablet, Take 1,000 Units by mouth daily., Disp: , Rfl:      citalopram (CELEXA) 20 MG tablet, TAKE ONE TABLET BY MOUTH ONCE DAILY, Disp: 90 tablet, Rfl: 3     cycloSPORINE (RESTASIS) 0.05 % ophthalmic emulsion, Administer 1 drop to both eyes every 12 (twelve) hours. Daily., Disp: , Rfl:      DOCOSAHEXANOIC ACID/EPA (FISH OIL ORAL), Take 4 capsules by mouth daily. 1000 mg oral capsule., Disp: , Rfl:      dorzolamide-timoloL (COSOPT) 22.3-6.8 mg/mL ophthalmic solution, , Disp: , Rfl:      erythromycin with ethanol (ERYDERM) 2 % external solution, APPLY SPARINGLY TO AFFECTED AREA(S) TWICE DAILY., Disp: 60 mL, Rfl: 2     estradiol (ESTRING) 2 mg vaginal ring, Insert 2 mg into the vagina every 3 (three) months. follow package directions, Disp: , Rfl:      estrogens conjugated, synthetic A, (CENESTIN) 0.625 MG tablet, Take 0.625 mg by mouth daily., Disp: , Rfl:      latanoprost (XALATAN) 0.005 % ophthalmic solution, , Disp: , Rfl:      mometasone (NASONEX) 50 mcg/actuation nasal spray, USE 2 PUFFS IN EACH NOSTRIL DAILY, Disp: 17 g, Rfl: 6     MULTIVITAMIN ORAL, Take 1 tablet by mouth daily. TABS, Disp: , Rfl:      NEVANAC 0.1 % ophthalmic suspension, , Disp: , Rfl:      omeprazole (PRILOSEC) 20 MG capsule, TAKE ONE CAPSULE BY MOUTH DAILY., Disp: 90 capsule, Rfl: 2     omeprazole (PRILOSEC) 20 MG capsule, Take 1 capsule (20 mg total) by mouth daily., Disp: 90 capsule, Rfl: 3     omeprazole (PRILOSEC) 20 MG capsule, TAKE ONE CAPSULE BY MOUTH DAILY., Disp: 90 capsule, Rfl: 3     pentoxifylline (TRENTAL) 400 mg CR tablet, TAKE 1 TABLET BY MOUTH 3 TIMES A DAY WITH MEALS, Disp: 90 tablet, Rfl: 0      phentermine 37.5 MG capsule, Take 37.5 mg by mouth every morning., Disp: , Rfl:      pravastatin (PRAVACHOL) 40 MG tablet, TAKE ONE TABLET DAILY, Disp: 90 tablet, Rfl: 3     prednisoLONE acetate (PRED-FORTE) 1 % ophthalmic suspension, , Disp: , Rfl:      progesterone (PROMETRIUM) 100 MG capsule, , Disp: , Rfl:      sulindac (CLINORIL) 200 MG tablet, TAKE 1 TABLET BY MOUTH 2 TIMES DAILY, Disp: 180 tablet, Rfl: 3     traZODone (DESYREL) 100 MG tablet, TAKE 1/2- 1 TABLET BY MOUTH AT BEDTIME., Disp: 90 tablet, Rfl: 3     triamcinolone (KENALOG) 0.1 % cream, Apply twice daily to involved area until resolved, Disp: 80 g, Rfl: 1     vitamin E 100 UNIT capsule, Take 100 Units by mouth daily., Disp: , Rfl:     Family History   Problem Relation Age of Onset     Breast cancer Mother      Depression Mother      Breast cancer Cousin      Breast cancer Cousin      Breast cancer Cousin      Depression Father      Leukemia Brother        Social History     Socioeconomic History     Marital status: Single     Spouse name: Not on file     Number of children: Not on file     Years of education: Not on file     Highest education level: Not on file   Occupational History     Occupation: retired   Social Needs     Financial resource strain: Not on file     Food insecurity     Worry: Not on file     Inability: Not on file     Transportation needs     Medical: Not on file     Non-medical: Not on file   Tobacco Use     Smoking status: Former Smoker     Smokeless tobacco: Never Used   Substance and Sexual Activity     Alcohol use: Yes     Comment: pt quit about 30 years ag     Drug use: No     Sexual activity: Not on file   Lifestyle     Physical activity     Days per week: Not on file     Minutes per session: Not on file     Stress: Not on file   Relationships     Social connections     Talks on phone: Not on file     Gets together: Not on file     Attends Yazidi service: Not on file     Active member of club or organization: Not on file      Attends meetings of clubs or organizations: Not on file     Relationship status: Not on file     Intimate partner violence     Fear of current or ex partner: Not on file     Emotionally abused: Not on file     Physically abused: Not on file     Forced sexual activity: Not on file   Other Topics Concern     Not on file   Social History Narrative    She is retired.        10 point Review of Systems is negative       Vitals:    03/22/21 1358   BP: 142/80   Pulse: (!) 58   Resp: 18   Temp: 98.5  F (36.9  C)       BMI= There is no height or weight on file to calculate BMI.    OBJECTIVE:  General appearance: Patient is alert and fully cooperative with history & exam.  No sign of distress is noted during the visit.  Vascular: Dorsalis pedis and posterior tibial pulses are palpable. There is no pedal hair growth laterally.  CFT < 3 sec from anterior tibial surface to distal digits bilaterally. There is no appreciable edema noted.  Dermatologic: Turgor and texture are within normal limits. No coloration or temperature changes. No primary or secondary lesions noted.  Neurologic: All epicritic and proprioceptive sensations are grossly intact bilaterally.  Musculoskeletal: All active and passive ankle, subtalar, midtarsal, and 1st MPJ range of motion are grossly intact without pain or crepitus, with the exception of none. Manual muscle strength is within normal limits bilaterally. All dorsiflexors, plantarflexors, invertors, evertors are intact bilaterally. Tenderness present to the plantar aspect of the right heel and the medial aspect of the first metatarsal head right foot on palpation.  No tenderness to the first MPJ right foot with range of motion. Calf is soft/non-tender without warmth/induration    Imaging:         No results found.         TREATMENT:  I informed the patient that she may have a foreign body.  An x-ray will be taken of the right foot to rule out foreign body of the right heel.  I informed the patient  that the mild neuritis of the medial dorsal cutaneous nerve of the right foot should improve over the next several weeks.  I do not recommend any treatment at this time.  She is to monitor her condition.  If her symptoms progress I recommend a cortisone injection of the medial dorsal cutaneous nerve right foot.        Miguel Doherty; [facility]  [facility]

## 2021-07-20 NOTE — TELEPHONE ENCOUNTER
Patient called to request a refill on pentoxifylline. She stated she is completely out of medication.

## 2021-07-20 NOTE — TELEPHONE ENCOUNTER
RN cannot approve Refill Request    RN can NOT refill this medication med is not covered by policy/route to provider     . Last office visit: 8/6/2019 Logan Aguilar MD Last Physical: 10/12/2018 Last MTM visit: Visit date not found Last visit same specialty: 8/6/2019 Logan Aguilar MD.  Next visit within 3 mo: Visit date not found  Next physical within 3 mo: Visit date not found      Hailey Naqvi, [facility] Triage/Med Refill 9/20/2019    Requested Prescriptions   Pending Prescriptions Disp Refills     sulindac (CLINORIL) 200 MG tablet [Pharmacy Med Name: SULINDAC 200 MG TABLET] 180 tablet 0     Sig: TAKE 1 TABLET BY MOUTH 2 TIMES DAILY       There is no refill protocol information for this order

## 2021-07-20 NOTE — TELEPHONE ENCOUNTER
Refill Approved    Rx renewed per Medication Renewal Policy. Medication was last renewed on 9/28/19.    Hailey Naqvi, [facility] Triage/Med Refill 12/23/2020     Requested Prescriptions   Pending Prescriptions Disp Refills     omeprazole (PRILOSEC) 20 MG capsule [Pharmacy Med Name: OMEPRAZOLE 20 MG CPDR 20 Capsule] 90 capsule 3     Sig: TAKE ONE CAPSULE BY MOUTH DAILY.       GI Medications Refill Protocol Passed - 12/21/2020  8:09 AM        Passed - PCP or prescribing provider visit in last 12 or next 3 months.     Last office visit with prescriber/PCP: 11/13/2020 Logan Aguilar MD OR same dept: 11/13/2020 Logan Aguilar MD OR same specialty: 11/13/2020 Logan Aguilar MD  Last physical: 10/12/2018 Last MTM visit: Visit date not found   Next visit within 3 mo: Visit date not found  Next physical within 3 mo: Visit date not found  Prescriber OR PCP: Logan Aguilar MD  Last diagnosis associated with med order: There are no diagnoses linked to this encounter.  If protocol passes may refill for 12 months if within 3 months of last provider visit (or a total of 15 months).

## 2021-07-20 NOTE — PROGRESS NOTES
ASSESSMENT:  1.  History of depression:  Zehra remains on Celexa 20 mg daily with trazodone at bedtime.  Overall, she is doing well.  She wishes to continue current medications.  2.  Hyperlipidemia:  On pravastatin.  Lipids will be checked.  3.  History of colonic polyps:  Colonoscopy is due 2018  4.  History of low bone mass:  Her previous bone density study was reviewed.  The study from January 2016 had returned into the normal range    A recheck in 3-4 years is advised.  5.  Joint pain due to osteoarthritis:  She uses sulindac as needed with benefit.  6.  Esophageal reflux:  Acceptable control with Tums  as needed.   She does use omeprazole for flares  7.  Obesity:  She recently saw Dr. Suarez and will be starting a weight loss program    PLAN:  1.  Obtain labs from Dr. Gale for review  2.  Medications were updated and reviewed  3.  Mammogram due after September 16.  Colonoscopy due 2018.  Recheck bone density in 3-4 years.  4.  Clinic follow-up in 6 months or as needed    Medications Discontinued During This Encounter   Medication Reason     codeine-guaiFENesin (GUAIFENESIN AC)  mg/5 mL liquid Therapy completed           ASSESSED PROBLEMS:  No diagnosis found.    CHIEF COMPLAINT:  Chief Complaint   Patient presents with     Follow-up     review meds       HISTORY OF PRESENT ILLNESS:  Tangela is a 72 y.o. female presenting to the clinic today for follow up and medication review.     Weight Issues: She has not been exercising for the past 6 weeks due to illness and putting her house on the market. She recently consulted with obesity and endocrine specialist, Dr. Chowdhury. So far, she had lab work done and will follow up with consultation in about a week.     GERD: She treats with several TUMS per night.       Osteoarthritis of the Hips: She has recently received cortisone injections in each hip which has been helpful. She cannot walk on the treadmill anymore due to hip pain, but  pls call regarding epinephrine script "she can use a stationary bike.     Vision Issues: She had cataract surgery which has left her with decreased vision in the lower field. She has glaucoma in the left eye and dry eyes. She continues on a multitude of eye drops. She has been using prednisolone drops during the winter but her eye doctor has said that she does not need to continue with these.     Health Maintenance: She had a colonoscopy in 2013, which was normal. She will repeat in 5 years due to previous polyp removal. She had a DXA on 1/26/16 which showed normal bone density with a T-score of 0.0, an improvement over her last DXA in 2011.       REVIEW OF SYSTEMS:   She reports a recent upper respiratory illness. She contacted the office and was prescribed an antibiotic and cough suppressant with codeine. These were helpful and she is feeling better though she continues to have a cough.     All other systems are negative.    PFSH:  She is moving temporarily to a rental house to decrease yard work. She has been in her house for 30 years. Her father had colon polyps.     TOBACCO USE:  History   Smoking Status     Former Smoker   Smokeless Tobacco     Not on file       VITALS:  Vitals:    04/17/17 1037 04/17/17 1121   BP: 120/72 130/82   Patient Site: Left Arm Right Arm   Patient Position: Sitting Sitting   Cuff Size: Adult Regular    Pulse: 60    Weight: 186 lb 11.2 oz (84.7 kg)    Height: 5' 3.5\" (1.613 m)      Wt Readings from Last 3 Encounters:   04/17/17 186 lb 11.2 oz (84.7 kg)   09/16/16 180 lb 1.6 oz (81.7 kg)   01/26/16 182 lb 8 oz (82.8 kg)     Body mass index is 32.55 kg/(m^2).    PHYSICAL EXAM:  Constitutional:   Reveals an alert pleasant talkative woman.  Affect appropriate. Does not seem acutely ill. Vitals: per nursing notes.  BP recheck by MD: 130/82, right arm, sitting.   HEENT:  Atraumatic.   Neck:  Supple, no carotid bruits or adenopathy.  Back:  No spine or CVA pain.  Thorax:  No bony deformities.  Lungs: Clear to A&P without rales or " wheezes.  Respiratory effort normal.  Cardiac:   Regular rate and rhythm, normal S1, S2, no murmur.  Abdomen:  Soft, moderately obese. Lower abdominal horizontal scar. No bruits, mass, or tenderness.  Skin:  Clear.   Neuro:  Alert and pleasant. No gross focal deficits.    QUALITY MEASURES:  The following high BMI interventions were performed this visit: encouragement to exercise    ADDITIONAL HISTORY SUMMARIZED (2): Reviewed colonoscopy of 2013.   DECISION TO OBTAIN EXTRA INFORMATION (1): OSEI for Care Everywhere obtained.   RADIOLOGY TESTS (1): Reviewed DXA of 2016.   LABS (1): None.  MEDICINE TESTS (1): None.  INDEPENDENT REVIEW (2 each): None.     The visit lasted a total of 14 minutes face to face with the patient. Over 50% of the time was spent counseling and educating the patient about obesity.    I, Jorge Winston, am scribing for and in the presence of, Dr. Aguilar.    I, Dr. Aguilar, personally performed the services described in this documentation, as scribed by Jorge Winston in my presence, and it is both accurate and complete.    Dragon dictation was used for this note.  Speech recognition errors are a possibility.    MEDICATIONS:  Current Outpatient Prescriptions   Medication Sig Dispense Refill     CALCIUM ORAL Take 1 tablet by mouth 2 (two) times a day. 600-200 MG-UNIT Oral Tablet       cholecalciferol, vitamin D3, 1,000 unit tablet Take 1,000 Units by mouth daily.       citalopram (CELEXA) 20 MG tablet TAKE ONE TABLET BY MOUTH DAILY 30 tablet 0     co-enzyme Q-10 30 mg capsule Take 30 mg by mouth 3 (three) times a day.       cycloSPORINE (RESTASIS) 0.05 % ophthalmic emulsion Administer 1 drop to both eyes every 12 (twelve) hours. Daily.       DOCOSAHEXANOIC ACID/EPA (FISH OIL ORAL) Take 4 capsules by mouth daily. 1000 mg oral capsule.       erythromycin with ethanol (ERYDERM) 2 % external solution APPLY SPARINGLY TO AFFECTED AREA(S) TWICE DAILY. 60 mL 2     estradiol (ESTRING) 2 mg vaginal ring Insert 2  mg into the vagina every 3 (three) months. follow package directions       estrogens conjugated, synthetic A, (CENESTIN) 0.625 MG tablet Take 0.625 mg by mouth daily.       gatifloxacin (ZYMAXID) 0.5 % Drop ophthalmic solution        latanoprost (XALATAN) 0.005 % ophthalmic solution        magnesium 30 mg tablet Take 30 mg by mouth 2 (two) times a day.       mometasone (NASONEX) 50 mcg/actuation nasal spray 2 sprays into each nostril daily.       MULTIVITAMIN ORAL Take 1 tablet by mouth daily. TABS       NEVANAC 0.1 % ophthalmic suspension        omeprazole (PRILOSEC) 20 MG capsule TAKE ONE CAPSULE BY MOUTH DAILY. 90 capsule 3     pravastatin (PRAVACHOL) 40 MG tablet TAKE ONE TABLET DAILY IN THE EVENING. 30 tablet 2     prednisoLONE acetate (PRED-FORTE) 1 % ophthalmic suspension        progesterone (PROMETRIUM) 100 MG capsule        sulindac (CLINORIL) 200 MG tablet TAKE 1 TABLET TWICE DAILY 180 tablet 3     traZODone (DESYREL) 100 MG tablet TAKE 1/2-1 TABLET BY MOUTH AT BEDTIME. 90 tablet 3     vitamin E 100 UNIT capsule Take 100 Units by mouth daily.       No current facility-administered medications for this visit.        Total data points: 4

## 2021-07-20 NOTE — PATIENT INSTRUCTIONS - HE
Assessment:    Persistent cough.  No improvement with 2 courses of antibiotics.  No clear improvement with albuterol.  Symptoms suggestive of drainage and drainage cough.  Drainage may be secondary to nonallergic vasomotor rhinitis.  Negative allergy testing today.   Normal spirometry and nitric oxide testing today.  This does not exclude asthma however makes it less likely.  At this time I do not think that cough is from underlying asthma.    Lower leg dermatitis.  This appears to be an eczematous rash.  However, no history of previous eczema.  Based on location and appearance, consider early stasis dermatitis.  I think a contact allergy is unlikely.    Plan:    For cough, recommend addressing drainage.  Recommend starting a nasal saline nasal wash.  Recommend Astelin 2 sprays each nostril twice daily.  Mucinex can be continued as before.    If cough drainage continue to be a problem consider doing a CT scan of sinuses before any additional antibiotics.  We will hold on CT scan for now while making medication changes.    Regarding the rash, continue topical steroids as prescribed by Dr. Lewis.  If not improving, consider return to Dr. Lewis/dermatology referral.

## 2021-07-20 NOTE — TELEPHONE ENCOUNTER
Last seen June, chronic cough.    Had seen allergist.  Made her cough more.    Tried mucinex.  Clearer mucus, used to be yellow and green.    Wakes her at night.    No fever.  Can still ride stationary bike.    No shortness of breath     Coughs more laying down.  Coughs less when laying on side.    Last smoked 35 years ago.    Inhaler made her cough more.    Acid reflux she feels is not under control.  Takes omeprazole and takes tums in middle of night.    Patient would like recheck on her cough with pcp.    Transferred to scheduling for an appointment.    Odalys Dias, RN, [facility] Nurse Triage/Med Refills RN       Reason for Disposition    Cough has been present for > 3 weeks    Protocols used: COUGH-A-OH

## 2021-07-20 NOTE — PROGRESS NOTES
ASSESSMENT:  1.  Midthoracic back pain:  X-ray done today shows no pulmonary infiltrates or masses.  There is no evidence for vertebral fracture.  I suspect symptoms are musculoskeletal.  Conservative management was reviewed.    2.  Mixed hyperlipidemia:  He is on pravastatin.  Lipids have not been checked 2 years.  Lipid cascade will be reported    3.  History of major depression:  Now scores 11 on PHQ-9.  She is on Celexa and feels it is of benefit.  She attributes her worsening largely to situational factors such as Covid and the election.  She is not interested in a change of medications at this time    4.  Medication monitoring:  Monitoring labs will be checked    5.  Urinary frequency:  Urine analysis will be checked    6.  Health maintenance:  He recently had an influenza vaccine.  She did complete the Shingrix series, and is up-to-date on other immunizations.  Last mammogram was in February.  Continued yearly follow-up was recommended  PLAN:  1.  Chest x-ray was done and reviewed.    2.  Use heat to the back with gentle stretching.  Sulindac could be used for more severe pain    3.  Continue Celexa for depressive symptoms    4.  She will be notified of laboratory test results    5.  Continue yearly mammograms.    6.  See in 1 year for annual wellness visit.  Call if back pain persist beyond 2 weeks    Orders Placed This Encounter   Procedures     Culture, Urine     XR Chest 2 Views     Order Specific Question:   Can the procedure be changed per Radiologist protocol?     Answer:   Yes     Lipid Cascade     Order Specific Question:   Fasting is required?     Answer:   Unknown     Comprehensive Metabolic Panel     HM2(CBC w/o Differential)     Urinalysis-UC if Indicated     There are no discontinued medications.        ASSESSED PROBLEMS:  1. Bilateral thoracic back pain  XR Chest 2 Views   2. Mixed hyperlipidemia  Lipid Cascade   3. Urinary frequency  Urinalysis-UC if Indicated    Culture, Urine   4. Encounter  "for therapeutic drug monitoring  Comprehensive Metabolic Panel    HM2(CBC w/o Differential)       CHIEF COMPLAINT:  Chief Complaint   Patient presents with     Back Pain     x3 weeks       HISTORY OF PRESENT ILLNESS:  Tangela is a 75 y.o. female seen for evaluation of mid thoracic back pain.  Symptoms have been present for several weeks.  She has not had falls or trauma.  She is unsure what brought this on.  The pain is at about the bra line and extends to the sides.  It varies somewhat with movement and inspiration.  She has a chronic cough which has not changed since symptoms began.  There is no associated wheezing or shortness of breath.  Last DEXA scan in 2016 revealed bone density in the spine at the young adult mean.    She otherwise feels well.  She is on pravastatin for mixed hyperlipidemia and would like to have lipids checked.    She remains on Celexa for depressive symptoms.  Currently scores 11 on PHQ-9.  (Score was 0 in 2018).  She attributes the worsening largely to Covid induced isolation.    Other medical issues are stable.  She does have regular GYN checks and had a mammogram in February.  Last colonoscopy was in January 2018.  No polyps were noted at that time.  No further screening was recommended    REVIEW OF SYSTEMS:    Comprehensive review of systems is negative.    PFSH:  Single.  Retired.    TOBACCO USE:  Social History     Tobacco Use   Smoking Status Former Smoker   Smokeless Tobacco Never Used       VITALS:  Vitals:    11/13/20 0825   BP: 134/78   Patient Site: Left Arm   Patient Position: Sitting   Cuff Size: Adult Regular   Pulse: (!) 42   SpO2: 98%   Weight: 175 lb (79.4 kg)   Height: 5' 2.5\" (1.588 m)     Wt Readings from Last 3 Encounters:   11/13/20 175 lb (79.4 kg)   03/02/20 168 lb (76.2 kg)   08/06/19 168 lb (76.2 kg)       PHYSICAL EXAM:  Constitutional:   Reveals an talkative woman with appropriate affect.  She does not appear in acute distress vitals: per nursing notes.  HEENT: " Atraumatic  Eyes: No conjunctival hyperemia  Oropharynx:   Mouth and throat clear, no thrush or exudate.  Neck:  Supple, no carotid bruits or adenopathy.  Back:  No spine or CVA pain.  No abnormal kyphosis or scoliosis.  No point tenderness over the spine  Thorax:  No bony deformities.  Lungs: Clear to A&P without rales or wheezes.  Respiratory effort normal.  Cardiac:   Regular rate and rhythm, normal S1, S2, no murmur or gallop.  Abdomen:  Soft, active bowel sounds without bruits, mass, or tenderness..  Extremities:   No peripheral edema   Skin:  No jaundice, peripheral cyanosis or lesions to suggest malignancy.  Neuro:  Alert and oriented. .  No gross focal deficits.  Psychiatric:  Memory intact, mood appropriate.    DATA REVIEWED:  Additional History from Old Records Summarized (2): None.  Decision to Obtain Records (1): None.   Radiology Tests Summarized or Ordered (1): Mammogram and DEXA scan reviewed  Labs Reviewed or Ordered (1): Labs ordered  Medicine Test Summarized or Ordered (1): None.   Independent Review of EKG or X-RAY(2 each): Chest x-ray was done and reviewed    The visit lasted a total of 25 minutes face to face with the patient. Over 50% of the time was spent counseling and educating the patient about management of thoracic back pain and general health maintenance issues  .    Dragon dictation was used for this note. Speech recognition errors are a possibility.     MEDICATIONS:  Current Outpatient Medications   Medication Sig Dispense Refill     albuterol (PROAIR HFA;PROVENTIL HFA;VENTOLIN HFA) 90 mcg/actuation inhaler Inhale 2 puffs every 6 (six) hours as needed for wheezing. 1 each 0     CALCIUM ORAL Take 1 tablet by mouth 2 (two) times a day. 600-200 MG-UNIT Oral Tablet       cholecalciferol, vitamin D3, 1,000 unit tablet Take 1,000 Units by mouth daily.       citalopram (CELEXA) 20 MG tablet TAKE ONE TABLET BY MOUTH ONCE DAILY 90 tablet 3     cycloSPORINE (RESTASIS) 0.05 % ophthalmic emulsion  Administer 1 drop to both eyes every 12 (twelve) hours. Daily.       DOCOSAHEXANOIC ACID/EPA (FISH OIL ORAL) Take 4 capsules by mouth daily. 1000 mg oral capsule.       dorzolamide-timoloL (COSOPT) 22.3-6.8 mg/mL ophthalmic solution        erythromycin with ethanol (ERYDERM) 2 % external solution APPLY SPARINGLY TO AFFECTED AREA(S) TWICE DAILY. 60 mL 2     estradiol (ESTRING) 2 mg vaginal ring Insert 2 mg into the vagina every 3 (three) months. follow package directions       estrogens conjugated, synthetic A, (CENESTIN) 0.625 MG tablet Take 0.625 mg by mouth daily.       latanoprost (XALATAN) 0.005 % ophthalmic solution        mometasone (NASONEX) 50 mcg/actuation nasal spray USE 2 PUFFS IN EACH NOSTRIL DAILY 17 g 6     MULTIVITAMIN ORAL Take 1 tablet by mouth daily. TABS       NEVANAC 0.1 % ophthalmic suspension        omeprazole (PRILOSEC) 20 MG capsule TAKE ONE CAPSULE BY MOUTH DAILY. 90 capsule 2     omeprazole (PRILOSEC) 20 MG capsule Take 1 capsule (20 mg total) by mouth daily. 90 capsule 3     pentoxifylline (TRENTAL) 400 mg CR tablet TAKE 1 TABLET BY MOUTH 3 TIMES A DAY WITH MEALS 90 tablet 0     phentermine 37.5 MG capsule Take 37.5 mg by mouth every morning.       pravastatin (PRAVACHOL) 40 MG tablet TAKE ONE TABLET DAILY 90 tablet 3     prednisoLONE acetate (PRED-FORTE) 1 % ophthalmic suspension        progesterone (PROMETRIUM) 100 MG capsule        sulindac (CLINORIL) 200 MG tablet TAKE 1 TABLET BY MOUTH 2 TIMES DAILY 180 tablet 3     traZODone (DESYREL) 100 MG tablet TAKE 1/2- 1 TABLET BY MOUTH AT BEDTIME. 90 tablet 3     triamcinolone (KENALOG) 0.1 % cream Apply twice daily to involved area until resolved 80 g 1     vitamin E 100 UNIT capsule Take 100 Units by mouth daily.       No current facility-administered medications for this visit.

## 2021-07-20 NOTE — PATIENT INSTRUCTIONS - HE
1.  Triamcinolone 1% cream:   use twice daily  For dermatitis on legs    2.  Zyrtec 10 mg twice daily as needed for itching    3.  Albuterol two puffs four times daily as needed for itching    4.  See for yearly exam    5.  Call if rash is not better in two weeks    6.  Yearly exam due in October

## 2021-07-20 NOTE — TELEPHONE ENCOUNTER
Refill Approved    Rx renewed per Medication Renewal Policy. Medication was last renewed on 12/27/18.    Antonella Bennett, [facility] Triage/Med Refill 9/28/2019     Requested Prescriptions   Pending Prescriptions Disp Refills     omeprazole (PRILOSEC) 20 MG capsule [Pharmacy Med Name: OMEPRAZOLE DR 20 MG CAPSULE] 90 capsule 0     Sig: TAKE ONE CAPSULE BY MOUTH DAILY.       GI Medications Refill Protocol Passed - 9/27/2019  6:23 PM        Passed - PCP or prescribing provider visit in last 12 or next 3 months.     Last office visit with prescriber/PCP: 8/6/2019 Logan Aguilar MD OR same dept: 8/6/2019 Logan Aguilar MD OR same specialty: 8/6/2019 Logan Aguilar MD  Last physical: 10/12/2018 Last MTM visit: Visit date not found   Next visit within 3 mo: Visit date not found  Next physical within 3 mo: Visit date not found  Prescriber OR PCP: Logan Aguilar MD  Last diagnosis associated with med order: 1. Esophageal reflux  - omeprazole (PRILOSEC) 20 MG capsule [Pharmacy Med Name: OMEPRAZOLE DR 20 MG CAPSULE]; TAKE ONE CAPSULE BY MOUTH DAILY.  Dispense: 90 capsule; Refill: 0    If protocol passes may refill for 12 months if within 3 months of last provider visit (or a total of 15 months).

## 2021-07-20 NOTE — PATIENT INSTRUCTIONS - HE
1.  Schedule sinus CT     2.  I will notify you of the test results    3. Yearly exam due in October

## 2021-07-20 NOTE — PROGRESS NOTES
FOOT AND ANKLE SURGERY/PODIATRY Progress Note        ASSESSMENT:   Raynauds disease without gangrene bilateral feet    HPI: Tangela Chapa was seen again today complaining of pain involving the toes of both feet.  She stated that the toes also are bluish in color and sometimes bright red in color.  She has had this for several months.  She has difficult time keeping her toes warm.  Is not had any open wounds.  She is not had any drainage or bleeding.  Her pain is moderate.  It is aggravated by her shoe gear.  She denies any other previous treatment.    Past Medical History:   Diagnosis Date     Herpes zoster     Left facial        Past Surgical History:   Procedure Laterality Date     ABDOMINOPLASTY       BREAST BIOPSY Left     benign x2     CATARACT EXTRACTION W/ INTRAOCULAR LENS IMPLANT Left 01/2016     CATARACT EXTRACTION, BILATERAL  04/2015     CERVICAL BIOPSY  W/ LOOP ELECTRODE EXCISION  10/09/2017     IN BIOPSY OF BREAST, INCISIONAL      Description: Biopsy Breast Open;  Recorded: 06/06/2008;  Comments: BENIGN     IN KNEE SCOPE,DIAGNOSTIC      Description: Arthroscopy Knee Right;  Recorded: 06/06/2008;  Comments: FOR MENISCUS TEAR     IN OSTEOTOMY METATARSAL (NOT 1ST)      Description: Metatarsal Osteotomy Of The Fifth Metatarsal;  Proc Date: 02/01/2007;     IN TOTAL HIP ARTHROPLASTY      Description: Total Hip Replacement;  Proc Date: 06/01/2008;  Comments: RIGHT     IN TOTAL HIP ARTHROPLASTY      Description: Total Hip Replacement;  Proc Date: 08/01/2008;       Allergies   Allergen Reactions     Mite Extract Unknown     Simvastatin Myalgia         Current Outpatient Medications:      albuterol (PROAIR HFA;PROVENTIL HFA;VENTOLIN HFA) 90 mcg/actuation inhaler, Inhale 2 puffs every 6 (six) hours as needed for wheezing., Disp: 1 each, Rfl: 0     azelastine (ASTELIN) 137 mcg (0.1 %) nasal spray, 2 sprays into each nostril 2 (two) times a day. Use in each nostril as directed, Disp: 30 mL, Rfl: 11     CALCIUM  ORAL, Take 1 tablet by mouth 2 (two) times a day. 600-200 MG-UNIT Oral Tablet, Disp: , Rfl:      cholecalciferol, vitamin D3, 1,000 unit tablet, Take 1,000 Units by mouth daily., Disp: , Rfl:      citalopram (CELEXA) 20 MG tablet, Take 1 tablet (20 mg total) by mouth daily., Disp: 90 tablet, Rfl: 0     cycloSPORINE (RESTASIS) 0.05 % ophthalmic emulsion, Administer 1 drop to both eyes every 12 (twelve) hours. Daily., Disp: , Rfl:      DOCOSAHEXANOIC ACID/EPA (FISH OIL ORAL), Take 4 capsules by mouth daily. 1000 mg oral capsule., Disp: , Rfl:      erythromycin with ethanol (ERYDERM) 2 % external solution, APPLY SPARINGLY TO AFFECTED AREA(S) TWICE DAILY., Disp: 60 mL, Rfl: 2     estradiol (ESTRING) 2 mg vaginal ring, Insert 2 mg into the vagina every 3 (three) months. follow package directions, Disp: , Rfl:      estrogens conjugated, synthetic A, (CENESTIN) 0.625 MG tablet, Take 0.625 mg by mouth daily., Disp: , Rfl:      latanoprost (XALATAN) 0.005 % ophthalmic solution, , Disp: , Rfl:      mometasone (NASONEX) 50 mcg/actuation nasal spray, USE 2 PUFFS IN EACH NOSTRIL DAILY, Disp: 17 g, Rfl: 6     MULTIVITAMIN ORAL, Take 1 tablet by mouth daily. TABS, Disp: , Rfl:      NEVANAC 0.1 % ophthalmic suspension, , Disp: , Rfl:      omeprazole (PRILOSEC) 20 MG capsule, TAKE ONE CAPSULE BY MOUTH DAILY., Disp: 90 capsule, Rfl: 2     omeprazole (PRILOSEC) 20 MG capsule, Take 1 capsule (20 mg total) by mouth daily., Disp: 90 capsule, Rfl: 3     phentermine 37.5 MG capsule, Take 37.5 mg by mouth every morning., Disp: , Rfl:      pravastatin (PRAVACHOL) 40 MG tablet, TAKE ONE TABLET DAILY, Disp: 90 tablet, Rfl: 3     prednisoLONE acetate (PRED-FORTE) 1 % ophthalmic suspension, , Disp: , Rfl:      progesterone (PROMETRIUM) 100 MG capsule, , Disp: , Rfl:      sulindac (CLINORIL) 200 MG tablet, TAKE 1 TABLET BY MOUTH 2 TIMES DAILY, Disp: 180 tablet, Rfl: 3     traZODone (DESYREL) 100 MG tablet, TAKE 1/2- 1 TABLET BY MOUTH AT BEDTIME.,  Disp: 90 tablet, Rfl: 3     triamcinolone (KENALOG) 0.1 % cream, Apply twice daily to involved area until resolved, Disp: 80 g, Rfl: 1     vitamin E 100 UNIT capsule, Take 100 Units by mouth daily., Disp: , Rfl:      pentoxifylline (TRENTAL) 400 mg CR tablet, Take 1 tablet (400 mg total) by mouth 3 (three) times a day with meals., Disp: 90 tablet, Rfl: 1    Family History   Problem Relation Age of Onset     Breast cancer Mother      Depression Mother      Breast cancer Cousin      Breast cancer Cousin      Breast cancer Cousin      Depression Father      Leukemia Brother        Social History     Socioeconomic History     Marital status: Single     Spouse name: Not on file     Number of children: Not on file     Years of education: Not on file     Highest education level: Not on file   Occupational History     Occupation: retired   Social Needs     Financial resource strain: Not on file     Food insecurity:     Worry: Not on file     Inability: Not on file     Transportation needs:     Medical: Not on file     Non-medical: Not on file   Tobacco Use     Smoking status: Former Smoker     Smokeless tobacco: Never Used   Substance and Sexual Activity     Alcohol use: Yes     Comment: pt quit about 30 years ag     Drug use: No     Sexual activity: Not on file   Lifestyle     Physical activity:     Days per week: Not on file     Minutes per session: Not on file     Stress: Not on file   Relationships     Social connections:     Talks on phone: Not on file     Gets together: Not on file     Attends Congregation service: Not on file     Active member of club or organization: Not on file     Attends meetings of clubs or organizations: Not on file     Relationship status: Not on file     Intimate partner violence:     Fear of current or ex partner: Not on file     Emotionally abused: Not on file     Physically abused: Not on file     Forced sexual activity: Not on file   Other Topics Concern     Not on file   Social History  Narrative    She is retired.        10 point Review of Systems is negative except as mentioned above.        Vitals:    03/02/20 1448   BP: 118/68   Pulse: 64   SpO2: 94%       BMI= Body mass index is 30.33 kg/m .    OBJECTIVE:  General appearance: Patient is alert and fully cooperative with history & exam.  No sign of distress is noted during the visit.  Vascular: Dorsalis pedis and posterior tibial pulses are palpable. There is no pedal hair growth bilaterally.  CFT < 3 sec from anterior tibial surface to distal digits bilaterally.  Cyanosis noted digits 2 through 5 bilateral feet.  Skin temperature cool to touch digits 2 through 5 bilaterally.  There is no appreciable edema noted.  Dermatologic: Turgor and texture are within normal limits. No coloration or temperature changes. No primary or secondary lesions noted.  Neurologic: All epicritic and proprioceptive sensations are grossly intact bilaterally.  Musculoskeletal: All active and passive ankle, subtalar, midtarsal, and 1st MPJ range of motion are grossly intact without pain or crepitus, with the exception of none. Manual muscle strength is within normal limits bilaterally. All dorsiflexors, plantarflexors, invertors, evertors are intact bilaterally. Tenderness present to digits 2 through 5 bilaterally on palpation.  No tenderness to digits 2 through 5 bilaterally with range of motion. Calf is soft/non-tender without warmth/induration    Imaging:       Mammo Screening Bilateral    Result Date: 2/10/2020  BILATERAL FULL FIELD DIGITAL SCREENING MAMMOGRAM Performed on: 2/10/20. Compared to: 01/23/2019 Mammo Screening Bilateral, 10/23/2017 Mammo Screening Bilateral, and 09/16/2016 Mammo Screening Bilateral Findings: The breasts have scattered areas of fibroglandular densities. There is no radiographic evidence of malignancy. This study was evaluated with the assistance of Computer-Aided Detection. Continue routine screening mammogram as recommended. ACR BI-RADS  Category 1: Negative           TREATMENT:  The patient was placed on Trental 400 mg 1 tab 3 times daily.  I have recommended the patient return to the clinic as needed.  The patient was told that should assist with her symptoms.  I also mentioned that the symptoms markedly improved with warmer ambient temperatures outdoors.     Miguel Doherty; [facility]  [facility]

## 2021-07-20 NOTE — TELEPHONE ENCOUNTER
Refill Approved    Rx renewed per Medication Renewal Policy. Medication was last renewed on 2/27/18.    Hailey Naqvi, [facility] Triage/Med Refill 5/7/2019     Requested Prescriptions   Pending Prescriptions Disp Refills     traZODone (DESYREL) 100 MG tablet [Pharmacy Med Name: TRAZODONE 100 MG TABLET] 90 tablet 0     Sig: TAKE 1/2- 1 TABLET BY MOUTH AT BEDTIME.       Tricyclics/Misc Antidepressant/Antianxiety Meds Refill Protocol Passed - 5/7/2019  4:23 PM        Passed - PCP or prescribing provider visit in last year     Last office visit with prescriber/PCP: 10/26/2017 Logan Aguilar MD OR same dept: 4/4/2019 Shahram Parkinson MD OR same specialty: 4/4/2019 Shahram Parkinson MD  Last physical: 10/12/2018 Last MTM visit: Visit date not found   Next visit within 3 mo: Visit date not found  Next physical within 3 mo: Visit date not found  Prescriber OR PCP: Logan Aguilar MD  Last diagnosis associated with med order: 1. Insomnia  - traZODone (DESYREL) 100 MG tablet [Pharmacy Med Name: TRAZODONE 100 MG TABLET]; TAKE 1/2- 1 TABLET BY MOUTH AT BEDTIME.  Dispense: 90 tablet; Refill: 0    If protocol passes may refill for 12 months if within 3 months of last provider visit (or a total of 15 months).

## 2021-07-20 NOTE — PROGRESS NOTES
Assessment and Plan:   Annual wellness assessment:  Zehra is retired and lives independently.  She is quite active, but does not have a formal exercise program.  She acknowledges that her current diet is not optimal, and will work on this.  She does not have a healthcare directive and was encouraged to work on that.  She expressed concerns about memory, but scores 5 on mini cog and does not have any evidence for forgetfulness currently  1. Benign neoplasm of colon, unspecified part of colon  Recent colonoscopy showed no significant abnormalities.  She may not require further studies since 10-year follow-up was advised  - HM2(CBC w/o Differential)    2. Recurrent Major Depression In Full Remission  She is on Celexa.  She scores 0 on PHQ 9.  She seems to be doing well.  Trazodone has been helpful for and    3. Mixed hyperlipidemia  She is on pravastatin 40 mg daily.  She has no known history of ischemic heart disease.  Fasting lipids will be checked  - Lipid Cascade    4. Medication monitoring encounter  Monitoring labs will be checked.  Magnesium level is checked since she is on omeprazole  - Comprehensive Metabolic Panel  - Urinalysis-UC if Indicated  - HM2(CBC w/o Differential)  - Magnesium    5. Flu vaccine need  Will receive an influenza vaccine  - Influenza High Dose, Seasonal    6.  HPV positive:  She has regular GYN checks    7.  Obesity with BMI 30:  She has seen Dr. Juancho Gale.  He has used phentermine with some benefit, but is been out of it for several weeks    Plan:  1.  Lab as outlined.  She will be notified of test result  2.  Flu shot  3.  Shingrix advised.  Check insurance coverage.  It may be cheaper at the pharmacy  4.  Mammogram due after 10/23  5.  Bone density should not be required for 3-4 years  6.  No further screening colonoscopies are advised  7.  The patient's current medical problems were reviewed.  Medications were updated  8.  See in 1 year or as needed  I have had an Advance  Directives discussion with the patient.  The following health maintenance schedule was reviewed with the patient and provided in printed form in the after visit summary:   Health Maintenance   Topic Date Due     DXA SCAN  02/05/2018     DEPRESSION FOLLOW UP  04/26/2018     INFLUENZA VACCINE RULE BASED (1) 08/01/2018     MAMMOGRAM  10/23/2018     FALL RISK ASSESSMENT  10/12/2019     ADVANCE DIRECTIVES DISCUSSED WITH PATIENT  04/16/2020     COLONOSCOPY  01/04/2023     TD 18+ HE  07/01/2024     PNEUMOCOCCAL POLYSACCHARIDE VACCINE AGE 65 AND OVER  Completed     PNEUMOCOCCAL CONJUGATE VACCINE FOR ADULTS (PCV13 OR PREVNAR)  Completed     ZOSTER VACCINE  Completed        Subjective:   Chief Complaint: Tangela Chapa is an 73 y.o. female here for an Annual Wellness visit.   HPI:      Memory Loss: She has had some trouble with her memory lately. She forgets names and certain words but will usually remember them later on. She does not think she was doing this six months or a year ago. She notes that she does have a lot of different things going on right now. She also states she has ADD and acknowledges that could be a factor.     Obesity: She has been following with Dr. Chowdhury on a three month basis for weight loss. She has been taking Phentermine but notes that she recently ran out of it for about ten days. She noticed an increase in appetite during that time. Overall, she thinks she has been able to lose some weight.     Abdominal Discomfort: She has been experiencing ongoing abdominal discomfort. It is not as bad as it used to be, but she still has some discomfort. She had a colonoscopy in January 2018 that showed scattered erythematous spots.     Poor Balance: She has been having more trouble with her balance lately. She has fallen in the past, but the falls have been more from tripping over things rather than balance. She has not had the sensation of the room spinning.     Depression: She continues to take  citalopram 20 mg daily, which has worked extremely well for her. Her mood has been good.     Insomnia: Taking trazodone before bed works fairly well for sleep.     Health Maintenance: She got the flu shot. She has had the old shingles vaccine but will look into getting the Shingrix. She had a colonoscopy in January of this year.     Review of Systems:    She has been feeling well, physically. Her right ear currently feels somewhat full. She has occasional blurriness in her right eye, and her eyes are frequently dry.   Please see above.  The rest of the review of systems are negative for all systems.    PFSH:  She got a garden put in near [employer]. She thinks she is involved in too many things at once. She has had shingles in the past. She has not been exercising as much lately but states she is still active. She has had cataract surgery.     Patient Care Team:  Logan Aguilar MD as PCP - General   Juancho Gale MD, endocrinology  Macarena Gusman MD gynecology  Miguel Doherty, podiatry  Terri Gonzalez MD dermatology  Patient Active Problem List   Diagnosis     ADHD, Combined Type     Migraine Headache     Limb Pain     Benign Adenomatous Polyp Of The Large Intestine     Mixed hyperlipidemia     Recurrent Major Depression In Full Remission     Allergic Rhinitis     Esophageal Reflux     Localized Osteoarthritis Of The Hip     Urine Tests Nonspecific Abnormal Findings     Edema     herpes zoster 2013     High risk HPV infection     Past Medical History:   Diagnosis Date     Herpes zoster     Left facial       Past Surgical History:   Procedure Laterality Date     ABDOMINOPLASTY       BREAST BIOPSY Left     benign      CATARACT EXTRACTION W/ INTRAOCULAR LENS IMPLANT Left 01/2016     CATARACT EXTRACTION, BILATERAL  04/2015     CERVICAL BIOPSY  W/ LOOP ELECTRODE EXCISION  10/09/2017     MD BIOPSY OF BREAST, INCISIONAL      Description: Biopsy Breast Open;  Recorded: 06/06/2008;  Comments: BENIGN     MD KNEE  SCOPE,DIAGNOSTIC      Description: Arthroscopy Knee Right;  Recorded: 06/06/2008;  Comments: FOR MENISCUS TEAR     MO OSTEOTOMY METATARSAL (NOT 1ST)      Description: Metatarsal Osteotomy Of The Fifth Metatarsal;  Proc Date: 02/01/2007;     MO TOTAL HIP ARTHROPLASTY      Description: Total Hip Replacement;  Proc Date: 06/01/2008;  Comments: RIGHT     MO TOTAL HIP ARTHROPLASTY      Description: Total Hip Replacement;  Proc Date: 08/01/2008;      Family History   Problem Relation Age of Onset     Breast cancer Mother      Depression Mother      Breast cancer Cousin      Breast cancer Cousin      Breast cancer Cousin      Depression Father      Leukemia Brother       Social History     Social History     Marital status: Single     Spouse name: N/A     Number of children: N/A     Years of education: N/A     Occupational History     retired      Social History Main Topics     Smoking status: Former Smoker     Smokeless tobacco: Never Used     Alcohol use Yes      Comment: pt quit about 30 years ag     Drug use: No     Sexual activity: Not on file     Other Topics Concern     Not on file     Social History Narrative    She is retired.       Current Outpatient Prescriptions   Medication Sig Dispense Refill     CALCIUM ORAL Take 1 tablet by mouth 2 (two) times a day. 600-200 MG-UNIT Oral Tablet       cholecalciferol, vitamin D3, 1,000 unit tablet Take 1,000 Units by mouth daily.       citalopram (CELEXA) 20 MG tablet TAKE ONE TABLET BY MOUTH ONCE DAILY 90 tablet 1     co-enzyme Q-10 30 mg capsule Take 30 mg by mouth 3 (three) times a day.       cycloSPORINE (RESTASIS) 0.05 % ophthalmic emulsion Administer 1 drop to both eyes every 12 (twelve) hours. Daily.       DOCOSAHEXANOIC ACID/EPA (FISH OIL ORAL) Take 4 capsules by mouth daily. 1000 mg oral capsule.       erythromycin with ethanol (ERYDERM) 2 % external solution APPLY SPARINGLY TO AFFECTED AREA(S) TWICE DAILY. 60 mL 2     estradiol (ESTRING) 2 mg vaginal ring Insert 2  "mg into the vagina every 3 (three) months. follow package directions       estrogens conjugated, synthetic A, (CENESTIN) 0.625 MG tablet Take 0.625 mg by mouth daily.       latanoprost (XALATAN) 0.005 % ophthalmic solution        magnesium 30 mg tablet Take 30 mg by mouth 2 (two) times a day.       mometasone (NASONEX) 50 mcg/actuation nasal spray 2 sprays into each nostril daily.       MULTIVITAMIN ORAL Take 1 tablet by mouth daily. TABS       NEVANAC 0.1 % ophthalmic suspension        omeprazole (PRILOSEC) 20 MG capsule TAKE ONE CAPSULE BY MOUTH DAILY. 90 capsule 0     phentermine 37.5 MG capsule Take 37.5 mg by mouth every morning.       pravastatin (PRAVACHOL) 40 MG tablet TAKE ONE TABLET DAILY IN THE EVENING. 90 tablet 1     prednisoLONE acetate (PRED-FORTE) 1 % ophthalmic suspension        progesterone (PROMETRIUM) 100 MG capsule        sulindac (CLINORIL) 200 MG tablet TAKE 1 TABLET BY MOUTH 2 TIMES DAILY 180 tablet 3     traZODone (DESYREL) 100 MG tablet Take 0.5-1 tablets ( mg total) by mouth at bedtime. 90 tablet 3     vitamin E 100 UNIT capsule Take 100 Units by mouth daily.       No current facility-administered medications for this visit.       Objective:   Vital Signs:   Visit Vitals     /80 (Patient Site: Left Arm, Patient Position: Sitting, Cuff Size: Adult Regular)     Pulse (!) 57     Ht 5' 2.8\" (1.595 m)     Wt 168 lb 8 oz (76.4 kg)     SpO2 99%     BMI 30.04 kg/m2        VisionScreening:  No exam data present     PHYSICAL EXAM  Constitutional:   Reveals an alert, pleasant, talkative woman. Affect appropriate. Does not appear acutely ill.  Vitals: per nursing notes.  HEENT: Eyes: Aphakia of left eye. Right pupil round and reactive.   Oropharynx:   Mouth and throat clear, no thrush or exudate.  Neck:  Supple, no carotid bruits or adenopathy.  Back:  No spine or CVA pain.  Thorax:  No bony deformities.  Lungs: Clear to A&P without rales or wheezes.  Respiratory effort normal.  Cardiac: " Bradycardic with rate of 52, no murmur or gallop.  Breasts:  Old biopsy scar left breast. No suspicious masses, no axillary adenopathy.  Abdomen:  Soft, lower abdominal horizontal scar, no bruits, mass, or tenderness. No incisional hernia.   : Not done. Sees gyn  Extremities:   No peripheral edema, pulses in the feet intact.  Removal of right great toenail, partial removal of left great toenail.   Skin: Several tattoos, no lesions to suggest malignancy.  Neuro:  Alert and oriented. Cranial nerves, motor, sensory exams are intact.  No gross focal deficits.  Psychiatric:  Memory intact, mood appropriate.    Assessment Results 10/12/2018   Activities of Daily Living No help needed   Instrumental Activities of Daily Living No help needed   Mini Cog Total Score 5   Some recent data might be hidden     A Mini-Cog score of 0-2 suggests the possibility of dementia, score of 3-5 suggests no dementia    Identified Health Risks:     She is at risk for lack of exercise and has been provided with information to increase physical activity for the benefit of her well-being.  The patient was counseled and encouraged to consider modifying their diet and eating habits. She was provided with information on recommended healthy diet options.  She is at risk for falling and has been provided with information to reduce the risk of falling at home.  Information regarding advance directives (living renae), including where she can download the appropriate form, was provided to the patient via the AVS.     ADDITIONAL HISTORY SUMMARIZED (2): Reviewed 10/17/2017 dermatology note. Reviewed 4/25/2017 Dr. Chowdhury note regarding obesity. Reviewed January 2018 colonoscopy report regarding colitis.   DECISION TO OBTAIN EXTRA INFORMATION (1): None.   RADIOLOGY TESTS (1): Reviewed 2/5/2016 DXA - normal. Reviewed 10/23/2017 mammogram - normal.   LABS (1): Labs from 10/26/2017 reviewed. Labs ordered.   MEDICINE TESTS (1): None.  INDEPENDENT  REVIEW (2 each): None.     The visit lasted a total of 25 minutes face to face with the patient. Over 50% of the time was spent counseling and educating the patient about general health maintenance.    I, Logan Hernandez, am scribing for and in the presence of, Dr. Aguilar.    I, Dr. Logan Aguilar, personally performed the services described in this documentation, as scribed by Logan Hernandez in my presence, and it is both accurate and complete.    Total data points: 4

## 2021-07-20 NOTE — PROGRESS NOTES
ASSESSMENT:  1.  Left lower quadrant abdominal pain: Causes uncertain.  Symptoms are not positional, related to urination, or bowel movement.  Zehra just had an endometrial biopsy done by Dr. Macarena Gusman.  There was no evidence for malignancy, and no explanation for her discomfort.  She is due for follow-up colonoscopy next year, and is interested in moving up the time of the procedure.  This seems reasonable.  She does have a past history of colonic polyps with last colonoscopy July 2013    2.  Obesity: She has been seen by Dr. Dolores Gale and has started on phentermine.  She feels well on this.  Weight is down 20 pounds.  She hopes to lose an additional 10 pounds.    3.  Joint pain related to osteoarthritis: She has had bilateral hip replacements.  She uses Clinoril chronically.  She denies GI upset from this.    4.  Hyperlipidemia: On pravastatin.  Fasting lipids will be checked.    5.  History of depression: Doing well.  On Celexa with trazodone at at bedtime for insomnia    6.  Estrogen replacement therapy: This undoubtedly contributed to her recent endometrial hyperplasia.  Pros and cons of continuing the supplement were discussed.  She may wish to wean off    7.  Health maintenance: Health maintenance and screening issues were reviewed  PLAN:  1.  Labs as outlined.  She will be notified of test results  2.  Schedule colonoscopy  3.  She will discuss whether to continue hormone replacement therapy with Dr. Gusman  4.  Clinic follow-up 1 year or as needed  Orders Placed This Encounter   Procedures     Culture, Urine     Influenza High Dose, Seasonal 65+ yrs     Lipid Cascade     Order Specific Question:   Fasting is required?     Answer:   Unknown     Comprehensive Metabolic Panel     HM2(CBC w/o Differential)     Urinalysis-UC if Indicated     Ambulatory referral for Colonoscopy     Referral Priority:   Routine     Referral Type:   Colonoscopy     Referral Reason:   Evaluation and Treatment      Requested Specialty:   Gastroenterology     Number of Visits Requested:   1     Medications Discontinued During This Encounter   Medication Reason     gatifloxacin (ZYMAXID) 0.5 % Drop ophthalmic solution Therapy completed       No Follow-up on file.    ASSESSED PROBLEMS:  1. Hyperlipemia, mixed  Lipid Cascade   2. Medication monitoring encounter  Comprehensive Metabolic Panel    HM2(CBC w/o Differential)    Urinalysis-UC if Indicated    Culture, Urine   3. Need for influenza vaccination  Influenza High Dose, Seasonal 65+ yrs   4. Left flank pain  Ambulatory referral for Colonoscopy   5. Colon polyps  Ambulatory referral for Colonoscopy       CHIEF COMPLAINT:  Chief Complaint   Patient presents with     Abdominal Pain     left lower abdominal pain, had uterine biopsy that was all good.         HISTORY OF PRESENT ILLNESS:  Tangela is a 72 y.o. female presenting to the clinic today with complaints of abdominal pain.     Abdominal Pain: She has pain in her left lower abdomen that bothers her from time to time. She has been meeting with her OB/GYN who decided to perform a uterine LEEP biopsy, which was negative. She would like to have a colonoscopy now to look into the abdominal pain further. Her last one was in 2013, and she was given a five year clearance. Even though she is not due until July, she would like to get it done now. Nothing seems to change the pain. It comes and goes randomly, unrelated to food or bowel movements. It can be dull or sharp, and the pain generally lasts for 30 seconds to a minute. She first noticed this pain about seven months ago and has never had this problem before.     Overweight: She has been meeting with Dr. Chowdhury and has lost about 20 pounds this year. She is taking phentermine and is exercising regularly. She has a little constipation from the medication, but it does not bother her outside of that.     Depression: She continues to take 20 mg of citalopram daily. Her spirits  "have been good lately.     Health Maintenance: She would like a flu shot today.     REVIEW OF SYSTEMS:   She takes estrogen. Her left hip has been bothering her a little more at night, but she sleeps on that side. The severity of her joint pain depends on the day. All other systems are negative.    PFSH:  She just bought a house. Her mother and cousin had breast cancer.     TOBACCO USE:  History   Smoking Status     Former Smoker   Smokeless Tobacco     Not on file       VITALS:  Vitals:    10/26/17 0836 10/26/17 0955   BP: 124/74 136/82   Patient Site: Left Arm    Patient Position: Sitting    Cuff Size: Adult Regular    Pulse: 60    Weight: 168 lb 14.4 oz (76.6 kg)    Height: 5' 3.5\" (1.613 m)      Wt Readings from Last 3 Encounters:   10/26/17 168 lb 14.4 oz (76.6 kg)   04/17/17 186 lb 11.2 oz (84.7 kg)   09/16/16 180 lb 1.6 oz (81.7 kg)     Body mass index is 29.45 kg/(m^2).    PHYSICAL EXAM:  Constitutional:   Reveals an alert, talkative woman. Affect appropriate. Does not appear acutely ill.  Vitals: per nursing notes.  Repeat BP by MD: 136/82  HEENT:  Ears:  External canals, TMs clear.    Eyes:  EOMs full, PERRL.  Oropharynx:   Mouth and throat clear, no thrush or exudate.  Neck:  Supple, no carotid bruits or adenopathy.  Back:  No spine or CVA pain.  Thorax:  No bony deformities.  Lungs: Clear to A&P without rales or wheezes.  Respiratory effort normal.  Cardiac:   Regular rate and rhythm, normal S1, S2, no murmur or gallop.  Abdomen:  Old lower abdominal scar, no focal tenderness to touch, no hernias.   Breasts: Old biopsy scar on the left. No masses, no axillary adenopathy.  : Deferred  Extremities:  Old scars over hips, full ROM of knees. No peripheral edema, pulses in the feet intact.    Skin:  Clear, no lesions to suggest malignancy.  Neuro:  Alert and oriented. Cranial nerves, motor, sensory exams are intact.  No gross focal deficits.  Psychiatric:  Memory intact, mood appropriate.    ADDITIONAL " HISTORY SUMMARIZED (2): Reviewed 2013 colonoscopy report regarding polyps and clearance.   DECISION TO OBTAIN EXTRA INFORMATION (1): None.   RADIOLOGY TESTS (1): Reviewed 2016 DXA and 2017 mammo  LABS (1): Labs from 9/16/2016 reviewed. Labs ordered.   MEDICINE TESTS (1): None.  INDEPENDENT REVIEW (2 each): None.     The visit lasted a total of 27 minutes face to face with the patient. Over 50% of the time was spent counseling and educating the patient about her abdominal pain.    ILogan, am scribing for and in the presence of, Dr. Aguilar.    STACY HURT, personally performed the services described in this documentation, as scribed by Logan Hernandez in my presence, and it is both accurate and complete.    Dragon dictation was used for this note.  Speech recognition errors are a possibility.    MEDICATIONS:  Current Outpatient Prescriptions   Medication Sig Dispense Refill     CALCIUM ORAL Take 1 tablet by mouth 2 (two) times a day. 600-200 MG-UNIT Oral Tablet       cholecalciferol, vitamin D3, 1,000 unit tablet Take 1,000 Units by mouth daily.       citalopram (CELEXA) 20 MG tablet TAKE ONE TABLET BY MOUTH DAILY 90 tablet 3     co-enzyme Q-10 30 mg capsule Take 30 mg by mouth 3 (three) times a day.       cycloSPORINE (RESTASIS) 0.05 % ophthalmic emulsion Administer 1 drop to both eyes every 12 (twelve) hours. Daily.       DOCOSAHEXANOIC ACID/EPA (FISH OIL ORAL) Take 4 capsules by mouth daily. 1000 mg oral capsule.       erythromycin with ethanol (ERYDERM) 2 % external solution APPLY SPARINGLY TO AFFECTED AREA(S) TWICE DAILY. 60 mL 2     estradiol (ESTRING) 2 mg vaginal ring Insert 2 mg into the vagina every 3 (three) months. follow package directions       estrogens conjugated, synthetic A, (CENESTIN) 0.625 MG tablet Take 0.625 mg by mouth daily.       latanoprost (XALATAN) 0.005 % ophthalmic solution        magnesium 30 mg tablet Take 30 mg by mouth 2 (two) times a day.       mometasone (NASONEX) 50  mcg/actuation nasal spray 2 sprays into each nostril daily.       MULTIVITAMIN ORAL Take 1 tablet by mouth daily. TABS       NEVANAC 0.1 % ophthalmic suspension        omeprazole (PRILOSEC) 20 MG capsule TAKE ONE CAPSULE BY MOUTH DAILY. 90 capsule 2     phentermine 37.5 MG capsule Take 37.5 mg by mouth every morning.       pravastatin (PRAVACHOL) 40 MG tablet TAKE ONE TABLET DAILY IN THE EVENING. 30 tablet 3     prednisoLONE acetate (PRED-FORTE) 1 % ophthalmic suspension        progesterone (PROMETRIUM) 100 MG capsule        sulindac (CLINORIL) 200 MG tablet TAKE 1 TABLET TWICE DAILY 180 tablet 3     traZODone (DESYREL) 100 MG tablet TAKE 1/2-1 TABLET BY MOUTH AT BEDTIME. 90 tablet 3     vitamin E 100 UNIT capsule Take 100 Units by mouth daily.       No current facility-administered medications for this visit.        Total data points: 4

## 2021-07-20 NOTE — TELEPHONE ENCOUNTER
Pt called stating she is almost out of the medication Dr. Doherty has prescribed. Pentoxifyllineer. Please call pt as she does not think she has enough until she sees Dr. Doherty on 3/22/21.

## 2021-07-20 NOTE — TELEPHONE ENCOUNTER
"  \"Worst cold in my life\"     CC:  Cold sx - been building up for the past 4 days or so      > Scratchy / sore throat   > Upper chest congestion today   > Runny nose   > Body aches   > Ears are plugged   > No fever     > \"Minor\" shortness of breath - was planning to work out today     > No sinus pain    > Appetite has been a little less      Would like to be seen today        Pt tele# [phone]        A/P:      Discussed at home routine cold / flu symptoms management including   >Fluids - body needs to stay hydrated - drink more water to stay hydrated   >Nutrition - body needs the extra calories when ill - eat healthy when you can   >Rest - rest when you can - try and get a good night's sleep   > OTC pain relievers - follow label directions for dosing   >Read labels of any OTC cold medications to prevent accidental \"double dosing\" of ingredients   > Humidifier / steam showers - these are excellent ways to help stuffy noses, nasal or chest congestion and with any coughing or sore throats      > Watch your temp and breathing - call back if you start spiking a high temperature 103F+ or have any breathing problems or if the sx change or worsen in some way            Yovani Hall, RN   Triage and Medication Refills  "

## 2021-07-20 NOTE — TELEPHONE ENCOUNTER
Refill Approved    Rx renewed per Medication Renewal Policy. Medication was last renewed on 11/29/19.    Hailey Naqvi, [facility] Triage/Med Refill 3/4/2020     Requested Prescriptions   Pending Prescriptions Disp Refills     citalopram (CELEXA) 20 MG tablet [Pharmacy Med Name: CITALOPRAM HBR 20 MG TABLET] 90 tablet 0     Sig: TAKE ONE TABLET BY MOUTH ONCE DAILY       SSRI Refill Protocol  Passed - 3/3/2020  2:25 PM        Passed - PCP or prescribing provider visit in last year     Last office visit with prescriber/PCP: 8/6/2019 Logan Aguilar MD OR same dept: 8/6/2019 Logan Aguilar MD OR same specialty: 8/6/2019 Logan Aguilar MD  Last physical: 10/12/2018 Last MTM visit: Visit date not found   Next visit within 3 mo: Visit date not found  Next physical within 3 mo: Visit date not found  Prescriber OR PCP: Logan Aguilar MD  Last diagnosis associated with med order: 1. Anxiety  - citalopram (CELEXA) 20 MG tablet [Pharmacy Med Name: CITALOPRAM HBR 20 MG TABLET]; TAKE ONE TABLET BY MOUTH ONCE DAILY  Dispense: 90 tablet; Refill: 0    If protocol passes may refill for 12 months if within 3 months of last provider visit (or a total of 15 months).

## 2021-07-20 NOTE — PROGRESS NOTES
"Tangela: Lipids are good with a total cholesterol 187 and an LDL fraction of 107.  Continue the pravastatin.  Your urine culture returned positive for Enterococcus.  If you do not have symptoms of infection, this represents \"asymptomatic bacteriuria\".  Infectious disease experts do not recommend treatment without symptoms.  Other labs including your potassium, blood sugar, hemoglobin, liver and kidney tests are normal.  It was nice to see you.    Logan Aguilar"

## 2021-07-20 NOTE — TELEPHONE ENCOUNTER
Refill Approved    Rx renewed per Medication Renewal Policy. Medication was last renewed on 4/2/15.    Hailey Naqvi, [facility] Triage/Med Refill 7/16/2019     Requested Prescriptions   Pending Prescriptions Disp Refills     mometasone (NASONEX) 50 mcg/actuation nasal spray [Pharmacy Med Name: NASONEX 50 MCG NASAL SPRAY] 17 g 0     Sig: USE 2 PUFFS IN EACH NOSTRIL DAILY       Nasal Steroid Refill Protocol Passed - 7/16/2019  9:30 AM        Passed - Patient has had office visit/physical in last 2 years     Last office visit with prescriber/PCP: 6/27/2019 OR same dept: 6/27/2019 Logan Aguilar MD OR same specialty: 6/27/2019 Logan Aguilar MD Last physical: 10/12/2018 Last MTM visit: Visit date not found    Next appt within 3 mo: Visit date not found  Next physical within 3 mo: Visit date not found  Prescriber OR PCP: Logan Aguilar MD  Last diagnosis associated with med order: There are no diagnoses linked to this encounter.   If protocol passes may refill for 12 months if within 3 months of last provider visit (or a total of 15 months).

## 2021-07-20 NOTE — TELEPHONE ENCOUNTER
RN cannot approve Refill Request    RN can NOT refill this medication Protocol failed and NO refill given. Last office visit: 8/6/2019 Logan Aguilar MD Last Physical: 10/12/2018 Last MTM visit: Visit date not found Last visit same specialty: 8/6/2019 Logan Aguilar MD.  Next visit within 3 mo: Visit date not found  Next physical within 3 mo: Visit date not found      Hailey Naqvi, [facility] [place] Connection Triage/Med Refill 11/2/2020    Requested Prescriptions   Pending Prescriptions Disp Refills     traZODone (DESYREL) 100 MG tablet [Pharmacy Med Name: TRAZODONE  MG TABS 100 Tablet] 90 tablet 0     Sig: TAKE 1/2- 1 TABLET BY MOUTH AT BEDTIME.       Tricyclics/Misc Antidepressant/Antianxiety Meds Refill Protocol Failed - 10/29/2020  7:41 AM        Failed - PCP or prescribing provider visit in last year     Last office visit with prescriber/PCP: 8/6/2019 Logan Aguilar MD OR same dept: Visit date not found OR same specialty: 8/6/2019 Logan Aguilar MD  Last physical: 10/12/2018 Last MTM visit: Visit date not found   Next visit within 3 mo: Visit date not found  Next physical within 3 mo: Visit date not found  Prescriber OR PCP: Logan Aguilar MD  Last diagnosis associated with med order: 1. Insomnia  - traZODone (DESYREL) 100 MG tablet [Pharmacy Med Name: TRAZODONE  MG TABS 100 Tablet]; TAKE 1/2- 1 TABLET BY MOUTH AT BEDTIME.  Dispense: 90 tablet; Refill: 0    If protocol passes may refill for 12 months if within 3 months of last provider visit (or a total of 15 months).

## 2021-07-20 NOTE — TELEPHONE ENCOUNTER
RN cannot approve Refill Request    RN can NOT refill this  Last ordered 5/31/19, last office visit 5/31/19    Kiersten Driver, [facility] Triage/Med Refill 6/11/2019    Requested Prescriptions   Pending Prescriptions Disp Refills     cefuroxime (CEFTIN) 500 MG tablet [Pharmacy Med Name: CEFUROXIME AXETIL 500 MG TAB] 20 tablet 0     Sig: TAKE ONE TABLET BY MOUTH 2 TIMES A DAY FOR 10 DAYS       There is no refill protocol information for this order

## 2021-07-20 NOTE — PROGRESS NOTES
ASSESSMENT AND PLAN:    1. Acute bronchitis, unspecified organism  Will treat with Z liz.  Advised, rest, fluids, acetaminophen.  Follow up if fails to improve.   - azithromycin (ZITHROMAX Z-LIZ) 250 MG tablet; Take 2 tablets (500 mg) on  Day 1,  followed by 1 tablet (250 mg) once daily on Days 2 through 5.  Dispense: 6 tablet; Refill: 0  - benzonatate (TESSALON PERLES) 100 MG capsule; Take 1 capsule (100 mg total) by mouth every 6 (six) hours as needed for cough.  Dispense: 30 capsule; Refill: 0      Patient Instructions   1. Discussed using zithromycin and benzonatate for cough.     CHIEF COMPLAINT:  Chief Complaint   Patient presents with     Sore Throat     all for 4 days     Cough     Ear Fullness     HISTORY OF PRESENT ILLNESS:  Tangela Chapa is a 74 y.o. female has had four days of congestion and cough, with malaise and arthalgia and myalgia.  No high fever, or chills.  No dyspnea, or nausea or diarrhea.      Past Medical History:   Diagnosis Date     Herpes zoster     Left facial      Social History     Tobacco Use   Smoking Status Former Smoker   Smokeless Tobacco Never Used     Family History   Problem Relation Age of Onset     Breast cancer Mother      Depression Mother      Breast cancer Cousin      Breast cancer Cousin      Breast cancer Cousin      Depression Father      Leukemia Brother      Past Surgical History:   Procedure Laterality Date     ABDOMINOPLASTY       BREAST BIOPSY Left     benign      CATARACT EXTRACTION W/ INTRAOCULAR LENS IMPLANT Left 01/2016     CATARACT EXTRACTION, BILATERAL  04/2015     CERVICAL BIOPSY  W/ LOOP ELECTRODE EXCISION  10/09/2017     [place] BIOPSY OF BREAST, INCISIONAL      Description: Biopsy Breast Open;  Recorded: 06/06/2008;  Comments: BENIGN     [place] KNEE SCOPE,DIAGNOSTIC      Description: Arthroscopy Knee Right;  Recorded: 06/06/2008;  Comments: FOR MENISCUS TEAR     [place] OSTEOTOMY METATARSAL (NOT 1ST)      Description: Metatarsal Osteotomy Of The Fifth  "Metatarsal;  Proc Date: 02/01/2007;     [place] TOTAL HIP ARTHROPLASTY      Description: Total Hip Replacement;  Proc Date: 06/01/2008;  Comments: RIGHT     [place] TOTAL HIP ARTHROPLASTY      Description: Total Hip Replacement;  Proc Date: 08/01/2008;     VITALS:  Vitals:    04/04/19 1439   BP: 124/70   Patient Site: Left Arm   Patient Position: Sitting   Cuff Size: Adult Large   Pulse: 63   Temp: 97.2  F (36.2  C)   TempSrc: Tympanic   SpO2: 99%   Weight: 168 lb (76.2 kg)   Height: 5' 2\" (1.575 m)     Wt Readings from Last 3 Encounters:   04/04/19 168 lb (76.2 kg)   10/12/18 168 lb 8 oz (76.4 kg)   10/26/17 168 lb 14.4 oz (76.6 kg)     PHYSICAL EXAM:  Constitutional:  In NAD, alert and oriented  HEENT:mild erythema of pharynx, bilateral rhinitis, without mucopurulence.   Neck: no significant adenopathy.  Cardiac:  S1 S2 without murmur  Lungs: Clear to auscultation, no wheezes or rhonchi, moves air well, DB causes harsh cough    Current Outpatient Medications   Medication Sig Dispense Refill     CALCIUM ORAL Take 1 tablet by mouth 2 (two) times a day. 600-200 MG-UNIT Oral Tablet       cholecalciferol, vitamin D3, 1,000 unit tablet Take 1,000 Units by mouth daily.       citalopram (CELEXA) 20 MG tablet TAKE ONE TABLET BY MOUTH ONCE DAILY 90 tablet 3     co-enzyme Q-10 30 mg capsule Take 30 mg by mouth 3 (three) times a day.       cycloSPORINE (RESTASIS) 0.05 % ophthalmic emulsion Administer 1 drop to both eyes every 12 (twelve) hours. Daily.       DOCOSAHEXANOIC ACID/EPA (FISH OIL ORAL) Take 4 capsules by mouth daily. 1000 mg oral capsule.       erythromycin with ethanol (ERYDERM) 2 % external solution APPLY SPARINGLY TO AFFECTED AREA(S) TWICE DAILY. 60 mL 2     estradiol (ESTRING) 2 mg vaginal ring Insert 2 mg into the vagina every 3 (three) months. follow package directions       estrogens conjugated, synthetic A, (CENESTIN) 0.625 MG tablet Take 0.625 mg by mouth daily.       latanoprost (XALATAN) 0.005 % ophthalmic " solution        magnesium 30 mg tablet Take 30 mg by mouth 2 (two) times a day.       mometasone (NASONEX) 50 mcg/actuation nasal spray 2 sprays into each nostril daily.       MULTIVITAMIN ORAL Take 1 tablet by mouth daily. TABS       NEVANAC 0.1 % ophthalmic suspension        omeprazole (PRILOSEC) 20 MG capsule TAKE ONE CAPSULE BY MOUTH DAILY. 90 capsule 2     phentermine 37.5 MG capsule Take 37.5 mg by mouth every morning.       pravastatin (PRAVACHOL) 40 MG tablet TAKE ONE TABLET DAILY IN THE EVENING. 90 tablet 3     prednisoLONE acetate (PRED-FORTE) 1 % ophthalmic suspension        progesterone (PROMETRIUM) 100 MG capsule        sulindac (CLINORIL) 200 MG tablet TAKE 1 TABLET BY MOUTH 2 TIMES DAILY 180 tablet 3     traZODone (DESYREL) 100 MG tablet Take 0.5-1 tablets ( mg total) by mouth at bedtime. 90 tablet 3     vitamin E 100 UNIT capsule Take 100 Units by mouth daily.       azithromycin (ZITHROMAX Z-LIZ) 250 MG tablet Take 2 tablets (500 mg) on  Day 1,  followed by 1 tablet (250 mg) once daily on Days 2 through 5. 6 tablet 0     benzonatate (TESSALON PERLES) 100 MG capsule Take 1 capsule (100 mg total) by mouth every 6 (six) hours as needed for cough. 30 capsule 0     No current facility-administered medications for this visit.      Shahram Parkinson MD  Internal Medicine  [facility]

## 2021-07-20 NOTE — LETTER
Letter by Logan Aguilar MD at      Author: Logan Aguilar MD Service: -- Author Type: --    Filed:  Encounter Date: 8/20/2019 Status: (Other)         Tangela Chapa  [address] [place] [address]             August 20, 2019         Dear Ms. Flaviodavid,    Below are the results from your recent visit:    Resulted Orders   CT Sinuses Without Contrast    Narrative    EXAM: CT SINUS WO CONTRAST  LOCATION: [facility]  DATE/TIME: 8/19/2019 2:51 PM    INDICATION: Recurrent sinusitis  COMPARISON: None.  TECHNIQUE: Routine without contrast. Multiplanar reformats. Dose reduction techniques were used.    FINDINGS:     FRONTAL SINUSES:  Normal.    ETHMOID SINUSES:  Normal.    SPHENOID SINUSES: Normal.     MAXILLARY SINUSES: Normal. The floors of the maxillary sinuses are intact.    NASAL CAVITY/SKULL BASE: Intact nasal septum. Becky bullosa of the bilateral middle turbinates. The cribriform plate is intact and symmetric. Pneumatized right anterior clinoid.    PARANASAL SINUS DRAINAGE PATHWAYS:  The paranasal sinus drainage pathways are patent.    NON-SINUS STRUCTURES: No abnormality of the visualized orbits or intracranial compartment. Normal temporomandibular joints.      Impression    1.  Normal paranasal sinus CT.  2.  Pneumatized right anterior clinoid.       Zehra: Sinus CT shows no evidence for sinusitis.  Let me know if your symptoms are not improving.    Please call with questions or contact us using [employer].    Sincerely,        Electronically signed by Logan Aguilar MD

## 2021-07-20 NOTE — TELEPHONE ENCOUNTER
Refill Approved    Rx renewed per Medication Renewal Policy. Medication was last renewed on 11/27/18 .    Jammie Gama, [facility] [place] Connection Triage/Med Refill 11/29/2019     Requested Prescriptions   Pending Prescriptions Disp Refills     citalopram (CELEXA) 20 MG tablet [Pharmacy Med Name: CITALOPRAM HBR 20 MG TABLET] 90 tablet 0     Sig: TAKE ONE TABLET BY MOUTH ONCE DAILY       SSRI Refill Protocol  Passed - 11/29/2019  9:52 AM        Passed - PCP or prescribing provider visit in last year     Last office visit with prescriber/PCP: 8/6/2019 Logan Aguilar MD OR same dept: 8/6/2019 Logan Aguilar MD OR same specialty: 8/6/2019 Logan Aguilar MD  Last physical: 10/12/2018 Last MTM visit: Visit date not found   Next visit within 3 mo: Visit date not found  Next physical within 3 mo: Visit date not found  Prescriber OR PCP: Logan Aguilar MD  Last diagnosis associated with med order: 1. Anxiety  - citalopram (CELEXA) 20 MG tablet [Pharmacy Med Name: CITALOPRAM HBR 20 MG TABLET]; TAKE ONE TABLET BY MOUTH ONCE DAILY  Dispense: 90 tablet; Refill: 0    If protocol passes may refill for 12 months if within 3 months of last provider visit (or a total of 15 months).

## 2021-07-20 NOTE — TELEPHONE ENCOUNTER
Refill Approved    Rx renewed per Medication Renewal Policy. Medication was last renewed on 5/7/19.    Hailey Naqvi, [facility] Triage/Med Refill 5/1/2020     Requested Prescriptions   Pending Prescriptions Disp Refills     traZODone (DESYREL) 100 MG tablet [Pharmacy Med Name: TRAZODONE 100 MG TABLET] 90 tablet 0     Sig: TAKE 1/2- 1 TABLET BY MOUTH AT BEDTIME.       Tricyclics/Misc Antidepressant/Antianxiety Meds Refill Protocol Passed - 4/30/2020  8:48 AM        Passed - PCP or prescribing provider visit in last year     Last office visit with prescriber/PCP: 8/6/2019 Logan Aguilar MD OR same dept: 8/6/2019 Logan Aguilar MD OR same specialty: 8/6/2019 Logan Aguilar MD  Last physical: 10/12/2018 Last MTM visit: Visit date not found   Next visit within 3 mo: Visit date not found  Next physical within 3 mo: Visit date not found  Prescriber OR PCP: Logan Aguilar MD  Last diagnosis associated with med order: 1. Insomnia  - traZODone (DESYREL) 100 MG tablet [Pharmacy Med Name: TRAZODONE 100 MG TABLET]; TAKE 1/2- 1 TABLET BY MOUTH AT BEDTIME.  Dispense: 90 tablet; Refill: 0    If protocol passes may refill for 12 months if within 3 months of last provider visit (or a total of 15 months).

## 2021-07-20 NOTE — TELEPHONE ENCOUNTER
"ACTION NEEDED    Pt reports \"pain across mid-back x three weeks.\"  Pain located \"at level of bra line.\"  \"Every day for 3 weeks.\"    Pt states \"Worried about pneumonia.\"  \"Feels like the pleurisy pain I had forty years ago.\"  \"Nagging cough\" which pt attributes to \"allergies.\"  Productive sputum \"every night all night.\"    No fevers or chills.  No body aches.  No shortness of breath.  Feels well otherwise.    Pt requests in-person clinic visit.  Declines virtual visit.  Will see only Dr Aguilar.  Is this possible/acceptable to squeeze pt in to clinic today?    Best phone # for patient -> [phone]    Thank you-    Kisha Correa RN  [facility] Triage     Reason for Disposition    Patient wants to be seen    Additional Information    Negative: Bluish (or gray) lips or face    Negative: SEVERE difficulty breathing (e.g., struggling for each breath, speaks in single words)    Negative: Rapid onset of cough and has hives    Negative: Coughing started suddenly after medicine, an allergic food or bee sting    Negative: Difficulty breathing after exposure to flames, smoke, or fumes    Negative: Sounds like a life-threatening emergency to the triager    Negative: Previous asthma attacks and this feels like asthma attack    Negative: Chest pain present when not coughing    Negative: Difficulty breathing    Negative: Passed out (i.e., fainted, collapsed and was not responding)    Negative: Patient sounds very sick or weak to the triager    Negative: Coughed up > 1 tablespoon (15 ml) blood (Exception: blood-tinged sputum)    Negative: Fever > 103 F (39.4 C)    Negative: Fever > 101 F (38.3 C) and over 60 years of age    Negative: Fever > 100.0 F (37.8 C) and has diabetes mellitus or a weak immune system (e.g., HIV positive, cancer chemotherapy, organ transplant, splenectomy, chronic steroids)    Negative: Fever > 100.0 F (37.8 C) and bedridden (e.g., nursing home patient, stroke, chronic illness, recovering from " "surgery)    Negative: Increasing ankle swelling    Negative: Wheezing is present    Negative: SEVERE coughing spells (e.g., whooping sound after coughing, vomiting after coughing)    Negative: Coughing up herman-colored (reddish-brown) or blood-tinged sputum    Negative: Fever present > 3 days (72 hours)    Negative: Fever returns after gone for over 24 hours and symptoms worse or not improved    Negative: Using nasal washes and pain medicine > 24 hours and sinus pain persists    Negative: Known COPD or other severe lung disease (i.e., bronchiectasis, cystic fibrosis, lung surgery) and worsening symptoms (i.e., increased sputum purulence or amount, increased breathing difficulty)    Negative: Continuous (nonstop) coughing interferes with work or school and no improvement using cough treatment per Care Advice    Protocols used: COUGH-A-OH    ______________________    COVID 19 Nurse Triage Plan/Patient Instructions    Please be aware that novel coronavirus (COVID-19) may be circulating in the community. If you develop symptoms such as fever, cough, or SOB or if you have concerns about the presence of another infection including coronavirus (COVID-19), please contact your health care provider or visit [email or web address].     Disposition/Instructions    Additional COVID19 information to add for patients.   How can I protect others?  If you have symptoms (fever, cough, body aches or trouble breathing): Stay home and away from others (self-isolate) until:    At least 10 days have passed since your symptoms started, And     You ve had no fever--and no medicine that reduces fever--for 1 full day (24 hours), And      Your other symptoms have resolved (gotten better).     If you don t have symptoms, but a test showed that you have COVID-19 (you tested positive):    Stay home and away from others (self-isolate). Follow the tips under \"How do I self-isolate?\" below for 10 days (20 days if you have a weak immune system).    You don't " need to be retested for COVID-19 before going back to school or work. As long as you're fever-free and feeling better, you can go back to school, work and other activities after waiting the 10 or 20 days.     How do I self-isolate?    Stay in your own room, even for meals. Use your own bathroom if you can.     Stay away from others in your home. No hugging, kissing or shaking hands. No visitors.    Don t go to work, school or anywhere else.     Clean  high touch  surfaces often (doorknobs, counters, handles, etc.). Use a household cleaning spray or wipes. You ll find a full list on the EPA website:  www.epa.gov/pesticide-registration/list-n-disinfectants-use-against-sars-cov-2.    Cover your mouth and nose with a mask, tissue or washcloth to avoid spreading germs.    Wash your hands and face often. Use soap and water.    Caregivers in these groups are at risk for severe illness due to COVID-19:  o People 65 years and older  o People who live in a nursing home or long-term care facility  o People with chronic disease (lung, heart, cancer, diabetes, kidney, liver, immunologic)  o People who have a weakened immune system, including those who:  - Are in cancer treatment  - Take medicine that weakens the immune system, such as corticosteroids  - Had a bone marrow or organ transplant  - Have an immune deficiency  - Have poorly controlled HIV or AIDS  - Are obese (body mass index of 40 or higher)  - Smoke regularly    Caregivers should wear gloves while washing dishes, handling laundry and cleaning bedrooms and bathrooms.    Use caution when washing and drying laundry: Don t shake dirty laundry, and use the warmest water setting that you can.    For more tips, go to www.cdc.gov/coronavirus/2019-ncov/downloads/10Things.pdf.    How can I take care of myself?  1. Get lots of rest. Drink extra fluids (unless a doctor has told you not to).     2. Take Tylenol (acetaminophen) for fever or pain. If you have liver or kidney  problems, ask your family doctor if it s okay to take Tylenol.     Adults can take either:     650 mg (two 325 mg pills) every 4 to 6 hours, or     1,000 mg (two 500 mg pills) every 8 hours as needed.     Note: Don t take more than 3,000 mg in one day.   Acetaminophen is found in many medicines (both prescribed and over-the-counter medicines). Read all labels to be sure you don t take too much.     For children, check the Tylenol bottle for the right dose. The dose is based on the child s age or weight.    3. If you have other health problems (like cancer, heart failure, an organ transplant or severe kidney disease): Call your specialty clinic if you don t feel better in the next 2 days.    4. Know when to call 911: Emergency warning signs include:    Trouble breathing or shortness of breath    Pain or pressure in the chest that doesn t go away    Feeling confused like you haven t felt before, or not being able to wake up    Bluish-colored lips or face    What are the symptoms of COVID-19?     The most common symptoms are cough, fever and trouble breathing.     Less common symptoms include body aches, chills, diarrhea (loose, watery poops), fatigue (feeling very tired), headache, runny nose, sore throat and loss of smell.    COVID-19 can cause severe coughing (bronchitis) and lung infection (pneumonia).    How does it spread?     The virus may spread when a person coughs or sneezes into the air. The virus can travel about 6 feet this way, and it can live on surfaces.      Common  (household disinfectants) will kill the virus.    Who is at risk?  Anyone can catch COVID-19 if they re around someone who has the virus.    How can others protect themselves?     Stay away from people who have COVID-19 (or symptoms of COVID-19).    Wash hands often with soap and water. Or, use hand  with at least 60% alcohol.    Avoid touching the eyes, nose or mouth.     Wear a face mask when you go out in public, when  sick or when caring for a sick person.    Where can I get more information?    [facility]: About COVID-19: [email or web address]    CDC: What to Do If You re Sick: www.cdc.gov/coronavirus/2019-ncov/about/steps-when-sick.html    CDC: Ending Home Isolation: www.cdc.gov/coronavirus/2019-ncov/hcp/disposition-in-home-patients.html     CDC: Caring for Someone: www.cdc.gov/coronavirus/2019-ncov/if-you-are-sick/care-for-someone.html     [facility]: Interim Guidance for Hospital Discharge to Home: www.[facility]/diseases/coronavirus/hcp/hospdischarge.pdf    [facility] clinical trials (COVID-19 research studies): [email or web address].[email or web address]-clinical-[email or web address]     Below are the COVID-19 hotlines at the [place] [facility] ([facility]). Interpreters are available.   o For health questions: Call 954-833-7703 or 1-920.809.6804 (7 a.m. to 7 p.m.)  o For questions about schools and childcare: Call 541-068-1337 or 1-318.274.2896 (7 a.m. to 7 p.m.)              Thank you for taking steps to prevent the spread of this virus.  o Limit your contact with others.  o Wear a simple mask to cover your cough.  o Wash your hands well and often.    Resources    [facility]: About COVID-19: [email or web address]    CDC: What to Do If You're Sick: www.cdc.gov/coronavirus/2019-ncov/about/steps-when-sick.html    CDC: Ending Home Isolation: www.cdc.gov/coronavirus/2019-ncov/hcp/disposition-in-home-patients.html     CDC: Caring for Someone: www.cdc.gov/coronavirus/2019-ncov/if-you-are-sick/care-for-someone.html     [facility]: Interim Guidance for Hospital Discharge to Home: www.[facility]/diseases/coronavirus/hcp/hospdischarge.pdf    [facility] clinical trials (COVID-19 research studies): [email or web address]/[email or web address]-clinical-trials     Below are the COVID-19 hotlines at the [facility] ([facility]). Interpreters are available.   o For health questions: Call 171-262-5256 or  1-608.797.9116 (7 a.m. to 7 p.m.)  o For questions about schools and childcare: Call 430-088-5144 or 1-219.131.4362 (7 a.m. to 7 p.m.)

## 2021-07-20 NOTE — PROGRESS NOTES
Assessment:    Persistent cough.  No improvement with 2 courses of antibiotics.  No clear improvement with albuterol.  Symptoms suggestive of drainage and drainage cough.  Drainage may be secondary to nonallergic vasomotor rhinitis.  Negative allergy testing today.   Normal spirometry and nitric oxide testing today.  This does not exclude asthma however makes it less likely.  At this time I do not think that cough is from underlying asthma.    Lower leg dermatitis.  This appears to be an eczematous rash.  However, no history of previous eczema.  Based on location and appearance, consider early stasis dermatitis.  I think a contact allergy is unlikely.    Plan:    For cough, recommend addressing drainage.  Recommend starting a nasal saline nasal wash.  Recommend Astelin 2 sprays each nostril twice daily.  Mucinex can be continued as before.    If cough drainage continue to be a problem consider doing a CT scan of sinuses before any additional antibiotics.  We will hold on CT scan for now while making medication changes.    Regarding the rash, continue topical steroids as prescribed by Dr. Lewis.  If not improving, consider return to Dr. Lewis/dermatology referral.   ____________________________________________________________________________     Patient comes in today with symptoms of persistent cough.  She reports a long history of cough that comes and goes.  Is been worse this past spring.  She is also had some drainage that at times is discolored yellow.  Her cough can be productive at times.  No associated wheezing or shortness of breath with it.  No obvious trigger although it seems to be somewhat worse at night.  Since the onset she has been placed on 2 courses of antibiotics including azithromycin and Ceftin.  Despite antibiotics there is no improvement.  She is also trialed albuterol this past week.  She does not feel that this is been helpful.  She has used Mucinex which does seem to help somewhat.  She  also is concerned about a new rash on her legs.  It has been mildly itchy.  Recently triamcinolone 0.1% was prescribed by Dr. Lewis.  She feels that this is helpful and the itch has been relieved.  The rash still is there.  She has had previous allergy testing.  She recalls having allergies to dust mite and cats.  She had previous allergy shots years ago.  She recalls that they were very helpful for her migraine headaches.    Review of symptoms:  As above, otherwise negative    Past medical history: Migraine headache history, ADHD, hyperlipidemia, esophageal reflux    Allergies: No known allergies to medications, latex, foods or hymenoptera venom    Family history: Uncle and niece with asthma.  No known member of the family with allergies.    Social history: Currently has lived in the same house for 2 years.  Initially there was some concerns about water and had some mold remediation.  This was completed 1-1/2 years ago.  She reports always having dogs in the house.  She is currently retired.  She was a previous cigarette smoker but stopped 30 years ago.  She does report recent cleaning of a sister-in-law's home that is very dirty and possibly some mold present there.  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    Medications: reviewed in chart    Physical Exam:  General:  Alert and in no apparent distress.  Eyes:  Sclera clear.  Ears: TMs translucent grey with bony landmarks visible. Nose: Pale, boggy mucosal membranes.  Throat: Pink, moist.  No lesions.  Neck: Supple.  No lymphadenopathy.  Lungs: CTA.  CV: Regular rate and rhythm. Extremities: Well perfused.  No clubbing or cyanosis. Skin: No rash    Spirometry: FEV1 to FVC ratio was 86%.  FEV1 is 1.77 L which is 88% predicted.  FVC is 2.07 L which is 77% of predicted.  This is normal spirometry    Nitric oxide is 33 ppb which is an intermediate range.    Last Percutaneous  Allergy Test Results  Trees  [place], White  1:20 H  (W/F in mm): 0/0 (07/03/19 1109)  Birch Mix 1:20 H (W/F in mm): 0/0 (07/03/19 1109)  [place], Common 1:20 H (W/F in mm): 0/0 (07/03/19 1109)  Elm, American 1:20 H (W/F in mm): 0/0 (07/03/19 1109)  [place], Shagbark 1:20 H (W/F in mm): 0/0 (07/03/19 1109)  Maple, Hard/Sugar 1:20 H (W/F in mm): 0/0 (07/03/19 1109)  [place] Mix 1:20 H (W/F in mm): 0/0 (07/03/19 1109)  [place], Red 1:20 H (W/F in mm): 0/0 (07/03/19 1109)  [place], American 1:20 H (W/F in mm): 0/0 (07/03/19 1109)  Bourbon Tree 1:20 H (W/F in mm): 0/0 (07/03/19 1109)  Dust Mites  D. Pteronyssinus Mite 30,000 AU/ML H (W/F in mm): 0/0 (07/03/19 1109)  D. Farinae Mite 30,000 AU/ML H (W/F in mm: 0/0 (07/03/19 1109)  Grasses  Grass Mix #4 10,000 BAU/ML H: 0/0 (07/03/19 1109)  Junior Grass 1:20 H (W/F in mm): 0/0 (07/03/19 1109)  Cockroach  Cockroach Mix 1:10 H (W/F in mm): 0/0 (07/03/19 1109)  Molds/Fungi  Alternaria Tenuis 1:10 H (W/F in mm): 0/0 (07/03/19 1109)  Aspergillus Fumigatus 1:10 H (W/F in mm): 0/0 (07/03/19 1109)  Homodendrum Cladosporioides 1:10 H (W/F in mm): 0/0 (07/03/19 1109)  Penicillin Notatum 1:10 H (W/F in mm): 0/0 (07/03/19 1109)  Epicoccum 1:10 H (W/F in mm): 0/0 (07/03/19 1109)  Weeds  Ragweed, Short 1:20 H (W/F in mm): 0/0 (07/03/19 1109)  Dock, Sorrel 1:20 H (W/F in mm): 0/0 (07/03/19 1109)  Lamb's Quarter 1:20 H (W/F in mm): 0/0 (07/03/19 1109)  Pigweed, Rough Red Root 1:20 H  (W/F in mm): 0/0 (07/03/19 1109)  Plantain, English 1:20 H  (W/F in mm): 0/0 (07/03/19 1109)  Sagebrush, Mugwort 1:20 H  (W/F in mm): 0/0 (07/03/19 1109)  Animal  Cat 10,000 BAU/ML H (W/F in mm): 0/0 (07/03/19 1109)  Dog 1:10 H (W/F in mm): 0/0 (07/03/19 1109)  Controls  Device Type: QUINTIP (07/03/19 1109)  Neg. control: 50% Glycerine/Saline H (W/F in mm): 0/0 (07/03/19 1109)  Pos. control: Histamine 6mg/ML (W/F in mms): 8/24 (07/03/19 1109)     Patient seen upon request of Dr. Logan Lewis for evaluation of  environmental allergies

## 2021-07-20 NOTE — TELEPHONE ENCOUNTER
RN cannot approve Refill Request    RN can NOT refill this medication med is not covered by policy/route to provider. Last office visit: 8/6/2019 Logan Aguilar MD Last Physical: 10/12/2018 Last MTM visit: Visit date not found Last visit same specialty: 8/6/2019 Logan Aguilar MD.  Next visit within 3 mo: Visit date not found  Next physical within 3 mo: Visit date not found      Madelyn Quintanilla, [facility] Triage/Med Refill 9/23/2020    Requested Prescriptions   Pending Prescriptions Disp Refills     sulindac (CLINORIL) 200 MG tablet [Pharmacy Med Name: SULINDAC 200 MG TABLET] 180 tablet 0     Sig: TAKE 1 TABLET BY MOUTH 2 TIMES DAILY       There is no refill protocol information for this order

## 2021-07-20 NOTE — TELEPHONE ENCOUNTER
Refill Approved    Rx renewed per Medication Renewal Policy. Medication was last renewed on 5/1/20, last OV 8/6/19.    Madelyn Quintanilla, [facility] Triage/Med Refill 8/1/2020     Requested Prescriptions   Pending Prescriptions Disp Refills     traZODone (DESYREL) 100 MG tablet [Pharmacy Med Name: TRAZODONE 100 MG TABLET] 90 tablet 0     Sig: TAKE 1/2- 1 TABLET BY MOUTH AT BEDTIME.       Tricyclics/Misc Antidepressant/Antianxiety Meds Refill Protocol Passed - 7/31/2020  7:29 AM        Passed - PCP or prescribing provider visit in last year     Last office visit with prescriber/PCP: 8/6/2019 Logan Aguilar MD OR same dept: 8/6/2019 Logan Aguilar MD OR same specialty: 8/6/2019 Logan Aguilar MD  Last physical: 10/12/2018 Last MTM visit: Visit date not found   Next visit within 3 mo: Visit date not found  Next physical within 3 mo: Visit date not found  Prescriber OR PCP: Logan Aguilar MD  Last diagnosis associated with med order: 1. Insomnia  - traZODone (DESYREL) 100 MG tablet [Pharmacy Med Name: TRAZODONE 100 MG TABLET]; TAKE 1/2- 1 TABLET BY MOUTH AT BEDTIME.  Dispense: 90 tablet; Refill: 0    If protocol passes may refill for 12 months if within 3 months of last provider visit (or a total of 15 months).

## 2021-07-20 NOTE — TELEPHONE ENCOUNTER
Refill Approved    Rx renewed per Medication Renewal Policy. Medication was last renewed on 12/9/18.    Angeles Wisdom, [facility] Triage/Med Refill 12/4/2019     Requested Prescriptions   Pending Prescriptions Disp Refills     pravastatin (PRAVACHOL) 40 MG tablet [Pharmacy Med Name: PRAVASTATIN SODIUM 40 MG TAB] 90 tablet 0     Sig: TAKE ONE TABLET DAILY       Statins Refill Protocol (Hmg CoA Reductase Inhibitors) Passed - 12/3/2019 11:49 AM        Passed - PCP or prescribing provider visit in past 12 months      Last office visit with prescriber/PCP: 8/6/2019 Logan Aguilar MD OR same dept: 8/6/2019 Logan Aguilar MD OR same specialty: 8/6/2019 Logan Aguilar MD  Last physical: 10/12/2018 Last MTM visit: Visit date not found   Next visit within 3 mo: Visit date not found  Next physical within 3 mo: Visit date not found  Prescriber OR PCP: Logan Aguilar MD  Last diagnosis associated with med order: 1. Hyperlipemia  - pravastatin (PRAVACHOL) 40 MG tablet [Pharmacy Med Name: PRAVASTATIN SODIUM 40 MG TAB]; TAKE ONE TABLET DAILY  Dispense: 90 tablet; Refill: 0    If protocol passes may refill for 12 months if within 3 months of last provider visit (or a total of 15 months).

## 2021-07-20 NOTE — TELEPHONE ENCOUNTER
Advise trying Zyrtec.  This may help both with hives and respiratory symptoms.  I will put in a referral for an allergy consult with Dr. Rucker if she is interested.

## 2021-07-20 NOTE — TELEPHONE ENCOUNTER
"Question following Office Visit  When did you see your provider: 5/31/19  What is your question: She has finished Ceftin and feels good.  However, she continues to have a \"huge\" amount of congestion both in sinses and chest.  Mucus is clear.  She is not taking anything over the counter for this.  She is asking if Dr. Aguilar has any suggestions to clearing up the mucus?  Okay to leave a detailed message: Yes    "

## 2021-07-20 NOTE — PROGRESS NOTES
ASSESSMENT:  1.  Persistent cough: She was treated with Zithromax by Dr. Parkinson in April without sustained benefit.  She has no evidence for bronchospasm on exam and is a non-smoker.  She still reports thick phlegm, and could have associated sinusitis.  A second course of an antibiotic with a different spectrum is advised.    2.  Recurrent major depression in full remission:  She reports she is doing well and scored 0 on her last PHQ 9    3.  Attention deficit hyperactivity disorder;  Acknowledges some ongoing issues with this.  She was placed on phentermine by Dr. Gale for weight loss.  This may be of benefit    PLAN:  1.  Ceftin 500 mg twice daily with food for 10 days    2.  Robitussin-DM for cough    3.  Call if not improving after Ceftin is completed    4.  Annual wellness exam is due in October      Medications Discontinued During This Encounter   Medication Reason     benzonatate (TESSALON PERLES) 100 MG capsule Alternate therapy       Return in about 5 months (around 10/31/2019) for Annual physical.    ASSESSED PROBLEMS:  1. Acute bronchitis, unspecified organism  cefuroxime (CEFTIN) 500 MG tablet   2. Major depressive disorder, recurrent episode, in full remission (H)         CHIEF COMPLAINT:  Chief Complaint   Patient presents with     Cough     X two months, really productive cough, see triage note      Routine Health Maintenance     Due for DXA, pend the order       HISTORY OF PRESENT ILLNESS:  Tangela is a 74 y.o. female presents complaining of a persisting cough.  First was seen for this problem by Dr. Parkinson in April.  She was treated with Zithromax at that time for bronchitis with Tessalon used for cough.  No real improvement has been noted.  She has noted recurrent coughing spells particularly at night.  She also notes thick nasal congestion with postnasal drip and some facial pressure.  She has not had fevers chills or wheezing.  She is a non-smoker.    She otherwise has done well since last  "visit.  She has a past history of depression, but has done well scoring 0 on her last PHQ 9.    She has history of attention deficit disorder.  He notes some ongoing issues with this.  She was given phentermine by Dr. Gale for weight loss.  She feels this may be of some benefit    REVIEW OF SYSTEMS:    Comprehensive review of systems is negative except as noted above    PFSH:  Single.  Retired.  Is working on remodeling her home    TOBACCO USE:  Social History     Tobacco Use   Smoking Status Former Smoker   Smokeless Tobacco Never Used       VITALS:  Vitals:    05/31/19 1447   BP: 114/68   Patient Site: Left Arm   Patient Position: Sitting   Cuff Size: Adult Regular   Pulse: 68   SpO2: 97%   Weight: 167 lb (75.8 kg)   Height: 5' 2.4\" (1.585 m)     Wt Readings from Last 3 Encounters:   05/31/19 167 lb (75.8 kg)   04/04/19 168 lb (76.2 kg)   10/12/18 168 lb 8 oz (76.4 kg)       PHYSICAL EXAM:  Constitutional:   Reveals an alert talkative woman who does not appear in acute distress.   Vitals: per nursing notes.  HEENT:  Ears:  External canals, TMs clear.    Eyes:  EOMs full, PERRL.  Oropharynx:   Mouth and throat clear, no thrush or exudate.  Neck:  Supple, no carotid bruits or adenopathy.  Back:  No spine or CVA pain.  Thorax:  No bony deformities.  Lungs: Clear to A&P without rales or wheezes.  Respiratory effort normal.  Cardiac:   Regular rate and rhythm, normal S1, S2, no murmur or gallop.  Abdomen:  Soft, active bowel sounds without bruits, mass, or tenderness..  Extremities:   No peripheral edema, pulses in the feet intact.    Skin:  No jaundice, peripheral cyanosis or lesions to suggest malignancy.  Neuro:  Alert and oriented.  No gross focal deficits.  Psychiatric:  Memory intact, mood appropriate.    DATA REVIEWED:  Additional History from Old Records Summarized (2): None.  Decision to Obtain Records (1): None.   Radiology Tests Summarized or Ordered (1): None.  Labs Reviewed or Ordered (1): " None.  Medicine Test Summarized or Ordered (1): None.   Independent Review of EKG or X-RAY(2 each): None.    The visit lasted a total of 24 minutes face to face with the patient. Over 50% of the time was spent counseling and educating the patient about evaluation and management of cough.    .    Dragon dictation was used for this note. Speech recognition errors are a possibility.     MEDICATIONS:  Current Outpatient Medications   Medication Sig Dispense Refill     CALCIUM ORAL Take 1 tablet by mouth 2 (two) times a day. 600-200 MG-UNIT Oral Tablet       cholecalciferol, vitamin D3, 1,000 unit tablet Take 1,000 Units by mouth daily.       citalopram (CELEXA) 20 MG tablet TAKE ONE TABLET BY MOUTH ONCE DAILY 90 tablet 3     co-enzyme Q-10 30 mg capsule Take 30 mg by mouth 3 (three) times a day.       cycloSPORINE (RESTASIS) 0.05 % ophthalmic emulsion Administer 1 drop to both eyes every 12 (twelve) hours. Daily.       DOCOSAHEXANOIC ACID/EPA (FISH OIL ORAL) Take 4 capsules by mouth daily. 1000 mg oral capsule.       erythromycin with ethanol (ERYDERM) 2 % external solution APPLY SPARINGLY TO AFFECTED AREA(S) TWICE DAILY. 60 mL 2     estradiol (ESTRING) 2 mg vaginal ring Insert 2 mg into the vagina every 3 (three) months. follow package directions       estrogens conjugated, synthetic A, (CENESTIN) 0.625 MG tablet Take 0.625 mg by mouth daily.       latanoprost (XALATAN) 0.005 % ophthalmic solution        magnesium 30 mg tablet Take 30 mg by mouth 2 (two) times a day.       mometasone (NASONEX) 50 mcg/actuation nasal spray 2 sprays into each nostril daily.       MULTIVITAMIN ORAL Take 1 tablet by mouth daily. TABS       NEVANAC 0.1 % ophthalmic suspension        omeprazole (PRILOSEC) 20 MG capsule TAKE ONE CAPSULE BY MOUTH DAILY. 90 capsule 2     phentermine 37.5 MG capsule Take 37.5 mg by mouth every morning.       pravastatin (PRAVACHOL) 40 MG tablet TAKE ONE TABLET DAILY IN THE EVENING. 90 tablet 3     prednisoLONE  acetate (PRED-FORTE) 1 % ophthalmic suspension        progesterone (PROMETRIUM) 100 MG capsule        sulindac (CLINORIL) 200 MG tablet TAKE 1 TABLET BY MOUTH 2 TIMES DAILY 180 tablet 3     traZODone (DESYREL) 100 MG tablet TAKE 1/2- 1 TABLET BY MOUTH AT BEDTIME. 90 tablet 3     vitamin E 100 UNIT capsule Take 100 Units by mouth daily.       cefuroxime (CEFTIN) 500 MG tablet Take 1 tablet (500 mg total) by mouth 2 (two) times a day for 10 days. 20 tablet 0     No current facility-administered medications for this visit.

## 2021-07-20 NOTE — PROGRESS NOTES
Ms. Chapa returned to the clinic today for continued foot care  complaining of painful callus on the inside between her 1st and 2nd toes of  her right foot.  Patient also complained of painful ingrown toenails on both sides of both great toenails.      OBJECTIVE FINDINGS:  Nails bilateral feet, normal length and normal color.  The medial and lateral borders of both great toenails are severely incurvated.  Skin bilaterally warm, supple, and intact.  DP and PT pulses +2/4  bilaterally.  Capillary refill less than 2 seconds bilaterally.  Negative  clonus and negative Babinski bilaterally.  Range of motion within normal  limits, bilateral feet and muscle power +5/5 within all compartments of both  lower extremities.  There is large round raised hyperkeratotic nucleated  lesion on the lateral aspect of right great toe, which is somewhat tender on  palpation.      ASSESSMENT:  Tylomas, ingrown toenail both great toes.      PLAN:  Trimmed the painful corn on the right great toe today.  I have recommended a partial nail excision and matrixectomy of the medial and lateral borders of both great toenails.

## 2021-08-05 ENCOUNTER — OFFICE VISIT (OUTPATIENT)
Dept: PODIATRY | Facility: CLINIC | Age: 76
End: 2021-08-05
Payer: COMMERCIAL

## 2021-08-05 VITALS
HEART RATE: 88 BPM | SYSTOLIC BLOOD PRESSURE: 140 MMHG | RESPIRATION RATE: 14 BRPM | TEMPERATURE: 98.3 F | DIASTOLIC BLOOD PRESSURE: 80 MMHG

## 2021-08-05 DIAGNOSIS — I73.00 RAYNAUD'S DISEASE WITHOUT GANGRENE: Primary | ICD-10-CM

## 2021-08-05 PROCEDURE — 99213 OFFICE O/P EST LOW 20 MIN: CPT | Performed by: PODIATRIST

## 2021-08-05 RX ORDER — PENTOXIFYLLINE 400 MG/1
TABLET, EXTENDED RELEASE ORAL
COMMUNITY
Start: 2021-06-25 | End: 2021-09-07

## 2021-08-05 RX ORDER — PENTOXIFYLLINE 400 MG/1
400 TABLET, EXTENDED RELEASE ORAL
Qty: 90 TABLET | Refills: 3 | Status: SHIPPED | OUTPATIENT
Start: 2021-08-05 | End: 2022-01-03

## 2021-08-05 RX ORDER — LATANOPROST 50 UG/ML
SOLUTION/ DROPS OPHTHALMIC
Status: ON HOLD | COMMUNITY
Start: 2021-07-14 | End: 2024-02-24

## 2021-08-05 RX ORDER — PRAVASTATIN SODIUM 40 MG
TABLET ORAL
COMMUNITY
Start: 2021-06-01 | End: 2021-12-06

## 2021-08-05 RX ORDER — SULINDAC 200 MG/1
TABLET ORAL
COMMUNITY
Start: 2021-06-24 | End: 2021-09-27

## 2021-08-05 RX ORDER — PROGESTERONE 100 MG/1
100 CAPSULE ORAL
COMMUNITY
End: 2021-09-07

## 2021-08-05 ASSESSMENT — PAIN SCALES - GENERAL: PAINLEVEL: NO PAIN (0)

## 2021-08-05 NOTE — PROGRESS NOTES
FOOT AND ANKLE SURGERY/PODIATRY Progress Note        ASSESSMENT:   Raynauds disease without gangrene bilateral feet     HPI: aTngela Chapa was seen again today complaining of pain involving the toes of both feet.  She stated that the toes also are bluish in color and sometimes bright red in color.  She has had this for several months.  She has difficult time keeping her toes warm.  Is not had any open wounds.  She is not had any drainage or bleeding.  Her pain is moderate.  It is aggravated by her shoe gear.  She denies any other previous treatment.         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        Past Surgical History:   Procedure Laterality Date     ABDOMINOPLASTY       ABDOMINOPLASTY       ARTHROSCOPY KNEE IRRIGATION AND DEBRIDEMENT      RT-Articular Cartilage     BIOPSY BREAST      benign open brest biopsy     BIOPSY BREAST Left     benign x2     BIOPSY CERVICAL, LOCAL EXCISION, SINGLE/MULTIPLE  10/09/2017     BIOPSY OF BREAST, INCISIONAL      Description: Biopsy Breast Open;  Recorded: 06/06/2008;  Comments: BENIGN     C TOTAL HIP ARTHROPLASTY      Description: Total Hip Replacement;  Proc Date: 06/01/2008;  Comments: RIGHT     C TOTAL HIP ARTHROPLASTY      Description: Total Hip Replacement;  Proc Date: 08/01/2008;     CATARACT EXTRACTION W/ INTRAOCULAR LENS IMPLANT Left 01/2016     CATARACT EXTRACTION, BILATERAL  04/2015     COLPOSCOPY, BIOPSY, COMBINED  2/16/10    TZ-not seen     ENDOMETRIAL SAMPLING (BIOPSY)  3/31/05    benign     GENITOURINARY SURGERY  6 April 2011    cystoscopy with +1 trabeculaion     HC KNEE SCOPE, DIAGNOSTIC      Description: Arthroscopy Knee Right;  Recorded: 06/06/2008;  Comments: FOR MENISCUS TEAR     HC OSTEOTOMY METATARSAL (NOT  1ST)      Description: Metatarsal Osteotomy Of The Fifth Metatarsal;  Proc Date: 02/01/2007;     IR LUMBAR EPIDURAL STEROID INJECTION  9/30/2004     IR LUMBAR EPIDURAL STEROID INJECTION  10/11/2004     IR LUMBAR EPIDURAL STEROID INJECTION  11/5/2004     JOINT REPLACEMENT, HIP RT/LT  2008    Joint Replacement Hip RT/LT     RELEASE CARPAL TUNNEL BILATERAL         Allergies   Allergen Reactions     Dust Mite Extract      Nkda [No Known Drug Allergies]      Seasonal Allergies          Current Outpatient Medications:      B Complex-C-Folic Acid (B COMPLEX + C TR PO), Take  by mouth., Disp: , Rfl:      Calcium 500-125 MG-UNIT TABS, Take 1 tablet by mouth daily. 2, Disp: , Rfl:      citalopram (CELEXA) 20 MG tablet, Take 20 mg by mouth daily., Disp: , Rfl:      co-enzyme Q-10 (COQ10) 100 MG CAPS, Take  by mouth daily., Disp: , Rfl:      cycloSPORINE (RESTASIS) 0.05 % ophthalmic emulsion, 1 drop every 12 hours., Disp: , Rfl:      estradiol (ESTRACE) 0.5 MG tablet, Take 1 tablet (0.5 mg) by mouth daily, Disp: 90 tablet, Rfl: 3     estradiol (ESTRING) 2 MG vaginal ring, Place once vaginally and refill as instructed, Disp: 1 each, Rfl: 3     fish oil-omega-3 fatty acids (FISH OIL) 1000 MG capsule, Take 1 capsule by mouth daily., Disp: , Rfl:      Flaxseed, Linseed, (FLAXSEED OIL) 1000 MG CAPS, Take 2 tablets by mouth 2 times daily., Disp: , Rfl:      Latanoprost (XALATAN OP), Apply 2 drops to eye At Bedtime. 2.5, Disp: , Rfl:      Magnesium 100 MG TABS, Take 3 tablets by mouth At Bedtime., Disp: , Rfl:      mometasone (NASONEX) 50 MCG/ACT nasal spray, 2 sprays by Both Nostrils route daily., Disp: , Rfl:      nitroFURantoin macrocrystal-monohydrate (MACROBID) 100 MG capsule, Take 1 capsule (100 mg) by mouth 2 times daily, Disp: 14 capsule, Rfl: 0     NONFORMULARY, 1 Dose daily henrik 128 apply small amount to eyes at night, Disp: , Rfl:      omeprazole (PRILOSEC) 20 MG capsule, Take 1 capsule by mouth as needed., Disp: , Rfl:       PHENTERMINE HCL PO, , Disp: , Rfl:      progesterone (PROMETRIUM) 100 MG capsule, Take 1 capsule (100 mg) by mouth At Bedtime, Disp: 90 capsule, Rfl: 3     simvastatin (ZOCOR) 40 MG tablet, Take 1 tablet by mouth At Bedtime., Disp: , Rfl:      Tetrahydrozoline HCl (EYE DROPS OP), Apply  to eye. Hydro eye, Disp: , Rfl:      TRAZodone (DESYREL) 100 MG tablet, Take 1 tablet by mouth At Bedtime., Disp: , Rfl:     Family History   Problem Relation Age of Onset     Alcohol/Drug Father      Depression Father      Alcohol/Drug Brother      Breast Cancer Mother      Depression Mother      Diabetes Maternal Grandfather      Cancer Brother 68        blood cancer     Breast Cancer Cousin      Breast Cancer Cousin      Breast Cancer Cousin      Leukemia Brother        Social History     Socioeconomic History     Marital status: Single     Spouse name: Not on file     Number of children: Not on file     Years of education: Not on file     Highest education level: Not on file   Occupational History     Not on file   Tobacco Use     Smoking status: Former Smoker     Smokeless tobacco: Never Used     Tobacco comment: quit 30 years ago   Substance and Sexual Activity     Alcohol use: Yes     Alcohol/week: 0.0 - 0.8 standard drinks     Comment: Rare     Drug use: No     Sexual activity: Not Currently     Partners: Male     Birth control/protection: Post-menopausal   Other Topics Concern      Service No     Blood Transfusions No     Caffeine Concern No     Comment: 3-4 pop/d (diet)     Occupational Exposure No     Hobby Hazards No     Sleep Concern Yes     Comment: sleeps too much -- tired after 9-10 hrs/nite     Stress Concern Yes     Comment: holiday lonliness     Weight Concern Yes     Comment: admits to eating more than she uses -- declines wt today     Special Diet No     Back Care No     Exercise Yes     Comment: sedentary -- plans to restart     Bike Helmet No     Seat Belt No     Self-Exams No     Parent/sibling w/  CABG, MI or angioplasty before 65F 55M? Not Asked   Social History Narrative    How much exercise per week? Stationary bike, recent weight loss    How much calcium per day? Supplement       How much caffeine per day? 2 cans a day    How much vitamin D per day? supplment    Do you/your family wear seatbelts?  Yes    Do you/your family use safety helmets? Yes    Do you/your family use sunscreen? Yes    Do you/your family keep firearms in the home? Yes, locked    Do you/your family have a smoke detector(s)? Yes        Do you feel safe in your home? Yes    Has anyone ever touched you in an unwanted manner? No     Explain     Updated 8/28 CHIKI Santiago          Social Determinants of Health     Financial Resource Strain:      Difficulty of Paying Living Expenses:    Food Insecurity:      Worried About Running Out of Food in the Last Year:      Ran Out of Food in the Last Year:    Transportation Needs:      Lack of Transportation (Medical):      Lack of Transportation (Non-Medical):    Physical Activity:      Days of Exercise per Week:      Minutes of Exercise per Session:    Stress:      Feeling of Stress :    Social Connections:      Frequency of Communication with Friends and Family:      Frequency of Social Gatherings with Friends and Family:      Attends Protestant Services:      Active Member of Clubs or Organizations:      Attends Club or Organization Meetings:      Marital Status:    Intimate Partner Violence:      Fear of Current or Ex-Partner:      Emotionally Abused:      Physically Abused:      Sexually Abused:        10 point Review of Systems is negative      There were no vitals taken for this visit.    BMI= There is no height or weight on file to calculate BMI.    OBJECTIVE:  General appearance: Patient is alert and fully cooperative with history & exam.  No sign of distress is noted during the visit.  Vascular: Dorsalis pedis and posterior tibial pulses are palpable. There is no pedal hair growth bilaterally.   CFT < 3 sec from anterior tibial surface to distal digits bilaterally.  Cyanosis noted digits 2 through 5 bilateral feet.  Skin temperature cool to touch digits 2 through 5 bilaterally.  There is no appreciable edema noted.  Dermatologic: Turgor and texture are within normal limits. No coloration or temperature changes. No primary or secondary lesions noted.  Neurologic: All epicritic and proprioceptive sensations are grossly intact bilaterally.  Musculoskeletal: All active and passive ankle, subtalar, midtarsal, and 1st MPJ range of motion are grossly intact without pain or crepitus, with the exception of none. Manual muscle strength is within normal limits bilaterally. All dorsiflexors, plantarflexors, invertors, evertors are intact bilaterally. Tenderness present to digits 2 through 5 bilaterally on palpation.  No tenderness to digits 2 through 5 bilaterally with range of motion. Calf is soft/non-tender without warmth/induration    Imaging:         No results found.         TREATMENT:  The patient was given a refill of her Trental 400 mg 1 tab 3 times daily.  I have recommended the patient return to the clinic as needed.        Miguel Doherty; CUBA  [facility] Foot & Ankle Surgery/Podiatry

## 2021-08-05 NOTE — LETTER
8/5/2021         RE: Tangela Chapa  [address]  [place] [address]        Dear Colleague,    Thank you for referring your patient, Tangela Chapa, to the [facility]. Please see a copy of my visit note below.    FOOT AND ANKLE SURGERY/PODIATRY Progress Note        ASSESSMENT:   Raynauds disease without gangrene bilateral feet     HPI: Tangela Chapa was seen again today complaining of pain involving the toes of both feet.  She stated that the toes also are bluish in color and sometimes bright red in color.  She has had this for several months.  She has difficult time keeping her toes warm.  Is not had any open wounds.  She is not had any drainage or bleeding.  Her pain is moderate.  It is aggravated by her shoe gear.  She denies any other previous treatment.         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        Past Surgical History:   Procedure Laterality Date     ABDOMINOPLASTY       ABDOMINOPLASTY       ARTHROSCOPY KNEE IRRIGATION AND DEBRIDEMENT      RT-Articular Cartilage     BIOPSY BREAST      benign open brest biopsy     BIOPSY BREAST Left     benign x2     BIOPSY CERVICAL, LOCAL EXCISION, SINGLE/MULTIPLE  10/09/2017     BIOPSY OF BREAST, INCISIONAL      Description: Biopsy Breast Open;  Recorded: 06/06/2008;  Comments: BENIGN     C TOTAL HIP ARTHROPLASTY      Description: Total Hip Replacement;  Proc Date: 06/01/2008;  Comments: RIGHT     C TOTAL HIP ARTHROPLASTY      Description: Total Hip Replacement;  Proc Date: 08/01/2008;     CATARACT EXTRACTION W/ INTRAOCULAR LENS IMPLANT Left 01/2016     CATARACT EXTRACTION, BILATERAL  04/2015     COLPOSCOPY, BIOPSY, COMBINED  2/16/10    TZ-not seen     ENDOMETRIAL SAMPLING  (BIOPSY)  3/31/05    benign     GENITOURINARY SURGERY  6 April 2011    cystoscopy with +1 trabeculaion     HC KNEE SCOPE, DIAGNOSTIC      Description: Arthroscopy Knee Right;  Recorded: 06/06/2008;  Comments: FOR MENISCUS TEAR     HC OSTEOTOMY METATARSAL (NOT 1ST)      Description: Metatarsal Osteotomy Of The Fifth Metatarsal;  Proc Date: 02/01/2007;     IR LUMBAR EPIDURAL STEROID INJECTION  9/30/2004     IR LUMBAR EPIDURAL STEROID INJECTION  10/11/2004     IR LUMBAR EPIDURAL STEROID INJECTION  11/5/2004     JOINT REPLACEMENT, HIP RT/LT  2008    Joint Replacement Hip RT/LT     RELEASE CARPAL TUNNEL BILATERAL         Allergies   Allergen Reactions     Dust Mite Extract      Nkda [No Known Drug Allergies]      Seasonal Allergies          Current Outpatient Medications:      B Complex-C-Folic Acid (B COMPLEX + C TR PO), Take  by mouth., Disp: , Rfl:      Calcium 500-125 MG-UNIT TABS, Take 1 tablet by mouth daily. 2, Disp: , Rfl:      citalopram (CELEXA) 20 MG tablet, Take 20 mg by mouth daily., Disp: , Rfl:      co-enzyme Q-10 (COQ10) 100 MG CAPS, Take  by mouth daily., Disp: , Rfl:      cycloSPORINE (RESTASIS) 0.05 % ophthalmic emulsion, 1 drop every 12 hours., Disp: , Rfl:      estradiol (ESTRACE) 0.5 MG tablet, Take 1 tablet (0.5 mg) by mouth daily, Disp: 90 tablet, Rfl: 3     estradiol (ESTRING) 2 MG vaginal ring, Place once vaginally and refill as instructed, Disp: 1 each, Rfl: 3     fish oil-omega-3 fatty acids (FISH OIL) 1000 MG capsule, Take 1 capsule by mouth daily., Disp: , Rfl:      Flaxseed, Linseed, (FLAXSEED OIL) 1000 MG CAPS, Take 2 tablets by mouth 2 times daily., Disp: , Rfl:      Latanoprost (XALATAN OP), Apply 2 drops to eye At Bedtime. 2.5, Disp: , Rfl:      Magnesium 100 MG TABS, Take 3 tablets by mouth At Bedtime., Disp: , Rfl:      mometasone (NASONEX) 50 MCG/ACT nasal spray, 2 sprays by Both Nostrils route daily., Disp: , Rfl:      nitroFURantoin macrocrystal-monohydrate (MACROBID) 100 MG  capsule, Take 1 capsule (100 mg) by mouth 2 times daily, Disp: 14 capsule, Rfl: 0     NONFORMULARY, 1 Dose daily henrik 128 apply small amount to eyes at night, Disp: , Rfl:      omeprazole (PRILOSEC) 20 MG capsule, Take 1 capsule by mouth as needed., Disp: , Rfl:      PHENTERMINE HCL PO, , Disp: , Rfl:      progesterone (PROMETRIUM) 100 MG capsule, Take 1 capsule (100 mg) by mouth At Bedtime, Disp: 90 capsule, Rfl: 3     simvastatin (ZOCOR) 40 MG tablet, Take 1 tablet by mouth At Bedtime., Disp: , Rfl:      Tetrahydrozoline HCl (EYE DROPS OP), Apply  to eye. Hydro eye, Disp: , Rfl:      TRAZodone (DESYREL) 100 MG tablet, Take 1 tablet by mouth At Bedtime., Disp: , Rfl:     Family History   Problem Relation Age of Onset     Alcohol/Drug Father      Depression Father      Alcohol/Drug Brother      Breast Cancer Mother      Depression Mother      Diabetes Maternal Grandfather      Cancer Brother 68        blood cancer     Breast Cancer Cousin      Breast Cancer Cousin      Breast Cancer Cousin      Leukemia Brother        Social History     Socioeconomic History     Marital status: Single     Spouse name: Not on file     Number of children: Not on file     Years of education: Not on file     Highest education level: Not on file   Occupational History     Not on file   Tobacco Use     Smoking status: Former Smoker     Smokeless tobacco: Never Used     Tobacco comment: quit 30 years ago   Substance and Sexual Activity     Alcohol use: Yes     Alcohol/week: 0.0 - 0.8 standard drinks     Comment: Rare     Drug use: No     Sexual activity: Not Currently     Partners: Male     Birth control/protection: Post-menopausal   Other Topics Concern      Service No     Blood Transfusions No     Caffeine Concern No     Comment: 3-4 pop/d (diet)     Occupational Exposure No     Hobby Hazards No     Sleep Concern Yes     Comment: sleeps too much -- tired after 9-10 hrs/nite     Stress Concern Yes     Comment: holiday lonliness      Weight Concern Yes     Comment: admits to eating more than she uses -- declines wt today     Special Diet No     Back Care No     Exercise Yes     Comment: sedentary -- plans to restart     Bike Helmet No     Seat Belt No     Self-Exams No     Parent/sibling w/ CABG, MI or angioplasty before 65F 55M? Not Asked   Social History Narrative    How much exercise per week? Stationary bike, recent weight loss    How much calcium per day? Supplement       How much caffeine per day? 2 cans a day    How much vitamin D per day? supplment    Do you/your family wear seatbelts?  Yes    Do you/your family use safety helmets? Yes    Do you/your family use sunscreen? Yes    Do you/your family keep firearms in the home? Yes, locked    Do you/your family have a smoke detector(s)? Yes        Do you feel safe in your home? Yes    Has anyone ever touched you in an unwanted manner? No     Explain     Updated 8/28 CHIKI Santiago          Social Determinants of Health     Financial Resource Strain:      Difficulty of Paying Living Expenses:    Food Insecurity:      Worried About Running Out of Food in the Last Year:      Ran Out of Food in the Last Year:    Transportation Needs:      Lack of Transportation (Medical):      Lack of Transportation (Non-Medical):    Physical Activity:      Days of Exercise per Week:      Minutes of Exercise per Session:    Stress:      Feeling of Stress :    Social Connections:      Frequency of Communication with Friends and Family:      Frequency of Social Gatherings with Friends and Family:      Attends Rastafarian Services:      Active Member of Clubs or Organizations:      Attends Club or Organization Meetings:      Marital Status:    Intimate Partner Violence:      Fear of Current or Ex-Partner:      Emotionally Abused:      Physically Abused:      Sexually Abused:        10 point Review of Systems is negative      There were no vitals taken for this visit.    BMI= There is no height or weight on file to  calculate BMI.    OBJECTIVE:  General appearance: Patient is alert and fully cooperative with history & exam.  No sign of distress is noted during the visit.  Vascular: Dorsalis pedis and posterior tibial pulses are palpable. There is no pedal hair growth bilaterally.  CFT < 3 sec from anterior tibial surface to distal digits bilaterally.  Cyanosis noted digits 2 through 5 bilateral feet.  Skin temperature cool to touch digits 2 through 5 bilaterally.  There is no appreciable edema noted.  Dermatologic: Turgor and texture are within normal limits. No coloration or temperature changes. No primary or secondary lesions noted.  Neurologic: All epicritic and proprioceptive sensations are grossly intact bilaterally.  Musculoskeletal: All active and passive ankle, subtalar, midtarsal, and 1st MPJ range of motion are grossly intact without pain or crepitus, with the exception of none. Manual muscle strength is within normal limits bilaterally. All dorsiflexors, plantarflexors, invertors, evertors are intact bilaterally. Tenderness present to digits 2 through 5 bilaterally on palpation.  No tenderness to digits 2 through 5 bilaterally with range of motion. Calf is soft/non-tender without warmth/induration    Imaging:         No results found.         TREATMENT:  The patient was given a refill of her Trental 400 mg 1 tab 3 times daily.  I have recommended the patient return to the clinic as needed.        Miguel Miranda DPM  [facility] Foot & Ankle Surgery/Podiatry         Again, thank you for allowing me to participate in the care of your patient.        Sincerely,        Miguel Doherty DPM

## 2021-08-06 ENCOUNTER — TELEPHONE (OUTPATIENT)
Dept: NURSING | Facility: CLINIC | Age: 76
End: 2021-08-06

## 2021-08-06 NOTE — TELEPHONE ENCOUNTER
Patient returning call to clinic.  Relayed below message from provider Dr. Bill.      8/6/21 1:03 PM  Note     Please let pt know, trental decreases blood viscocity and does not have direct effect on b/p. HTN from this med is uncommon.  B/p <16o is not dangerous, but if her b/p chronically elevated >130/80, she may need to start on b/p medication.  Try life style modification: avoid excessive salt (salted nuts, chips), avoid alcohol, avoid too much coffee  Follow up with Dr ENCARNACION if b/p continue to be above 130/80.     DR Bill        Patient has concerns because she was told in February that she has a bleed in her eye and looked at [employer] to find out that high blood pressure can cause this or make it worse. Encouraged scheduling sooner appointment with covering provider since PCP is out of office. Patient unwilling to do this. Patient does agree to get a blood pressure machine from the pharmacy and try life style changes. Patient agrees to call back if blood pressures are higher than 130/80.   Lisa Hood RN   08/06/21 4:08 PM  [facility] Nurse Advisor

## 2021-08-06 NOTE — TELEPHONE ENCOUNTER
Please let pt know, trental decreases blood viscocity and does not have direct effect on b/p. HTN from this med is uncommon.  B/p <16o is not dangerous, but if her b/p chronically elevated >130/80, she may need to start on b/p medication.  Try life style modification: avoid excessive salt (salted nuts, chips), avoid alcohol, avoid too much coffee  Follow up with Dr ENCARNACION if b/p continue to be above 130/80.    DR Bill

## 2021-08-27 ENCOUNTER — TELEPHONE (OUTPATIENT)
Dept: INTERNAL MEDICINE | Facility: CLINIC | Age: 76
End: 2021-08-27

## 2021-08-27 NOTE — TELEPHONE ENCOUNTER
Reason for Call:  Patient is calling to reschedule appt from 8/27/21. Writer unable to find anything on pcp schedule. Needs to be seen for possible high blood pressure. 140/80 per patient.  Possibly a spot on tues 9/7 at 12:30?    Detailed comments: please call patient to schedule if provider agreeable.    Phone Number Patient can be reached at: Home number on file [phone] (home)    Best Time: any    Can we leave a detailed message on this number? YES    Call taken on 8/27/2021 at 4:17 PM by Leanna Thornton

## 2021-09-05 ENCOUNTER — HEALTH MAINTENANCE LETTER (OUTPATIENT)
Age: 76
End: 2021-09-05

## 2021-09-07 ENCOUNTER — OFFICE VISIT (OUTPATIENT)
Dept: INTERNAL MEDICINE | Facility: CLINIC | Age: 76
End: 2021-09-07
Payer: COMMERCIAL

## 2021-09-07 ENCOUNTER — ANCILLARY PROCEDURE (OUTPATIENT)
Dept: GENERAL RADIOLOGY | Facility: CLINIC | Age: 76
End: 2021-09-07
Attending: INTERNAL MEDICINE
Payer: COMMERCIAL

## 2021-09-07 VITALS
SYSTOLIC BLOOD PRESSURE: 130 MMHG | OXYGEN SATURATION: 96 % | BODY MASS INDEX: 30.55 KG/M2 | WEIGHT: 178 LBS | HEART RATE: 65 BPM | DIASTOLIC BLOOD PRESSURE: 76 MMHG

## 2021-09-07 DIAGNOSIS — E78.00 HYPERCHOLESTEROLEMIA: ICD-10-CM

## 2021-09-07 DIAGNOSIS — M25.511 CHRONIC RIGHT SHOULDER PAIN: Primary | ICD-10-CM

## 2021-09-07 DIAGNOSIS — R03.0 ELEVATED BP WITHOUT DIAGNOSIS OF HYPERTENSION: ICD-10-CM

## 2021-09-07 DIAGNOSIS — M25.511 CHRONIC RIGHT SHOULDER PAIN: ICD-10-CM

## 2021-09-07 DIAGNOSIS — G89.29 CHRONIC RIGHT SHOULDER PAIN: ICD-10-CM

## 2021-09-07 DIAGNOSIS — R42 VERTIGO: ICD-10-CM

## 2021-09-07 DIAGNOSIS — G89.29 CHRONIC RIGHT SHOULDER PAIN: Primary | ICD-10-CM

## 2021-09-07 DIAGNOSIS — R53.82 CHRONIC FATIGUE: ICD-10-CM

## 2021-09-07 LAB
ANION GAP SERPL CALCULATED.3IONS-SCNC: 12 MMOL/L (ref 5–18)
BUN SERPL-MCNC: 16 MG/DL (ref 8–28)
CALCIUM SERPL-MCNC: 10.1 MG/DL (ref 8.5–10.5)
CHLORIDE BLD-SCNC: 105 MMOL/L (ref 98–107)
CO2 SERPL-SCNC: 23 MMOL/L (ref 22–31)
CREAT SERPL-MCNC: 0.79 MG/DL (ref 0.6–1.1)
GFR SERPL CREATININE-BSD FRML MDRD: 74 ML/MIN/1.73M2
GLUCOSE BLD-MCNC: 89 MG/DL (ref 70–125)
POTASSIUM BLD-SCNC: 4.6 MMOL/L (ref 3.5–5)
SODIUM SERPL-SCNC: 140 MMOL/L (ref 136–145)
TSH SERPL DL<=0.005 MIU/L-ACNC: 2.31 UIU/ML (ref 0.3–5)

## 2021-09-07 PROCEDURE — 36415 COLL VENOUS BLD VENIPUNCTURE: CPT | Performed by: INTERNAL MEDICINE

## 2021-09-07 PROCEDURE — 96127 BRIEF EMOTIONAL/BEHAV ASSMT: CPT | Performed by: INTERNAL MEDICINE

## 2021-09-07 PROCEDURE — 73030 X-RAY EXAM OF SHOULDER: CPT | Mod: TC | Performed by: RADIOLOGY

## 2021-09-07 PROCEDURE — 99214 OFFICE O/P EST MOD 30 MIN: CPT | Performed by: INTERNAL MEDICINE

## 2021-09-07 PROCEDURE — 80048 BASIC METABOLIC PNL TOTAL CA: CPT | Performed by: INTERNAL MEDICINE

## 2021-09-07 PROCEDURE — 84443 ASSAY THYROID STIM HORMONE: CPT | Performed by: INTERNAL MEDICINE

## 2021-09-07 ASSESSMENT — PATIENT HEALTH QUESTIONNAIRE - PHQ9: SUM OF ALL RESPONSES TO PHQ QUESTIONS 1-9: 1

## 2021-09-07 NOTE — PATIENT INSTRUCTIONS
1.  Covid booster due about 11/18.  (Pfizer).    2.  Flu shot this fall.    3.  Give a trial off Sulindac.  Monitor BP.  Note effect on joint pain    4.  Annual wellness visit in about November    5.  Monitor BP.  Goal under 140 systolic    6.  Consider Dr. Anderson for orthopedic consult    7.  Therapy referral for vertigo

## 2021-09-09 DIAGNOSIS — F41.9 ANXIETY: Primary | ICD-10-CM

## 2021-09-09 RX ORDER — CITALOPRAM HYDROBROMIDE 20 MG/1
20 TABLET ORAL DAILY
Qty: 90 TABLET | Refills: 3 | Status: SHIPPED | OUTPATIENT
Start: 2021-09-09 | End: 2022-09-08

## 2021-09-12 NOTE — PROGRESS NOTES
ASSESSMENT:  1. Chronic right shoulder pain  Elizabeth complains of chronic right shoulder pain aggravated by abduction and rotation.  She also notes knee pain and other issues.  She is interested in seeing a different orthopedist.  She has been on sulindac for prolonged period of time  - XR Shoulder Right 2 Views; Future    2. Vertigo  She notes episodic spinning dizziness with change in position especially laying down.  Symptoms seem suggestive of benign positional vertigo.  This is not troubling her greatly at the present time.  If therapy referral see if repositioning maneuvers would be of benefit is advised  - Physical Therapy Referral; Future    3. Elevated BP without diagnosis of hypertension  She reports that she had a small bleed in her right eye and was advised to have blood pressure checked by her ophthalmologist.  Traditionally blood pressure readings have been in the normal range at home.  BP is high normal in clinic.  She was counseled that NSAIDs such as sulindac could contribute to elevations in blood pressure.  She was counseled to try to go off of it  - Basic metabolic panel  (Ca, Cl, CO2, Creat, Gluc, K, Na, BUN); Future  - Basic metabolic panel  (Ca, Cl, CO2, Creat, Gluc, K, Na, BUN)    4. Chronic fatigue  She complains of chronic fatigue.  Thyroid function will be checked  - TSH; Future  - TSH    5. Hypercholesterolemia  She is on simvastatin 40 mg daily.  Lipids were last checked in November and looked good    6.  Health maintenance:  She had the Pfizer Covid vaccine with last vaccine on 3/18/2021    7.  Raynaud's syndrome:   She was placed on Trental by Dr. Doherty and feels it has been of benefit      PLAN:  Patient Instructions   1.  Covid booster due about 11/18.  (Pfizer).    2.  Flu shot this fall.    3.  Give a trial off Sulindac.  Monitor BP.  Note effect on joint pain    4.  Annual wellness visit in about November    5.  Monitor BP.  Goal under 140 systolic    6.  Consider Dr. Anderson for  orthopedic consult    7.  Therapy referral for vertigo    8.  Right shoulder x-ray     Orders Placed This Encounter   Procedures     XR Shoulder Right 2 Views     Basic metabolic panel  (Ca, Cl, CO2, Creat, Gluc, K, Na, BUN)     TSH     Physical Therapy Referral     Medications Discontinued During This Encounter   Medication Reason     Latanoprost (XALATAN OP)      pentoxifylline ER (TRENTAL) 400 MG CR tablet      progesterone (PROMETRIUM) 100 MG capsule      PHENTERMINE HCL PO Therapy completed     nitroFURantoin macrocrystal-monohydrate (MACROBID) 100 MG capsule Therapy completed       Return in about 2 months (around 11/7/2021) for Routine preventive.    ASSESSED PROBLEMS:  See above      CHIEF COMPLAINT:  Blood pressure check and other concerns    HISTORY OF PRESENT ILLNESS:  Tangela is a 76 year old female seen with concern over blood pressure.  She also has other issues.  She had a recent eye check where she was noted to have a small bleed in the right eye.  Her ophthalmologist advised follow-up for blood pressure check.  Elizabeth monitors home blood pressures and states it generally averages in the 1 20-1 30 systolic range.  She has not been on antihypertensive therapy.    She complains of episodic dizziness.  She describes this as a spinning sensation with change in position.  It is most common with laying down.  This has been present for months with varying severity    She was placed on Trental by Dr. Doherty for symptoms diagnosed as Raynaud's of her feet.  She feels it has been of benefit.  She complains of multiple joint pains.  She notes especially pain in the right shoulder aggravated by abduction    And rotation.  She also notes knee pain.  She is interested in referral to a new orthopedist.    REVIEW OF SYSTEMS:    Comprehensive review of systems is otherwise negative.    PFSH:  Single.  Retired.  Lives independently.  Likes to [employer]    TOBACCO USE:  History   Smoking Status     Former Smoker    Smokeless Tobacco     Never Used     Comment: quit 30 years ago       VITALS:  Vitals:    09/07/21 1304 09/07/21 1307   BP: 122/78 130/76   BP Location: Left arm    Patient Position: Sitting    Cuff Size: Adult Regular    Pulse: 65    SpO2: 96%    Weight: 80.7 kg (178 lb)      Wt Readings from Last 3 Encounters:   09/07/21 80.7 kg (178 lb)   03/29/21 79.4 kg (175 lb)   02/09/21 80.8 kg (178 lb 3.2 oz)       PHYSICAL EXAM:  Constitutional:   Reveals an alert pleasant moderately obese woman with appropriate affect.  She does not seem in acute distress..  Vitals: per nursing notes.  HEENT: Atraumatic  Eyes:  EOMs full, no nystagmus.  No conjunctival hyperemia  Oropharynx:   Mouth and throat clear, no thrush or exudate.  Neck:  Supple, no carotid bruits or adenopathy.  Back:  No spine or CVA pain.  Thorax:  No bony deformities.  Lungs: Clear to A&P without rales or wheezes.  Respiratory effort normal.  Cardiac:   Regular rate and rhythm, normal S1, S2, no murmur or gallop.  Abdomen:  Soft, active bowel sounds without bruits, mass, or tenderness  Extremities:   No peripheral edema, pulses in the feet intact.  No bony deformities of the right shoulder noted.  Abduction external rotation and internal rotation seem well-preserved.  No active synovitis of other joints noted  Skin:  No jaundice, peripheral cyanosis or lesions to suggest malignancy.  Neuro:  Alert and oriented.  No gross focal deficits.  Psychiatric:  Memory intact, mood appropriate.    DATA REVIEWED:  Additional History from Old Records Summarized (2):  chart reviewed  Decision to Obtain Records (1): None.   Radiology Tests Summarized or Ordered (1): None.  Labs Reviewed or Ordered (1): Labs reviewed and ordered  Medicine Test Summarized or Ordered (1): None.   Independent Review of EKG or X-RAY(2 each):  shoulder x-ray ordered and reviewed    The visit lasted a total of 30 minutes face to face with the patient. Over 50% of the time was spent counseling and  educating the patient about blood pressure monitoring, joint pain, and other concerns.    Dragon dictation was used for this note. Speech recognition errors are a possibility.     MEDICATIONS:  Current Outpatient Medications   Medication Sig Dispense Refill     B Complex-C-Folic Acid (B COMPLEX + C TR PO) Take  by mouth.       Calcium 500-125 MG-UNIT TABS Take 1 tablet by mouth daily. 2       co-enzyme Q-10 (COQ10) 100 MG CAPS Take  by mouth daily.       cycloSPORINE (RESTASIS) 0.05 % ophthalmic emulsion 1 drop every 12 hours.       estradiol (ESTRACE) 0.5 MG tablet Take 1 tablet (0.5 mg) by mouth daily 90 tablet 3     estradiol (ESTRING) 2 MG vaginal ring Place once vaginally and refill as instructed 1 each 3     fish oil-omega-3 fatty acids (FISH OIL) 1000 MG capsule Take 1 capsule by mouth daily.       Flaxseed, Linseed, (FLAXSEED OIL) 1000 MG CAPS Take 2 tablets by mouth 2 times daily.       latanoprost (XALATAN) 0.005 % ophthalmic solution        Magnesium 100 MG TABS Take 3 tablets by mouth At Bedtime.       mometasone (NASONEX) 50 MCG/ACT nasal spray 2 sprays by Both Nostrils route daily.       NONFORMULARY 1 Dose daily henrik 128 apply small amount to eyes at night       omeprazole (PRILOSEC) 20 MG capsule Take 1 capsule by mouth as needed.       pentoxifylline ER (TRENTAL) 400 MG CR tablet Take 1 tablet (400 mg) by mouth 3 times daily (with meals) 90 tablet 3     pravastatin (PRAVACHOL) 40 MG tablet        progesterone (PROMETRIUM) 100 MG capsule Take 1 capsule (100 mg) by mouth At Bedtime 90 capsule 3     simvastatin (ZOCOR) 40 MG tablet Take 1 tablet by mouth At Bedtime.       sulindac (CLINORIL) 200 MG tablet        Tetrahydrozoline HCl (EYE DROPS OP) Apply  to eye. Hydro eye       TRAZodone (DESYREL) 100 MG tablet Take 1 tablet by mouth At Bedtime.       citalopram (CELEXA) 20 MG tablet Take 1 tablet (20 mg) by mouth daily 90 tablet 3

## 2021-09-27 DIAGNOSIS — M19.90 LOCALIZED OSTEOARTHROSIS: Primary | ICD-10-CM

## 2021-09-27 RX ORDER — SULINDAC 200 MG/1
TABLET ORAL
Qty: 90 TABLET | Refills: 3 | Status: SHIPPED | OUTPATIENT
Start: 2021-09-27 | End: 2021-11-18

## 2021-09-27 NOTE — TELEPHONE ENCOUNTER
Reason for Call: patient is calling to request a refill of    sulindac (CLINORIL) 200 MG tablet    SEND TO [facility] pharmacy has requested twice, no response.    Detailed comments: last seen 09/7/21.    Phone Number Patient can be reached at: Home number on file [phone] (home)    Best Time: any    Can we leave a detailed message on this number? YES    Call taken on 9/27/2021 at 12:06 PM by Leanna Thornton

## 2021-10-20 ENCOUNTER — TRANSFERRED RECORDS (OUTPATIENT)
Dept: HEALTH INFORMATION MANAGEMENT | Facility: CLINIC | Age: 76
End: 2021-10-20
Payer: COMMERCIAL

## 2021-11-18 ENCOUNTER — TELEPHONE (OUTPATIENT)
Dept: INTERNAL MEDICINE | Facility: CLINIC | Age: 76
End: 2021-11-18

## 2021-11-18 DIAGNOSIS — M19.90 LOCALIZED OSTEOARTHROSIS: ICD-10-CM

## 2021-11-18 RX ORDER — SULINDAC 200 MG/1
TABLET ORAL
Qty: 90 TABLET | Refills: 3 | Status: SHIPPED | OUTPATIENT
Start: 2021-11-18 | End: 2022-02-08

## 2021-11-18 NOTE — TELEPHONE ENCOUNTER
Reason for Call:  Medication or medication refill:    Do you use a [facility]?  Name of the pharmacy and phone number for the current request:  [facility]    Name of the medication requested:     pt is stating she takes 2 per day and not sure why a script was sent into her pharm for only 1 per day.    sulindac (CLINORIL) 200 MG tablet 90 tablet 3 2021  No   Si tablet daily with food as needed for joint pain   Sent to pharmacy as: Sulindac 200 MG Oral Tablet (CLINORIL)         Other request: na    Can we leave a detailed message on this number? YES    Phone number patient can be reached at: Cell number on file:    Telephone Information:   Mobile [phone]       Best Time: any    Call taken on 2021 at 12:30 PM by Pam J. Behr

## 2021-12-10 ENCOUNTER — OFFICE VISIT (OUTPATIENT)
Dept: INTERNAL MEDICINE | Facility: CLINIC | Age: 76
End: 2021-12-10
Payer: COMMERCIAL

## 2021-12-10 VITALS
WEIGHT: 185.7 LBS | SYSTOLIC BLOOD PRESSURE: 132 MMHG | HEART RATE: 53 BPM | BODY MASS INDEX: 31.7 KG/M2 | HEIGHT: 64 IN | OXYGEN SATURATION: 97 % | DIASTOLIC BLOOD PRESSURE: 74 MMHG

## 2021-12-10 DIAGNOSIS — Z11.59 NEED FOR HEPATITIS C SCREENING TEST: ICD-10-CM

## 2021-12-10 DIAGNOSIS — I10 ESSENTIAL HYPERTENSION: ICD-10-CM

## 2021-12-10 DIAGNOSIS — K21.9 GASTROESOPHAGEAL REFLUX DISEASE, UNSPECIFIED WHETHER ESOPHAGITIS PRESENT: ICD-10-CM

## 2021-12-10 DIAGNOSIS — D12.6 BENIGN NEOPLASM OF COLON, UNSPECIFIED PART OF COLON: ICD-10-CM

## 2021-12-10 DIAGNOSIS — E78.00 HYPERCHOLESTEROLEMIA: ICD-10-CM

## 2021-12-10 DIAGNOSIS — Z00.00 MEDICARE ANNUAL WELLNESS VISIT, SUBSEQUENT: Primary | ICD-10-CM

## 2021-12-10 DIAGNOSIS — F33.42 RECURRENT MAJOR DEPRESSIVE DISORDER, IN FULL REMISSION (H): ICD-10-CM

## 2021-12-10 DIAGNOSIS — Z51.81 ENCOUNTER FOR THERAPEUTIC DRUG MONITORING: ICD-10-CM

## 2021-12-10 LAB
ALBUMIN SERPL-MCNC: 3.5 G/DL (ref 3.5–5)
ALBUMIN UR-MCNC: NEGATIVE MG/DL
ALP SERPL-CCNC: 62 U/L (ref 45–120)
ALT SERPL W P-5'-P-CCNC: 28 U/L (ref 0–45)
ANION GAP SERPL CALCULATED.3IONS-SCNC: 9 MMOL/L (ref 5–18)
APPEARANCE UR: ABNORMAL
AST SERPL W P-5'-P-CCNC: 25 U/L (ref 0–40)
BACTERIA #/AREA URNS HPF: ABNORMAL /HPF
BILIRUB SERPL-MCNC: 0.7 MG/DL (ref 0–1)
BILIRUB UR QL STRIP: NEGATIVE
BUN SERPL-MCNC: 12 MG/DL (ref 8–28)
CALCIUM SERPL-MCNC: 9 MG/DL (ref 8.5–10.5)
CHLORIDE BLD-SCNC: 109 MMOL/L (ref 98–107)
CHOLEST SERPL-MCNC: 208 MG/DL
CO2 SERPL-SCNC: 21 MMOL/L (ref 22–31)
COLOR UR AUTO: YELLOW
CREAT SERPL-MCNC: 0.74 MG/DL (ref 0.6–1.1)
ERYTHROCYTE [DISTWIDTH] IN BLOOD BY AUTOMATED COUNT: 13.2 % (ref 10–15)
FASTING STATUS PATIENT QL REPORTED: NO
GFR SERPL CREATININE-BSD FRML MDRD: 80 ML/MIN/1.73M2
GLUCOSE BLD-MCNC: 113 MG/DL (ref 70–125)
GLUCOSE UR STRIP-MCNC: NEGATIVE MG/DL
HCT VFR BLD AUTO: 43 % (ref 35–47)
HDLC SERPL-MCNC: 51 MG/DL
HGB BLD-MCNC: 14.7 G/DL (ref 11.7–15.7)
HGB UR QL STRIP: ABNORMAL
KETONES UR STRIP-MCNC: NEGATIVE MG/DL
LDLC SERPL CALC-MCNC: 117 MG/DL
LEUKOCYTE ESTERASE UR QL STRIP: ABNORMAL
MCH RBC QN AUTO: 31.8 PG (ref 26.5–33)
MCHC RBC AUTO-ENTMCNC: 34.2 G/DL (ref 31.5–36.5)
MCV RBC AUTO: 93 FL (ref 78–100)
NITRATE UR QL: NEGATIVE
PH UR STRIP: 5 [PH] (ref 5–8)
PLATELET # BLD AUTO: 213 10E3/UL (ref 150–450)
POTASSIUM BLD-SCNC: 4 MMOL/L (ref 3.5–5)
PROT SERPL-MCNC: 6.9 G/DL (ref 6–8)
RBC # BLD AUTO: 4.62 10E6/UL (ref 3.8–5.2)
RBC #/AREA URNS AUTO: ABNORMAL /HPF
SODIUM SERPL-SCNC: 139 MMOL/L (ref 136–145)
SP GR UR STRIP: 1.02 (ref 1–1.03)
SQUAMOUS #/AREA URNS AUTO: ABNORMAL /LPF
TRIGL SERPL-MCNC: 202 MG/DL
UROBILINOGEN UR STRIP-ACNC: 0.2 E.U./DL
WBC # BLD AUTO: 6.3 10E3/UL (ref 4–11)
WBC #/AREA URNS AUTO: ABNORMAL /HPF

## 2021-12-10 PROCEDURE — 99397 PER PM REEVAL EST PAT 65+ YR: CPT | Performed by: INTERNAL MEDICINE

## 2021-12-10 PROCEDURE — 85027 COMPLETE CBC AUTOMATED: CPT | Performed by: INTERNAL MEDICINE

## 2021-12-10 PROCEDURE — 99214 OFFICE O/P EST MOD 30 MIN: CPT | Mod: 25 | Performed by: INTERNAL MEDICINE

## 2021-12-10 PROCEDURE — 87086 URINE CULTURE/COLONY COUNT: CPT | Performed by: INTERNAL MEDICINE

## 2021-12-10 PROCEDURE — 81001 URINALYSIS AUTO W/SCOPE: CPT | Performed by: INTERNAL MEDICINE

## 2021-12-10 PROCEDURE — 80053 COMPREHEN METABOLIC PANEL: CPT | Performed by: INTERNAL MEDICINE

## 2021-12-10 PROCEDURE — 87088 URINE BACTERIA CULTURE: CPT | Performed by: INTERNAL MEDICINE

## 2021-12-10 PROCEDURE — 87186 SC STD MICRODIL/AGAR DIL: CPT | Performed by: INTERNAL MEDICINE

## 2021-12-10 PROCEDURE — 36415 COLL VENOUS BLD VENIPUNCTURE: CPT | Performed by: INTERNAL MEDICINE

## 2021-12-10 PROCEDURE — 80061 LIPID PANEL: CPT | Performed by: INTERNAL MEDICINE

## 2021-12-10 PROCEDURE — 86803 HEPATITIS C AB TEST: CPT | Performed by: INTERNAL MEDICINE

## 2021-12-10 RX ORDER — LOSARTAN POTASSIUM 50 MG/1
50 TABLET ORAL DAILY
Qty: 30 TABLET | Refills: 11 | Status: SHIPPED | OUTPATIENT
Start: 2021-12-10 | End: 2022-02-08 | Stop reason: DRUGHIGH

## 2021-12-10 RX ORDER — BIMATOPROST 0.1 MG/ML
1 SOLUTION/ DROPS OPHTHALMIC AT BEDTIME
COMMUNITY
Start: 2021-11-16 | End: 2024-08-02

## 2021-12-10 ASSESSMENT — MIFFLIN-ST. JEOR: SCORE: 1330.08

## 2021-12-10 NOTE — PROGRESS NOTES
SUBJECTIVE:   Tangela Chapa is a 76 year old female who presents for Preventive Visit.  Virginie is single and retired.  She lives independently in her own home.  No cognitive deficits are noted.  Her health risk assessment was reviewed.  Weight has increased over the past year.  Health maintenance habits otherwise are good.  She does not have a healthcare directive.    Blood pressure is high on recheck by MD at 158/82.  She also reports multiple high readings checked at her pharmacy.  She has been using sulindac twice daily for joint pain.  She was advised to give a trial off of it with the thought that it could possibly influence blood pressure, and also to determine whether chronic use  is actually necessary.    She reports that vertigo and right shoulder pain noted at last visit have improved    She complains of rhinitis.  She does not wish to use an antihistamine due to chronic problems with dry eyes.  She is being monitored for glaucoma, and also is uncomfortable with using a nasal steroid.    She continues on Estrace for menopausal symptoms.  She has regular GYN visits.    She uses omeprazole as needed for reflux symptoms.  This has not been a major issue lately.    She was given Trental for cold feet.  She feels it has been of benefit    She remains on Pravachol and fish oil for mixed hyperlipidemia    She has a past history of recurrent major depression.  She is on Celexa.  She feels she is currently doing well        Patient has been advised of split billing requirements and indicates understanding: Yes   Are you in the first 12 months of your Medicare coverage?  No    HPI  Do you feel safe in your environment? Yes    Have you ever done Advance Care Planning? (For example, a Health Directive, POLST, or a discussion with a medical provider or your loved ones about your wishes): No, advance care planning information given to patient to review.  Advanced care planning was discussed at today's visit.      Fall risk  Fallen 2 or more times in the past year?: No (1 fall in the last year.)  Any fall with injury in the past year?: No  click delete button to remove this line now  Cognitive Screening   1) Repeat 3 items (Leader, Season, Table)    2) Clock draw: NORMAL  3) 3 item recall: Recalls 3 objects  Results: 3 items recalled: COGNITIVE IMPAIRMENT LESS LIKELY    Mini-CogTM Copyright BEKAH Jasmine. Licensed by the author for use in [facility]; reprinted with permission (adelaida@[facility]). All rights reserved.      Do you have sleep apnea, excessive snoring or daytime drowsiness?: no    Reviewed and updated as needed this visit by clinical staff  Tobacco  Allergies  Meds             Reviewed and updated as needed this visit by Provider               Social History     Tobacco Use     Smoking status: Former Smoker     Smokeless tobacco: Never Used     Tobacco comment: quit 30 years ago   Substance Use Topics     Alcohol use: Yes     Alcohol/week: 0.0 - 0.8 standard drinks     Comment: Rare     If you drink alcohol do you typically have >3 drinks per day or >7 drinks per week? No    No flowsheet data found.No flowsheet data found.            Current providers sharing in care for this patient include:   Patient Care Team:  Logan Aguilar MD as PCP - General (Internal Medicine)  Macarena Gusman MD as MD (OB/Gyn)  Macarena Gusman MD as Assigned OBGYN Provider  Miguel Doherty DPM as Assigned Musculoskeletal Provider  Logan Aguilar MD as Assigned PCP    The following health maintenance items are reviewed in Epic and correct as of today:  Health Maintenance Due   Topic Date Due     ANNUAL REVIEW OF HM ORDERS   done today     DEPRESSION ACTION PLAN   discussed today     HEPATITIS C SCREENING  Never done     LUNG CANCER SCREENING  Never done     FALL RISK ASSESSMENT  11/13/2021             Review of Systems  CONSTITUTIONAL: NEGATIVE for fever, chills.  She acknowledges her weight is higher than  "optimal  INTEGUMENTARY/SKIN: NEGATIVE for worrisome rashes, moles or lesions  EYES: she notes chronic dry eyes and has started treatment for glaucoma  ENT/MOUTH: Notes chronic rhinitis  RESP: NEGATIVE for significant cough or SOB  BREAST: NEGATIVE for masses, tenderness or discharge  CV: NEGATIVE for chest pain, palpitations or peripheral edema  GI: NEGATIVE for nausea, abdominal pain, heartburn, or change in bowel habits  : NEGATIVE for frequency, dysuria, or hematuria  MUSCULOSKELETAL: NEGATIVE for significant arthralgias or myalgia  NEURO: NEGATIVE for weakness, dizziness or paresthesias  ENDOCRINE: NEGATIVE for temperature intolerance, skin/hair changes  HEME: NEGATIVE for bleeding problems  PSYCHIATRIC: NEGATIVE for changes in mood or affect    OBJECTIVE:   /74 (BP Location: Left arm, Patient Position: Sitting, Cuff Size: Adult Regular) recheck blood pressure 158/82 by MD right arm sitting  Pulse 53   Ht 1.63 m (5' 4.17\")   Wt 84.2 kg (185 lb 11.2 oz)   SpO2 97%   Breastfeeding No   BMI 31.70 kg/m   Estimated body mass index is 31.7 kg/m  as calculated from the following:    Height as of this encounter: 1.63 m (5' 4.17\").    Weight as of this encounter: 84.2 kg (185 lb 11.2 oz).  Physical Exam  /74 (BP Location: Left arm, Patient Position: Sitting, Cuff Size: Adult Regular)   Pulse 53   Ht 1.63 m (5' 4.17\")   Wt 84.2 kg (185 lb 11.2 oz)   SpO2 97%   Breastfeeding No   BMI 31.70 kg/m      General Appearance:  Alert, cooperative, no distress, appears stated age   Head:  Normocephalic, without obvious abnormality, atraumatic   Eyes:  PERRL, conjunctiva/corneas clear, EOM's intact   Ears:  Normal TM's and external ear canals, both ears   Nose: Nares normal, septum midline,mucosa normal, no drainage    Throat: Lips, mucosa, and tongue normal; teeth and gums normal   Neck: Supple, symmetrical, trachea midline, no adenopathy;  thyroid: not enlarged, symmetric, no tenderness/mass/nodules; no " carotid bruit or JVD   Back:   Symmetric, no curvature, ROM normal, no CVA tenderness   Lungs:   Clear to auscultation bilaterally, respirations unlabored   Breasts:  No breast masses, tenderness, asymmetry, or nipple discharge.   Heart:  Regular rate and rhythm, S1 and S2 normal, no murmur, rub, or gallop   Abdomen:   Soft, non-tender, bowel sounds active all four quadrants,  no masses, no organomegaly.  Moderately obese   Genitalia:  Exam deferred.   Pelvic: Deferred   Extremities: Extremities normal, atraumatic, no cyanosis or edema   Skin: Skin color, texture, turgor normal, no rashes or lesions   Lymph nodes: Cervical, supraclavicular, and axillary nodes normal   Neurologic: No dysarthria or aphasia.  Cranial nerves, motor and sensory exams intact with symmetric DTRs         Diagnostic Test Results:  Labs reviewed in Epic  Results for orders placed or performed in visit on 12/10/21   Lipid panel reflex to direct LDL Fasting     Status: Abnormal   Result Value Ref Range    Cholesterol 208 (H) <=199 mg/dL    Triglycerides 202 (H) <=149 mg/dL    Direct Measure HDL 51 >=50 mg/dL    LDL Cholesterol Calculated 117 <=129 mg/dL    Patient Fasting > 8hrs? No    Comprehensive metabolic panel     Status: Abnormal   Result Value Ref Range    Sodium 139 136 - 145 mmol/L    Potassium 4.0 3.5 - 5.0 mmol/L    Chloride 109 (H) 98 - 107 mmol/L    Carbon Dioxide (CO2) 21 (L) 22 - 31 mmol/L    Anion Gap 9 5 - 18 mmol/L    Urea Nitrogen 12 8 - 28 mg/dL    Creatinine 0.74 0.60 - 1.10 mg/dL    Calcium 9.0 8.5 - 10.5 mg/dL    Glucose 113 70 - 125 mg/dL    Alkaline Phosphatase 62 45 - 120 U/L    AST 25 0 - 40 U/L    ALT 28 0 - 45 U/L    Protein Total 6.9 6.0 - 8.0 g/dL    Albumin 3.5 3.5 - 5.0 g/dL    Bilirubin Total 0.7 0.0 - 1.0 mg/dL    GFR Estimate 80 >60 mL/min/1.73m2   CBC with platelets     Status: Normal   Result Value Ref Range    WBC Count 6.3 4.0 - 11.0 10e3/uL    RBC Count 4.62 3.80 - 5.20 10e6/uL    Hemoglobin 14.7 11.7 -  "15.7 g/dL    Hematocrit 43.0 35.0 - 47.0 %    MCV 93 78 - 100 fL    MCH 31.8 26.5 - 33.0 pg    MCHC 34.2 31.5 - 36.5 g/dL    RDW 13.2 10.0 - 15.0 %    Platelet Count 213 150 - 450 10e3/uL   UA Macro with Reflex to Micro and Culture - lab collect     Status: Abnormal    Specimen: Urine, Midstream   Result Value Ref Range    Color Urine Yellow Colorless, Straw, Light Yellow, Yellow    Appearance Urine Slightly Cloudy (A) Clear    Glucose Urine Negative Negative mg/dL    Bilirubin Urine Negative Negative    Ketones Urine Negative Negative mg/dL    Specific Gravity Urine 1.025 1.005 - 1.030    Blood Urine Trace (A) Negative    pH Urine 5.0 5.0 - 8.0    Protein Albumin Urine Negative Negative mg/dL    Urobilinogen Urine 0.2 0.2, 1.0 E.U./dL    Nitrite Urine Negative Negative    Leukocyte Esterase Urine Moderate (A) Negative   Urine Microscopic Exam     Status: Abnormal   Result Value Ref Range    Bacteria Urine Moderate (A) None Seen /HPF    RBC Urine 2-5 (A) 0-2 /HPF /HPF    WBC Urine 25-50 (A) 0-5 /HPF /HPF    Squamous Epithelials Urine Moderate (A) None Seen /LPF       ASSESSMENT / PLAN:   (Z00.00) Medicare annual wellness visit, subsequent  (primary encounter diagnosis)  Comment: Elizabeth is single and retired.  She lives independently.  No cognitive deficits are noted.  BMI is above goal.  Health maintenance habits otherwise are good.  She does not have a healthcare directive.  Plan: Hepatitis C Screen Reflex to HCV RNA Quant and         Genotype        Health maintenance and screening issues were discussed.  Appropriate vaccinations were reviewed.  Next mammogram is due after 3/29/2022      (K21.9) Gastroesophageal reflux disease, unspecified whether esophagitis present  Comment: Acceptable control with \"as needed \"omeprazole  Plan: Continue omeprazole.  She was also encouraged to give a trial off of sulindac to see if daily use is truly necessary    (I10) Essential hypertension  Comment: Blood pressure was high on " "recheck by MD and also on multiple checks at her pharmacy.  She will start losartan.  She also was encouraged to go off sulindac since this could contribute to elevated blood pressure  Plan: losartan (COZAAR) 50 MG tablet            (D12.6) Benign neoplasm of colon, unspecified part of colon  Comment: Last colonoscopy in 2018 showed small areas of focal active colitis, but no polyps.  No further follow-up studies were recommended for screening given age  Plan: CBC with platelets            (E78.00) Hypercholesterolemia  Comment: Fasting lipids will be checked  Plan: Lipid panel reflex to direct LDL Fasting           (F33.42) Recurrent major depressive disorder, in full remission (H)  Comment: Doing well on Celexa  Plan: She will continue Celexa    (Z51.81) Encounter for therapeutic drug monitoring  Comment: Noted labs are checked as outlined  Plan: Comprehensive metabolic panel, CBC with         platelets, UA Macro with Reflex to Micro and         Culture - lab collect, Urine Microscopic Exam,         Urine Culture            (Z11.59) Need for hepatitis C screening test  Comment: Hepatitis C screening will be done per recent health maintenance recommendations  Plan: Hepatitis C Screen Reflex to HCV RNA Quant and         Genotype          Patient Instructions   1.  Stop Sulindac.  See if joint pain is worse of the medication.    2.  Start Losartan 50 mg daily.    3.  Monitor BP.  See for BP check in 4 to 8 weeks    4.  I will notify you of test results    5.  Work on exercise and weight loss    6.  Mammogram due after 3/29/21      Patient has been advised of split billing requirements and indicates understanding: Yes  COUNSELING:  Reviewed preventive health counseling, as reflected in patient instructions       Regular exercise       Healthy diet/nutrition    Estimated body mass index is 31.7 kg/m  as calculated from the following:    Height as of this encounter: 1.63 m (5' 4.17\").    Weight as of this encounter: " 84.2 kg (185 lb 11.2 oz).    Weight management plan: Discussed healthy diet and exercise guidelines    She reports that she has quit smoking. She has never used smokeless tobacco.      Appropriate preventive services were discussed with this patient, including applicable screening as appropriate for cardiovascular disease, diabetes, osteopenia/osteoporosis, and glaucoma.  As appropriate for age/gender, discussed screening for colorectal cancer, prostate cancer, breast cancer, and cervical cancer. Checklist reviewing preventive services available has been given to the patient.    Reviewed patients plan of care and provided an AVS. The Basic Care Plan (routine screening as documented in Health Maintenance) for Tangela meets the Care Plan requirement. This Care Plan has been established and reviewed with the Patient.    Counseling Resources:  ATP IV Guidelines  Pooled Cohorts Equation Calculator  Breast Cancer Risk Calculator  Breast Cancer: Medication to Reduce Risk  FRAX Risk Assessment  ICSI Preventive Guidelines  Dietary Guidelines for Americans, 2010  USDA's MyPlate  ASA Prophylaxis  Lung CA Screening    Logan Aguilar MD  [facility]    Identified Health Risks:

## 2021-12-10 NOTE — PATIENT INSTRUCTIONS
1.  Stop Sulindac.  See if joint pain is worse of the medication.    2.  Start Losartan 50 mg daily.    3.  Monitor BP.  See for BP check in 4 to 8 weeks    4.  I will notify you of test results    5.  Work on exercise and weight loss    6.  Mammogram due after 3/29/21

## 2021-12-13 LAB
BACTERIA UR CULT: ABNORMAL
BACTERIA UR CULT: ABNORMAL
HCV AB SERPL QL IA: NONREACTIVE

## 2022-01-03 DIAGNOSIS — I73.00 RAYNAUD'S DISEASE WITHOUT GANGRENE: ICD-10-CM

## 2022-01-03 RX ORDER — PENTOXIFYLLINE 400 MG/1
400 TABLET, EXTENDED RELEASE ORAL
Qty: 90 TABLET | Refills: 3 | Status: SHIPPED | OUTPATIENT
Start: 2022-01-03 | End: 2022-06-22

## 2022-01-11 ENCOUNTER — TELEPHONE (OUTPATIENT)
Dept: INTERNAL MEDICINE | Facility: CLINIC | Age: 77
End: 2022-01-11

## 2022-01-11 DIAGNOSIS — K21.9 GASTROESOPHAGEAL REFLUX DISEASE, UNSPECIFIED WHETHER ESOPHAGITIS PRESENT: Primary | ICD-10-CM

## 2022-01-11 NOTE — TELEPHONE ENCOUNTER
Reason for Call:  Medication or medication refill:    Do you use a [facility]?  Name of the pharmacy and phone number for the current request:  [facility]    Name of the medication requested:     omeprazole (PRILOSEC) 20 MG capsule     --   Sig - Route: Take 1 capsule by mouth .daily - Oral         Other request: pt is out of meds and has been for over a week, pharm sent in request and no response    Can we leave a detailed message on this number? YES    Phone number patient can be reached at: Cell number on file:    Telephone Information:   Mobile [phone]       Best Time: any    Call taken on 1/11/2022 at 3:26 PM by Pam J. Behr

## 2022-01-31 ENCOUNTER — TELEPHONE (OUTPATIENT)
Dept: INTERNAL MEDICINE | Facility: CLINIC | Age: 77
End: 2022-01-31
Payer: COMMERCIAL

## 2022-01-31 DIAGNOSIS — G47.00 INSOMNIA: Primary | ICD-10-CM

## 2022-01-31 DIAGNOSIS — G47.00 PERSISTENT INSOMNIA: ICD-10-CM

## 2022-01-31 RX ORDER — TRAZODONE HYDROCHLORIDE 100 MG/1
100 TABLET ORAL AT BEDTIME
Qty: 90 TABLET | Refills: 3 | Status: SHIPPED | OUTPATIENT
Start: 2022-01-31 | End: 2023-01-31

## 2022-01-31 NOTE — TELEPHONE ENCOUNTER
Reason for Call:  Other call back    Detailed comments: This is the 2nd call regarding a refill for:  Trazadon    Pharm:  [facility]        Phone Number Patient can be reached at: Other phone number:  [phone]    Best Time: anytime    Can we leave a detailed message on this number? YES    Call taken on 1/31/2022 at 11:58 AM by Keyana Stubbs

## 2022-01-31 NOTE — TELEPHONE ENCOUNTER
Pending Prescriptions:                       Disp   Refills    traZODone (DESYREL) 100 MG tablet         90 tab*3            Sig: Take 1 tablet (100 mg) by mouth At Bedtime    Patient out of medication soon.

## 2022-02-08 ENCOUNTER — OFFICE VISIT (OUTPATIENT)
Dept: INTERNAL MEDICINE | Facility: CLINIC | Age: 77
End: 2022-02-08
Payer: COMMERCIAL

## 2022-02-08 ENCOUNTER — ANCILLARY PROCEDURE (OUTPATIENT)
Dept: MAMMOGRAPHY | Facility: CLINIC | Age: 77
End: 2022-02-08
Attending: INTERNAL MEDICINE
Payer: COMMERCIAL

## 2022-02-08 VITALS
HEART RATE: 54 BPM | DIASTOLIC BLOOD PRESSURE: 71 MMHG | BODY MASS INDEX: 31.45 KG/M2 | WEIGHT: 184.2 LBS | SYSTOLIC BLOOD PRESSURE: 137 MMHG | OXYGEN SATURATION: 97 %

## 2022-02-08 DIAGNOSIS — Z12.31 SCREENING MAMMOGRAM, ENCOUNTER FOR: ICD-10-CM

## 2022-02-08 DIAGNOSIS — Z51.81 ENCOUNTER FOR THERAPEUTIC DRUG MONITORING: ICD-10-CM

## 2022-02-08 DIAGNOSIS — F33.42 RECURRENT MAJOR DEPRESSIVE DISORDER, IN FULL REMISSION (H): ICD-10-CM

## 2022-02-08 DIAGNOSIS — I10 ESSENTIAL HYPERTENSION: Primary | ICD-10-CM

## 2022-02-08 LAB
ANION GAP SERPL CALCULATED.3IONS-SCNC: 8 MMOL/L (ref 5–18)
BUN SERPL-MCNC: 12 MG/DL (ref 8–28)
CALCIUM SERPL-MCNC: 10.1 MG/DL (ref 8.5–10.5)
CHLORIDE BLD-SCNC: 104 MMOL/L (ref 98–107)
CO2 SERPL-SCNC: 28 MMOL/L (ref 22–31)
CREAT SERPL-MCNC: 0.82 MG/DL (ref 0.6–1.1)
GFR SERPL CREATININE-BSD FRML MDRD: 75 ML/MIN/1.73M2
GLUCOSE BLD-MCNC: 96 MG/DL (ref 70–125)
POTASSIUM BLD-SCNC: 4.6 MMOL/L (ref 3.5–5)
SODIUM SERPL-SCNC: 140 MMOL/L (ref 136–145)

## 2022-02-08 PROCEDURE — 80048 BASIC METABOLIC PNL TOTAL CA: CPT | Performed by: INTERNAL MEDICINE

## 2022-02-08 PROCEDURE — 99214 OFFICE O/P EST MOD 30 MIN: CPT | Performed by: INTERNAL MEDICINE

## 2022-02-08 PROCEDURE — 36415 COLL VENOUS BLD VENIPUNCTURE: CPT | Performed by: INTERNAL MEDICINE

## 2022-02-08 PROCEDURE — 77067 SCR MAMMO BI INCL CAD: CPT | Mod: TC | Performed by: RADIOLOGY

## 2022-02-08 RX ORDER — LOSARTAN POTASSIUM 100 MG/1
100 TABLET ORAL DAILY
Qty: 90 TABLET | Refills: 3 | Status: SHIPPED | OUTPATIENT
Start: 2022-02-08 | End: 2023-01-31

## 2022-02-08 NOTE — PATIENT INSTRUCTIONS
1.  Increase losartan to 100 mg daily.    2.  Report BP readings in two weeks.  May need to adjust medication    3.  See in three months    4.  Low salt diet, weight loss encouraged

## 2022-02-08 NOTE — PROGRESS NOTES
ASSESSMENT:  1. Essential hypertension  At last visit Elizabeth was placed on losartan 50 mg daily.  She has monitored blood pressures since then on a regular basis with readings ranging from a high of 170 systolic with an average in the 150s.  She has no adverse effects from the losartan.  She has stayed off of sulindac.  Dose of losartan will be increased to 100 mg daily.  She was advised that she may require a second medication on top of this to achieve adequate control.  Further efforts at lifestyle modification including dietary changes and weight loss were encouraged  - losartan (COZAAR) 100 MG tablet; Take 1 tablet (100 mg) by mouth daily  Dispense: 90 tablet; Refill: 3  - Basic metabolic panel  (Ca, Cl, CO2, Creat, Gluc, K, Na, BUN); Future  - Basic metabolic panel  (Ca, Cl, CO2, Creat, Gluc, K, Na, BUN)    2. Encounter for therapeutic drug monitoring  Basic metabolic panel will be drawn after beginning the losartan  - Basic metabolic panel  (Ca, Cl, CO2, Creat, Gluc, K, Na, BUN); Future  - Basic metabolic panel  (Ca, Cl, CO2, Creat, Gluc, K, Na, BUN)    3. Recurrent major depressive disorder, in full remission (H)  She reports that a close friend  over the winter.  She has noted some increased depressive symptoms.  She will continue Celexa    PLAN:  Patient Instructions   1.  Increase losartan to 100 mg daily.    2.  Report BP readings in two weeks.  May need to adjust medication    3.  See in three months    4.  Low salt diet, weight loss encouraged    Orders Placed This Encounter   Procedures     Basic metabolic panel  (Ca, Cl, CO2, Creat, Gluc, K, Na, BUN)     Medications Discontinued During This Encounter   Medication Reason     sulindac (CLINORIL) 200 MG tablet Therapy completed     losartan (COZAAR) 50 MG tablet Dose adjustment       Return in about 3 months (around 2022) for Follow up.    ASSESSED PROBLEMS:  See above      CHIEF COMPLAINT:  Follow-up essential hypertension    HISTORY OF PRESENT  ILLNESS:  Tangela is a 76 year old female seen for follow-up of essential hypertension.  At last visit, she was started on losartan 50 mg daily.  She has tolerated this without adverse effects.  Blood pressures have ranged in the 1 40-1 70 systolic range, averaging in the 150s.  She was advised to stop sulindac at last visit and has done so.  She feels joint pain has been tolerable with using local heat and topical agents    She has noted some increased depressive symptoms since her close friend  over the winter.  She recently purchased a new dog, (Liam), which she feels has been of benefit.  She also has continued on Celexa.    REVIEW OF SYSTEMS:  See above  Comprehensive review of systems is otherwise negative.    PFSH:  Single and retired.  Recently purchased a new dog as above.      TOBACCO USE:  History   Smoking Status     Former Smoker   Smokeless Tobacco     Never Used     Comment: quit 30 years ago       VITALS:  Vitals:    22 1149   BP: 137/71      142/80 right arm sitting, by MD   Pulse: 54   SpO2: 97%   Weight: 83.6 kg (184 lb 3.2 oz)     Wt Readings from Last 3 Encounters:   22 83.6 kg (184 lb 3.2 oz)   12/10/21 84.2 kg (185 lb 11.2 oz)   21 80.7 kg (178 lb)       PHYSICAL EXAM:  Constitutional:   Reveals an alert pleasant moderately obese woman with appropriate affect.  She does not seem in acute distress.    Vitals: per nursing notes.  HEENT: Atraumatic  Eyes: No conjunctival hyperemia  Neck:  Supple, no carotid bruits or adenopathy.  Back:  No spine or CVA pain.  Thorax:  No bony deformities.  Lungs: Clear to A&P without rales or wheezes.  Respiratory effort normal.  Cardiac:   Regular rate and rhythm, normal S1, S2, no murmur or gallop.  Bradycardic  Abdomen:  Soft, active bowel sounds without bruits, mass, or tenderness.  Extremities:   No peripheral edema, pulses in the feet intact.    Skin:  No jaundice, peripheral cyanosis or lesions to suggest malignancy.  Neuro:  Alert  and oriented. .  No gross focal deficits.  Psychiatric:  Memory intact, mood appropriate.    DATA REVIEWED:  Additional History from Old Records Summarized (2): None.  Decision to Obtain Records (1): None.   Radiology Tests Summarized or Ordered (1): None.  Labs Reviewed or Ordered (1): Labs reviewed and ordered  Medicine Test Summarized or Ordered (1): None.   Independent Review of EKG or X-RAY(2 each): None.    The visit lasted a total of 25 minutes face to face with the patient. Over 50% of the time was spent counseling and educating the patient about management of essential hypertension      Dragon dictation was used for this note. Speech recognition errors are a possibility.     MEDICATIONS:  Current Outpatient Medications   Medication Sig Dispense Refill     citalopram (CELEXA) 20 MG tablet Take 1 tablet (20 mg) by mouth daily 90 tablet 3     cycloSPORINE (RESTASIS) 0.05 % ophthalmic emulsion 1 drop every 12 hours.       estradiol (ESTRACE) 0.5 MG tablet Take 1 tablet (0.5 mg) by mouth daily 90 tablet 3     estradiol (ESTRING) 2 MG vaginal ring Place once vaginally and refill as instructed 1 each 3     fish oil-omega-3 fatty acids (FISH OIL) 1000 MG capsule Take 1 capsule by mouth daily.       latanoprost (XALATAN) 0.005 % ophthalmic solution        losartan (COZAAR) 100 MG tablet Take 1 tablet (100 mg) by mouth daily 90 tablet 3     LUMIGAN 0.01 % SOLN ophthalmic solution        Magnesium 100 MG TABS Take 3 tablets by mouth At Bedtime.       mometasone (NASONEX) 50 MCG/ACT nasal spray 2 sprays by Both Nostrils route daily.       NONFORMULARY 1 Dose daily henrik 128 apply small amount to eyes at night       omeprazole (PRILOSEC) 20 MG DR capsule Take 1 capsule (20 mg) by mouth daily 90 capsule 3     pentoxifylline ER (TRENTAL) 400 MG CR tablet TAKE 1 TABLET (400 MG) BY MOUTH 3 TIMES DAILY (WITH MEALS) 90 tablet 3     pravastatin (PRAVACHOL) 40 MG tablet Take 1 tablet (40 mg) by mouth daily 90 tablet 3      progesterone (PROMETRIUM) 100 MG capsule Take 1 capsule (100 mg) by mouth At Bedtime 90 capsule 3     simvastatin (ZOCOR) 40 MG tablet Take 1 tablet by mouth At Bedtime.       Tetrahydrozoline HCl (EYE DROPS OP) Apply  to eye. Hydro eye       traZODone (DESYREL) 100 MG tablet Take 1 tablet (100 mg) by mouth At Bedtime 90 tablet 3

## 2022-03-18 DIAGNOSIS — N95.9 MENOPAUSAL AND POSTMENOPAUSAL DISORDER: ICD-10-CM

## 2022-03-18 RX ORDER — ESTRADIOL 0.5 MG/1
0.5 TABLET ORAL DAILY
Qty: 90 TABLET | Refills: 3 | Status: SHIPPED | OUTPATIENT
Start: 2022-03-18 | End: 2022-05-09

## 2022-03-18 NOTE — TELEPHONE ENCOUNTER
Received request for refill of estradiol. Patient scheduled for annual in June 2022. Refill sent per protocol.

## 2022-03-28 DIAGNOSIS — N95.9 MENOPAUSAL AND POSTMENOPAUSAL DISORDER: Primary | ICD-10-CM

## 2022-03-28 RX ORDER — PROGESTERONE 100 MG/1
100 CAPSULE ORAL AT BEDTIME
Qty: 90 CAPSULE | Refills: 1 | Status: SHIPPED | OUTPATIENT
Start: 2022-03-28 | End: 2022-05-09

## 2022-05-09 ENCOUNTER — OFFICE VISIT (OUTPATIENT)
Dept: OBGYN | Facility: CLINIC | Age: 77
End: 2022-05-09
Attending: OBSTETRICS & GYNECOLOGY
Payer: COMMERCIAL

## 2022-05-09 VITALS
DIASTOLIC BLOOD PRESSURE: 79 MMHG | SYSTOLIC BLOOD PRESSURE: 126 MMHG | BODY MASS INDEX: 31.74 KG/M2 | HEART RATE: 60 BPM | WEIGHT: 185.9 LBS | HEIGHT: 64 IN

## 2022-05-09 DIAGNOSIS — Z01.419 WELL WOMAN EXAM: ICD-10-CM

## 2022-05-09 DIAGNOSIS — B35.6 TINEA CRURIS: ICD-10-CM

## 2022-05-09 DIAGNOSIS — B97.7 HPV IN FEMALE: Primary | ICD-10-CM

## 2022-05-09 DIAGNOSIS — N95.9 MENOPAUSAL AND POSTMENOPAUSAL DISORDER: ICD-10-CM

## 2022-05-09 DIAGNOSIS — Z01.411 ENCOUNTER FOR GYNECOLOGICAL EXAMINATION WITH ABNORMAL FINDING: ICD-10-CM

## 2022-05-09 PROCEDURE — 99214 OFFICE O/P EST MOD 30 MIN: CPT | Performed by: OBSTETRICS & GYNECOLOGY

## 2022-05-09 PROCEDURE — G0463 HOSPITAL OUTPT CLINIC VISIT: HCPCS

## 2022-05-09 RX ORDER — ESTRADIOL 0.5 MG/1
0.5 TABLET ORAL DAILY
Qty: 90 TABLET | Refills: 3 | Status: SHIPPED | OUTPATIENT
Start: 2022-05-09 | End: 2023-06-28

## 2022-05-09 RX ORDER — PROGESTERONE 100 MG/1
100 CAPSULE ORAL AT BEDTIME
Qty: 90 CAPSULE | Refills: 1 | Status: SHIPPED | OUTPATIENT
Start: 2022-05-09 | End: 2022-07-25

## 2022-05-09 RX ORDER — NYSTATIN 100000 [USP'U]/G
POWDER TOPICAL 3 TIMES DAILY
Qty: 30 G | Refills: 1 | Status: SHIPPED | OUTPATIENT
Start: 2022-05-09 | End: 2022-05-19

## 2022-05-09 RX ORDER — ESTRADIOL 2 MG/1
RING VAGINAL
Qty: 1 EACH | Refills: 3 | Status: SHIPPED | OUTPATIENT
Start: 2022-05-09 | End: 2023-07-26

## 2022-05-09 NOTE — PROGRESS NOTES
Progress Note    SUBJECTIVE:  Tangela Chapa is an 77 year old  , who requests a breast and pelvic exam.   Patient is followed by Dr. Logan Aguilar for primary care.    Concerns today include: a pruritic rash in the right inguinal fold for the past 3 months without progressive worsening. Painful after itching, but no bleeding from the area. Has tried vasoline over the region without improvement of symptoms. Denies any fevers or chills. No palpated masses on the breasts or vagina. No other specific concerns.     Elizabeth has decided she no longer wants to f/u with annual pap smears/colpos.  She is tired of doing the follow up.     Menstrual History:  Menstrual History 2016   Period Cycle (Days) menapausal    Reviewed Today Yes       Last    Lab Results   Component Value Date    PAP NIL 2021     History of abnormal Pap smear: YES - updated in Problem List and Health Maintenance accordingly    Last   Lab Results   Component Value Date    HPV16 Negative 2021     Last   Lab Results   Component Value Date    HPV18 Negative 2021     Last   Lab Results   Component Value Date    HRHPV Positive 2021       Mammogram current: yes     HISTORY:  Prescription Medications as of 2022       Rx Number Disp Refills Start End Last Dispensed Date Next Fill Date Owning Pharmacy    estradiol (ESTRACE) 0.5 MG tablet  90 tablet 3 2022    [address]    Sig: Take 1 tablet (0.5 mg) by mouth daily    Class: E-Prescribe    Route: Oral    estradiol (ESTRING) 2 MG vaginal ring  1 each 3 2022    [place] [id number] [address]     Sig: Place once vaginally and refill as instructed    Class: E-Prescribe    nystatin (MYCOSTATIN) 407716 UNIT/GM external powder  30 g 1 2022   [place] [id number] [address]     Sig: Apply topically 3 times daily for 10 days    Class: E-Prescribe    Route: Topical    progesterone (PROMETRIUM) 100  MG capsule  90 capsule 1 2022    [address]    Sig: Take 1 capsule (100 mg) by mouth At Bedtime    Class: E-Prescribe    Route: Oral    citalopram (CELEXA) 20 MG tablet  90 tablet 3 2021    [address]    Sig: Take 1 tablet (20 mg) by mouth daily    Class: E-Prescribe    Route: Oral    cycloSPORINE (RESTASIS) 0.05 % ophthalmic emulsion            Si drop every 12 hours.    Class: Historical    fish oil-omega-3 fatty acids (FISH OIL) 1000 MG capsule            Sig: Take 1 capsule by mouth daily.    Class: Historical    Route: Oral    latanoprost (XALATAN) 0.005 % ophthalmic solution    2021    [address]    Class: Historical    losartan (COZAAR) 100 MG tablet  90 tablet 3 2022    [address]    Sig: Take 1 tablet (100 mg) by mouth daily    Class: E-Prescribe    Route: Oral    LUMIGAN 0.01 % SOLN ophthalmic solution    2021    [address]    Class: Historical    Magnesium 100 MG TABS            Sig: Take 3 tablets by mouth At Bedtime.    Class: Historical    Route: Oral    mometasone (NASONEX) 50 MCG/ACT nasal spray            Si sprays by Both Nostrils route daily.    Class: Historical    Route: Both Nostrils    NONFORMULARY            Si Dose daily henrik 128 apply small amount to eyes at night    Class: Historical    omeprazole (PRILOSEC) 20 MG DR capsule  90 capsule 3 2022    [address]    Sig: Take 1 capsule (20 mg) by mouth daily    Class: E-Prescribe    Route: Oral    pentoxifylline ER (TRENTAL) 400 MG CR tablet  90 tablet 3 1/3/2022    [address]    Sig: TAKE 1 TABLET (400 MG) BY MOUTH 3 TIMES DAILY (WITH MEALS)    Class: E-Prescribe    Route: Oral    pravastatin (PRAVACHOL) 40 MG tablet  90 tablet 3 2021    [facility]  [address]    Sig: Take 1 tablet (40 mg) by mouth daily    Class: E-Prescribe    Route: Oral    progesterone (PROMETRIUM) 100 MG capsule  90 capsule 3 2021    [address]    Sig: Take 1 capsule (100 mg) by mouth At Bedtime    Class: E-Prescribe    Route: Oral    simvastatin (ZOCOR) 40 MG tablet            Sig: Take 1 tablet by mouth At Bedtime.    Class: Historical    Route: Oral    Tetrahydrozoline HCl (EYE DROPS OP)            Sig: Apply  to eye. Hydro eye    Class: Historical    Route: Ophthalmic    traZODone (DESYREL) 100 MG tablet  90 tablet 3 2022    [address]    Sig: Take 1 tablet (100 mg) by mouth At Bedtime    Class: E-Prescribe    Route: Oral        Allergies   Allergen Reactions     Dust Mite Extract      Seasonal Allergies      Immunization History   Administered Date(s) Administered     COVID-19,PF,Pfizer (12+ Yrs) 2021, 2021, 10/10/2021     DT (PEDS <7y) 2000     Flu, Unspecified 2008, 2011, 2019     Influenza (High Dose) 3 valent vaccine 2016, 10/26/2017, 10/12/2018     Influenza (IIV3) PF 2008, 2011, 01/10/2013     Influenza Vaccine Im 4yrs+ 4 Valent CCIIV4 10/22/2019     Influenza, Quad, High Dose, Pf, 65yr+ (Fluzone HD) 2020, 10/10/2021     Pneumo Conj 13-V (2010&after) 2015     Pneumococcal 23 valent 2013     Td (Adult), Adsorbed 2000     Td,adult,historic,unspecified 2000     Tdap (Adacel,Boostrix) 2011, 2014     Zoster vaccine recombinant adjuvanted (SHINGRIX) 10/15/2018, 2018     Zoster vaccine, live 02/10/2009       OB History    Para Term  AB Living   0 0 0 0 0 0   SAB IAB Ectopic Multiple Live Births   0 0 0 0 0     Past Medical History:   Diagnosis Date     Chronic UTI     controlled w PO and vaginal estrogen tx     YESSICA I (cervical intraepithelial neoplasia I) 2010     5/28/14: Pap NIL; HR HPV neg -> D/C screening     Herpes zoster     Left facial      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     Past Surgical History:   Procedure Laterality Date     ABDOMINOPLASTY       ABDOMINOPLASTY       ARTHROSCOPY KNEE IRRIGATION AND DEBRIDEMENT      RT-Articular Cartilage     BIOPSY BREAST      benign open brest biopsy     BIOPSY BREAST Left     benign x2     BIOPSY CERVICAL, LOCAL EXCISION, SINGLE/MULTIPLE  10/09/2017     BIOPSY OF BREAST, INCISIONAL      Description: Biopsy Breast Open;  Recorded: 06/06/2008;  Comments: BENIGN     CATARACT EXTRACTION W/ INTRAOCULAR LENS IMPLANT Left 01/2016     CATARACT EXTRACTION, BILATERAL  04/2015     COLPOSCOPY, BIOPSY, COMBINED  2/16/10    TZ-not seen     ENDOMETRIAL SAMPLING (BIOPSY)  3/31/05    benign     GENITOURINARY SURGERY  6 April 2011    cystoscopy with +1 trabeculaion     HC KNEE SCOPE, DIAGNOSTIC      Description: Arthroscopy Knee Right;  Recorded: 06/06/2008;  Comments: FOR MENISCUS TEAR     HC OSTEOTOMY METATARSAL (NOT 1ST)      Description: Metatarsal Osteotomy Of The Fifth Metatarsal;  Proc Date: 02/01/2007;     IR LUMBAR EPIDURAL STEROID INJECTION  9/30/2004     IR LUMBAR EPIDURAL STEROID INJECTION  10/11/2004     IR LUMBAR EPIDURAL STEROID INJECTION  11/5/2004     JOINT REPLACEMENT, HIP RT/LT  2008    Joint Replacement Hip RT/LT     RELEASE CARPAL TUNNEL BILATERAL       ZZC TOTAL HIP ARTHROPLASTY      Description: Total Hip Replacement;  Proc Date: 06/01/2008;  Comments: RIGHT     ZZC TOTAL HIP ARTHROPLASTY      Description: Total Hip Replacement;  Proc Date: 08/01/2008;     Family History   Problem Relation Age of Onset     Alcohol/Drug Father      Depression Father      Alcohol/Drug Brother      Breast Cancer Mother      Depression Mother      Diabetes Maternal Grandfather      Cancer Brother 68        blood cancer     Breast Cancer Cousin      Breast Cancer Cousin      Breast Cancer Cousin      Leukemia Brother      Social History  "    Socioeconomic History     Marital status: Single     Spouse name: None     Number of children: None     Years of education: None     Highest education level: None   Tobacco Use     Smoking status: Former Smoker     Smokeless tobacco: Never Used     Tobacco comment: quit 30 years ago   Substance and Sexual Activity     Alcohol use: Yes     Alcohol/week: 0.0 - 0.8 standard drinks     Comment: Rare     Drug use: No     Sexual activity: Not Currently     Partners: Male     Birth control/protection: Post-menopausal   Other Topics Concern      Service No     Blood Transfusions No     Caffeine Concern No     Comment: 3-4 pop/d (diet)     Occupational Exposure No     Hobby Hazards No     Sleep Concern Yes     Comment: sleeps too much -- tired after 9-10 hrs/nite     Stress Concern Yes     Comment: holiday lonliness     Weight Concern Yes     Comment: admits to eating more than she uses -- declines wt today     Special Diet No     Back Care No     Exercise Yes     Comment: sedentary -- plans to restart     Bike Helmet No     Seat Belt No     Self-Exams No   Social History Narrative    How much exercise per week? Stationary bike, recent weight loss    How much calcium per day? Supplement       How much caffeine per day? 2 cans a day    How much vitamin D per day? supplment    Do you/your family wear seatbelts?  Yes    Do you/your family use safety helmets? Yes    Do you/your family use sunscreen? Yes    Do you/your family keep firearms in the home? Yes, locked    Do you/your family have a smoke detector(s)? Yes        Do you feel safe in your home? Yes    Has anyone ever touched you in an unwanted manner? No     Explain     Updated 8/28 CHIKI Santiago            ROS  10 point ROS is negative other than as noted in the HPI.     EXAM:  Blood pressure 126/79, pulse 60, height 1.63 m (5' 4.17\"), weight 84.3 kg (185 lb 14.4 oz), not currently breastfeeding. Body mass index is 31.74 kg/m .  General appearance: Pleasant " female in no acute distress.     BREAST EXAM:  Breast: Without visible skin changes. No dimpling or lesions seen. Mild scar over the left breast from previous biopsy.   Breasts supple, non-tender with palpation, no dominant mass, nodularity, or nipple discharge noted bilaterally. Axillary nodes negative.      PELVIC EXAM:  EG/BUS: Normal genital architecture without lesions, erythema or abnormal secretions Bartholin's, Urethra, [place]'s normal   Urethral meatus: normal   Urethra: no masses, tenderness, or scarring   Vagina: moist, atrophic with miniaml rugae    ASSESSMENT:  Encounter Diagnoses   Name Primary?     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     PLAN:   - Estrogen and Progesterone supplements were refilled  - Nystatin powder TID for 10 days to the rash  - Discussed risks and benefits to ongoing HPV testing and patient declines at this time    Return in one year/PRN for preventive care or problems/concerns.     Verbalized understanding and agreement with visit plan.    The patient was seen and discussed with Dr. Gusman, who agrees with the assessment and plan.     Gigi Olvera, MS4  05/09/22    The above patient was seen and evaluated with the medical student who acted as my scribe for the above note. Agree with note, changes made as appropriate.    Macarena Gusman MD

## 2022-05-09 NOTE — LETTER
2022       RE: Tangela Chapa  [address]  [place] [address]     Dear Colleague,    Thank you for referring your patient, Tangela Chapa, to the [facility] [place] at [facility]. Please see a copy of my visit note below.    Progress Note    SUBJECTIVE:  Tangela Chapa is an 77 year old  , who requests a breast and pelvic exam.   Patient is followed by Dr. Logan Aguilar for primary care.    Concerns today include: a pruritic rash in the right inguinal fold for the past 3 months without progressive worsening. Painful after itching, but no bleeding from the area. Has tried vasoline over the region without improvement of symptoms. Denies any fevers or chills. No palpated masses on the breasts or vagina. No other specific concerns.     Elizabeth has decided she no longer wants to f/u with annual pap smears/colpos.  She is tired of doing the follow up.     Menstrual History:  Menstrual History 2016   Period Cycle (Days) menapausal    Reviewed Today Yes       Last    Lab Results   Component Value Date    PAP NIL 2021     History of abnormal Pap smear: YES - updated in Problem List and Health Maintenance accordingly    Last   Lab Results   Component Value Date    HPV16 Negative 2021     Last   Lab Results   Component Value Date    HPV18 Negative 2021     Last   Lab Results   Component Value Date    HRHPV Positive 2021       Mammogram current: yes     HISTORY:  Prescription Medications as of 2022       Rx Number Disp Refills Start End Last Dispensed Date Next Fill Date Owning Pharmacy    estradiol (ESTRACE) 0.5 MG tablet  90 tablet 3 2022    [place] [address] [address]    Sig: Take 1 tablet (0.5 mg) by mouth daily    Class: E-Prescribe    Route: Oral    estradiol (ESTRING) 2 MG vaginal ring  1 each 3 2022    [place] [address] [address]     Sig: Place  once vaginally and refill as instructed    Class: E-Prescribe    nystatin (MYCOSTATIN) 260577 UNIT/GM external powder  30 g 1 2022   [address]    Sig: Apply topically 3 times daily for 10 days    Class: E-Prescribe    Route: Topical    progesterone (PROMETRIUM) 100 MG capsule  90 capsule 1 2022    [address]    Sig: Take 1 capsule (100 mg) by mouth At Bedtime    Class: E-Prescribe    Route: Oral    citalopram (CELEXA) 20 MG tablet  90 tablet 3 2021    [address]    Sig: Take 1 tablet (20 mg) by mouth daily    Class: E-Prescribe    Route: Oral    cycloSPORINE (RESTASIS) 0.05 % ophthalmic emulsion            Si drop every 12 hours.    Class: Historical    fish oil-omega-3 fatty acids (FISH OIL) 1000 MG capsule            Sig: Take 1 capsule by mouth daily.    Class: Historical    Route: Oral    latanoprost (XALATAN) 0.005 % ophthalmic solution    2021    [address]    Class: Historical    losartan (COZAAR) 100 MG tablet  90 tablet 3 2022    [address]    Sig: Take 1 tablet (100 mg) by mouth daily    Class: E-Prescribe    Route: Oral    LUMIGAN 0.01 % SOLN ophthalmic solution    2021    [address]    Class: Historical    Magnesium 100 MG TABS            Sig: Take 3 tablets by mouth At Bedtime.    Class: Historical    Route: Oral    mometasone (NASONEX) 50 MCG/ACT nasal spray            Si sprays by Both Nostrils route daily.    Class: Historical    Route: Both Nostrils    NONFORMULARY            Si Dose daily henrik 128 apply small amount to eyes at night    Class: Historical    omeprazole (PRILOSEC) 20 MG DR capsule  90 capsule 3 2022    [address]    Sig: Take 1 capsule (20 mg) by mouth daily    Class: E-Prescribe     Route: Oral    pentoxifylline ER (TRENTAL) 400 MG CR tablet  90 tablet 3 1/3/2022    [address]    Sig: TAKE 1 TABLET (400 MG) BY MOUTH 3 TIMES DAILY (WITH MEALS)    Class: E-Prescribe    Route: Oral    pravastatin (PRAVACHOL) 40 MG tablet  90 tablet 3 12/9/2021    [address]    Sig: Take 1 tablet (40 mg) by mouth daily    Class: E-Prescribe    Route: Oral    progesterone (PROMETRIUM) 100 MG capsule  90 capsule 3 2/9/2021    [address]    Sig: Take 1 capsule (100 mg) by mouth At Bedtime    Class: E-Prescribe    Route: Oral    simvastatin (ZOCOR) 40 MG tablet            Sig: Take 1 tablet by mouth At Bedtime.    Class: Historical    Route: Oral    Tetrahydrozoline HCl (EYE DROPS OP)            Sig: Apply  to eye. Hydro eye    Class: Historical    Route: Ophthalmic    traZODone (DESYREL) 100 MG tablet  90 tablet 3 1/31/2022    [address]    Sig: Take 1 tablet (100 mg) by mouth At Bedtime    Class: E-Prescribe    Route: Oral        Allergies   Allergen Reactions     Dust Mite Extract      Seasonal Allergies      Immunization History   Administered Date(s) Administered     COVID-19,VAZQUEZ,Pfizer (12+ Yrs) 02/24/2021, 03/18/2021, 10/10/2021     DT (PEDS <7y) 01/01/2000     Flu, Unspecified 11/13/2008, 12/09/2011, 11/04/2019     Influenza (High Dose) 3 valent vaccine 09/16/2016, 10/26/2017, 10/12/2018     Influenza (IIV3) PF 11/13/2008, 12/09/2011, 01/10/2013     Influenza Vaccine Im 4yrs+ 4 Valent CCIIV4 10/22/2019     Influenza, Quad, High Dose, Pf, 65yr+ (Fluzone HD) 11/07/2020, 10/10/2021     Pneumo Conj 13-V (2010&after) 04/16/2015     Pneumococcal 23 valent 02/18/2013     Td (Adult), Adsorbed 01/01/2000     Td,adult,historic,unspecified 01/01/2000     Tdap (Adacel,Boostrix) 12/09/2011, 07/01/2014     Zoster vaccine recombinant adjuvanted (SHINGRIX) 10/15/2018, 12/18/2018      Zoster vaccine, live 02/10/2009       OB History    Para Term  AB Living   0 0 0 0 0 0   SAB IAB Ectopic Multiple Live Births   0 0 0 0 0     Past Medical History:   Diagnosis Date     Chronic UTI     controlled w PO and vaginal estrogen tx     YESSICA I (cervical intraepithelial neoplasia I) 14: Pap NIL; HR HPV neg -> D/C screening     Herpes zoster     Left facial      Vaginal dysplasia     cryo     Past Surgical History:   Procedure Laterality Date     ABDOMINOPLASTY       ABDOMINOPLASTY       ARTHROSCOPY KNEE IRRIGATION AND DEBRIDEMENT      RT-Articular Cartilage     BIOPSY BREAST      benign open brest biopsy     BIOPSY BREAST Left     benign x2     BIOPSY CERVICAL, LOCAL EXCISION, SINGLE/MULTIPLE  10/09/2017     BIOPSY OF BREAST, INCISIONAL      Description: Biopsy Breast Open;  Recorded: 2008;  Comments: BENIGN     CATARACT EXTRACTION W/ INTRAOCULAR LENS IMPLANT Left 2016     CATARACT EXTRACTION, BILATERAL  2015     COLPOSCOPY, BIOPSY, COMBINED  2/16/10    TZ-not seen     ENDOMETRIAL SAMPLING (BIOPSY)  3/31/05    benign     GENITOURINARY SURGERY  2011    cystoscopy with +1 trabeculaion     HC KNEE SCOPE, DIAGNOSTIC      Description: Arthroscopy Knee Right;  Recorded: 2008;  Comments: FOR MENISCUS TEAR     HC OSTEOTOMY METATARSAL (NOT 1ST)      Description: Metatarsal Osteotomy Of The Fifth Metatarsal;  Proc Date: 2007;     IR LUMBAR EPIDURAL STEROID INJECTION  2004     IR LUMBAR EPIDURAL STEROID INJECTION  10/11/2004     IR LUMBAR EPIDURAL STEROID INJECTION  2004     JOINT REPLACEMENT, HIP RT/LT      Joint Replacement Hip RT/LT     RELEASE CARPAL TUNNEL BILATERAL       ZZC TOTAL HIP ARTHROPLASTY      Description: Total Hip Replacement;  Proc Date: 2008;  Comments: RIGHT     ZZC TOTAL HIP ARTHROPLASTY      Description: Total Hip Replacement;  Proc Date: 2008;     Family History   Problem Relation Age of Onset      Alcohol/Drug Father      Depression Father      Alcohol/Drug Brother      Breast Cancer Mother      Depression Mother      Diabetes Maternal Grandfather      Cancer Brother 68        blood cancer     Breast Cancer Cousin      Breast Cancer Cousin      Breast Cancer Cousin      Leukemia Brother      Social History     Socioeconomic History     Marital status: Single     Spouse name: None     Number of children: None     Years of education: None     Highest education level: None   Tobacco Use     Smoking status: Former Smoker     Smokeless tobacco: Never Used     Tobacco comment: quit 30 years ago   Substance and Sexual Activity     Alcohol use: Yes     Alcohol/week: 0.0 - 0.8 standard drinks     Comment: Rare     Drug use: No     Sexual activity: Not Currently     Partners: Male     Birth control/protection: Post-menopausal   Other Topics Concern      Service No     Blood Transfusions No     Caffeine Concern No     Comment: 3-4 pop/d (diet)     Occupational Exposure No     Hobby Hazards No     Sleep Concern Yes     Comment: sleeps too much -- tired after 9-10 hrs/nite     Stress Concern Yes     Comment: holiday lonliness     Weight Concern Yes     Comment: admits to eating more than she uses -- declines wt today     Special Diet No     Back Care No     Exercise Yes     Comment: sedentary -- plans to restart     Bike Helmet No     Seat Belt No     Self-Exams No   Social History Narrative    How much exercise per week? Stationary bike, recent weight loss    How much calcium per day? Supplement       How much caffeine per day? 2 cans a day    How much vitamin D per day? supplment    Do you/your family wear seatbelts?  Yes    Do you/your family use safety helmets? Yes    Do you/your family use sunscreen? Yes    Do you/your family keep firearms in the home? Yes, locked    Do you/your family have a smoke detector(s)? Yes        Do you feel safe in your home? Yes    Has anyone ever touched you in an unwanted manner?  "No     Explain     Updated 8/28 CHIKI Santiago            ROS  10 point ROS is negative other than as noted in the HPI.     EXAM:  Blood pressure 126/79, pulse 60, height 1.63 m (5' 4.17\"), weight 84.3 kg (185 lb 14.4 oz), not currently breastfeeding. Body mass index is 31.74 kg/m .  General appearance: Pleasant female in no acute distress.     BREAST EXAM:  Breast: Without visible skin changes. No dimpling or lesions seen. Mild scar over the left breast from previous biopsy.   Breasts supple, non-tender with palpation, no dominant mass, nodularity, or nipple discharge noted bilaterally. Axillary nodes negative.      PELVIC EXAM:  EG/BUS: Normal genital architecture without lesions, erythema or abnormal secretions Bartholin's, Urethra, [place]'s normal   Urethral meatus: normal   Urethra: no masses, tenderness, or scarring   Vagina: moist, atrophic with miniaml rugae    ASSESSMENT:  Encounter Diagnoses   Name Primary?     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     PLAN:   - Estrogen and Progesterone supplements were refilled  - Nystatin powder TID for 10 days to the rash  - Discussed risks and benefits to ongoing HPV testing and patient declines at this time    Return in one year/PRN for preventive care or problems/concerns.     Verbalized understanding and agreement with visit plan.    The patient was seen and discussed with Dr. Gusman, who agrees with the assessment and plan.     Gigi Olvera, MS4  05/09/22    The above patient was seen and evaluated with the medical student who acted as my scribe for the above note. Agree with note, changes made as appropriate.    Macarena Gusman MD      "

## 2022-06-22 DIAGNOSIS — I73.00 RAYNAUD'S DISEASE WITHOUT GANGRENE: ICD-10-CM

## 2022-06-22 RX ORDER — PENTOXIFYLLINE 400 MG/1
400 TABLET, EXTENDED RELEASE ORAL
Qty: 90 TABLET | Refills: 3 | Status: SHIPPED | OUTPATIENT
Start: 2022-06-22 | End: 2022-07-25

## 2022-07-25 ENCOUNTER — OFFICE VISIT (OUTPATIENT)
Dept: INTERNAL MEDICINE | Facility: CLINIC | Age: 77
End: 2022-07-25
Payer: COMMERCIAL

## 2022-07-25 VITALS
WEIGHT: 184 LBS | SYSTOLIC BLOOD PRESSURE: 108 MMHG | OXYGEN SATURATION: 99 % | DIASTOLIC BLOOD PRESSURE: 70 MMHG | BODY MASS INDEX: 31.42 KG/M2 | HEART RATE: 60 BPM

## 2022-07-25 DIAGNOSIS — I10 ESSENTIAL HYPERTENSION: ICD-10-CM

## 2022-07-25 DIAGNOSIS — G89.29 CHRONIC RIGHT SHOULDER PAIN: Primary | ICD-10-CM

## 2022-07-25 DIAGNOSIS — I73.00 RAYNAUD'S DISEASE WITHOUT GANGRENE: ICD-10-CM

## 2022-07-25 DIAGNOSIS — M25.511 CHRONIC RIGHT SHOULDER PAIN: Primary | ICD-10-CM

## 2022-07-25 PROCEDURE — 99214 OFFICE O/P EST MOD 30 MIN: CPT | Performed by: INTERNAL MEDICINE

## 2022-07-25 RX ORDER — PENTOXIFYLLINE 400 MG/1
TABLET, EXTENDED RELEASE ORAL
Qty: 90 TABLET | Refills: 3
Start: 2022-07-25 | End: 2023-01-31

## 2022-07-25 NOTE — PATIENT INSTRUCTIONS
Schedule MRI of right shoulder    2.  I will notify you of test results.  May require Orthopedic referral.    3.  Continue losartan 100 mg daily    4.   Annual wellness visit in December

## 2022-07-25 NOTE — PROGRESS NOTES
1. Chronic right shoulder pain  Elizabeth complains of a flare of pain in the right shoulder and upper arm.  She previously went to physical therapy with some benefit.  She has had a recent flare.  There have been no falls or recent trauma.  X-ray from 9/7/2021 revealed no bony abnormalities.  I would be concerned about rotator cuff injury.  She would be interested in an MRI scan and orthopedic referral  - MR Shoulder Right w/o Contrast; Future    2. Raynaud's disease without gangrene  She previously was prescribed Trental for this and feels it has been of benefit.  She requests a refill  - pentoxifylline ER (TRENTAL) 400 MG CR tablet; One tab twice daily  Dispense: 90 tablet; Refill: 3    3.  Vertigo:  She reports 1 episode since last clinic visit which resolved.  Symptoms do seem suggestive of benign positional vertigo.    4.  Essential hypertension:  Good control with losartan    Patient Instructions   1.  Schedule MRI of right shoulder    2.  I will notify you of test results.  May require Orthopedic referral.    3.  Continue losartan 100 mg daily    4.   Annual wellness visit in December Subjective   Elizabeth is a 77 year old woman presenting for the following health issues:  Shoulder Pain  She notes chronic issues with right shoulder pain.  This is aggravated by abduction and rotation.  She was seen for flare of pain last September.  X-ray revealed no bony abnormalities.  She did note improvement in symptoms with physical therapy at that time.  She has had a recent flare which has not improved with rest and home exercise.  She is concerned about a possible rotator cuff injury and would be interested in an MRI scan.      She reports 1 episode of positional vertigo since she was last seen.  Those symptoms have resolved    She was placed on Trental for Raynaud's type symptoms.  She feels it has been of benefit and would like to continue it    She is on losartan 100 mg daily for essential hypertension.  Home blood  pressures have been excellent.  She has on pravastatin for hypercholesterolemia.        HPI         Review of Systems   See above.  Review of systems is otherwise negative.      Objective    There were no vitals taken for this visit.  There is no height or weight on file to calculate BMI.  Physical Exam   /70 (BP Location: Left arm, Patient Position: Sitting, Cuff Size: Adult Large)   Pulse 60   Wt 83.5 kg (184 lb)   SpO2 99%   BMI 31.42 kg/m      General Appearance:  Alert, cooperative, no distress, appears stated age   Head:  Normocephalic, without obvious abnormality, atraumatic   Eyes:   No conjunctival hyperemia   Ears:   Not examined   Nose:    Throat:    Neck: Supple, symmetrical, trachea midline, no adenopathy;  thyroid: not enlarged, symmetric, no tenderness/mass/nodules; no carotid bruit or JVD   Back:   Symmetric, no curvature, ROM normal, no CVA tenderness   Lungs:   Clear to auscultation bilaterally, respirations unlabored       Heart:  Regular rate and rhythm, S1 and S2 normal, no murmur, rub, or gallop   Abdomen:   Soft, non-tender, bowel sounds active all four quadrants,  no masses, no organomegaly   Genitalia:  Exam deferred   Pelvic: Deferred   Extremities:  No obvious bony deformity right shoulder.  Pain to abduction beyond 90 degrees active greater than passive.  No joint effusion   Skin: Skin color, texture, turgor normal, no rashes or lesions   Lymph nodes: Cervical, supraclavicular, and axillary nodes normal   Neurologic: No dysarthria or aphasia.  Cranial nerves, motor or sensory exams intact with symmetric DTRs     EXAM: XR SHOULDER 2 VIEW RIGHT  LOCATION: [facility]  DATE/TIME: 9/7/2021 1:52 PM     INDICATION:  Chronic right shoulder pain, Chronic right shoulder pain  COMPARISON: None.                                                                      IMPRESSION: Normal joint spaces and alignment. No fracture.     Visit time 30 minutes                   .  ..

## 2022-07-31 PROBLEM — I10 ESSENTIAL HYPERTENSION: Status: ACTIVE | Noted: 2022-07-31

## 2022-09-07 ENCOUNTER — HOSPITAL ENCOUNTER (OUTPATIENT)
Dept: MRI IMAGING | Facility: HOSPITAL | Age: 77
Discharge: HOME | End: 2022-09-07
Attending: INTERNAL MEDICINE | Admitting: INTERNAL MEDICINE
Payer: COMMERCIAL

## 2022-09-07 DIAGNOSIS — M25.511 CHRONIC RIGHT SHOULDER PAIN: ICD-10-CM

## 2022-09-07 DIAGNOSIS — G89.29 CHRONIC RIGHT SHOULDER PAIN: ICD-10-CM

## 2022-09-07 PROCEDURE — 73221 MRI JOINT UPR EXTREM W/O DYE: CPT | Mod: RT

## 2022-09-11 DIAGNOSIS — M25.511 CHRONIC RIGHT SHOULDER PAIN: Primary | ICD-10-CM

## 2022-09-11 DIAGNOSIS — G89.29 CHRONIC RIGHT SHOULDER PAIN: Primary | ICD-10-CM

## 2022-10-04 ENCOUNTER — TRANSFERRED RECORDS (OUTPATIENT)
Dept: HEALTH INFORMATION MANAGEMENT | Facility: CLINIC | Age: 77
End: 2022-10-04

## 2022-10-06 ENCOUNTER — PATIENT OUTREACH (OUTPATIENT)
Dept: OBGYN | Facility: CLINIC | Age: 77
End: 2022-10-06

## 2022-10-06 DIAGNOSIS — R87.810 CERVICAL HIGH RISK HPV (HUMAN PAPILLOMAVIRUS) TEST POSITIVE: ICD-10-CM

## 2022-10-06 NOTE — TELEPHONE ENCOUNTER
"Hi Dr Gusman,    This patient has a long history of positive HPV other with negative colposcopies. She is overdue for repeat cotesting but at her visit in May, notes state:    \"Elizabeth has decided she no longer wants to f/u with annual pap smears/colpos.  She is tired of doing the follow up.\"    Would you like the pap team to send any future pap reminders for this patient? Thanks!    2011, 2012 NIL paps  5/28/14 NIL pap, +HR HPV (not 16/18)  8/18/16 NIL pap, +HR HPV (not 16/18)  10/4/16 Colpo bx and ECC inflammation, negative  8/28/17 NIL pap, +HR HPV (not 16/18)  10/9/17 LEEP inflammation, negative  10/29/18 NIL pap, +HR HPV (not 16/18)  1/29/19 Colpo bx and ECC neg  12/11/19 NIL pap, +HR HPV (not 16/18)  2/5/20 Colpo ECC inflammation  2/9/21 NIL pap, +HR HPV (not 16/18)  3/29/21 Colpo bx and ECC neg  5/9/22 Visit: \"Elizabeth has decided she no longer wants to f/u with annual pap smears/colpos.  She is tired of doing the follow up.\"    Emilia Graham RN BSN, Pap Tracking   "

## 2022-10-06 NOTE — LETTER
October 25, 2022      Tangela Chapa  [address]  [place] [address]        Dear ,    This letter is to remind you that you are due for your follow-up Pap smear and Human Papillomavirus (HPV) test.    Please call [phone] to schedule your appointment at your earliest convenience.    If you have completed the appointment outside of the [facility] system, please have the records forwarded to our office. We will update your chart for your provider to review before your next annual wellness visit.     Thank you for choosing [facility]!      Sincerely,    Your [facility] Care Team

## 2022-10-14 ENCOUNTER — TRANSFERRED RECORDS (OUTPATIENT)
Dept: HEALTH INFORMATION MANAGEMENT | Facility: CLINIC | Age: 77
End: 2022-10-14

## 2022-10-17 ENCOUNTER — TRANSFERRED RECORDS (OUTPATIENT)
Dept: HEALTH INFORMATION MANAGEMENT | Facility: CLINIC | Age: 77
End: 2022-10-17

## 2022-10-23 ENCOUNTER — HEALTH MAINTENANCE LETTER (OUTPATIENT)
Age: 77
End: 2022-10-23

## 2022-10-25 NOTE — TELEPHONE ENCOUNTER
(copied and pasted provider response)    She has decided to forego further pap/colpo.  We can send her one more reminder and let her know if she wants to skip it, she can choose that.     [facility]D

## 2022-10-25 NOTE — TELEPHONE ENCOUNTER
Patient due for Pap and HPV.    Reminder done today via letter. Last reminder. . If no appointment, no longer track.

## 2022-11-17 ENCOUNTER — NURSE TRIAGE (OUTPATIENT)
Dept: NURSING | Facility: CLINIC | Age: 77
End: 2022-11-17

## 2022-11-17 ENCOUNTER — APPOINTMENT (OUTPATIENT)
Dept: CT IMAGING | Facility: HOSPITAL | Age: 77
End: 2022-11-17
Attending: STUDENT IN AN ORGANIZED HEALTH CARE EDUCATION/TRAINING PROGRAM
Payer: COMMERCIAL

## 2022-11-17 ENCOUNTER — HOSPITAL ENCOUNTER (EMERGENCY)
Facility: HOSPITAL | Age: 77
Discharge: HOME | End: 2022-11-18
Attending: STUDENT IN AN ORGANIZED HEALTH CARE EDUCATION/TRAINING PROGRAM | Admitting: STUDENT IN AN ORGANIZED HEALTH CARE EDUCATION/TRAINING PROGRAM
Payer: COMMERCIAL

## 2022-11-17 DIAGNOSIS — R05.2 SUBACUTE COUGH: ICD-10-CM

## 2022-11-17 PROBLEM — B97.7 HIGH RISK HPV INFECTION: Status: ACTIVE | Noted: 2017-10-26

## 2022-11-17 PROBLEM — I73.00 RAYNAUDS SYNDROME: Status: ACTIVE | Noted: 2020-06-01

## 2022-11-17 LAB
ALBUMIN SERPL BCG-MCNC: 4.1 G/DL (ref 3.5–5.2)
ALP SERPL-CCNC: 58 U/L (ref 35–104)
ALT SERPL W P-5'-P-CCNC: 33 U/L (ref 10–35)
ANION GAP SERPL CALCULATED.3IONS-SCNC: 9 MMOL/L (ref 7–15)
AST SERPL W P-5'-P-CCNC: 34 U/L (ref 10–35)
BASOPHILS # BLD AUTO: 0 10E3/UL (ref 0–0.2)
BASOPHILS NFR BLD AUTO: 0 %
BILIRUB SERPL-MCNC: 0.4 MG/DL
BUN SERPL-MCNC: 13.4 MG/DL (ref 8–23)
CALCIUM SERPL-MCNC: 8.6 MG/DL (ref 8.8–10.2)
CHLORIDE SERPL-SCNC: 104 MMOL/L (ref 98–107)
CREAT SERPL-MCNC: 0.68 MG/DL (ref 0.51–0.95)
DEPRECATED HCO3 PLAS-SCNC: 25 MMOL/L (ref 22–29)
EOSINOPHIL # BLD AUTO: 0 10E3/UL (ref 0–0.7)
EOSINOPHIL NFR BLD AUTO: 0 %
ERYTHROCYTE [DISTWIDTH] IN BLOOD BY AUTOMATED COUNT: 13.6 % (ref 10–15)
FLUAV RNA SPEC QL NAA+PROBE: POSITIVE
FLUBV RNA RESP QL NAA+PROBE: NEGATIVE
GFR SERPL CREATININE-BSD FRML MDRD: 90 ML/MIN/1.73M2
GLUCOSE SERPL-MCNC: 111 MG/DL (ref 70–99)
HCT VFR BLD AUTO: 42.5 % (ref 35–47)
HGB BLD-MCNC: 14.6 G/DL (ref 11.7–15.7)
IMM GRANULOCYTES # BLD: 0 10E3/UL
IMM GRANULOCYTES NFR BLD: 0 %
LYMPHOCYTES # BLD AUTO: 1.7 10E3/UL (ref 0.8–5.3)
LYMPHOCYTES NFR BLD AUTO: 26 %
MCH RBC QN AUTO: 32.2 PG (ref 26.5–33)
MCHC RBC AUTO-ENTMCNC: 34.4 G/DL (ref 31.5–36.5)
MCV RBC AUTO: 94 FL (ref 78–100)
MONOCYTES # BLD AUTO: 1 10E3/UL (ref 0–1.3)
MONOCYTES NFR BLD AUTO: 16 %
NEUTROPHILS # BLD AUTO: 3.6 10E3/UL (ref 1.6–8.3)
NEUTROPHILS NFR BLD AUTO: 58 %
NRBC # BLD AUTO: 0 10E3/UL
NRBC BLD AUTO-RTO: 0 /100
NT-PROBNP SERPL-MCNC: 312 PG/ML (ref 0–900)
PLATELET # BLD AUTO: 193 10E3/UL (ref 150–450)
POTASSIUM SERPL-SCNC: 3.6 MMOL/L (ref 3.4–5.3)
PROT SERPL-MCNC: 6.9 G/DL (ref 6.4–8.3)
RBC # BLD AUTO: 4.53 10E6/UL (ref 3.8–5.2)
RSV RNA SPEC NAA+PROBE: NEGATIVE
SARS-COV-2 RNA RESP QL NAA+PROBE: NEGATIVE
SODIUM SERPL-SCNC: 138 MMOL/L (ref 136–145)
TROPONIN T SERPL HS-MCNC: 14 NG/L
TROPONIN T SERPL HS-MCNC: 15 NG/L
WBC # BLD AUTO: 6.2 10E3/UL (ref 4–11)

## 2022-11-17 PROCEDURE — 80053 COMPREHEN METABOLIC PANEL: CPT | Performed by: STUDENT IN AN ORGANIZED HEALTH CARE EDUCATION/TRAINING PROGRAM

## 2022-11-17 PROCEDURE — 250N000009 HC RX 250: Performed by: STUDENT IN AN ORGANIZED HEALTH CARE EDUCATION/TRAINING PROGRAM

## 2022-11-17 PROCEDURE — 83880 ASSAY OF NATRIURETIC PEPTIDE: CPT | Performed by: STUDENT IN AN ORGANIZED HEALTH CARE EDUCATION/TRAINING PROGRAM

## 2022-11-17 PROCEDURE — 36415 COLL VENOUS BLD VENIPUNCTURE: CPT | Performed by: STUDENT IN AN ORGANIZED HEALTH CARE EDUCATION/TRAINING PROGRAM

## 2022-11-17 PROCEDURE — C9803 HOPD COVID-19 SPEC COLLECT: HCPCS

## 2022-11-17 PROCEDURE — 85014 HEMATOCRIT: CPT | Performed by: STUDENT IN AN ORGANIZED HEALTH CARE EDUCATION/TRAINING PROGRAM

## 2022-11-17 PROCEDURE — 93010 ELECTROCARDIOGRAM REPORT: CPT | Performed by: INTERNAL MEDICINE

## 2022-11-17 PROCEDURE — 71275 CT ANGIOGRAPHY CHEST: CPT

## 2022-11-17 PROCEDURE — 87637 SARSCOV2&INF A&B&RSV AMP PRB: CPT | Performed by: STUDENT IN AN ORGANIZED HEALTH CARE EDUCATION/TRAINING PROGRAM

## 2022-11-17 PROCEDURE — 99285 EMERGENCY DEPT VISIT HI MDM: CPT | Mod: CS,25

## 2022-11-17 PROCEDURE — 84484 ASSAY OF TROPONIN QUANT: CPT | Performed by: STUDENT IN AN ORGANIZED HEALTH CARE EDUCATION/TRAINING PROGRAM

## 2022-11-17 PROCEDURE — 94640 AIRWAY INHALATION TREATMENT: CPT

## 2022-11-17 PROCEDURE — 250N000011 HC RX IP 250 OP 636: Performed by: STUDENT IN AN ORGANIZED HEALTH CARE EDUCATION/TRAINING PROGRAM

## 2022-11-17 PROCEDURE — 93005 ELECTROCARDIOGRAM TRACING: CPT | Performed by: STUDENT IN AN ORGANIZED HEALTH CARE EDUCATION/TRAINING PROGRAM

## 2022-11-17 RX ORDER — IPRATROPIUM BROMIDE AND ALBUTEROL SULFATE 2.5; .5 MG/3ML; MG/3ML
3 SOLUTION RESPIRATORY (INHALATION) ONCE
Status: COMPLETED | OUTPATIENT
Start: 2022-11-17 | End: 2022-11-17

## 2022-11-17 RX ORDER — ALBUTEROL SULFATE 90 UG/1
2 AEROSOL, METERED RESPIRATORY (INHALATION) EVERY 6 HOURS PRN
Qty: 18 G | Refills: 0 | Status: SHIPPED | OUTPATIENT
Start: 2022-11-17 | End: 2023-06-28

## 2022-11-17 RX ORDER — PREDNISONE 20 MG/1
60 TABLET ORAL DAILY
Qty: 15 TABLET | Refills: 0 | Status: SHIPPED | OUTPATIENT
Start: 2022-11-17 | End: 2022-11-22

## 2022-11-17 RX ORDER — IOPAMIDOL 755 MG/ML
100 INJECTION, SOLUTION INTRAVASCULAR ONCE
Status: COMPLETED | OUTPATIENT
Start: 2022-11-17 | End: 2022-11-17

## 2022-11-17 RX ORDER — AZITHROMYCIN 250 MG/1
TABLET, FILM COATED ORAL
Qty: 6 TABLET | Refills: 0 | Status: SHIPPED | OUTPATIENT
Start: 2022-11-17 | End: 2022-11-18

## 2022-11-17 RX ADMIN — IOPAMIDOL 100 ML: 755 INJECTION, SOLUTION INTRAVENOUS at 21:58

## 2022-11-17 RX ADMIN — IPRATROPIUM BROMIDE AND ALBUTEROL SULFATE 3 ML: 2.5; .5 SOLUTION RESPIRATORY (INHALATION) at 21:00

## 2022-11-17 ASSESSMENT — ACTIVITIES OF DAILY LIVING (ADL)
ADLS_ACUITY_SCORE: 35
ADLS_ACUITY_SCORE: 35

## 2022-11-17 NOTE — TELEPHONE ENCOUNTER
Pt is phoning stating that she has cold symptoms - pt completed an at home COVID test which resulted negative     Pt is coughing and states that she noticed some blood in the mucous that pt is bringing up when coughing     Pt states that she is having a deep cough and is bringing up some rust colored mucous     No fever     Per Disposition: See in Office Today    Pt was transferred to [facility] and is only willing to see Dr. Aguilar     Care advice given per protocol and when to call back. Pt verbalized understanding and agrees to plan of care.    Evonne Mendez RN  [place] Nurse Advisor  11:56 AM 11/17/2022      COVID 19 Nurse Triage Plan/Patient Instructions    Please be aware that novel coronavirus (COVID-19) may be circulating in the community. If you develop symptoms such as fever, cough, or SOB or if you have concerns about the presence of another infection including coronavirus (COVID-19), please contact your health care provider or visit [email or web address].     Disposition/Instructions    In-Person Visit with provider recommended. Reference Visit Selection Guide.    Thank you for taking steps to prevent the spread of this virus.  o Limit your contact with others.  o Wear a simple mask to cover your cough.  o Wash your hands well and often.    Resources    [facility]: About COVID-19: [email or web address]    CDC: What to Do If You're Sick: www.cdc.gov/coronavirus/2019-ncov/about/steps-when-sick.html    CDC: Ending Home Isolation: www.cdc.gov/coronavirus/2019-ncov/hcp/disposition-in-home-patients.html     CDC: Caring for Someone: www.cdc.gov/coronavirus/2019-ncov/if-you-are-sick/care-for-someone.html     [facility]: Interim Guidance for Hospital Discharge to Home: www.health.[facility].mn.us/diseases/coronavirus/hcp/hospdischarge.pdf    [facility] clinical trials (COVID-19 research studies): [email or web address]     Below are the COVID-19 hotlines at the  [place] Department of Health ([facility]). Interpreters are available.   o For health questions: Call 269-840-3871 or 1-716.716.6465 (7 a.m. to 7 p.m.)  o For questions about schools and childcare: Call 013-234-5928 or 1-111.895.6467 (7 a.m. to 7 p.m.)                       Reason for Disposition    Coughing up herman-colored sputum    Additional Information    Negative: SEVERE difficulty breathing (e.g., struggling for each breath, speaks in single words)    Negative: Chest pain and difficulty breathing    Negative: Bluish (or gray) lips or face now    Negative: Passed out (i.e., lost consciousness, collapsed and was not responding)    Negative: Shock suspected (e.g., cold/pale/clammy skin, too weak to stand, low BP, rapid pulse)    Negative: Difficult to awaken or acting confused (e.g., disoriented, slurred speech)    Negative: Recent chest injury (i.e., past 24 hours)    Negative: Coughed up blood and large amount (Such as: 'a half cup of blood')    Negative: Sounds like a life-threatening emergency to the triager    Negative: MODERATE difficulty breathing (e.g., speaks in phrases, SOB even at rest, pulse 100-120) and still present when not coughing    Negative: Chest pain    Negative: Unclear to triager if the patient is coughing up blood or vomiting blood    Negative: History of prior 'blood clot' in leg or lungs (i.e., deep vein thrombosis, pulmonary embolism)    Negative: History of inherited increased risk of blood clots (e.g., Factor 5 Leiden, Anti-thrombin 3, Protein C or Protein S deficiency, Prothrombin mutation)    Negative: Pregnant or postpartum (from 0 to 6 weeks after delivery)    Negative: Hip or leg fracture (broken bone) in past month (or had cast on leg or ankle in past month)    Negative: Long-distance travel in past month (e.g., car, bus, train, plane; with trip lasting 6 or more hours)    Negative: Bedridden (e.g., nursing home patient, CVA, chronic illness, recovering from surgery)    Negative:  Patient sounds very sick or weak to the triager    Negative: MILD difficulty breathing (e.g., minimal/no SOB at rest, SOB with walking, pulse <100) and still present when not coughing (Exception: No change from usual, chronic shortness of breath.)    Negative: Coughed up blood and > 1 tablespoon (15 ml)  (Exception: Blood-tinged sputum.)    Negative: Fever > 103 F (39.4 C)    Negative: Fever > 101 F (38.3 C) and age > 60 years    Negative: Fever > 100.0 F (37.8 C) and diabetes mellitus or weak immune system (e.g., HIV positive, cancer chemo, splenectomy, organ transplant, chronic steroids)    Negative: Has underlying lung disease (e.g., COPD, chronic bronchitis or emphysema) and sputum has turned yellow or green in color    Protocols used: COUGHING UP BLOOD-A-OH

## 2022-11-18 VITALS
DIASTOLIC BLOOD PRESSURE: 85 MMHG | WEIGHT: 178 LBS | RESPIRATION RATE: 28 BRPM | HEART RATE: 55 BPM | TEMPERATURE: 98.3 F | BODY MASS INDEX: 30.39 KG/M2 | OXYGEN SATURATION: 96 % | HEIGHT: 64 IN | SYSTOLIC BLOOD PRESSURE: 150 MMHG

## 2022-11-18 LAB
ATRIAL RATE - MUSE: 53 BPM
DIASTOLIC BLOOD PRESSURE - MUSE: NORMAL MMHG
INTERPRETATION ECG - MUSE: NORMAL
P AXIS - MUSE: 59 DEGREES
PR INTERVAL - MUSE: 162 MS
QRS DURATION - MUSE: 114 MS
QT - MUSE: 506 MS
QTC - MUSE: 474 MS
R AXIS - MUSE: 80 DEGREES
SYSTOLIC BLOOD PRESSURE - MUSE: NORMAL MMHG
T AXIS - MUSE: 63 DEGREES
VENTRICULAR RATE- MUSE: 53 BPM

## 2022-11-18 PROCEDURE — 250N000009 HC RX 250

## 2022-11-18 PROCEDURE — 250N000013 HC RX MED GY IP 250 OP 250 PS 637: Performed by: STUDENT IN AN ORGANIZED HEALTH CARE EDUCATION/TRAINING PROGRAM

## 2022-11-18 PROCEDURE — 250N000012 HC RX MED GY IP 250 OP 636 PS 637: Performed by: STUDENT IN AN ORGANIZED HEALTH CARE EDUCATION/TRAINING PROGRAM

## 2022-11-18 RX ORDER — ALBUTEROL SULFATE 0.83 MG/ML
SOLUTION RESPIRATORY (INHALATION)
Status: COMPLETED
Start: 2022-11-18 | End: 2022-11-18

## 2022-11-18 RX ORDER — OSELTAMIVIR PHOSPHATE 75 MG/1
75 CAPSULE ORAL 2 TIMES DAILY
Qty: 10 CAPSULE | Refills: 0 | Status: SHIPPED | OUTPATIENT
Start: 2022-11-18 | End: 2022-11-23

## 2022-11-18 RX ORDER — IPRATROPIUM BROMIDE AND ALBUTEROL SULFATE 2.5; .5 MG/3ML; MG/3ML
3 SOLUTION RESPIRATORY (INHALATION) ONCE
Status: DISCONTINUED | OUTPATIENT
Start: 2022-11-18 | End: 2022-11-18

## 2022-11-18 RX ORDER — PREDNISONE 20 MG/1
40 TABLET ORAL ONCE
Status: COMPLETED | OUTPATIENT
Start: 2022-11-18 | End: 2022-11-18

## 2022-11-18 RX ORDER — ALBUTEROL SULFATE 90 UG/1
2 AEROSOL, METERED RESPIRATORY (INHALATION) EVERY 6 HOURS PRN
Status: DISCONTINUED | OUTPATIENT
Start: 2022-11-18 | End: 2022-11-18 | Stop reason: HOSPADM

## 2022-11-18 RX ADMIN — ALBUTEROL SULFATE 2.5 MG: 2.5 SOLUTION RESPIRATORY (INHALATION) at 00:17

## 2022-11-18 RX ADMIN — ALBUTEROL SULFATE 2 PUFF: 90 AEROSOL, METERED RESPIRATORY (INHALATION) at 00:21

## 2022-11-18 RX ADMIN — PREDNISONE 40 MG: 20 TABLET ORAL at 00:21

## 2022-11-18 NOTE — ED PROVIDER NOTES
Emergency Department Encounter         FINAL IMPRESSION:  Cough        ED COURSE AND MEDICAL DECISION MAKING       ED Course as of 11/22/22 0556   Thu Nov 17, 2022 2026 Patient is an obese 77-year-old with a history of hypertension hyperlipidemia, here with 5 to 6 days of cough, congestion and now wheezing that began today.  Reason why she came to the hospital today.  She had small amount of hemoptysis/blood-streaked sputum.  This happened twice.  No leg swelling.  No abdominal pain.  Intermittent diarrhea.  No vomiting or nausea.  Arrival her vitals are stable.  Social clinically.  Heart and lungs otherwise are normal with some very faint wheeze in the apices posteriorly.  No leg swelling.  Abdomen is benign.  Plan for labs CT PE reevaluate.  We will also give her some albuterol here to help with her breathing.     -CT PE pending at this time as well as repeat troponin.  If normal discharge home  with symptomatic treatment.  -EKG sinus bradycardia with no signs of acute ischemia, no inversions no depressions  Signed out to oncoming physician.      8:19 PM I met with the patient to gather history and to perform my initial exam. I discussed the plan for care while in the Emergency Department.     Medical Decision Making    Supplemental history from: N/A    External Record(s) Reviewed: N/A    Differential Diagnosis: See MDM charting for differential considered.     I performed an independent interpretation of the: EKG: See my documentation.    Discussed with radiology regarding test interpretation: N/A    Discussion of management with another provider: See chart documentation, if applicable    The following testing was considered but ultimately not selected: None    I considered prescription management with: Symptomatic Management    The patient's care impacted: None    Consideration of Admission/Observation: N/A - Patient discharged without consideration for admission    Care significantly affected by Social  Determinants of Health including: N/A      EKG      At the conclusion of the encounter I discussed the results of all the tests and the disposition. The questions were answered. The patient or family acknowledged understanding and was agreeable with the care plan.      MEDICATIONS GIVEN IN THE EMERGENCY DEPARTMENT:  Medications - No data to display    NEW PRESCRIPTIONS STARTED AT TODAY'S ED VISIT:  New Prescriptions    No medications on file       HPI     Patient information obtained from: Patient    Use of Interpretor: N/A    Tangela Chapa is a 77 year old female with a pertinent history of YESSICA I, hypercholesterolemia, and hypertension who presents to this ED by walk-in for evaluation of cough and shortness of breath.    Patient states having a cold since Sunday. She endorses having a painful productive cough of phlegm, diarrhea, and shortness of breath. As of today, patient noticed blood in her phlegm. Patient endorses taking an at home COVID test which was negative. Patient denies being on blood thinners. Patient notes she is a former smoker, and quit 30 years ago. Patient denies fever, leg swelling, or any other complaints at this time.     REVIEW OF SYSTEMS:  Review of Systems   Constitutional: Negative for fever, malaise  HEENT: Negative runny nose, sore throat, ear pain, neck pain  Respiratory: Negative for congestion. Positive for shortness of breath, productive cough (bloody phlegm)  Cardiovascular: Negative for chest pain, leg edema  Gastrointestinal: Negative for abdominal distention, abdominal pain, constipation, vomiting, nausea. Positive for diarrhea  Genitourinary: Negative for dysuria and hematuria.   Integument: Negative for rash, skin breakdown  Neurological: Negative for paresthesias, weakness, headache.  Musculoskeletal: Negative for joint pain, joint swelling      All other systems reviewed and are negative.      MEDICAL HISTORY     Past Medical History:   Diagnosis Date     Chronic UTI  2010     YESSICA I (cervical intraepithelial neoplasia I) 16 Feb 2010     Herpes zoster      Vaginal dysplasia 2004       Past Surgical History:   Procedure Laterality Date     ABDOMINOPLASTY       ABDOMINOPLASTY       ARTHROSCOPY KNEE IRRIGATION AND DEBRIDEMENT      RT-Articular Cartilage     BIOPSY BREAST      benign open brest biopsy     BIOPSY BREAST Left     benign x2     BIOPSY CERVICAL, LOCAL EXCISION, SINGLE/MULTIPLE  10/09/2017     BIOPSY OF BREAST, INCISIONAL      Description: Biopsy Breast Open;  Recorded: 06/06/2008;  Comments: BENIGN     CATARACT EXTRACTION W/ INTRAOCULAR LENS IMPLANT Left 01/2016     CATARACT EXTRACTION, BILATERAL  04/2015     COLPOSCOPY, BIOPSY, COMBINED  2/16/10    TZ-not seen     ENDOMETRIAL SAMPLING (BIOPSY)  3/31/05    benign     GENITOURINARY SURGERY  6 April 2011    cystoscopy with +1 trabeculaion     HC KNEE SCOPE, DIAGNOSTIC      Description: Arthroscopy Knee Right;  Recorded: 06/06/2008;  Comments: FOR MENISCUS TEAR     HC OSTEOTOMY METATARSAL (NOT 1ST)      Description: Metatarsal Osteotomy Of The Fifth Metatarsal;  Proc Date: 02/01/2007;     IR LUMBAR EPIDURAL STEROID INJECTION  9/30/2004     IR LUMBAR EPIDURAL STEROID INJECTION  10/11/2004     IR LUMBAR EPIDURAL STEROID INJECTION  11/5/2004     JOINT REPLACEMENT, HIP RT/LT  2008    Joint Replacement Hip RT/LT     RELEASE CARPAL TUNNEL BILATERAL       ZZC TOTAL HIP ARTHROPLASTY      Description: Total Hip Replacement;  Proc Date: 06/01/2008;  Comments: RIGHT     ZZC TOTAL HIP ARTHROPLASTY      Description: Total Hip Replacement;  Proc Date: 08/01/2008;       Social History     Tobacco Use     Smoking status: Former     Smokeless tobacco: Never     Tobacco comments:     quit 30 years ago   Substance Use Topics     Alcohol use: Yes     Alcohol/week: 0.0 - 0.8 standard drinks     Comment: Rare     Drug use: No       citalopram (CELEXA) 20 MG tablet  cycloSPORINE (RESTASIS) 0.05 % ophthalmic emulsion  estradiol (ESTRACE) 0.5 MG  "tablet  estradiol (ESTRING) 2 MG vaginal ring  fish oil-omega-3 fatty acids 1000 MG capsule  latanoprost (XALATAN) 0.005 % ophthalmic solution  losartan (COZAAR) 100 MG tablet  LUMIGAN 0.01 % SOLN ophthalmic solution  Magnesium 100 MG TABS  mometasone (NASONEX) 50 MCG/ACT nasal spray  NONFORMULARY  omeprazole (PRILOSEC) 20 MG DR capsule  pentoxifylline ER (TRENTAL) 400 MG CR tablet  pravastatin (PRAVACHOL) 40 MG tablet  progesterone (PROMETRIUM) 100 MG capsule  Tetrahydrozoline HCl (EYE DROPS OP)  traZODone (DESYREL) 100 MG tablet            PHYSICAL EXAM     BP (!) 189/77   Pulse 69   Temp 97.1  F (36.2  C) (Temporal)   Resp 22   Ht 1.626 m (5' 4\")   Wt 80.7 kg (178 lb)   SpO2 95%   BMI 30.55 kg/m        PHYSICAL EXAM:     General: Patient appears well, nontoxic, comfortable  HEENT: Moist mucous membranes, no tongue swelling.  No head trauma.  No midline neck pain.  Cardiovascular: Normal rate, normal rhythm, no extremity edema.  No appreciable murmur.  Respiratory: No signs of respiratory distress, lungs are clear to auscultation bilaterally with no rhonchi or rales. Some very faint wheezing in the apices posteriorly.   Abdominal: Soft, nontender, nondistended, no palpable masses, no guarding, no rebound  Musculoskeletal: Full range of motion of joints, no deformities appreciated.  Neurological: Alert and oriented, grossly neurologically intact.  Psychological: Normal affect and mood.  Integument: No rashes appreciated      RESULTS       Labs Ordered and Resulted from Time of ED Arrival to Time of ED Departure - No data to display    CT Chest Pulmonary Embolism w Contrast    (Results Pending)         PROCEDURES:  Procedures:  Procedures       I, Crystal Katz am serving as a scribe to document services personally performed by Guerrero Alegria DO, based on my observations and the provider's statements to me.  I, Guerrero Alegria DO, attest that Crystal Katz is acting in a scribe capacity, has observed my performance of " the services and has documented them in accordance with my direction.    Guerrero Alegria DO  Emergency Medicine  [facility] EMERGENCY DEPARTMENT     Ohl, Guerrero Arreaga DO  11/22/22 0549

## 2022-11-18 NOTE — ED NOTES
EMERGENCY DEPARTMENT SIGN OUT NOTE        ED COURSE AND MEDICAL DECISION MAKING  Patient was signed out to me by Dr Guerrero Alegria at 10:30 PM.  12:05 AM I checked on the patient.  12:40 AM We discussed the plan for discharge and the patient is agreeable. Reviewed supportive cares, symptomatic treatment, outpatient follow up, and reasons to return to the Emergency Department. All questions and concerns were addressed. Patient to be discharged by ED RN.      In brief, Tangela Chapa is a 77 year old female who initially presented for evaluation of 5-6 days of cough, congestion and now wheezing that began today.  She reported small amount of hemoptysis/blood-streaked sputum. No leg swelling or abdominal pain.  Intermittent diarrhea.  No vomiting or nausea.       At time of sign out, disposition was pending labs, CT PE, delta troponin, and admission.    CT showed some bronchial wall thickening, no PE.  Delta troponin is reassuring, no chest pain, doubt ACS.  Reevaluated the patient and she is feeling better and eager to go.  Influenza A swab was positive.  When I saw her she still had increased respiratory rate and was coughing frequently.  She was given another nebulizer treatment with some improvement.  She would like to go home and does not want further monitoring here.  Will send with prednisone and albuterol.  Given her age/risk factors discussed Tamiflu and she is in agreement.  Discussed the importance of close follow-up.  Strict return precautions discussed and her questions were answered.    FINAL IMPRESSION    1. Subacute cough        ED MEDS  Medications   ipratropium - albuterol 0.5 mg/2.5 mg/3 mL (DUONEB) neb solution 3 mL (3 mLs Nebulization Given 11/17/22 2100)   iopamidol (ISOVUE-370) solution 100 mL (100 mLs Intravenous Given 11/17/22 2158)   predniSONE (DELTASONE) tablet 40 mg (40 mg Oral Given 11/18/22 0021)   albuterol (PROVENTIL) (2.5 MG/3ML) 0.083% neb solution (2.5 mg  Given 11/18/22 0017)        LAB  Labs Ordered and Resulted from Time of ED Arrival to Time of ED Departure   COMPREHENSIVE METABOLIC PANEL - Abnormal       Result Value    Sodium 138      Potassium 3.6      Chloride 104      Carbon Dioxide (CO2) 25      Anion Gap 9      Urea Nitrogen 13.4      Creatinine 0.68      Calcium 8.6 (*)     Glucose 111 (*)     Alkaline Phosphatase 58      AST 34      ALT 33      Protein Total 6.9      Albumin 4.1      Bilirubin Total 0.4      GFR Estimate 90     TROPONIN T, HIGH SENSITIVITY - Abnormal    Troponin T, High Sensitivity 15 (*)    INFLUENZA A/B & SARS-COV2 PCR MULTIPLEX - Abnormal    Influenza A PCR Positive (*)     Influenza B PCR Negative      RSV PCR Negative      SARS CoV2 PCR Negative     NT PROBNP INPATIENT - Normal    N terminal Pro BNP Inpatient 312     TROPONIN T, HIGH SENSITIVITY - Normal    Troponin T, High Sensitivity 14     CBC WITH PLATELETS AND DIFFERENTIAL    WBC Count 6.2      RBC Count 4.53      Hemoglobin 14.6      Hematocrit 42.5      MCV 94      MCH 32.2      MCHC 34.4      RDW 13.6      Platelet Count 193      % Neutrophils 58      % Lymphocytes 26      % Monocytes 16      % Eosinophils 0      % Basophils 0      % Immature Granulocytes 0      NRBCs per 100 WBC 0      Absolute Neutrophils 3.6      Absolute Lymphocytes 1.7      Absolute Monocytes 1.0      Absolute Eosinophils 0.0      Absolute Basophils 0.0      Absolute Immature Granulocytes 0.0      Absolute NRBCs 0.0         EKG  EKG sinus bradycardia, incomplete RBBB     RADIOLOGY    CT Chest Pulmonary Embolism w Contrast   Final Result   IMPRESSION:   1.  No pulmonary embolism.      2.  Normal caliber aorta without dissection.      3.  Minimal to moderate bilateral bronchial wall thickening compatible with bronchial inflammation which can be seen with reactive airways disease or viral etiologies.      4.  No pulmonary infiltrates or pleural fluid.          DISCHARGE MEDS  Discharge Medication List as of 11/18/2022 12:39 AM       START taking these medications    Details   albuterol (PROAIR HFA/PROVENTIL HFA/VENTOLIN HFA) 108 (90 Base) MCG/ACT inhaler Inhale 2 puffs into the lungs every 6 hours as needed for shortness of breath / dyspnea or wheezing, Disp-18 g, R-0, Local PrintPharmacy may dispense brand covered by insurance (Proair, or proventil or ventolin or generic albuterol inhaler)      oseltamivir (TAMIFLU) 75 MG capsule Take 1 capsule (75 mg) by mouth 2 times daily for 5 days, Disp-10 capsule, R-0, Local Print      predniSONE (DELTASONE) 20 MG tablet Take 3 tablets (60 mg) by mouth daily for 5 days, Disp-15 tablet, R-0, Local Print             Ivet Hood MD  [facility] EMERGENCY DEPARTMENT  [address] [place] [id number] [phone]     Ivet Hood MD  11/18/22 045

## 2022-11-18 NOTE — DISCHARGE INSTRUCTIONS
We have influenza A. I would recommend considering tamiflu  Take steroids as prescribed and use the inhaler as needed.  Please follow up closely with your primary care doctor   Return to the Emergency Room with chest pain, increased work of breathing, or other worsening symptoms or concerns.

## 2022-11-18 NOTE — TELEPHONE ENCOUNTER
TRIAGE CALL - Is this a 2nd Level Triage? NO  Nurse Triage SBAR - Patient calling   Situation:  Cold and Cough 3 days  Her  at home COVID test  - negative - last night   Background:    Does not get sick often   Assessment:  Cough is ( sound ) productive   Reports phlemg with blood (about a 3r of it blood is blood) has been like that all day    the last one was A Bigger amount  then the previous one  SOB whne going up the stairs   Feeling very tired   Breathing in is hard  -   No chest pain but tightness  Patients denies fever  Patient is able to speak in full long sentences without getting short of breath but it gets interrupted with coughing  Recommended Disposition per protocol ED - NOW  Patient verbalized understanding and agrees with plan  Marissa Cuellar RN Nurse Triage Advisor 7:07 PM 11/17/2022    Reason for Disposition    [1] MODERATE difficulty breathing (e.g., speaks in phrases, SOB even at rest, pulse 100-120) AND [2] still present when not coughing    Additional Information    Negative: [1] Previous asthma attacks AND [2] this feels like asthma attack    Negative: Dry cough (non-productive;  no sputum or minimal clear sputum)    Negative: SEVERE difficulty breathing (e.g., struggling for each breath, speaks in single words)    Negative: Bluish (or gray) lips or face now    Negative: [1] Difficulty breathing AND [2] exposure to flames, smoke, or fumes    Negative: [1] Stridor AND [2] difficulty breathing    Negative: Sounds like a life-threatening emergency to the triager    Protocols used: COUGH - ACUTE [id number]

## 2022-11-18 NOTE — ED TRIAGE NOTES
amb to triage.  Pt seems winded upon arrival to triage.  Pt states 3 days ago noted pain in chest to cough.  Slept with heating pads with relief.  Took home covid test last night --negative.  Also c/o sinus congestion.  Noted blood in phlegm today.      Triage Assessment     Row Name 11/17/22 2002       Triage Assessment (Adult)    Airway WDL WDL       Respiratory WDL    Respiratory WDL X;cough;all    Rhythm/Pattern, Respiratory tachypneic;shortness of breath       Skin Circulation/Temperature WDL    Skin Circulation/Temperature WDL WDL       Cardiac WDL    Cardiac WDL WDL       Peripheral/Neurovascular WDL    Peripheral Neurovascular WDL WDL       Cognitive/Neuro/Behavioral WDL    Cognitive/Neuro/Behavioral WDL WDL

## 2022-12-08 DIAGNOSIS — E78.00 HYPERCHOLESTEROLEMIA: ICD-10-CM

## 2022-12-08 RX ORDER — PRAVASTATIN SODIUM 40 MG
40 TABLET ORAL DAILY
Qty: 90 TABLET | Refills: 0 | Status: SHIPPED | OUTPATIENT
Start: 2022-12-08 | End: 2023-01-31

## 2022-12-09 NOTE — TELEPHONE ENCOUNTER
"Last Written Prescription Date:  12/9/21  Last Fill Quantity: 90,  # refills: 3   Last office visit provider:  7/25/22     Requested Prescriptions   Pending Prescriptions Disp Refills     pravastatin (PRAVACHOL) 40 MG tablet [Pharmacy Med Name: PRAVASTATIN NA 40 MG TAB 40 Tablet] 90 tablet 3     Sig: TAKE 1 TABLET (40 MG) BY MOUTH DAILY       Statins Protocol Passed - 12/8/2022  7:58 AM        Passed - LDL on file in past 12 months     Recent Labs   Lab Test 12/10/21  1239                Passed - No abnormal creatine kinase in past 12 months     No lab results found.             Passed - Recent (12 mo) or future (30 days) visit within the authorizing provider's specialty     Patient has had an office visit with the authorizing provider or a provider within the authorizing providers department within the previous 12 mos or has a future within next 30 days. See \"Patient Info\" tab in inbasket, or \"Choose Columns\" in Meds & Orders section of the refill encounter.              Passed - Medication is active on med list        Passed - Patient is age 18 or older        Passed - No active pregnancy on record        Passed - No positive pregnancy test in past 12 months             Ritika Swann RN 12/08/22 6:21 PM  "

## 2022-12-26 NOTE — TELEPHONE ENCOUNTER
Received paper refill authorization request from [facility] for Progesterone 100mg capsules.  Placed in Dr. Gusman's basket.  Unable to fill electronically.

## 2022-12-28 NOTE — TELEPHONE ENCOUNTER
"FYI to provider - Patient is lost to pap tracking follow-up. Attempts to contact pt have been made per reminder process and there has been no reply and/or no appt scheduled. Contact hx listed below.     2011, 2012 NIL paps  5/28/14 NIL pap, +HR HPV (not 16/18)  8/18/16 NIL pap, +HR HPV (not 16/18)  10/4/16 Colpo bx and ECC inflammation, negative  8/28/17 NIL pap, +HR HPV (not 16/18)  10/9/17 LEEP inflammation, negative  10/29/18 NIL pap, +HR HPV (not 16/18)  1/29/19 Colpo bx and ECC neg  12/11/19 NIL pap, +HR HPV (not 16/18)  2/5/20 Colpo ECC inflammation  2/9/21 NIL pap, +HR HPV (not 16/18)  3/29/21 Colpo bx and ECC neg  5/9/22 Visit: \"Elizabeth has decided she no longer wants to f/u with annual pap smears/colpos.  She is tired of doing the follow up.\"  10/6/22 Message sent to provider to inquire if she would like any future reminders sent  10/25/22 Per provider, send one more reminder and pt can chose if she will follow up.   10/25/22 Reminder letter sent  11/28/22 Reminder call-LM  12/28/22 Lost to follow-up for pap tracking   "

## 2023-01-04 ENCOUNTER — TELEPHONE (OUTPATIENT)
Dept: OBGYN | Facility: CLINIC | Age: 78
End: 2023-01-04

## 2023-01-04 NOTE — TELEPHONE ENCOUNTER
[facility] Call Center    Phone Message    May a detailed message be left on voicemail: yes     Reason for Call:  Pt has an Eserin (sp) ring - is unable to get it out. Writer offered to send pt to [facility] and pt declined. She requests a call back. Thank you    Action Taken: Message routed to:  Other: Whs    Travel Screening: Not Applicable

## 2023-01-05 NOTE — TELEPHONE ENCOUNTER
Returned call to patient and left VM.  If E-string is stuck, patient will need clinic visit with OBGYN, resident, or urogyn to have it removed.

## 2023-01-12 ENCOUNTER — OFFICE VISIT (OUTPATIENT)
Dept: PODIATRY | Facility: CLINIC | Age: 78
End: 2023-01-12
Payer: COMMERCIAL

## 2023-01-12 VITALS — WEIGHT: 178 LBS | HEIGHT: 64 IN | HEART RATE: 95 BPM | BODY MASS INDEX: 30.39 KG/M2 | OXYGEN SATURATION: 97 %

## 2023-01-12 DIAGNOSIS — I73.00 RAYNAUD'S DISEASE WITHOUT GANGRENE: Primary | ICD-10-CM

## 2023-01-12 PROCEDURE — 99213 OFFICE O/P EST LOW 20 MIN: CPT | Performed by: PODIATRIST

## 2023-01-12 RX ORDER — PENTOXIFYLLINE 400 MG/1
400 TABLET, EXTENDED RELEASE ORAL
Qty: 90 TABLET | Refills: 4 | Status: SHIPPED | OUTPATIENT
Start: 2023-01-12 | End: 2023-11-06

## 2023-01-12 ASSESSMENT — PAIN SCALES - GENERAL: PAINLEVEL: NO PAIN (0)

## 2023-01-12 NOTE — PROGRESS NOTES
FOOT AND ANKLE SURGERY/PODIATRY Progress Note      ASSESSMENT:   Raynauds disease without gangrene bilateral feet     HPI: Tangela Chapa was seen again today complaining of pain involving the toes of both feet.  She stated that the toes also are bluish in color and sometimes bright red in color.  She has had this for several months.  She has difficult time keeping her toes warm.  Is not had any open wounds.  She is not had any drainage or bleeding.  Her pain is moderate.  It is aggravated by her shoe gear.  She denies any other previous treatment..      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        Past Surgical History:   Procedure Laterality Date     ABDOMINOPLASTY       ABDOMINOPLASTY       ARTHROSCOPY KNEE IRRIGATION AND DEBRIDEMENT      RT-Articular Cartilage     BIOPSY BREAST      benign open brest biopsy     BIOPSY BREAST Left     benign x2     BIOPSY CERVICAL, LOCAL EXCISION, SINGLE/MULTIPLE  10/09/2017     BIOPSY OF BREAST, INCISIONAL      Description: Biopsy Breast Open;  Recorded: 06/06/2008;  Comments: BENIGN     CATARACT EXTRACTION W/ INTRAOCULAR LENS IMPLANT Left 01/2016     CATARACT EXTRACTION, BILATERAL  04/2015     COLPOSCOPY, BIOPSY, COMBINED  2/16/10    TZ-not seen     ENDOMETRIAL SAMPLING (BIOPSY)  3/31/05    benign     GENITOURINARY SURGERY  6 April 2011    cystoscopy with +1 trabeculaion     HC KNEE SCOPE, DIAGNOSTIC      Description: Arthroscopy Knee Right;  Recorded: 06/06/2008;  Comments: FOR MENISCUS TEAR     HC OSTEOTOMY METATARSAL (NOT 1ST)      Description: Metatarsal Osteotomy Of The Fifth Metatarsal;  Proc Date: 02/01/2007;     IR LUMBAR EPIDURAL STEROID INJECTION  9/30/2004     IR LUMBAR EPIDURAL STEROID INJECTION  10/11/2004     IR LUMBAR  EPIDURAL STEROID INJECTION  11/5/2004     JOINT REPLACEMENT, HIP RT/LT  2008    Joint Replacement Hip RT/LT     RELEASE CARPAL TUNNEL BILATERAL       ZZC TOTAL HIP ARTHROPLASTY      Description: Total Hip Replacement;  Proc Date: 06/01/2008;  Comments: RIGHT     ZZC TOTAL HIP ARTHROPLASTY      Description: Total Hip Replacement;  Proc Date: 08/01/2008;       Allergies   Allergen Reactions     Dust Mite Extract      Seasonal Allergies          Current Outpatient Medications:      albuterol (PROAIR HFA/PROVENTIL HFA/VENTOLIN HFA) 108 (90 Base) MCG/ACT inhaler, Inhale 2 puffs into the lungs every 6 hours as needed for shortness of breath / dyspnea or wheezing, Disp: 18 g, Rfl: 0     citalopram (CELEXA) 20 MG tablet, TAKE 1 TABLET (20 MG) BY MOUTH DAILY, Disp: 90 tablet, Rfl: 3     cycloSPORINE (RESTASIS) 0.05 % ophthalmic emulsion, 1 drop every 12 hours., Disp: , Rfl:      estradiol (ESTRACE) 0.5 MG tablet, Take 1 tablet (0.5 mg) by mouth daily, Disp: 90 tablet, Rfl: 3     estradiol (ESTRING) 2 MG vaginal ring, Place once vaginally and refill as instructed, Disp: 1 each, Rfl: 3     fish oil-omega-3 fatty acids 1000 MG capsule, Take 1 capsule by mouth daily., Disp: , Rfl:      latanoprost (XALATAN) 0.005 % ophthalmic solution, , Disp: , Rfl:      losartan (COZAAR) 100 MG tablet, Take 1 tablet (100 mg) by mouth daily, Disp: 90 tablet, Rfl: 3     LUMIGAN 0.01 % SOLN ophthalmic solution, , Disp: , Rfl:      Magnesium 100 MG TABS, Take 3 tablets by mouth At Bedtime., Disp: , Rfl:      mometasone (NASONEX) 50 MCG/ACT nasal spray, 2 sprays by Both Nostrils route daily., Disp: , Rfl:      NONFORMULARY, 1 Dose daily henrik 128 apply small amount to eyes at night, Disp: , Rfl:      omeprazole (PRILOSEC) 20 MG DR capsule, Take 1 capsule (20 mg) by mouth daily, Disp: 90 capsule, Rfl: 3     pentoxifylline ER (TRENTAL) 400 MG CR tablet, One tab twice daily, Disp: 90 tablet, Rfl: 3     pravastatin (PRAVACHOL) 40 MG tablet, TAKE 1  TABLET (40 MG) BY MOUTH DAILY, Disp: 90 tablet, Rfl: 0     progesterone (PROMETRIUM) 100 MG capsule, Take 1 capsule (100 mg) by mouth At Bedtime, Disp: 90 capsule, Rfl: 3     Tetrahydrozoline HCl (EYE DROPS OP), Apply  to eye. Hydro eye, Disp: , Rfl:      traZODone (DESYREL) 100 MG tablet, Take 1 tablet (100 mg) by mouth At Bedtime, Disp: 90 tablet, Rfl: 3    Family History   Problem Relation Age of Onset     Alcohol/Drug Father      Depression Father      Alcohol/Drug Brother      Breast Cancer Mother      Depression Mother      Diabetes Maternal Grandfather      Cancer Brother 68        blood cancer     Breast Cancer Cousin      Breast Cancer Cousin      Breast Cancer Cousin      Leukemia Brother        Social History     Socioeconomic History     Marital status: Single     Spouse name: Not on file     Number of children: Not on file     Years of education: Not on file     Highest education level: Not on file   Occupational History     Not on file   Tobacco Use     Smoking status: Former     Smokeless tobacco: Never     Tobacco comments:     quit 30 years ago   Substance and Sexual Activity     Alcohol use: Yes     Alcohol/week: 0.0 - 0.8 standard drinks     Comment: Rare     Drug use: No     Sexual activity: Not Currently     Partners: Male     Birth control/protection: Post-menopausal   Other Topics Concern      Service No     Blood Transfusions No     Caffeine Concern No     Comment: 3-4 pop/d (diet)     Occupational Exposure No     Hobby Hazards No     Sleep Concern Yes     Comment: sleeps too much -- tired after 9-10 hrs/nite     Stress Concern Yes     Comment: holiday lonliness     Weight Concern Yes     Comment: admits to eating more than she uses -- declines wt today     Special Diet No     Back Care No     Exercise Yes     Comment: sedentary -- plans to restart     Bike Helmet No     Seat Belt No     Self-Exams No     Parent/sibling w/ CABG, MI or angioplasty before 65F 55M? Not Asked   Social  "History Narrative    How much exercise per week? Stationary bike, recent weight loss    How much calcium per day? Supplement       How much caffeine per day? 2 cans a day    How much vitamin D per day? supplment    Do you/your family wear seatbelts?  Yes    Do you/your family use safety helmets? Yes    Do you/your family use sunscreen? Yes    Do you/your family keep firearms in the home? Yes, locked    Do you/your family have a smoke detector(s)? Yes        Do you feel safe in your home? Yes    Has anyone ever touched you in an unwanted manner? No     Explain     Updated 8/28 CHIKI Santiago          Social Determinants of Health     Financial Resource Strain: Not on file   Food Insecurity: Not on file   Transportation Needs: Not on file   Physical Activity: Not on file   Stress: Not on file   Social Connections: Not on file   Intimate Partner Violence: Not on file   Housing Stability: Not on file       10 point Review of Systems is negative except as mentioned above     Pulse 95   Ht 1.626 m (5' 4\")   Wt 80.7 kg (178 lb)   SpO2 97%   BMI 30.55 kg/m      BMI= Body mass index is 30.55 kg/m .    OBJECTIVE:  General appearance: Patient is alert and fully cooperative with history & exam.  No sign of distress is noted during the visit.  Vascular: Dorsalis pedis and posterior tibial pulses are palpable. There is no pedal hair growth bilaterally.  CFT < 3 sec from anterior tibial surface to distal digits bilaterally.  Cyanosis noted digits 2 through 5 bilateral feet.  Skin temperature cool to touch digits 2 through 5 bilaterally.  There is no appreciable edema noted.  Dermatologic: Turgor and texture are within normal limits. No coloration or temperature changes. No primary or secondary lesions noted.  Neurologic: All epicritic and proprioceptive sensations are grossly intact bilaterally.  Musculoskeletal: All active and passive ankle, subtalar, midtarsal, and 1st MPJ range of motion are grossly intact without pain or " crepitus, with the exception of none. Manual muscle strength is within normal limits bilaterally. All dorsiflexors, plantarflexors, invertors, evertors are intact bilaterally. Tenderness present to digits 2 through 5 bilaterally on palpation.  No tenderness to digits 2 through 5 bilaterally with range of motion. Calf is soft/non-tender without warmth/induration    Imaging:         No results found.         TREATMENT:  The patient was given a refill of her Trental 400 mg 1 tab 3 times daily.  I have recommended the patient return to the clinic as needed.      Miguel Doherty; CUBA  [facility] Foot & Ankle Surgery/Podiatry

## 2023-01-12 NOTE — LETTER
1/12/2023         RE: Tangela Chapa  [address]  [place] [address]        Dear Colleague,    Thank you for referring your patient, Tangela Chapa, to the [facility]. Please see a copy of my visit note below.    FOOT AND ANKLE SURGERY/PODIATRY Progress Note      ASSESSMENT:   Raynauds disease without gangrene bilateral feet     HPI: Tangela Chapa was seen again today complaining of pain involving the toes of both feet.  She stated that the toes also are bluish in color and sometimes bright red in color.  She has had this for several months.  She has difficult time keeping her toes warm.  Is not had any open wounds.  She is not had any drainage or bleeding.  Her pain is moderate.  It is aggravated by her shoe gear.  She denies any other previous treatment..      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        Past Surgical History:   Procedure Laterality Date     ABDOMINOPLASTY       ABDOMINOPLASTY       ARTHROSCOPY KNEE IRRIGATION AND DEBRIDEMENT      RT-Articular Cartilage     BIOPSY BREAST      benign open brest biopsy     BIOPSY BREAST Left     benign x2     BIOPSY CERVICAL, LOCAL EXCISION, SINGLE/MULTIPLE  10/09/2017     BIOPSY OF BREAST, INCISIONAL      Description: Biopsy Breast Open;  Recorded: 06/06/2008;  Comments: BENIGN     CATARACT EXTRACTION W/ INTRAOCULAR LENS IMPLANT Left 01/2016     CATARACT EXTRACTION, BILATERAL  04/2015     COLPOSCOPY, BIOPSY, COMBINED  2/16/10    TZ-not seen     ENDOMETRIAL SAMPLING (BIOPSY)  3/31/05    benign     GENITOURINARY SURGERY  6 April 2011    cystoscopy with +1 trabeculaion      KNEE SCOPE, DIAGNOSTIC      Description: Arthroscopy Knee Right;  Recorded: 06/06/2008;  Comments: FOR  MENISCUS TEAR     HC OSTEOTOMY METATARSAL (NOT 1ST)      Description: Metatarsal Osteotomy Of The Fifth Metatarsal;  Proc Date: 02/01/2007;     IR LUMBAR EPIDURAL STEROID INJECTION  9/30/2004     IR LUMBAR EPIDURAL STEROID INJECTION  10/11/2004     IR LUMBAR EPIDURAL STEROID INJECTION  11/5/2004     JOINT REPLACEMENT, HIP RT/LT  2008    Joint Replacement Hip RT/LT     RELEASE CARPAL TUNNEL BILATERAL       ZZC TOTAL HIP ARTHROPLASTY      Description: Total Hip Replacement;  Proc Date: 06/01/2008;  Comments: RIGHT     ZZC TOTAL HIP ARTHROPLASTY      Description: Total Hip Replacement;  Proc Date: 08/01/2008;       Allergies   Allergen Reactions     Dust Mite Extract      Seasonal Allergies          Current Outpatient Medications:      albuterol (PROAIR HFA/PROVENTIL HFA/VENTOLIN HFA) 108 (90 Base) MCG/ACT inhaler, Inhale 2 puffs into the lungs every 6 hours as needed for shortness of breath / dyspnea or wheezing, Disp: 18 g, Rfl: 0     citalopram (CELEXA) 20 MG tablet, TAKE 1 TABLET (20 MG) BY MOUTH DAILY, Disp: 90 tablet, Rfl: 3     cycloSPORINE (RESTASIS) 0.05 % ophthalmic emulsion, 1 drop every 12 hours., Disp: , Rfl:      estradiol (ESTRACE) 0.5 MG tablet, Take 1 tablet (0.5 mg) by mouth daily, Disp: 90 tablet, Rfl: 3     estradiol (ESTRING) 2 MG vaginal ring, Place once vaginally and refill as instructed, Disp: 1 each, Rfl: 3     fish oil-omega-3 fatty acids 1000 MG capsule, Take 1 capsule by mouth daily., Disp: , Rfl:      latanoprost (XALATAN) 0.005 % ophthalmic solution, , Disp: , Rfl:      losartan (COZAAR) 100 MG tablet, Take 1 tablet (100 mg) by mouth daily, Disp: 90 tablet, Rfl: 3     LUMIGAN 0.01 % SOLN ophthalmic solution, , Disp: , Rfl:      Magnesium 100 MG TABS, Take 3 tablets by mouth At Bedtime., Disp: , Rfl:      mometasone (NASONEX) 50 MCG/ACT nasal spray, 2 sprays by Both Nostrils route daily., Disp: , Rfl:      NONFORMULARY, 1 Dose daily henrik 128 apply small amount to eyes at night, Disp: , Rfl:       omeprazole (PRILOSEC) 20 MG DR capsule, Take 1 capsule (20 mg) by mouth daily, Disp: 90 capsule, Rfl: 3     pentoxifylline ER (TRENTAL) 400 MG CR tablet, One tab twice daily, Disp: 90 tablet, Rfl: 3     pravastatin (PRAVACHOL) 40 MG tablet, TAKE 1 TABLET (40 MG) BY MOUTH DAILY, Disp: 90 tablet, Rfl: 0     progesterone (PROMETRIUM) 100 MG capsule, Take 1 capsule (100 mg) by mouth At Bedtime, Disp: 90 capsule, Rfl: 3     Tetrahydrozoline HCl (EYE DROPS OP), Apply  to eye. Hydro eye, Disp: , Rfl:      traZODone (DESYREL) 100 MG tablet, Take 1 tablet (100 mg) by mouth At Bedtime, Disp: 90 tablet, Rfl: 3    Family History   Problem Relation Age of Onset     Alcohol/Drug Father      Depression Father      Alcohol/Drug Brother      Breast Cancer Mother      Depression Mother      Diabetes Maternal Grandfather      Cancer Brother 68        blood cancer     Breast Cancer Cousin      Breast Cancer Cousin      Breast Cancer Cousin      Leukemia Brother        Social History     Socioeconomic History     Marital status: Single     Spouse name: Not on file     Number of children: Not on file     Years of education: Not on file     Highest education level: Not on file   Occupational History     Not on file   Tobacco Use     Smoking status: Former     Smokeless tobacco: Never     Tobacco comments:     quit 30 years ago   Substance and Sexual Activity     Alcohol use: Yes     Alcohol/week: 0.0 - 0.8 standard drinks     Comment: Rare     Drug use: No     Sexual activity: Not Currently     Partners: Male     Birth control/protection: Post-menopausal   Other Topics Concern      Service No     Blood Transfusions No     Caffeine Concern No     Comment: 3-4 pop/d (diet)     Occupational Exposure No     Hobby Hazards No     Sleep Concern Yes     Comment: sleeps too much -- tired after 9-10 hrs/nite     Stress Concern Yes     Comment: holiday lonliness     Weight Concern Yes     Comment: admits to eating more than she uses --  "declines wt today     Special Diet No     Back Care No     Exercise Yes     Comment: sedentary -- plans to restart     Bike Helmet No     Seat Belt No     Self-Exams No     Parent/sibling w/ CABG, MI or angioplasty before 65F 55M? Not Asked   Social History Narrative    How much exercise per week? Stationary bike, recent weight loss    How much calcium per day? Supplement       How much caffeine per day? 2 cans a day    How much vitamin D per day? supplment    Do you/your family wear seatbelts?  Yes    Do you/your family use safety helmets? Yes    Do you/your family use sunscreen? Yes    Do you/your family keep firearms in the home? Yes, locked    Do you/your family have a smoke detector(s)? Yes        Do you feel safe in your home? Yes    Has anyone ever touched you in an unwanted manner? No     Explain     Updated 8/28 CHIKI Santiago          Social Determinants of Health     Financial Resource Strain: Not on file   Food Insecurity: Not on file   Transportation Needs: Not on file   Physical Activity: Not on file   Stress: Not on file   Social Connections: Not on file   Intimate Partner Violence: Not on file   Housing Stability: Not on file       10 point Review of Systems is negative except as mentioned above     Pulse 95   Ht 1.626 m (5' 4\")   Wt 80.7 kg (178 lb)   SpO2 97%   BMI 30.55 kg/m      BMI= Body mass index is 30.55 kg/m .    OBJECTIVE:  General appearance: Patient is alert and fully cooperative with history & exam.  No sign of distress is noted during the visit.  Vascular: Dorsalis pedis and posterior tibial pulses are palpable. There is no pedal hair growth bilaterally.  CFT < 3 sec from anterior tibial surface to distal digits bilaterally.  Cyanosis noted digits 2 through 5 bilateral feet.  Skin temperature cool to touch digits 2 through 5 bilaterally.  There is no appreciable edema noted.  Dermatologic: Turgor and texture are within normal limits. No coloration or temperature changes. No primary or " secondary lesions noted.  Neurologic: All epicritic and proprioceptive sensations are grossly intact bilaterally.  Musculoskeletal: All active and passive ankle, subtalar, midtarsal, and 1st MPJ range of motion are grossly intact without pain or crepitus, with the exception of none. Manual muscle strength is within normal limits bilaterally. All dorsiflexors, plantarflexors, invertors, evertors are intact bilaterally. Tenderness present to digits 2 through 5 bilaterally on palpation.  No tenderness to digits 2 through 5 bilaterally with range of motion. Calf is soft/non-tender without warmth/induration    Imaging:         No results found.         TREATMENT:  The patient was given a refill of her Trental 400 mg 1 tab 3 times daily.  I have recommended the patient return to the clinic as needed.      Miguel Miranda DPM  [facility] Foot & Ankle Surgery/Podiatry         Again, thank you for allowing me to participate in the care of your patient.        Sincerely,        Miguel Doherty DPM

## 2023-01-22 DIAGNOSIS — K21.9 GASTROESOPHAGEAL REFLUX DISEASE, UNSPECIFIED WHETHER ESOPHAGITIS PRESENT: ICD-10-CM

## 2023-01-23 NOTE — TELEPHONE ENCOUNTER
"Last Written Prescription Date:  1/11//22  Last Fill Quantity: 90,  # refills: 3   Last office visit provider:  7/25/22     Requested Prescriptions   Pending Prescriptions Disp Refills     omeprazole (PRILOSEC) 20 MG DR capsule [Pharmacy Med Name: OMEPRAZOLE 20 MG CPDR 20 Capsule] 90 capsule 3     Sig: TAKE 1 CAPSULE (20 MG) BY MOUTH DAILY       PPI Protocol Passed - 1/23/2023 10:27 AM        Passed - Not on Clopidogrel (unless Pantoprazole ordered)        Passed - No diagnosis of osteoporosis on record        Passed - Recent (12 mo) or future (30 days) visit within the authorizing provider's specialty     Patient has had an office visit with the authorizing provider or a provider within the authorizing providers department within the previous 12 mos or has a future within next 30 days. See \"Patient Info\" tab in inbasket, or \"Choose Columns\" in Meds & Orders section of the refill encounter.              Passed - Medication is active on med list        Passed - Patient is age 18 or older        Passed - No active pregnacy on record        Passed - No positive pregnancy test in past 12 months             Denzel Wade RN 01/23/23 10:27 AM  "

## 2023-01-31 ENCOUNTER — OFFICE VISIT (OUTPATIENT)
Dept: INTERNAL MEDICINE | Facility: CLINIC | Age: 78
End: 2023-01-31
Payer: COMMERCIAL

## 2023-01-31 VITALS
SYSTOLIC BLOOD PRESSURE: 134 MMHG | DIASTOLIC BLOOD PRESSURE: 72 MMHG | WEIGHT: 185 LBS | BODY MASS INDEX: 31.58 KG/M2 | OXYGEN SATURATION: 98 % | HEIGHT: 64 IN | HEART RATE: 62 BPM | TEMPERATURE: 98.4 F | RESPIRATION RATE: 16 BRPM

## 2023-01-31 DIAGNOSIS — F33.42 RECURRENT MAJOR DEPRESSIVE DISORDER, IN FULL REMISSION (H): ICD-10-CM

## 2023-01-31 DIAGNOSIS — I10 ESSENTIAL HYPERTENSION: ICD-10-CM

## 2023-01-31 DIAGNOSIS — E78.00 HYPERCHOLESTEROLEMIA: ICD-10-CM

## 2023-01-31 DIAGNOSIS — G47.00 PERSISTENT INSOMNIA: ICD-10-CM

## 2023-01-31 PROCEDURE — 99214 OFFICE O/P EST MOD 30 MIN: CPT | Performed by: INTERNAL MEDICINE

## 2023-01-31 RX ORDER — PRAVASTATIN SODIUM 40 MG
40 TABLET ORAL DAILY
Qty: 90 TABLET | Refills: 1 | Status: SHIPPED | OUTPATIENT
Start: 2023-01-31 | End: 2023-09-21

## 2023-01-31 RX ORDER — LOSARTAN POTASSIUM 100 MG/1
100 TABLET ORAL DAILY
Qty: 90 TABLET | Refills: 3 | Status: SHIPPED | OUTPATIENT
Start: 2023-01-31 | End: 2024-07-31

## 2023-01-31 RX ORDER — TRAZODONE HYDROCHLORIDE 100 MG/1
100 TABLET ORAL AT BEDTIME
Qty: 90 TABLET | Refills: 3 | Status: SHIPPED | OUTPATIENT
Start: 2023-01-31 | End: 2024-04-03

## 2023-01-31 ASSESSMENT — PATIENT HEALTH QUESTIONNAIRE - PHQ9
SUM OF ALL RESPONSES TO PHQ QUESTIONS 1-9: 4
10. IF YOU CHECKED OFF ANY PROBLEMS, HOW DIFFICULT HAVE THESE PROBLEMS MADE IT FOR YOU TO DO YOUR WORK, TAKE CARE OF THINGS AT HOME, OR GET ALONG WITH OTHER PEOPLE: SOMEWHAT DIFFICULT
SUM OF ALL RESPONSES TO PHQ QUESTIONS 1-9: 4

## 2023-01-31 NOTE — PROGRESS NOTES
"  Assessment & Plan     (F33.42) Recurrent major depressive disorder, in full remission (H)  Comment: has noted some increase in depression and lower energy over the last year  Plan: may be reasonable to get sleep study though she wanted to hold on sleep clinic referral at this time. Alternatively, an increase in celexa discussed. She declined at this time. Early follow up for a physical this summer recommended. Reasons for prompt return to clinic or other acute care location were discussed with patient who voiced understanding.     (E78.00) Hypercholesterolemia  Comment: on statin  Plan: pravastatin (PRAVACHOL) 40 MG tablet        Will plan to continue on previous medication without changes     (I10) Essential hypertension  Comment: controlled  Plan: losartan (COZAAR) 100 MG tablet        Will plan to continue on previous medication without changes     (G47.00) Persistent insomnia  Comment: ongoing  Plan: traZODone (DESYREL) 100 MG tablet        Will plan to continue on previous medication without changes                 BMI:   Estimated body mass index is 31.76 kg/m  as calculated from the following:    Height as of this encounter: 1.626 m (5' 4\").    Weight as of this encounter: 83.9 kg (185 lb).   Weight management plan: Discussed healthy diet and exercise guidelines        No follow-ups on file.    Brennan De Luna MD  [facility]    Re Johnson is a 77 year old accompanied by her alone, presenting for the following health issues:  Follow Up and Recheck Medication (Pt reports that she is here for her follow up from her ER visit from [facility] for having Bronchitis. Pt reports that she feels better.)      HPI   Establish care  Recent ED visit discussed  Doing better overall    Pt reports that she is here for having been at [facility] ER for Bronchitis on 11/17/22, and also medication follow up.      Review of Systems         Objective    /72 (BP Location: Left arm, " "Patient Position: Sitting, Cuff Size: Adult Regular)   Pulse 62   Temp 98.4  F (36.9  C) (Tympanic)   Resp 16   Ht 1.626 m (5' 4\")   Wt 83.9 kg (185 lb)   SpO2 98%   BMI 31.76 kg/m    Body mass index is 31.76 kg/m .  Physical Exam                       Answers for HPI/ROS submitted by the patient on 1/31/2023  If you checked off any problems, how difficult have these problems made it for you to do your work, take care of things at home, or get along with other people?: Somewhat difficult  PHQ9 TOTAL SCORE: 4      "

## 2023-02-27 ENCOUNTER — ANCILLARY PROCEDURE (OUTPATIENT)
Dept: MAMMOGRAPHY | Facility: CLINIC | Age: 78
End: 2023-02-27
Attending: OBSTETRICS & GYNECOLOGY
Payer: COMMERCIAL

## 2023-02-27 ENCOUNTER — OFFICE VISIT (OUTPATIENT)
Dept: OBGYN | Facility: CLINIC | Age: 78
End: 2023-02-27
Attending: OBSTETRICS & GYNECOLOGY
Payer: COMMERCIAL

## 2023-02-27 VITALS
BODY MASS INDEX: 31.24 KG/M2 | SYSTOLIC BLOOD PRESSURE: 144 MMHG | DIASTOLIC BLOOD PRESSURE: 79 MMHG | HEART RATE: 75 BPM | WEIGHT: 183 LBS | HEIGHT: 64 IN

## 2023-02-27 DIAGNOSIS — N95.1 MENOPAUSAL SYNDROME (HOT FLASHES): ICD-10-CM

## 2023-02-27 DIAGNOSIS — B97.7 HPV IN FEMALE: Primary | ICD-10-CM

## 2023-02-27 DIAGNOSIS — Z12.31 VISIT FOR SCREENING MAMMOGRAM: ICD-10-CM

## 2023-02-27 PROCEDURE — 87624 HPV HI-RISK TYP POOLED RSLT: CPT | Performed by: OBSTETRICS & GYNECOLOGY

## 2023-02-27 PROCEDURE — 88141 CYTOPATH C/V INTERPRET: CPT | Performed by: STUDENT IN AN ORGANIZED HEALTH CARE EDUCATION/TRAINING PROGRAM

## 2023-02-27 PROCEDURE — 77067 SCR MAMMO BI INCL CAD: CPT

## 2023-02-27 PROCEDURE — G0101 CA SCREEN;PELVIC/BREAST EXAM: HCPCS | Performed by: OBSTETRICS & GYNECOLOGY

## 2023-02-27 PROCEDURE — 77067 SCR MAMMO BI INCL CAD: CPT | Mod: 26

## 2023-02-27 PROCEDURE — G0463 HOSPITAL OUTPT CLINIC VISIT: HCPCS | Mod: 25 | Performed by: OBSTETRICS & GYNECOLOGY

## 2023-02-27 PROCEDURE — 88175 CYTOPATH C/V AUTO FLUID REDO: CPT | Performed by: OBSTETRICS & GYNECOLOGY

## 2023-02-27 RX ORDER — ESTRADIOL 0.5 MG/1
0.5 TABLET ORAL DAILY
Qty: 90 TABLET | Refills: 3 | Status: SHIPPED | OUTPATIENT
Start: 2023-02-27 | End: 2024-07-31

## 2023-02-27 RX ORDER — PROGESTERONE 100 MG/1
100 CAPSULE ORAL DAILY
Qty: 90 CAPSULE | Refills: 3 | Status: SHIPPED | OUTPATIENT
Start: 2023-02-27 | End: 2024-08-02

## 2023-02-27 ASSESSMENT — ANXIETY QUESTIONNAIRES
6. BECOMING EASILY ANNOYED OR IRRITABLE: SEVERAL DAYS
1. FEELING NERVOUS, ANXIOUS, OR ON EDGE: NOT AT ALL
GAD7 TOTAL SCORE: 3
3. WORRYING TOO MUCH ABOUT DIFFERENT THINGS: SEVERAL DAYS
5. BEING SO RESTLESS THAT IT IS HARD TO SIT STILL: NOT AT ALL
GAD7 TOTAL SCORE: 3
2. NOT BEING ABLE TO STOP OR CONTROL WORRYING: SEVERAL DAYS
7. FEELING AFRAID AS IF SOMETHING AWFUL MIGHT HAPPEN: NOT AT ALL

## 2023-02-27 ASSESSMENT — PATIENT HEALTH QUESTIONNAIRE - PHQ9
SUM OF ALL RESPONSES TO PHQ QUESTIONS 1-9: 5
5. POOR APPETITE OR OVEREATING: NOT AT ALL

## 2023-02-27 NOTE — PATIENT INSTRUCTIONS
Thank you for trusting us with your care!     If you need to contact us for questions about:  Symptoms, Scheduling & Medical Questions; Non-urgent (2-3 day response) Bautista message, Urgent (needing response today) [phone] (if after 3:30pm next day response)   Prescriptions: Please call your Pharmacy   Billing: Sofie [phone] or [facility]:[phone]

## 2023-02-27 NOTE — PROGRESS NOTES
Progress Note    SUBJECTIVE:  Tangela Chapa is an 77 year old  who presents for an Annual Preventive Exam. Patient is followed by Dr. Logan Aguilar for primary care. She has an extensive history of persistent HR HPV, despite LEEP in 2017. At last years visit, patient expressed desires to discontinue annual pap smears/colposcopies. Today, she would like to discuss this further as she is not opposed to repeating these procedures to avoid malignancy. She would like to continue estrogen/progesterone replacement as well, in understanding of the risks that these may pose for malignancy/clot and stroke risk.     Notes ongoing pruritis in her clitoral kraft that has been present since Oct 2022, followed by her Dermatologist who prescribed topical triamcinolone and nystatin with c/f fungal involvement. She does think it has helped, however her pruritis returns once she stops using the topicals.     Menstrual History:  Menstrual History 2016   Period Cycle (Days) menapausal    Reviewed Today Yes       Last    Lab Results   Component Value Date    PAP NIL 2021     Dates/results of previous cervical pathology:   Pap smear hx:  21 - PAP NILM, HR HPV Positive   20 - colpo visually normal, ECC negative  19 - pap NILM, HR HPV positive  19 - colpo biopsy normal, ECC negative  10/29/18 - pap NILM, HR HPV positive  10/9/17 - LEEP negative  17 - pap NILM, HR HPV positive  10/4/16 - colpo biopsy normal, ECC negative  16 - pap NILM, HR HPV positive          Dates of previous cervical pathology treatment: LEEP .   Last   Lab Results   Component Value Date    HPV16 Negative 2021     Last   Lab Results   Component Value Date    HPV18 Negative 2021     Last   Lab Results   Component Value Date    HRHPV Positive 2021       Mammogram current: yes, mammogram today (not resulted yet)  Last Mammogram:   MA Screening Digital Bilateral    Result Date: 2022  Narrative:  BILATERAL FULL FIELD DIGITAL SCREENING MAMMOGRAM Performed on: 2/8/22 Compared to: 03/29/2021, 02/10/2020, 01/23/2019, 10/23/2017, and 09/16/2016 Technique: This study was evaluated with the assistance of Computer-Aided Detection. Findings: The breasts have scattered areas of fibroglandular density.  There is no radiographic evidence of malignancy. IMPRESSION: ACR BI-RADS Category 1: Negative RECOMMENDED FOLLOW-UP: Annual routine screening mammogram The results and recommendations of this examination will be communicated to the patient.       HISTORY:  albuterol (PROAIR HFA/PROVENTIL HFA/VENTOLIN HFA) 108 (90 Base) MCG/ACT inhaler, Inhale 2 puffs into the lungs every 6 hours as needed for shortness of breath / dyspnea or wheezing  citalopram (CELEXA) 20 MG tablet, TAKE 1 TABLET (20 MG) BY MOUTH DAILY  cycloSPORINE (RESTASIS) 0.05 % ophthalmic emulsion, 1 drop every 12 hours.  estradiol (ESTRACE) 0.5 MG tablet, Take 1 tablet (0.5 mg) by mouth daily  estradiol (ESTRING) 2 MG vaginal ring, Place once vaginally and refill as instructed  fish oil-omega-3 fatty acids 1000 MG capsule, Take 1 capsule by mouth daily.  latanoprost (XALATAN) 0.005 % ophthalmic solution,   losartan (COZAAR) 100 MG tablet, Take 1 tablet (100 mg) by mouth daily  LUMIGAN 0.01 % SOLN ophthalmic solution,   Magnesium 100 MG TABS, Take 3 tablets by mouth At Bedtime.  mometasone (NASONEX) 50 MCG/ACT nasal spray, 2 sprays by Both Nostrils route daily.  NONFORMULARY, 1 Dose daily henrik 128 apply small amount to eyes at night  omeprazole (PRILOSEC) 20 MG DR capsule, Take 1 capsule (20 mg) by mouth daily  pentoxifylline ER (TRENTAL) 400 MG CR tablet, Take 1 tablet (400 mg) by mouth 3 times daily (with meals)  pravastatin (PRAVACHOL) 40 MG tablet, Take 1 tablet (40 mg) by mouth daily  progesterone (PROMETRIUM) 100 MG capsule, Take 1 capsule (100 mg) by mouth At Bedtime  Tetrahydrozoline HCl (EYE DROPS OP), Apply  to eye. Hydro eye  traZODone (DESYREL) 100  MG tablet, Take 1 tablet (100 mg) by mouth At Bedtime  [DISCONTINUED] estrogens conjugated, synthetic A, (CENESTIN) 0.3 MG tablet, Take 1 tablet (0.3 mg) by mouth daily No further refills until seen by provider.    No current facility-administered medications on file prior to visit.    Allergies   Allergen Reactions     Dust Mite Extract      Seasonal Allergies      Immunization History   Administered Date(s) Administered     COVID-19 Vaccine 12+ (Pfizer) 2021, 2021, 10/10/2021, 2022     COVID-19 Vaccine Bivalent Booster 12+ (Pfizer) 10/11/2022     DT (PEDS <7y) 2000     Flu, Unspecified 2008, 2011, 2019     Influenza (High Dose) 3 valent vaccine 2016, 10/26/2017, 10/12/2018     Influenza (IIV3) PF 2008, 2011, 01/10/2013     Influenza Vaccine 65+ (Fluzone HD) 2020, 10/10/2021     Influenza Vaccine Im 4yrs+ 4 Valent CCIIV4 10/22/2019     Pneumo Conj 13-V (2010&after) 2015     Pneumococcal 23 valent 2013     Td (Adult), Adsorbed 2000     Td,adult,historic,unspecified 2000     Tdap (Adacel,Boostrix) 2011, 2014     Zoster vaccine recombinant adjuvanted (SHINGRIX) 10/15/2018, 2018     Zoster vaccine, live 02/10/2009       OB History    Para Term  AB Living   0 0 0 0 0 0   SAB IAB Ectopic Multiple Live Births   0 0 0 0 0     Past Medical History:   Diagnosis Date     Chronic UTI     controlled w PO and vaginal estrogen tx     YESSICA I (cervical intraepithelial neoplasia I) 14: Pap NIL; HR HPV neg -> D/C screening     Herpes zoster     Left facial      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     Past Surgical History:   Procedure Laterality Date     ABDOMINOPLASTY       ABDOMINOPLASTY       ARTHROSCOPY KNEE IRRIGATION AND DEBRIDEMENT      RT-Articular Cartilage     BIOPSY BREAST      benign open brest biopsy     BIOPSY BREAST Left     benign x2     BIOPSY CERVICAL, LOCAL EXCISION,  SINGLE/MULTIPLE  10/09/2017     BIOPSY OF BREAST, INCISIONAL      Description: Biopsy Breast Open;  Recorded: 06/06/2008;  Comments: BENIGN     CATARACT EXTRACTION W/ INTRAOCULAR LENS IMPLANT Left 01/2016     CATARACT EXTRACTION, BILATERAL  04/2015     COLPOSCOPY, BIOPSY, COMBINED  2/16/10    TZ-not seen     ENDOMETRIAL SAMPLING (BIOPSY)  3/31/05    benign     GENITOURINARY SURGERY  6 April 2011    cystoscopy with +1 trabeculaion     HC KNEE SCOPE, DIAGNOSTIC      Description: Arthroscopy Knee Right;  Recorded: 06/06/2008;  Comments: FOR MENISCUS TEAR     HC OSTEOTOMY METATARSAL (NOT 1ST)      Description: Metatarsal Osteotomy Of The Fifth Metatarsal;  Proc Date: 02/01/2007;     IR LUMBAR EPIDURAL STEROID INJECTION  9/30/2004     IR LUMBAR EPIDURAL STEROID INJECTION  10/11/2004     IR LUMBAR EPIDURAL STEROID INJECTION  11/5/2004     JOINT REPLACEMENT, HIP RT/LT  2008    Joint Replacement Hip RT/LT     RELEASE CARPAL TUNNEL BILATERAL       ZZC TOTAL HIP ARTHROPLASTY      Description: Total Hip Replacement;  Proc Date: 06/01/2008;  Comments: RIGHT     ZZC TOTAL HIP ARTHROPLASTY      Description: Total Hip Replacement;  Proc Date: 08/01/2008;     Family History   Problem Relation Age of Onset     Alcohol/Drug Father      Depression Father      Alcohol/Drug Brother      Breast Cancer Mother      Depression Mother      Diabetes Maternal Grandfather      Cancer Brother 68        blood cancer     Breast Cancer Cousin      Breast Cancer Cousin      Breast Cancer Cousin      Leukemia Brother      Social History     Socioeconomic History     Marital status: Single   Tobacco Use     Smoking status: Former     Smokeless tobacco: Never     Tobacco comments:     quit 30 years ago   Substance and Sexual Activity     Alcohol use: Yes     Alcohol/week: 0.0 - 0.8 standard drinks     Comment: Rare     Drug use: No     Sexual activity: Not Currently     Partners: Male     Birth control/protection: Post-menopausal   Other Topics Concern       Service No     Blood Transfusions No     Caffeine Concern No     Comment: 3-4 pop/d (diet)     Occupational Exposure No     Hobby Hazards No     Sleep Concern Yes     Comment: sleeps too much -- tired after 9-10 hrs/nite     Stress Concern Yes     Comment: holiday lonliness     Weight Concern Yes     Comment: admits to eating more than she uses -- declines wt today     Special Diet No     Back Care No     Exercise Yes     Comment: sedentary -- plans to restart     Bike Helmet No     Seat Belt No     Self-Exams No   Social History Narrative    How much exercise per week? Stationary bike, recent weight loss    How much calcium per day? Supplement       How much caffeine per day? 2 cans a day    How much vitamin D per day? supplment    Do you/your family wear seatbelts?  Yes    Do you/your family use safety helmets? Yes    Do you/your family use sunscreen? Yes    Do you/your family keep firearms in the home? Yes, locked    Do you/your family have a smoke detector(s)? Yes        Do you feel safe in your home? Yes    Has anyone ever touched you in an unwanted manner? No     Explain     Updated 8/28 CHIKI Santiago          ROS  As in HPI, all others negative.     PHQ-9 SCORE 2/5/2020 9/7/2021 1/31/2023   PHQ-9 Total Score - - -   PHQ-9 Total Score MyChart - - 4 (Minimal depression)   PHQ-9 Total Score 1 1 4     FARIDA-7 SCORE 2/5/2020 2/6/2021 2/9/2021   Total Score - 2 (minimal anxiety) -   Total Score 4 2 2     EXAM:  General - pleasant female in no acute distress.  Skin - mild erythema on clitoral base and in intertriginous space under R breast  Abdomen - soft, nontender, nondistended, no masses or organomegaly noted. Abdominoplasty scars  Musculoskeletal - no gross deformities.  Neurological - normal strength, sensation, and mental status.    Breast Exam:  Breast: Without visible skin changes. No dimpling or lesions seen.   Breasts supple, non-tender with palpation, no dominant mass, nodularity, or nipple  discharge noted bilaterally. Axillary nodes negative.      Pelvic Exam:  EG/BUS: Normal genital architecture without lesionsor abnormal secretions, mild erythema at the base of clitoris  Bartholin's, Urethra, [place]'s normal   Urethral meatus: normal   Urethra: no masses, tenderness, or scarring   Vagina: moist, atrophic with minimal rugae  Cervix: no lesions, scar c/w prior LEEP  Rectum:anus normal       ASSESSMENT/PLAN: 78 yo  female with long h/o HR HPV on paps.     # Tinea corporis, under R breast  - Advised patient that her previously Rx'd steroid cream can be used for this     # Irritant contact dermatitis, clitoral kraft  Most likely given persistent pruritis, mild localized erythema and tightening of underwear 2/2 patient's noted weight gain. Less likely fungal in nature given pdx findings and length of treatment with topical nystatin Rx'd by her dermatologist  - Can continue topical triamcinolone  - F/u with myself or dermatology if needing increased application, developing discharge, signs of infection, or spreading of rash    # Routine gyn care  Benefits of continuing cervical cancer follow up discussed with patient today, she is in agreement to continue these given her risk factors and desire to avoid malignancy.  - Mammogram and breast exam completed today  - Pap & HPV swab completed today  - If positive- as high liklihood, discussed w/ patient that her future annual visits can have Pap & Colposcopy done simultaneously given the persistence of of her HrHPV.  - Estrogen and Progesterone supplements refilled. Discussed risks/benefits of continuing hormonal therapy, including increased risk of breast cancer    Return to clinic in one year.  Follow-up as needed.    Shyam Jaurez, MS3    The above patient was seen and evaluated with the medical student who acted as my scribe for the above note. Agree with note, changes made as appropriate.    Macarena Gusman MD

## 2023-02-27 NOTE — LETTER
2023       RE: Tangela Chapa  [address]  [place] [address]     Dear Colleague,    Thank you for referring your patient, Tangela Chapa, to the [facility] [place] at [facility]. Please see a copy of my visit note below.      Progress Note    SUBJECTIVE:  Tangela Chapa is an 77 year old  who presents for an Annual Preventive Exam. Patient is followed by Dr. Logan Aguilar for primary care. She has an extensive history of persistent HR HPV, despite LEEP in 2017. At last years visit, patient expressed desires to discontinue annual pap smears/colposcopies. Today, she would like to discuss this further as she is not opposed to repeating these procedures to avoid malignancy. She would like to continue estrogen/progesterone replacement as well, in understanding of the risks that these may pose for malignancy/clot and stroke risk.     Notes ongoing pruritis in her clitoral kraft that has been present since Oct 2022, followed by her Dermatologist who prescribed topical triamcinolone and nystatin with c/f fungal involvement. She does think it has helped, however her pruritis returns once she stops using the topicals.     Menstrual History:  Menstrual History 2016   Period Cycle (Days) menapausal    Reviewed Today Yes       Last    Lab Results   Component Value Date    PAP NIL 2021     Dates/results of previous cervical pathology:   Pap smear hx:  21 - PAP NILM, HR HPV Positive   20 - colpo visually normal, ECC negative  19 - pap NILM, HR HPV positive  19 - colpo biopsy normal, ECC negative  10/29/18 - pap NILM, HR HPV positive  10/9/17 - LEEP negative  17 - pap NILM, HR HPV positive  10/4/16 - colpo biopsy normal, ECC negative  16 - pap NILM, HR HPV positive          Dates of previous cervical pathology treatment: LEEP .   Last   Lab Results   Component Value Date    HPV16  Negative 02/09/2021     Last   Lab Results   Component Value Date    HPV18 Negative 02/09/2021     Last   Lab Results   Component Value Date    HRHPV Positive 02/09/2021       Mammogram current: yes, mammogram today (not resulted yet)  Last Mammogram:   MA Screening Digital Bilateral    Result Date: 2/8/2022  Narrative: BILATERAL FULL FIELD DIGITAL SCREENING MAMMOGRAM Performed on: 2/8/22 Compared to: 03/29/2021, 02/10/2020, 01/23/2019, 10/23/2017, and 09/16/2016 Technique: This study was evaluated with the assistance of Computer-Aided Detection. Findings: The breasts have scattered areas of fibroglandular density.  There is no radiographic evidence of malignancy. IMPRESSION: ACR BI-RADS Category 1: Negative RECOMMENDED FOLLOW-UP: Annual routine screening mammogram The results and recommendations of this examination will be communicated to the patient.       HISTORY:  albuterol (PROAIR HFA/PROVENTIL HFA/VENTOLIN HFA) 108 (90 Base) MCG/ACT inhaler, Inhale 2 puffs into the lungs every 6 hours as needed for shortness of breath / dyspnea or wheezing  citalopram (CELEXA) 20 MG tablet, TAKE 1 TABLET (20 MG) BY MOUTH DAILY  cycloSPORINE (RESTASIS) 0.05 % ophthalmic emulsion, 1 drop every 12 hours.  estradiol (ESTRACE) 0.5 MG tablet, Take 1 tablet (0.5 mg) by mouth daily  estradiol (ESTRING) 2 MG vaginal ring, Place once vaginally and refill as instructed  fish oil-omega-3 fatty acids 1000 MG capsule, Take 1 capsule by mouth daily.  latanoprost (XALATAN) 0.005 % ophthalmic solution,   losartan (COZAAR) 100 MG tablet, Take 1 tablet (100 mg) by mouth daily  LUMIGAN 0.01 % SOLN ophthalmic solution,   Magnesium 100 MG TABS, Take 3 tablets by mouth At Bedtime.  mometasone (NASONEX) 50 MCG/ACT nasal spray, 2 sprays by Both Nostrils route daily.  NONFORMULARY, 1 Dose daily henrik 128 apply small amount to eyes at night  omeprazole (PRILOSEC) 20 MG DR capsule, Take 1 capsule (20 mg) by mouth daily  pentoxifylline ER (TRENTAL) 400 MG  CR tablet, Take 1 tablet (400 mg) by mouth 3 times daily (with meals)  pravastatin (PRAVACHOL) 40 MG tablet, Take 1 tablet (40 mg) by mouth daily  progesterone (PROMETRIUM) 100 MG capsule, Take 1 capsule (100 mg) by mouth At Bedtime  Tetrahydrozoline HCl (EYE DROPS OP), Apply  to eye. Hydro eye  traZODone (DESYREL) 100 MG tablet, Take 1 tablet (100 mg) by mouth At Bedtime  [DISCONTINUED] estrogens conjugated, synthetic A, (CENESTIN) 0.3 MG tablet, Take 1 tablet (0.3 mg) by mouth daily No further refills until seen by provider.    No current facility-administered medications on file prior to visit.    Allergies   Allergen Reactions     Dust Mite Extract      Seasonal Allergies      Immunization History   Administered Date(s) Administered     COVID-19 Vaccine 12+ (Pfizer) 2021, 2021, 10/10/2021, 2022     COVID-19 Vaccine Bivalent Booster 12+ (Pfizer) 10/11/2022     DT (PEDS <7y) 2000     Flu, Unspecified 2008, 2011, 2019     Influenza (High Dose) 3 valent vaccine 2016, 10/26/2017, 10/12/2018     Influenza (IIV3) PF 2008, 2011, 01/10/2013     Influenza Vaccine 65+ (Fluzone HD) 2020, 10/10/2021     Influenza Vaccine Im 4yrs+ 4 Valent CCIIV4 10/22/2019     Pneumo Conj 13-V (2010&after) 2015     Pneumococcal 23 valent 2013     Td (Adult), Adsorbed 2000     Td,adult,historic,unspecified 2000     Tdap (Adacel,Boostrix) 2011, 2014     Zoster vaccine recombinant adjuvanted (SHINGRIX) 10/15/2018, 2018     Zoster vaccine, live 02/10/2009       OB History    Para Term  AB Living   0 0 0 0 0 0   SAB IAB Ectopic Multiple Live Births   0 0 0 0 0     Past Medical History:   Diagnosis Date     Chronic UTI     controlled w PO and vaginal estrogen tx     YESSICA I (cervical intraepithelial neoplasia I) 201014: Pap NIL; HR HPV neg -> D/C screening     Herpes zoster     Left facial      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     Past Surgical History:   Procedure Laterality Date     ABDOMINOPLASTY       ABDOMINOPLASTY       ARTHROSCOPY KNEE IRRIGATION AND DEBRIDEMENT      RT-Articular Cartilage     BIOPSY BREAST      benign open brest biopsy     BIOPSY BREAST Left     benign x2     BIOPSY CERVICAL, LOCAL EXCISION, SINGLE/MULTIPLE  10/09/2017     BIOPSY OF BREAST, INCISIONAL      Description: Biopsy Breast Open;  Recorded: 06/06/2008;  Comments: BENIGN     CATARACT EXTRACTION W/ INTRAOCULAR LENS IMPLANT Left 01/2016     CATARACT EXTRACTION, BILATERAL  04/2015     COLPOSCOPY, BIOPSY, COMBINED  2/16/10    TZ-not seen     ENDOMETRIAL SAMPLING (BIOPSY)  3/31/05    benign     GENITOURINARY SURGERY  6 April 2011    cystoscopy with +1 trabeculaion     HC KNEE SCOPE, DIAGNOSTIC      Description: Arthroscopy Knee Right;  Recorded: 06/06/2008;  Comments: FOR MENISCUS TEAR     HC OSTEOTOMY METATARSAL (NOT 1ST)      Description: Metatarsal Osteotomy Of The Fifth Metatarsal;  Proc Date: 02/01/2007;     IR LUMBAR EPIDURAL STEROID INJECTION  9/30/2004     IR LUMBAR EPIDURAL STEROID INJECTION  10/11/2004     IR LUMBAR EPIDURAL STEROID INJECTION  11/5/2004     JOINT REPLACEMENT, HIP RT/LT  2008    Joint Replacement Hip RT/LT     RELEASE CARPAL TUNNEL BILATERAL       ZZC TOTAL HIP ARTHROPLASTY      Description: Total Hip Replacement;  Proc Date: 06/01/2008;  Comments: RIGHT     ZZC TOTAL HIP ARTHROPLASTY      Description: Total Hip Replacement;  Proc Date: 08/01/2008;     Family History   Problem Relation Age of Onset     Alcohol/Drug Father      Depression Father      Alcohol/Drug Brother      Breast Cancer Mother      Depression Mother      Diabetes Maternal Grandfather      Cancer Brother 68        blood cancer     Breast Cancer Cousin      Breast Cancer Cousin      Breast Cancer Cousin      Leukemia Brother      Social History     Socioeconomic History     Marital status: Single   Tobacco Use     Smoking status: Former      Smokeless tobacco: Never     Tobacco comments:     quit 30 years ago   Substance and Sexual Activity     Alcohol use: Yes     Alcohol/week: 0.0 - 0.8 standard drinks     Comment: Rare     Drug use: No     Sexual activity: Not Currently     Partners: Male     Birth control/protection: Post-menopausal   Other Topics Concern      Service No     Blood Transfusions No     Caffeine Concern No     Comment: 3-4 pop/d (diet)     Occupational Exposure No     Hobby Hazards No     Sleep Concern Yes     Comment: sleeps too much -- tired after 9-10 hrs/nite     Stress Concern Yes     Comment: holiday lonliness     Weight Concern Yes     Comment: admits to eating more than she uses -- declines wt today     Special Diet No     Back Care No     Exercise Yes     Comment: sedentary -- plans to restart     Bike Helmet No     Seat Belt No     Self-Exams No   Social History Narrative    How much exercise per week? Stationary bike, recent weight loss    How much calcium per day? Supplement       How much caffeine per day? 2 cans a day    How much vitamin D per day? supplment    Do you/your family wear seatbelts?  Yes    Do you/your family use safety helmets? Yes    Do you/your family use sunscreen? Yes    Do you/your family keep firearms in the home? Yes, locked    Do you/your family have a smoke detector(s)? Yes        Do you feel safe in your home? Yes    Has anyone ever touched you in an unwanted manner? No     Explain     Updated 8/28 CHIKI Santiago          ROS  As in HPI, all others negative.     PHQ-9 SCORE 2/5/2020 9/7/2021 1/31/2023   PHQ-9 Total Score - - -   PHQ-9 Total Score MyChart - - 4 (Minimal depression)   PHQ-9 Total Score 1 1 4     FARIDA-7 SCORE 2/5/2020 2/6/2021 2/9/2021   Total Score - 2 (minimal anxiety) -   Total Score 4 2 2     EXAM:  General - pleasant female in no acute distress.  Skin - mild erythema on clitoral base and in intertriginous space under R breast  Abdomen - soft, nontender, nondistended, no masses  or organomegaly noted. Abdominoplasty scars  Musculoskeletal - no gross deformities.  Neurological - normal strength, sensation, and mental status.    Breast Exam:  Breast: Without visible skin changes. No dimpling or lesions seen.   Breasts supple, non-tender with palpation, no dominant mass, nodularity, or nipple discharge noted bilaterally. Axillary nodes negative.      Pelvic Exam:  EG/BUS: Normal genital architecture without lesionsor abnormal secretions, mild erythema at the base of clitoris  Bartholin's, Urethra, [place]'s normal   Urethral meatus: normal   Urethra: no masses, tenderness, or scarring   Vagina: moist, atrophic with minimal rugae  Cervix: no lesions, scar c/w prior LEEP  Rectum:anus normal       ASSESSMENT/PLAN: 76 yo  female with long h/o HR HPV on paps.     # Tinea corporis, under R breast  - Advised patient that her previously Rx'd steroid cream can be used for this     # Irritant contact dermatitis, clitoral kraft  Most likely given persistent pruritis, mild localized erythema and tightening of underwear 2/2 patient's noted weight gain. Less likely fungal in nature given pdx findings and length of treatment with topical nystatin Rx'd by her dermatologist  - Can continue topical triamcinolone  - F/u with myself or dermatology if needing increased application, developing discharge, signs of infection, or spreading of rash    # Routine gyn care  Benefits of continuing cervical cancer follow up discussed with patient today, she is in agreement to continue these given her risk factors and desire to avoid malignancy.  - Mammogram and breast exam completed today  - Pap & HPV swab completed today  - If positive- as high liklihood, discussed w/ patient that her future annual visits can have Pap & Colposcopy done simultaneously given the persistence of of her HrHPV.  - Estrogen and Progesterone supplements refilled. Discussed risks/benefits of continuing hormonal therapy, including increased risk of  breast cancer    Return to clinic in one year.  Follow-up as needed.    Shyam uJarez, MS3    The above patient was seen and evaluated with the medical student who acted as my scribe for the above note. Agree with note, changes made as appropriate.    Macarena Gusman MD

## 2023-03-01 LAB
BKR LAB AP GYN ADEQUACY: ABNORMAL
BKR LAB AP GYN INTERPRETATION: ABNORMAL
BKR LAB AP HPV REFLEX: ABNORMAL
BKR LAB AP PREVIOUS ABNL DX: ABNORMAL
BKR LAB AP PREVIOUS ABNORMAL: ABNORMAL
PATH REPORT.COMMENTS IMP SPEC: ABNORMAL
PATH REPORT.COMMENTS IMP SPEC: ABNORMAL
PATH REPORT.RELEVANT HX SPEC: ABNORMAL

## 2023-03-03 PROBLEM — R87.810 CERVICAL HIGH RISK HPV (HUMAN PAPILLOMAVIRUS) TEST POSITIVE: Status: ACTIVE | Noted: 2021-02-09

## 2023-03-03 LAB
HUMAN PAPILLOMA VIRUS 16 DNA: NEGATIVE
HUMAN PAPILLOMA VIRUS 18 DNA: NEGATIVE
HUMAN PAPILLOMA VIRUS FINAL DIAGNOSIS: ABNORMAL
HUMAN PAPILLOMA VIRUS OTHER HR: POSITIVE

## 2023-03-08 ENCOUNTER — PATIENT OUTREACH (OUTPATIENT)
Dept: OBGYN | Facility: CLINIC | Age: 78
End: 2023-03-08
Payer: COMMERCIAL

## 2023-05-11 ENCOUNTER — TRANSFERRED RECORDS (OUTPATIENT)
Dept: HEALTH INFORMATION MANAGEMENT | Facility: CLINIC | Age: 78
End: 2023-05-11
Payer: COMMERCIAL

## 2023-06-09 ENCOUNTER — TELEPHONE (OUTPATIENT)
Dept: INTERNAL MEDICINE | Facility: CLINIC | Age: 78
End: 2023-06-09
Payer: COMMERCIAL

## 2023-06-09 NOTE — TELEPHONE ENCOUNTER
General Call    Contacts       Type Contact Phone/Fax    06/09/2023 04:48 PM CDT Phone (Incoming) FlavioElizabeth hernandez (Self) [phone] (H)        Reason for Call:  Covid shot    What are your questions or concerns:  Patient would like to know if she could get her covid shot/booster on 6/28/23 as she has an appointment with Dr. De Luna on this day    Date of last appointment with provider: n/a    Could we send this information to you in [place] or would you prefer to receive a phone call?:   Patient would prefer a phone call   Okay to leave a detailed message?: Yes at Home number on file [phone] (home)

## 2023-06-12 NOTE — TELEPHONE ENCOUNTER
I called and left patient a detailed message that booster can be given at her visit. For any further questions to return call to clinic.

## 2023-06-28 ENCOUNTER — OFFICE VISIT (OUTPATIENT)
Dept: INTERNAL MEDICINE | Facility: CLINIC | Age: 78
End: 2023-06-28
Payer: COMMERCIAL

## 2023-06-28 VITALS
DIASTOLIC BLOOD PRESSURE: 75 MMHG | BODY MASS INDEX: 33.04 KG/M2 | HEIGHT: 63 IN | SYSTOLIC BLOOD PRESSURE: 135 MMHG | RESPIRATION RATE: 13 BRPM | WEIGHT: 186.5 LBS | TEMPERATURE: 99.5 F | OXYGEN SATURATION: 98 % | HEART RATE: 59 BPM

## 2023-06-28 DIAGNOSIS — I10 ESSENTIAL HYPERTENSION: ICD-10-CM

## 2023-06-28 DIAGNOSIS — F33.42 RECURRENT MAJOR DEPRESSIVE DISORDER, IN FULL REMISSION (H): ICD-10-CM

## 2023-06-28 DIAGNOSIS — G47.00 INSOMNIA, UNSPECIFIED TYPE: ICD-10-CM

## 2023-06-28 DIAGNOSIS — F41.9 ANXIETY: ICD-10-CM

## 2023-06-28 DIAGNOSIS — R73.09 ELEVATED GLUCOSE: ICD-10-CM

## 2023-06-28 DIAGNOSIS — E78.00 HYPERCHOLESTEROLEMIA: ICD-10-CM

## 2023-06-28 DIAGNOSIS — Z00.00 ENCOUNTER FOR MEDICARE ANNUAL WELLNESS EXAM: Primary | ICD-10-CM

## 2023-06-28 PROBLEM — B97.7 HIGH RISK HPV INFECTION: Status: RESOLVED | Noted: 2017-10-26 | Resolved: 2023-06-28

## 2023-06-28 LAB
ALBUMIN SERPL BCG-MCNC: 4.5 G/DL (ref 3.5–5.2)
ALP SERPL-CCNC: 69 U/L (ref 35–104)
ALT SERPL W P-5'-P-CCNC: 46 U/L (ref 0–50)
ANION GAP SERPL CALCULATED.3IONS-SCNC: 9 MMOL/L (ref 7–15)
AST SERPL W P-5'-P-CCNC: 40 U/L (ref 0–45)
BILIRUB SERPL-MCNC: 0.7 MG/DL
BUN SERPL-MCNC: 11.3 MG/DL (ref 8–23)
CALCIUM SERPL-MCNC: 9.4 MG/DL (ref 8.8–10.2)
CHLORIDE SERPL-SCNC: 106 MMOL/L (ref 98–107)
CHOLEST SERPL-MCNC: 203 MG/DL
CREAT SERPL-MCNC: 0.74 MG/DL (ref 0.51–0.95)
DEPRECATED HCO3 PLAS-SCNC: 25 MMOL/L (ref 22–29)
GFR SERPL CREATININE-BSD FRML MDRD: 83 ML/MIN/1.73M2
GLUCOSE SERPL-MCNC: 93 MG/DL (ref 70–99)
HBA1C MFR BLD: 5.5 % (ref 0–5.6)
HDLC SERPL-MCNC: 42 MG/DL
LDLC SERPL CALC-MCNC: 125 MG/DL
NONHDLC SERPL-MCNC: 161 MG/DL
POTASSIUM SERPL-SCNC: 3.9 MMOL/L (ref 3.4–5.3)
PROT SERPL-MCNC: 7.4 G/DL (ref 6.4–8.3)
SODIUM SERPL-SCNC: 140 MMOL/L (ref 136–145)
TRIGL SERPL-MCNC: 178 MG/DL

## 2023-06-28 PROCEDURE — 0124A COVID-19 BIVALENT 12+ (PFIZER): CPT | Performed by: INTERNAL MEDICINE

## 2023-06-28 PROCEDURE — 99397 PER PM REEVAL EST PAT 65+ YR: CPT | Mod: 25 | Performed by: INTERNAL MEDICINE

## 2023-06-28 PROCEDURE — 36415 COLL VENOUS BLD VENIPUNCTURE: CPT | Performed by: INTERNAL MEDICINE

## 2023-06-28 PROCEDURE — 83036 HEMOGLOBIN GLYCOSYLATED A1C: CPT | Performed by: INTERNAL MEDICINE

## 2023-06-28 PROCEDURE — 91312 COVID-19 BIVALENT 12+ (PFIZER): CPT | Performed by: INTERNAL MEDICINE

## 2023-06-28 PROCEDURE — 80061 LIPID PANEL: CPT | Performed by: INTERNAL MEDICINE

## 2023-06-28 PROCEDURE — 99213 OFFICE O/P EST LOW 20 MIN: CPT | Mod: 25 | Performed by: INTERNAL MEDICINE

## 2023-06-28 PROCEDURE — 80053 COMPREHEN METABOLIC PANEL: CPT | Performed by: INTERNAL MEDICINE

## 2023-06-28 RX ORDER — CITALOPRAM HYDROBROMIDE 20 MG/1
30 TABLET ORAL DAILY
Qty: 135 TABLET | Refills: 1 | Status: SHIPPED | OUTPATIENT
Start: 2023-06-28 | End: 2024-07-31

## 2023-06-28 ASSESSMENT — ENCOUNTER SYMPTOMS
DIARRHEA: 1
BREAST MASS: 0
JOINT SWELLING: 1
FREQUENCY: 1
ABDOMINAL PAIN: 1
MYALGIAS: 1
COUGH: 1
ARTHRALGIAS: 1
DYSURIA: 1

## 2023-06-28 ASSESSMENT — ACTIVITIES OF DAILY LIVING (ADL): CURRENT_FUNCTION: NO ASSISTANCE NEEDED

## 2023-06-28 ASSESSMENT — PATIENT HEALTH QUESTIONNAIRE - PHQ9
SUM OF ALL RESPONSES TO PHQ QUESTIONS 1-9: 8
SUM OF ALL RESPONSES TO PHQ QUESTIONS 1-9: 8
10. IF YOU CHECKED OFF ANY PROBLEMS, HOW DIFFICULT HAVE THESE PROBLEMS MADE IT FOR YOU TO DO YOUR WORK, TAKE CARE OF THINGS AT HOME, OR GET ALONG WITH OTHER PEOPLE: VERY DIFFICULT

## 2023-06-28 ASSESSMENT — PAIN SCALES - GENERAL: PAINLEVEL: NO PAIN (0)

## 2023-06-28 NOTE — PROGRESS NOTES
"SUBJECTIVE:   Elizabeth is a 78 year old who presents for Preventive Visit.      6/28/2023     2:44 PM   Additional Questions   Roomed by Sheila GOFF     Are you in the first 12 months of your Medicare coverage?  No    Healthy Habits:     In general, how would you rate your overall health?  Good    Frequency of exercise:  1 day/week    Duration of exercise:  15-30 minutes    Do you usually eat at least 4 servings of fruit and vegetables a day, include whole grains    & fiber and avoid regularly eating high fat or \"junk\" foods?  No    Taking medications regularly:  Yes    Medication side effects:  None    Ability to successfully perform activities of daily living:  No assistance needed    Home Safety:  No safety concerns identified    Hearing Impairment:  No hearing concerns    In the past 6 months, have you been bothered by leaking of urine? Yes    In general, how would you rate your overall mental or emotional health?  Fair      PHQ-2 Total Score: 4        Have you ever done Advance Care Planning? (For example, a Health Directive, POLST, or a discussion with a medical provider or your loved ones about your wishes): No, advance care planning information given to patient to review.  Patient plans to discuss their wishes with loved ones or provider.         Fall risk  Fallen 2 or more times in the past year?: No  Any fall with injury in the past year?: No    Cognitive Screening Patient has no identifiable cognitive impairment based on our visit today through our conversation and examination         Reviewed and updated as needed this visit by clinical staff   Tobacco  Allergies  Meds              Reviewed and updated as needed this visit by Provider                 Social History     Tobacco Use     Smoking status: Former     Smokeless tobacco: Never     Tobacco comments:     quit 30 years ago 1970   Substance Use Topics     Alcohol use: Yes     Alcohol/week: 0.0 - 0.8 standard drinks of alcohol     Comment: Rare "             6/28/2023     2:54 PM   Alcohol Use   Prescreen: >3 drinks/day or >7 drinks/week? No     Do you have a current opioid prescription? No  Do you use any other controlled substances or medications that are not prescribed by a provider? None              Current providers sharing in care for this patient include:   Patient Care Team:  Brennan De Luna MD as PCP - General (Internal Medicine)  Macarena Gusman MD as MD (OB/Gyn)  Macarena Gusman MD as Assigned OBGYN Provider  Miguel Doherty DPM as Assigned Surgical Provider  Brennan De Luna MD as Assigned PCP    The following health maintenance items are reviewed in Epic and correct as of today:  Health Maintenance   Topic Date Due     DEPRESSION ACTION PLAN  Never done     ANNUAL REVIEW OF HM ORDERS  12/10/2022     COVID-19 Vaccine (6 - Pfizer series) 02/11/2023     COLPOSCOPY  05/27/2023     PAP FOLLOW-UP  08/27/2023 (Originally 5/25/2023)     HPV FOLLOW-UP  08/27/2023 (Originally 5/25/2023)     LUNG CANCER SCREENING  11/17/2023     PHQ-9  12/28/2023     MEDICARE ANNUAL WELLNESS VISIT  02/27/2024     MAMMO SCREENING  02/27/2024     FALL RISK ASSESSMENT  06/28/2024     DTAP/TDAP/TD IMMUNIZATION (3 - Td or Tdap) 07/01/2024     LIPID  12/10/2026     ADVANCE CARE PLANNING  12/11/2026     COLORECTAL CANCER SCREENING  01/04/2028     DEXA  02/05/2031     HEPATITIS C SCREENING  Completed     INFLUENZA VACCINE  Completed     Pneumococcal Vaccine: 65+ Years  Completed     ZOSTER IMMUNIZATION  Completed     IPV IMMUNIZATION  Aged Out     MENINGITIS IMMUNIZATION  Aged Out           Mammogram Screening - Patient over age 75, has elected to continue with screening.  Pertinent mammograms are reviewed under the imaging tab.    Review of Systems   Respiratory: Positive for cough.    Gastrointestinal: Positive for abdominal pain and diarrhea.   Breasts:  Negative for tenderness, breast mass and discharge.   Genitourinary: Positive for dysuria,  "frequency and urgency. Negative for pelvic pain, vaginal bleeding and vaginal discharge.   Musculoskeletal: Positive for arthralgias, joint swelling and myalgias.         OBJECTIVE:   BP (!) 149/83   Pulse 59   Temp 99.5  F (37.5  C) (Tympanic)   Resp 13   Ht 1.61 m (5' 3.39\")   Wt 84.6 kg (186 lb 8 oz)   LMP  (LMP Unknown)   SpO2 98%   BMI 32.64 kg/m   Estimated body mass index is 32.64 kg/m  as calculated from the following:    Height as of this encounter: 1.61 m (5' 3.39\").    Weight as of this encounter: 84.6 kg (186 lb 8 oz).  Physical Exam  GENERAL: healthy, alert and no distress  RESP: lungs clear to auscultation - no rales, rhonchi or wheezes  CV: regular rate and rhythm, normal S1 S2, no S3 or S4, no murmur, click or rub, no peripheral edema and peripheral pulses strong  NEURO: Normal strength and tone, mentation intact and speech normal  PSYCH: mentation appears normal, affect normal/bright    Diagnostic Test Results:  Labs reviewed in Epic    ASSESSMENT / PLAN:   (Z00.00) Encounter for Medicare annual wellness exam  (primary encounter diagnosis)  Comment: normal  Plan: HM items reviewed    (I10) Essential hypertension  Comment: controlled  Plan: Will plan to continue on previous medication without changes     (E78.00) Hypercholesterolemia  Comment: on statin  Plan: Comprehensive metabolic panel, Lipid panel         reflex to direct LDL Fasting        Will plan to continue on previous medication without changes     (F33.42) Recurrent major depressive disorder, in full remission (H)  Comment: more sedentary, less energy and drive  Plan: will bump up celexa to 30mg. She will check in with me via virtual visit in 1-2 months.    (G47.00) Insomnia, unspecified type  Comment: ongoing  Plan: Will plan to continue on previous medication without changes     (R73.09) Elevated glucose  Comment: hx of  Plan: Hemoglobin A1c        recheck    Hx of abnormal pap. Most recent ASCUS in 2/2023. Followed by " GYN          COUNSELING:  Reviewed preventive health counseling, as reflected in patient instructions        She reports that she has quit smoking. She has never used smokeless tobacco.      Appropriate preventive services were discussed with this patient, including applicable screening as appropriate for cardiovascular disease, diabetes, osteopenia/osteoporosis, and glaucoma.  As appropriate for age/gender, discussed screening for colorectal cancer, prostate cancer, breast cancer, and cervical cancer. Checklist reviewing preventive services available has been given to the patient.    Reviewed patients plan of care and provided an AVS. The Basic Care Plan (routine screening as documented in Health Maintenance) for Tangela meets the Care Plan requirement. This Care Plan has been established and reviewed with the Patient.          Brennan De Luna MD  [facility]    Identified Health Risks:    I have reviewed Opioid Use Disorder and Substance Use Disorder risk factors and made any needed referrals.     Answers for HPI/ROS submitted by the patient on 6/28/2023  If you checked off any problems, how difficult have these problems made it for you to do your work, take care of things at home, or get along with other people?: Very difficult  PHQ9 TOTAL SCORE: 8

## 2023-06-28 NOTE — PATIENT INSTRUCTIONS
Patient Education   Personalized Prevention Plan  You are due for the preventive services outlined below.  Your care team is available to assist you in scheduling these services.  If you have already completed any of these items, please share that information with your care team to update in your medical record.  Health Maintenance Due   Topic Date Due     Depression Action Plan  Never done     ANNUAL REVIEW OF HM ORDERS  12/10/2022     COVID-19 Vaccine (6 - Pfizer series) 02/11/2023     Colposcopy  05/27/2023       Exercise for a Healthier Heart  You may wonder how you can improve the health of your heart. If you re thinking about exercise, you re on the right track. You don t need to become an athlete. But you do need a certain amount of brisk exercise to help strengthen your heart. If you have been diagnosed with a heart condition, your healthcare provider may advise exercise to help your condition. To help make exercise a habit, choose safe, fun activities.      Exercise with a friend. When activity is fun, you're more likely to stick with it.     Before you start  Check with your healthcare provider before starting an exercise program. This is especially important if you haven't been active for a while. It's also important if you have a long-term (chronic) health problem such as heart disease, diabetes, or obesity. Also check with your provider if you're at high risk for having these problems.   Why exercise?  Exercising regularly offers many healthy rewards. It can help you do all of these:     Improve your blood cholesterol level to help prevent further heart trouble.    Lower your blood pressure to help prevent a stroke or heart attack.    Control diabetes or reduce your risk of getting this disease.    Improve your heart and lung function.    Reach and stay at a healthy weight.    Make your muscles stronger so you can stay active.    Prevent falls and fractures by slowing the loss of bone mass  (osteoporosis).    Manage stress better.    Improve your sense of self and your body image.  Exercise tips      Ease into your routine. Set small goals. Then build on them. Talk with your healthcare provider first before starting an exercise routine if you're not sure what your activity level should be.    Exercise on most days. Aim for a total of at least 150 minutes (2 hours and 30 minutes) or more of moderate-intensity aerobic activity each week. You could also do 75 minutes (1 hour and 15 minutes) or more of vigorous-intensity aerobic activity each week. Or try for a combination of both. Moderate activity means that you breathe heavier and your heart rate increases, but you can still talk. Think about doing at least 30 minutes of moderate exercise, 5 times a week. It's OK to work up to the 30-minute period over time. Examples of moderate-intensity activity are brisk walking, gardening, and water aerobics.    Step up your daily activity level.  Along with your exercise program, try being more active the whole day. Walk instead of drive. Or park further away so that you take more steps each day. Do more household tasks or yard work. You may not be able to meet the advised amount of physical activity. But doing some moderate- or vigorous-intensity aerobic activity can help reduce your risk for heart disease. Your healthcare provider can help you figure out what is best for you.    Choose 1 or more activities you enjoy.  Walking is one of the easiest things you can do. You can also try swimming, riding a bike, dancing, or taking an exercise class.    Call 911  Call 911 right away if any of these occur:     Chest pain that doesn't go away quickly with rest    New burning, tightness, pressure, or heaviness in your chest, neck, shoulders, back, or arms    Abnormal or severe shortness of breath    A very fast or irregular heartbeat (palpitations)    Fainting  When to call your healthcare provider  Call your healthcare  provider if you have any of these:     Dizziness or lightheadedness    Mild shortness of breath or chest pain    Increased or new joint or muscle pain    [employer] last reviewed this educational content on 7/1/2022 2000-2022 [employer]. All rights reserved. This information is not intended as a substitute for professional medical care. Always follow your healthcare professional's instructions.          Understanding USDA MyPlate  The USDA has guidelines to help you make healthy food choices. These are called MyPlate. MyPlate shows the food groups that make up healthy meals using the image of a place setting. Before you eat, think about the healthiest choices for what to put on your plate or in your cup or bowl. To learn more about building a healthy plate, visit www.choosemyplate.gov.     The food groups    Fruits. Any fruit or 100% fruit juice counts as part of the Fruit Group. Fruits may be fresh, canned, frozen, or dried, and may be whole, cut-up, or pureed. Make 1/2 of your plate fruits and vegetables.    Vegetables. Any vegetable or 100% vegetable juice counts as a member of the Vegetable Group. Vegetables may be fresh, frozen, canned, or dried. They can be served raw or cooked and may be whole, cut-up, or mashed. Make 1/2 of your plate fruits and vegetables.    Grains. All foods made from grains are part of the Grains Group. These include wheat, rice, oats, cornmeal, and barley. Grains are often used to make foods such as bread, pasta, oatmeal, cereal, tortillas, and grits. Grains should be no more than 1/4 of your plate. At least half of your grains should be whole grains.    Protein. This group includes meat, poultry, seafood, beans and peas, eggs, processed soy products (such as tofu), nuts (including nut butters), and seeds. Make protein choices no more than 1/4 of your plate. Meat and poultry choices should be lean or low fat.    Dairy. The Dairy Group includes all fluid milk products and  foods made from milk that contain calcium, such as yogurt and cheese. (Foods that have little calcium, such as cream, butter, and cream cheese, are not part of this group.) Most dairy choices should be low-fat or fat-free.    Oils. Oils aren't a food group, but they do contain essential nutrients. However it's important to watch your intake of oils. These are fats that are liquid at room temperature. They include canola, corn, olive, soybean, vegetable, and sunflower oil. Foods that are mainly oil include mayonnaise, certain salad dressings, and soft margarines. You likely already get your daily oil allowance from the foods you eat.  Things to limit  Eating healthy also means limiting these things in your diet:    Salt (sodium). Many processed foods have a lot of sodium. To keep sodium intake down, eat fresh vegetables, meats, poultry, and seafood when possible. Purchase low-sodium, reduced-sodium, or no-salt-added food products at the store. And don't add salt to your meals at home. Instead, season them with herbs and spices such as dill, oregano, cumin, and paprika. Or try adding flavor with lemon or lime zest and juice.    Saturated fat. Saturated fats are most often found in animal products such as beef, pork, and chicken. They are often solid at room temperature, such as butter. To reduce your saturated fat intake, choose leaner cuts of meat and poultry. And try healthier cooking methods such as grilling, broiling, roasting, or baking. For a simple lower-fat swap, use plain nonfat yogurt instead of mayonnaise when making potato salad or macaroni salad.    Added sugars. These are sugars added to foods. They are in foods such as ice cream, candy, soda, fruit drinks, sports drinks, energy drinks, cookies, pastries, jams, and syrups. Cut down on added sugars by sharing sweet treats with a family member or friend. You can also choose fruit for dessert, and drink water or other unsweetened beverages.  [employer] last  reviewed this educational content on 6/1/2020 2000-2022 [employer]. All rights reserved. This information is not intended as a substitute for professional medical care. Always follow your healthcare professional's instructions.          Urinary Incontinence, Female (Adult)   Urinary incontinence means loss of bladder control. This problem affects many women, especially as they get older. If you have incontinence, you may be embarrassed to ask for help. But know that this problem can be treated.   Types of Incontinence  There are different types of incontinence. Two of the main types are described here. You can have more than one type.     Stress incontinence. With this type, urine leaks when pressure (stress) is put on the bladder. This may happen when you cough, sneeze, or laugh. Stress incontinence most often occurs because the pelvic floor muscles that support the bladder and urethra are weak. This can happen after pregnancy and vaginal childbirth or a hysterectomy. It can also be due to excess body weight or hormone changes.    Urge incontinence (also called overactive bladder). With this type, a sudden urge to urinate is felt often. This may happen even though there may not be much urine in the bladder. The need to urinate often during the night is common. Urge incontinence most often occurs because of bladder spasms. This may be due to bladder irritation or infection. Damage to bladder nerves or pelvic muscles, constipation, and certain medicines can also lead to urge incontinence.  Treatment depends on the cause. Further evaluation is needed to find the type you have. This will likely include an exam and certain tests. Based on the results, you and your healthcare provider can then plan treatment. Until a diagnosis is made, the home care tips below can help ease symptoms.   Home care    Do pelvic floor muscle exercises, if they are prescribed. The pelvic floor muscles help support the  bladder and urethra. Many women find that their symptoms improve when doing special exercises that strengthen these muscles. To do the exercises, contract the muscles you would use to stop your stream of urine. But do this when you re not urinating. Hold for 10 seconds, then relax. Repeat 10 to 20 times in a row, at least 3 times a day. Your healthcare provider may give you other instructions for how to do the exercises and how often.    Keep a bladder diary. This helps track how often and how much you urinate over a set period of time. Bring this diary with you to your next visit with the provider. The information can help your provider learn more about your bladder problem.    Lose weight, if advised to by your provider. Extra weight puts pressure on the bladder. Your provider can help you create a weight-loss plan that s right for you. This may include exercising more and making certain diet changes.    Don't have foods and drinks that may irritate the bladder. These can include alcohol and caffeinated drinks.    Quit smoking. Smoking and other tobacco use can lead to a long-term (chronic) cough that strains the pelvic floor muscles. Smoking may also damage the bladder and urethra. Talk with your provider about treatments or methods you can use to quit smoking.    If drinking large amounts of fluid makes you have symptoms, you may be advised to limit your fluid intake. You may also be advised to drink most of your fluids during the day and to limit fluids at night.    If you re worried about urine leakage or accidents, you may wear absorbent pads to catch urine. Change the pads often. This helps reduce discomfort. It may also reduce the risk of skin or bladder infections.    Follow-up care  Follow up with your healthcare provider, or as directed. It may take some to find the right treatment for your problem. But healthy lifestyle changes can be made right away. These include such things as exercising on a regular  basis, eating a healthy diet, losing weight (if needed), and quitting smoking. Your treatment plan may include special therapies or medicines. Certain procedures or surgery may also be options. Talk about any questions you have with your provider.   When to seek medical advice  Call the healthcare provider right away if any of these occur:    Fever of 100.4 F (38 C) or higher, or as directed by your provider    Bladder pain or fullness    Belly swelling    Nausea or vomiting    Back pain    Weakness, dizziness, or fainting  [employer] last reviewed this educational content on 1/1/2020 2000-2022 [employer]. All rights reserved. This information is not intended as a substitute for professional medical care. Always follow your healthcare professional's instructions.        Your Health Risk Assessment indicates you feel you are not in good emotional health.    Recreation   Recreation is not limited to sports and team events. It includes any activity that provides relaxation, interest, enjoyment, and exercise. Recreation provides an outlet for physical, mental, and social energy. It can give a sense of worth and achievement. It can help you stay healthy.    Mental Exercise and Social Involvement  Mental and emotional health is as important as physical health. Keep in touch with friends and family. Stay as active as possible. Continue to learn and challenge yourself.   Things you can do to stay mentally active are:    Learn something new, like a foreign language or musical instrument.     Play SCRABBLE or do crossword puzzles. If you cannot find people to play these games with you at home, you can play them with others on your computer through the Internet.     Join a games club--anything from card games to chess or checkers or lawn bowling.     Start a new hobby.     Go back to school.     Volunteer.     Read.   Keep up with world events.    Depression and Suicide in Older Adults  Nearly 2 million older  "adults in the [place] have some type of depression. Some of them even take their own lives. Yet depression among older adults is often ignored. Learning about the warning signs of depression may help spare a loved one needless pain. You may also save a life.   What is depression?  Depression is a common and serious illness. It affects the way you think and feel. It is not a normal part of aging. It is not a sign of weakness, a character flaw, or something you can \"snap out of.\" Most people with depression need treatment to get better. The most common symptom is a feeling of deep sadness. People who are depressed also may seem tired and listless. And nothing seems to give them pleasure. It s normal to grieve or be sad sometimes. But sadness lessens or passes with time. Depression rarely goes away or improves on its own. Other symptoms of depression are:     Sleeping more or less than normal    Eating more or less than normal    Having headaches, stomachaches, or other pains that don t go away    Feeling nervous,  empty,  or worthless    Crying a lot    Thinking or talking about suicide or death    Loss of interest in activities previously enjoyed    Social isolation    Feeling confused or forgetful  What causes it?  The causes of depression aren t fully known. But it is thought to result from a complex blend of these factors:     Biochemistry. Certain chemicals in the brain play a role.    Genes. Depression does run in families.    Life stress. Life stresses can also trigger depression in some people. Older adults often face many stressors. These may include isolation, the death of friends or a spouse, health problems, and financial concerns.    Chronic health conditions. This includes diabetes, heart disease, or cancer. These can cause symptoms of depression. Medicine side effects can cause changes in thoughts and behaviors.  Giving support    Depressed people often may not want to ask for help. When they do, they may " be ignored. Or they may get the wrong treatment. You can help by showing parents and older friends love and support. If they seem depressed, don t lecture the person or ignore the symptoms. Don't discount the symptoms as a  normal  part of aging. They are not. Get involved, listen, and show interest and support.   Help them understand that depression is a treatable illness. Tell them you can help them find the right treatment. Offer to go to their healthcare provider's appointment with them for support when the symptoms are discussed. With their approval, contact a local mental health center, social service agency, or hospital about services.   Helping at healthcare visits  You can be an advocate for them at healthcare appointments. Many older adults have chronic illnesses. Many of these can cause symptoms of depression. Medicine side effects can change thoughts and behaviors.   You can help make sure that the healthcare provider looks at all of these factors. They should refer your family member or friend to a mental healthcare provider when needed. In some cases, untreated depression can lead to a misdiagnosis. A person may be diagnosed with a brain disorder such as dementia. If the healthcare provider does not take the issue of depression seriously, help your family member or friend find another provider.   Asking about self-harm thoughts  If you think an older person you care about could be suicidal, ask,  Have you thought about suicide?  Most people will tell you the truth. If they say yes, they may already have a plan for how and when they will attempt it. Find out as much as you can. The more detailed the plan, and the easier it is to carry out, the more danger the person is in right now. Tell the person you are there for them and you do not want them to get harmed. Don't wait to get help for the person. Call the person's healthcare provider, local hospital, or emergency services.   Call 988 in a crisis    Never leave the person alone. A person who is actively suicidal needs crisis care right away. They need constant supervision. Never leave the person out of sight. Call 138 Tell the crisis counselor you need help for a person who is thinking about suicide. The counselor will help you get the right level of crisis help. You may be advised to take the person to the nearest emergency room.   The National Suicide Prevention Lifeline is available at 988, or 605-520-ISXG (930-831-9527), or www.suicidepreVIDA Diagnostics.org. When you call or text 988, you will be connected to trained counselors who are part of the National Suicide Prevention Lifeline network. An online chat option is also available. Lifeline is free and available 24/7.   To learn more    National Suicide Prevention Lifeline at www.suicideGoInformaticsline.org  or 196-554-EIBQ (623-503-2684)    National [place] on Mental Illness at www.nathan.org  or 966-158-UJLG (545-971-4623)    [facility] at www.nmha.org  or 953-066-4521    National [place] of Mental Health at www.nimh.nih.gov  or 193-978-7744    Olivia last reviewed this educational content on 7/1/2022 2000-2022 [employer]. All rights reserved. This information is not intended as a substitute for professional medical care. Always follow your healthcare professional's instructions.

## 2023-06-28 NOTE — PROGRESS NOTES
"    She is at risk for lack of exercise and has been provided with information to increase physical activity for the benefit of her well-being.  The patient was counseled and encouraged to consider modifying their diet and eating habits. She was provided with information on recommended healthy diet options.  Information on urinary incontinence and treatment options given to patient.  The patient was provided with suggestions to help her develop a healthy emotional lifestyle.  The patient's PHQ-9 score is consistent with mild depression. She was provided with information regarding depression and was advised to schedule a follow up appointment in 8 weeks to further address this issue.  Answers for HPI/ROS submitted by the patient on 6/28/2023  If you checked off any problems, how difficult have these problems made it for you to do your work, take care of things at home, or get along with other people?: Very difficult  PHQ9 TOTAL SCORE: 8  In general, how would you rate your overall physical health?: good  Frequency of exercise:: 1 day/week  Do you usually eat at least 4 servings of fruit and vegetables a day, include whole grains & fiber, and avoid regularly eating high fat or \"junk\" foods? : No  Taking medications regularly:: Yes  Medication side effects:: None  Activities of Daily Living: no assistance needed  Home safety: no safety concerns identified  Hearing Impairment:: no hearing concerns  In the past 6 months, have you been bothered by leaking of urine?: Yes  abdominal pain: Yes  cough: Yes  diarrhea: Yes  frequency: Yes  arthralgias: Yes  joint swelling: Yes  myalgias: Yes  dysuria: Yes  urgency: Yes  pelvic pain: No  vaginal bleeding: No  vaginal discharge: No  tenderness: No  breast mass: No  breast discharge: No  In general, how would you rate your overall mental or emotional health?: fair  Duration of exercise:: 15-30 minutes      "

## 2023-07-11 ENCOUNTER — TELEPHONE (OUTPATIENT)
Dept: PHARMACY | Facility: CLINIC | Age: 78
End: 2023-07-11
Payer: COMMERCIAL

## 2023-07-11 NOTE — TELEPHONE ENCOUNTER
Called patient to conduct a comprehensive medication review as requested by patients [employer] insurance. Left a voicemail for a call back.    Guevara Ba, Pharm. D., [facility]  Medication Therapy Management Pharmacist  Direct Voicemail: [phone]

## 2023-07-26 DIAGNOSIS — N95.9 MENOPAUSAL AND POSTMENOPAUSAL DISORDER: ICD-10-CM

## 2023-07-26 RX ORDER — ESTRADIOL 2 MG/1
RING VAGINAL
Qty: 1 EACH | Refills: 3 | Status: SHIPPED | OUTPATIENT
Start: 2023-07-26 | End: 2024-08-26

## 2023-08-31 ENCOUNTER — TELEPHONE (OUTPATIENT)
Dept: INTERNAL MEDICINE | Facility: CLINIC | Age: 78
End: 2023-08-31
Payer: COMMERCIAL

## 2023-08-31 NOTE — TELEPHONE ENCOUNTER
Reason for Call:  Medication or medication refill:    Do you use a [facility]?  Name of the pharmacy and phone number for the current request:      [facility] - [address] [phone]     Name of the medication requested:     citalopram (CELEXA) 30 MG tablet     Other request:     Patient Rx was increased from 20 mg to 30 mg she used up her last bottle of 20 mg and took 1.5 tablet. Can you refill for 3- 10 mg tablet dosage for new refill?     Patient is out of medication         Call taken on 8/31/2023 at 2:34 PM by Shobha Joya

## 2023-08-31 NOTE — TELEPHONE ENCOUNTER
Spoke with patient and she read to me the instructions on her empty bottle of citalopram that said 0 refills, this was the bottle of her previous prescription that was for 20 mg once daily.     I informed her that a new prescription was sent to [facility] pharmacy on 6/28/23 for the 30 mg prescription (1.5 tablets of 20 mg tablets daily).     Pt stated understanding and will contact her pharmacy for them to prepare the fill of the 20 mg tablet prescription that was sent 6/28/23.

## 2023-09-11 ENCOUNTER — TRANSFERRED RECORDS (OUTPATIENT)
Dept: HEALTH INFORMATION MANAGEMENT | Facility: CLINIC | Age: 78
End: 2023-09-11
Payer: COMMERCIAL

## 2023-09-21 DIAGNOSIS — E78.00 HYPERCHOLESTEROLEMIA: ICD-10-CM

## 2023-09-21 RX ORDER — PRAVASTATIN SODIUM 40 MG
40 TABLET ORAL DAILY
Qty: 90 TABLET | Refills: 0 | Status: SHIPPED | OUTPATIENT
Start: 2023-09-21 | End: 2023-12-27

## 2023-09-28 NOTE — TELEPHONE ENCOUNTER
Called to offer an MTM appointment. Patient would like to opt out.     Ning Barnes, PharmD, [facility]  Medication Therapy Management Pharmacist   September 28, 2023

## 2023-10-09 ENCOUNTER — OFFICE VISIT (OUTPATIENT)
Dept: OBGYN | Facility: CLINIC | Age: 78
End: 2023-10-09
Attending: OBSTETRICS & GYNECOLOGY
Payer: COMMERCIAL

## 2023-10-09 VITALS
WEIGHT: 190 LBS | BODY MASS INDEX: 33.25 KG/M2 | HEART RATE: 46 BPM | DIASTOLIC BLOOD PRESSURE: 84 MMHG | SYSTOLIC BLOOD PRESSURE: 159 MMHG

## 2023-10-09 DIAGNOSIS — N89.8 VAGINAL DISCHARGE: ICD-10-CM

## 2023-10-09 DIAGNOSIS — I10 BENIGN ESSENTIAL HYPERTENSION: Primary | ICD-10-CM

## 2023-10-09 DIAGNOSIS — N89.8 VAGINAL ITCHING: ICD-10-CM

## 2023-10-09 DIAGNOSIS — N76.0 BACTERIAL VAGINOSIS: ICD-10-CM

## 2023-10-09 DIAGNOSIS — B96.89 BACTERIAL VAGINOSIS: ICD-10-CM

## 2023-10-09 LAB
CLUE CELLS: POSITIVE
TRICHOMONAS (WET PREP): NEGATIVE
WBC (WET PREP): NORMAL
YEAST (WET PREP): NEGATIVE

## 2023-10-09 PROCEDURE — G0463 HOSPITAL OUTPT CLINIC VISIT: HCPCS | Performed by: OBSTETRICS & GYNECOLOGY

## 2023-10-09 PROCEDURE — 87210 SMEAR WET MOUNT SALINE/INK: CPT | Performed by: OBSTETRICS & GYNECOLOGY

## 2023-10-09 PROCEDURE — 99213 OFFICE O/P EST LOW 20 MIN: CPT | Performed by: OBSTETRICS & GYNECOLOGY

## 2023-10-09 RX ORDER — METRONIDAZOLE 500 MG/1
500 TABLET ORAL 2 TIMES DAILY
Qty: 14 TABLET | Refills: 0 | Status: SHIPPED | OUTPATIENT
Start: 2023-10-09 | End: 2023-10-16

## 2023-10-09 ASSESSMENT — PAIN SCALES - GENERAL: PAINLEVEL: NO PAIN (0)

## 2023-10-09 NOTE — PROGRESS NOTES
Progress Note    SUBJECTIVE:  Tangela Chapa is an 78 year old, , who requests an Annual Preventive Exam.     Concerns today include: Vaginal discharge    Since starting the Estrogen rings, she has noticed some clear vaginal discharge that occasionally stains her panties. Additionally, she has some minor vaginal itching. There is a rash on both her thighs which was evaluated at her dermatology appointment in September and she was prescribed two ointments to mix together, though she has not been using this regularly. Not sexually active.    Per baseline, urinates frequently, but denies urinary symptoms like burning, urgency, fevers. Not on antibiotic prophylaxis for chronic UTIs.     Blood pressure noted to be elevated in clinic today. Patient states she is taking a blood pressure medication but is unsure which one. She checks her blood pressure at the pharmacy and we reviewed most recent BP lo/65 and 127/61.     Menstrual History:      2016    10:59 AM   Menstrual History   Period Cycle (Days) menapausal    Reviewed Today Yes     Lab Results   Component Value Date    PAP NIL 2021     Lab Results   Component Value Date    HPV16 Negative 2023    HPV16 Negative 2021     Lab Results   Component Value Date    HPV18 Negative 2023    HPV18 Negative 2021     Last   Lab Results   Component Value Date    HRHPV Positive 2023    HRHPV Positive 2021     Mammogram current: yes  Last Mammogram: 23  MA Screening Digital Bilateral    Result Date: 2023  Narrative: BILATERAL FULL FIELD DIGITAL SCREENING MAMMOGRAM Performed on: 23 Compared to: 2022, 2021, and 02/10/2020 Technique: This study was evaluated with the assistance of Computer-Aided Detection. Findings: The breasts have scattered areas of fibroglandular density.  There is no radiographic evidence of malignancy.     MA Screening Digital Bilateral    Result Date: 2022  Narrative:  BILATERAL FULL FIELD DIGITAL SCREENING MAMMOGRAM Performed on: 2/8/22 Compared to: 03/29/2021, 02/10/2020, 01/23/2019, 10/23/2017, and 09/16/2016 Technique: This study was evaluated with the assistance of Computer-Aided Detection. Findings: The breasts have scattered areas of fibroglandular density.  There is no radiographic evidence of malignancy. IMPRESSION: ACR BI-RADS Category 1: Negative RECOMMENDED FOLLOW-UP: Annual routine screening mammogram The results and recommendations of this examination will be communicated to the patient.     MA Screening Digital Bilateral    Result Date: 3/30/2021  Narrative: Examination: Bilateral digital screening mammography with computer aided detection. Comparison: 11/26/2012, 8/27/2009, 11/7/2008, 3/10/2006 History/Family history: No symptoms, routine screening. Close blood relatives with breast cancer.  Previous breast biopsies. BREAST DENSITY: Scattered fibroglandular densities. COMMENTS: There has been no significant change.     MA Screening Digital Bilateral    Result Date: 2/10/2020  Narrative: BILATERAL FULL FIELD DIGITAL SCREENING MAMMOGRAM Performed on: 2/10/20. Compared to: 01/23/2019 Mammo Screening Bilateral, 10/23/2017 Mammo Screening Bilateral, and 09/16/2016 Mammo Screening Bilateral Findings: The breasts have scattered areas of fibroglandular densities. There is no radiographic evidence of malignancy. This study was evaluated with the assistance of Computer-Aided Detection. Continue routine screening mammogram as recommended. ACR BI-RADS Category 1: Negative    Last Colonoscopy:  No results found for this or any previous visit.    HISTORY:  citalopram (CELEXA) 20 MG tablet, Take 1.5 tablets (30 mg) by mouth daily  cycloSPORINE (RESTASIS) 0.05 % ophthalmic emulsion, 1 drop every 12 hours.  estradiol (ESTRACE) 0.5 MG tablet, Take 1 tablet (0.5 mg) by mouth daily  estradiol (ESTRING) 7.5 MCG/24HR vaginal ring, Place once vaginally and refill as instructed  fish  oil-omega-3 fatty acids 1000 MG capsule, Take 1 capsule by mouth daily.  latanoprost (XALATAN) 0.005 % ophthalmic solution,   losartan (COZAAR) 100 MG tablet, Take 1 tablet (100 mg) by mouth daily  LUMIGAN 0.01 % SOLN ophthalmic solution,   Magnesium 100 MG TABS, Take 3 tablets by mouth At Bedtime.  mometasone (NASONEX) 50 MCG/ACT nasal spray, 2 sprays by Both Nostrils route daily.  NONFORMULARY, 1 Dose daily henrik 128 apply small amount to eyes at night  omeprazole (PRILOSEC) 20 MG DR capsule, Take 1 capsule (20 mg) by mouth daily  pentoxifylline ER (TRENTAL) 400 MG CR tablet, Take 1 tablet (400 mg) by mouth 3 times daily (with meals)  pravastatin (PRAVACHOL) 40 MG tablet, TAKE 1 TABLET (40 MG) BY MOUTH DAILY  progesterone (PROMETRIUM) 100 MG capsule, Take 1 capsule (100 mg) by mouth daily  Tetrahydrozoline HCl (EYE DROPS OP), Apply  to eye. Hydro eye  traZODone (DESYREL) 100 MG tablet, Take 1 tablet (100 mg) by mouth At Bedtime  [DISCONTINUED] estrogens conjugated, synthetic A, (CENESTIN) 0.3 MG tablet, Take 1 tablet (0.3 mg) by mouth daily No further refills until seen by provider.    No current facility-administered medications on file prior to visit.    Allergies   Allergen Reactions    Dust Mite Extract     Seasonal Allergies      Immunization History   Administered Date(s) Administered    COVID-19 Bivalent 12+ (Pfizer) 10/11/2022, 06/28/2023    COVID-19 MONOVALENT 12+ (Pfizer) 02/24/2021, 03/18/2021, 10/10/2021, 04/14/2022    DT (PEDS <7y) 01/01/2000    Flu, Unspecified 11/13/2008, 12/09/2011, 11/04/2019    Influenza (High Dose) 3 valent vaccine 09/16/2016, 10/26/2017, 10/12/2018    Influenza (IIV3) PF 11/13/2008, 12/09/2011, 01/10/2013    Influenza Vaccine 65+ (Fluzone HD) 11/07/2020, 10/10/2021    Influenza Vaccine Im 4yrs+ 4 Valent CCIIV4 10/22/2019    Pneumo Conj 13-V (2010&after) 04/16/2015    Pneumococcal 23 valent 02/18/2013    TDAP (Adacel,Boostrix) 12/09/2011, 07/01/2014    Td (Adult), Adsorbed  2000    Td,adult,historic,unspecified 2000    Zoster recombinant adjuvanted (SHINGRIX) 10/15/2018, 2018    Zoster vaccine, live 02/10/2009       OB History    Para Term  AB Living   0 0 0 0 0 0   SAB IAB Ectopic Multiple Live Births   0 0 0 0 0     Past Medical History:   Diagnosis Date    Chronic UTI     controlled w PO and vaginal estrogen tx    YESSICA I (cervical intraepithelial neoplasia I) 16: Pap NIL; HR HPV neg -> D/C screening    Herpes zoster     Left facial     Vaginal dysplasia     cryo     Past Surgical History:   Procedure Laterality Date    ABDOMINOPLASTY      ABDOMINOPLASTY      ARTHROSCOPY KNEE IRRIGATION AND DEBRIDEMENT      RT-Articular Cartilage    BIOPSY BREAST      benign open brest biopsy    BIOPSY BREAST Left     benign x2    BIOPSY CERVICAL, LOCAL EXCISION, SINGLE/MULTIPLE  10/09/2017    BIOPSY OF BREAST, INCISIONAL      Description: Biopsy Breast Open;  Recorded: 2008;  Comments: BENIGN    CATARACT EXTRACTION W/ INTRAOCULAR LENS IMPLANT Left 2016    CATARACT EXTRACTION, BILATERAL  2015    COLPOSCOPY, BIOPSY, COMBINED  2/16/10    TZ-not seen    ENDOMETRIAL SAMPLING (BIOPSY)  3/31/05    benign    GENITOURINARY SURGERY  2011    cystoscopy with +1 trabeculaion    HC KNEE SCOPE, DIAGNOSTIC      Description: Arthroscopy Knee Right;  Recorded: 2008;  Comments: FOR MENISCUS TEAR    HC OSTEOTOMY METATARSAL (NOT 1ST)      Description: Metatarsal Osteotomy Of The Fifth Metatarsal;  Proc Date: 2007;    IR LUMBAR EPIDURAL STEROID INJECTION  2004    IR LUMBAR EPIDURAL STEROID INJECTION  10/11/2004    IR LUMBAR EPIDURAL STEROID INJECTION  2004    JOINT REPLACEMENT, HIP RT/LT      Joint Replacement Hip RT/LT    RELEASE CARPAL TUNNEL BILATERAL      ZZC TOTAL HIP ARTHROPLASTY      Description: Total Hip Replacement;  Proc Date: 2008;  Comments: RIGHT    ZZC TOTAL HIP ARTHROPLASTY      Description:  Total Hip Replacement;  Proc Date: 08/01/2008;     Family History   Problem Relation Age of Onset    Alcohol/Drug Father     Depression Father     Alcohol/Drug Brother     Breast Cancer Mother     Depression Mother     Diabetes Maternal Grandfather     Cancer Brother 68        blood cancer    Breast Cancer Cousin     Breast Cancer Cousin     Breast Cancer Cousin     Leukemia Brother      Social History     Socioeconomic History    Marital status: Single     Spouse name: None    Number of children: None    Years of education: None    Highest education level: None   Tobacco Use    Smoking status: Former    Smokeless tobacco: Never    Tobacco comments:     quit 30 years ago 1970   Substance and Sexual Activity    Alcohol use: Yes     Alcohol/week: 0.0 - 0.8 standard drinks of alcohol     Comment: Rare    Drug use: No    Sexual activity: Not Currently     Partners: Male     Birth control/protection: Post-menopausal   Other Topics Concern     Service No    Blood Transfusions No    Caffeine Concern No     Comment: 3-4 pop/d (diet)    Occupational Exposure No    Hobby Hazards No    Sleep Concern Yes     Comment: sleeps too much -- tired after 9-10 hrs/nite    Stress Concern Yes     Comment: holiday lonliness    Weight Concern Yes     Comment: admits to eating more than she uses -- declines wt today    Special Diet No    Back Care No    Exercise Yes     Comment: sedentary -- plans to restart    Bike Helmet No    Seat Belt No    Self-Exams No   Social History Narrative    How much exercise per week? Stationary bike, recent weight loss    How much calcium per day? Supplement       How much caffeine per day? 2 cans a day    How much vitamin D per day? supplment    Do you/your family wear seatbelts?  Yes    Do you/your family use safety helmets? Yes    Do you/your family use sunscreen? Yes    Do you/your family keep firearms in the home? Yes, locked    Do you/your family have a smoke detector(s)? Yes        Do you feel  safe in your home? Yes    Has anyone ever touched you in an unwanted manner? No     Explain     Updated 8/28 CHIKI Santiago            ROS   ROS: 10 point ROS neg other than the symptoms noted above in the HPI.        1/31/2023    12:22 PM 2/27/2023     1:27 PM 6/28/2023     2:41 PM   PHQ-9 SCORE   PHQ-9 Total Score MyChart 4 (Minimal depression)  8 (Mild depression)   PHQ-9 Total Score 4 5 8         2/6/2021     5:02 PM 2/9/2021    10:10 AM 2/27/2023     1:27 PM   FARIDA-7 SCORE   Total Score 2 (minimal anxiety)     Total Score 2 2 3     EXAM:  Blood pressure (!) 159/84, pulse (!) 46, weight 86.2 kg (190 lb), not currently breastfeeding. Body mass index is 33.25 kg/m .  General - pleasant female in no acute distress.  Skin - no suspicious lesions or rashes  EENT-  PERRLA, euthyroid with out palpable nodules  Neck - supple without lymphadenopathy.  Lungs - clear to auscultation bilaterally.  Heart - bradycardia (normal per patient)  Abdomen - soft, nontender, nondistended, no masses or organomegaly noted.  Musculoskeletal - no gross deformities.  Neurological - normal strength, sensation, and mental status.    Breast Exam:  Breast: Without visible skin changes. No dimpling or lesions seen.   Breasts supple, non-tender with palpation, no dominant mass, nodularity, or nipple discharge noted bilaterally. Axillary nodes negative.      Pelvic Exam:  EG/BUS: Normal genital architecture without lesions, erythema or abnormal secretions Bartholin's, Urethra, [place]'s normal   Urethral meatus: normal with some estrogen loss atrophy  Urethra: no masses, tenderness, or scarring   Bladder: no masses or tenderness   Vagina: moist, pink, and atrophic, thincreamy, green, and yellow  secretions  Cervix: no lesions  Uterus: midposition,  Adnexa: Within normal limits and No masses, nodularity, tenderness  Rectum:anus normal    mponent Ref Range & Units       Trichomonas Wet Prep  Negative neg    Clue cells  Positive neg    Yeast Wet Prep   Negative neg    WBC (Wet Prep) Negative Pos           ASSESSMENT:  Encounter Diagnoses   Name Primary?    Benign essential hypertension Yes    Vaginal discharge     Vaginal itching     Bacterial vaginosis         PLAN:   Orders Placed This Encounter   Procedures    Wet Prep POCT     Orders Placed This Encounter   Medications    metroNIDAZOLE (FLAGYL) 500 MG tablet     Sig: Take 1 tablet (500 mg) by mouth 2 times daily for 7 days     Dispense:  14 tablet     Refill:  0     Bacterial Vaginosis   Vaginal discharge  Vaginal itching  Suspect this is physiologic discharge related to estrogen, though also considered differential diagnosis given concurrent pruritus including bacterial vaginosis, candidiasis, vulvar lichen sclerosus, psoriasis, neoplasia. On exam, she has odorous yellow discharge. Wet prep showed clue cells with + amine odor.  We discussed plan to start course of metronidazole for bacterial vaginosis.     Hypertension  BP today was 171/74 and 159/84 on recheck. Plan to follow-up with PCP for blood pressure management/ adjustment of her anti-hypertensive regimen and continue monitoring blood pressure readings at the pharmacy as she has been doing.     Additional teaching done at this visit regarding calcium (1200 mg per day), exercise, and weight/diet.    Return to clinic for f/up pap/colpo per her long standing h/o HPV.      Katerina Chase MS3    The above patient was seen and evaluated with the medical student who acted as my scribe for the above note. Agree with note, changes made as appropriate.     Macarena Gusman MD

## 2023-10-09 NOTE — LETTER
10/9/2023       RE: Tangela Chapa  [address]  [place] [address]     Dear Colleague,    Thank you for referring your patient, Tangela Chapa, to the [facility] [place] at [facility]. Please see a copy of my visit note below.      Progress Note    SUBJECTIVE:  Tangela Chapa is an 78 year old, , who requests an Annual Preventive Exam.     Concerns today include: Vaginal discharge    Since starting the Estrogen rings, she has noticed some clear vaginal discharge that occasionally stains her panties. Additionally, she has some minor vaginal itching. There is a rash on both her thighs which was evaluated at her dermatology appointment in September and she was prescribed two ointments to mix together, though she has not been using this regularly. Not sexually active.    Per baseline, urinates frequently, but denies urinary symptoms like burning, urgency, fevers. Not on antibiotic prophylaxis for chronic UTIs.     Blood pressure noted to be elevated in clinic today. Patient states she is taking a blood pressure medication but is unsure which one. She checks her blood pressure at the pharmacy and we reviewed most recent BP lo/65 and 127/61.     Menstrual History:      2016    10:59 AM   Menstrual History   Period Cycle (Days) menapausal    Reviewed Today Yes     Lab Results   Component Value Date    PAP NIL 2021     Lab Results   Component Value Date    HPV16 Negative 2023    HPV16 Negative 2021     Lab Results   Component Value Date    HPV18 Negative 2023    HPV18 Negative 2021     Last   Lab Results   Component Value Date    HRHPV Positive 2023    HRHPV Positive 2021     Mammogram current: yes  Last Mammogram: 23  MA Screening Digital Bilateral    Result Date: 2023  Narrative: BILATERAL FULL FIELD DIGITAL SCREENING MAMMOGRAM Performed on: 23 Compared to:  02/08/2022, 03/29/2021, and 02/10/2020 Technique: This study was evaluated with the assistance of Computer-Aided Detection. Findings: The breasts have scattered areas of fibroglandular density.  There is no radiographic evidence of malignancy.     MA Screening Digital Bilateral    Result Date: 2/8/2022  Narrative: BILATERAL FULL FIELD DIGITAL SCREENING MAMMOGRAM Performed on: 2/8/22 Compared to: 03/29/2021, 02/10/2020, 01/23/2019, 10/23/2017, and 09/16/2016 Technique: This study was evaluated with the assistance of Computer-Aided Detection. Findings: The breasts have scattered areas of fibroglandular density.  There is no radiographic evidence of malignancy. IMPRESSION: ACR BI-RADS Category 1: Negative RECOMMENDED FOLLOW-UP: Annual routine screening mammogram The results and recommendations of this examination will be communicated to the patient.     MA Screening Digital Bilateral    Result Date: 3/30/2021  Narrative: Examination: Bilateral digital screening mammography with computer aided detection. Comparison: 11/26/2012, 8/27/2009, 11/7/2008, 3/10/2006 History/Family history: No symptoms, routine screening. Close blood relatives with breast cancer.  Previous breast biopsies. BREAST DENSITY: Scattered fibroglandular densities. COMMENTS: There has been no significant change.     MA Screening Digital Bilateral    Result Date: 2/10/2020  Narrative: BILATERAL FULL FIELD DIGITAL SCREENING MAMMOGRAM Performed on: 2/10/20. Compared to: 01/23/2019 Mammo Screening Bilateral, 10/23/2017 Mammo Screening Bilateral, and 09/16/2016 Mammo Screening Bilateral Findings: The breasts have scattered areas of fibroglandular densities. There is no radiographic evidence of malignancy. This study was evaluated with the assistance of Computer-Aided Detection. Continue routine screening mammogram as recommended. ACR BI-RADS Category 1: Negative    Last Colonoscopy:  No results found for this or any previous visit.    HISTORY:  citalopram  (CELEXA) 20 MG tablet, Take 1.5 tablets (30 mg) by mouth daily  cycloSPORINE (RESTASIS) 0.05 % ophthalmic emulsion, 1 drop every 12 hours.  estradiol (ESTRACE) 0.5 MG tablet, Take 1 tablet (0.5 mg) by mouth daily  estradiol (ESTRING) 7.5 MCG/24HR vaginal ring, Place once vaginally and refill as instructed  fish oil-omega-3 fatty acids 1000 MG capsule, Take 1 capsule by mouth daily.  latanoprost (XALATAN) 0.005 % ophthalmic solution,   losartan (COZAAR) 100 MG tablet, Take 1 tablet (100 mg) by mouth daily  LUMIGAN 0.01 % SOLN ophthalmic solution,   Magnesium 100 MG TABS, Take 3 tablets by mouth At Bedtime.  mometasone (NASONEX) 50 MCG/ACT nasal spray, 2 sprays by Both Nostrils route daily.  NONFORMULARY, 1 Dose daily henrik 128 apply small amount to eyes at night  omeprazole (PRILOSEC) 20 MG DR capsule, Take 1 capsule (20 mg) by mouth daily  pentoxifylline ER (TRENTAL) 400 MG CR tablet, Take 1 tablet (400 mg) by mouth 3 times daily (with meals)  pravastatin (PRAVACHOL) 40 MG tablet, TAKE 1 TABLET (40 MG) BY MOUTH DAILY  progesterone (PROMETRIUM) 100 MG capsule, Take 1 capsule (100 mg) by mouth daily  Tetrahydrozoline HCl (EYE DROPS OP), Apply  to eye. Hydro eye  traZODone (DESYREL) 100 MG tablet, Take 1 tablet (100 mg) by mouth At Bedtime  [DISCONTINUED] estrogens conjugated, synthetic A, (CENESTIN) 0.3 MG tablet, Take 1 tablet (0.3 mg) by mouth daily No further refills until seen by provider.    No current facility-administered medications on file prior to visit.    Allergies   Allergen Reactions    Dust Mite Extract     Seasonal Allergies      Immunization History   Administered Date(s) Administered    COVID-19 Bivalent 12+ (Pfizer) 10/11/2022, 06/28/2023    COVID-19 MONOVALENT 12+ (Pfizer) 02/24/2021, 03/18/2021, 10/10/2021, 04/14/2022    DT (PEDS <7y) 01/01/2000    Flu, Unspecified 11/13/2008, 12/09/2011, 11/04/2019    Influenza (High Dose) 3 valent vaccine 09/16/2016, 10/26/2017, 10/12/2018    Influenza (IIV3) PF  2008, 2011, 01/10/2013    Influenza Vaccine 65+ (Fluzone HD) 2020, 10/10/2021    Influenza Vaccine Im 4yrs+ 4 Valent CCIIV4 10/22/2019    Pneumo Conj 13-V (2010&after) 2015    Pneumococcal 23 valent 2013    TDAP (Adacel,Boostrix) 2011, 2014    Td (Adult), Adsorbed 2000    Td,adult,historic,unspecified 2000    Zoster recombinant adjuvanted (SHINGRIX) 10/15/2018, 2018    Zoster vaccine, live 02/10/2009       OB History    Para Term  AB Living   0 0 0 0 0 0   SAB IAB Ectopic Multiple Live Births   0 0 0 0 0     Past Medical History:   Diagnosis Date    Chronic UTI     controlled w PO and vaginal estrogen tx    YESSICA I (cervical intraepithelial neoplasia I) 14: Pap NIL; HR HPV neg -> D/C screening    Herpes zoster     Left facial     Vaginal dysplasia     cryo     Past Surgical History:   Procedure Laterality Date    ABDOMINOPLASTY      ABDOMINOPLASTY      ARTHROSCOPY KNEE IRRIGATION AND DEBRIDEMENT      RT-Articular Cartilage    BIOPSY BREAST      benign open brest biopsy    BIOPSY BREAST Left     benign x2    BIOPSY CERVICAL, LOCAL EXCISION, SINGLE/MULTIPLE  10/09/2017    BIOPSY OF BREAST, INCISIONAL      Description: Biopsy Breast Open;  Recorded: 2008;  Comments: BENIGN    CATARACT EXTRACTION W/ INTRAOCULAR LENS IMPLANT Left 2016    CATARACT EXTRACTION, BILATERAL  2015    COLPOSCOPY, BIOPSY, COMBINED  2/16/10    TZ-not seen    ENDOMETRIAL SAMPLING (BIOPSY)  3/31/05    benign    GENITOURINARY SURGERY  2011    cystoscopy with +1 trabeculaion    HC KNEE SCOPE, DIAGNOSTIC      Description: Arthroscopy Knee Right;  Recorded: 2008;  Comments: FOR MENISCUS TEAR    HC OSTEOTOMY METATARSAL (NOT 1ST)      Description: Metatarsal Osteotomy Of The Fifth Metatarsal;  Proc Date: 2007;    IR LUMBAR EPIDURAL STEROID INJECTION  2004    IR LUMBAR EPIDURAL STEROID INJECTION  10/11/2004    IR LUMBAR  EPIDURAL STEROID INJECTION  11/5/2004    JOINT REPLACEMENT, HIP RT/LT  2008    Joint Replacement Hip RT/LT    RELEASE CARPAL TUNNEL BILATERAL      ZZC TOTAL HIP ARTHROPLASTY      Description: Total Hip Replacement;  Proc Date: 06/01/2008;  Comments: RIGHT    ZZC TOTAL HIP ARTHROPLASTY      Description: Total Hip Replacement;  Proc Date: 08/01/2008;     Family History   Problem Relation Age of Onset    Alcohol/Drug Father     Depression Father     Alcohol/Drug Brother     Breast Cancer Mother     Depression Mother     Diabetes Maternal Grandfather     Cancer Brother 68        blood cancer    Breast Cancer Cousin     Breast Cancer Cousin     Breast Cancer Cousin     Leukemia Brother      Social History     Socioeconomic History    Marital status: Single     Spouse name: None    Number of children: None    Years of education: None    Highest education level: None   Tobacco Use    Smoking status: Former    Smokeless tobacco: Never    Tobacco comments:     quit 30 years ago 1970   Substance and Sexual Activity    Alcohol use: Yes     Alcohol/week: 0.0 - 0.8 standard drinks of alcohol     Comment: Rare    Drug use: No    Sexual activity: Not Currently     Partners: Male     Birth control/protection: Post-menopausal   Other Topics Concern     Service No    Blood Transfusions No    Caffeine Concern No     Comment: 3-4 pop/d (diet)    Occupational Exposure No    Hobby Hazards No    Sleep Concern Yes     Comment: sleeps too much -- tired after 9-10 hrs/nite    Stress Concern Yes     Comment: holiday lonliness    Weight Concern Yes     Comment: admits to eating more than she uses -- declines wt today    Special Diet No    Back Care No    Exercise Yes     Comment: sedentary -- plans to restart    Bike Helmet No    Seat Belt No    Self-Exams No   Social History Narrative    How much exercise per week? Stationary bike, recent weight loss    How much calcium per day? Supplement       How much caffeine per day? 2 cans a day     How much vitamin D per day? supplment    Do you/your family wear seatbelts?  Yes    Do you/your family use safety helmets? Yes    Do you/your family use sunscreen? Yes    Do you/your family keep firearms in the home? Yes, locked    Do you/your family have a smoke detector(s)? Yes        Do you feel safe in your home? Yes    Has anyone ever touched you in an unwanted manner? No     Explain     Updated 8/28 CHIKI Santiago            ROS   ROS: 10 point ROS neg other than the symptoms noted above in the HPI.        1/31/2023    12:22 PM 2/27/2023     1:27 PM 6/28/2023     2:41 PM   PHQ-9 SCORE   PHQ-9 Total Score MyChart 4 (Minimal depression)  8 (Mild depression)   PHQ-9 Total Score 4 5 8         2/6/2021     5:02 PM 2/9/2021    10:10 AM 2/27/2023     1:27 PM   FARIDA-7 SCORE   Total Score 2 (minimal anxiety)     Total Score 2 2 3     EXAM:  Blood pressure (!) 159/84, pulse (!) 46, weight 86.2 kg (190 lb), not currently breastfeeding. Body mass index is 33.25 kg/m .  General - pleasant female in no acute distress.  Skin - no suspicious lesions or rashes  EENT-  PERRLA, euthyroid with out palpable nodules  Neck - supple without lymphadenopathy.  Lungs - clear to auscultation bilaterally.  Heart - bradycardia (normal per patient)  Abdomen - soft, nontender, nondistended, no masses or organomegaly noted.  Musculoskeletal - no gross deformities.  Neurological - normal strength, sensation, and mental status.    Breast Exam:  Breast: Without visible skin changes. No dimpling or lesions seen.   Breasts supple, non-tender with palpation, no dominant mass, nodularity, or nipple discharge noted bilaterally. Axillary nodes negative.      Pelvic Exam:  EG/BUS: Normal genital architecture without lesions, erythema or abnormal secretions Bartholin's, Urethra, [place]'s normal   Urethral meatus: normal with some estrogen loss atrophy  Urethra: no masses, tenderness, or scarring   Bladder: no masses or tenderness   Vagina: moist, pink, and  atrophic, thincreamy, green, and yellow  secretions  Cervix: no lesions  Uterus: midposition,  Adnexa: Within normal limits and No masses, nodularity, tenderness  Rectum:anus normal    mponent Ref Range & Units       Trichomonas Wet Prep  Negative neg    Clue cells  Positive neg    Yeast Wet Prep  Negative neg    WBC (Wet Prep) Negative Pos           ASSESSMENT:  Encounter Diagnoses   Name Primary?    Benign essential hypertension Yes    Vaginal discharge     Vaginal itching     Bacterial vaginosis         PLAN:   Orders Placed This Encounter   Procedures    Wet Prep POCT     Orders Placed This Encounter   Medications    metroNIDAZOLE (FLAGYL) 500 MG tablet     Sig: Take 1 tablet (500 mg) by mouth 2 times daily for 7 days     Dispense:  14 tablet     Refill:  0     Bacterial Vaginosis   Vaginal discharge  Vaginal itching  Suspect this is physiologic discharge related to estrogen, though also considered differential diagnosis given concurrent pruritus including bacterial vaginosis, candidiasis, vulvar lichen sclerosus, psoriasis, neoplasia. On exam, she has odorous yellow discharge. Wet prep showed clue cells with + amine odor.  We discussed plan to start course of metronidazole for bacterial vaginosis.     Hypertension  BP today was 171/74 and 159/84 on recheck. Plan to follow-up with PCP for blood pressure management/ adjustment of her anti-hypertensive regimen and continue monitoring blood pressure readings at the pharmacy as she has been doing.     Additional teaching done at this visit regarding calcium (1200 mg per day), exercise, and weight/diet.    Return to clinic for f/up pap/colpo per her long standing h/o HPV.      Katerina Chase MS3    The above patient was seen and evaluated with the medical student who acted as my scribe for the above note. Agree with note, changes made as appropriate.     Macarena Gusman MD

## 2023-11-03 DIAGNOSIS — I73.00 RAYNAUD'S DISEASE WITHOUT GANGRENE: ICD-10-CM

## 2023-11-06 RX ORDER — PENTOXIFYLLINE 400 MG/1
400 TABLET, EXTENDED RELEASE ORAL
Qty: 90 TABLET | Refills: 4 | Status: SHIPPED | OUTPATIENT
Start: 2023-11-06 | End: 2024-07-31

## 2023-11-08 DIAGNOSIS — K21.9 GASTROESOPHAGEAL REFLUX DISEASE, UNSPECIFIED WHETHER ESOPHAGITIS PRESENT: ICD-10-CM

## 2023-11-10 ENCOUNTER — TRANSFERRED RECORDS (OUTPATIENT)
Dept: HEALTH INFORMATION MANAGEMENT | Facility: CLINIC | Age: 78
End: 2023-11-10
Payer: COMMERCIAL

## 2023-12-07 ENCOUNTER — TRANSFERRED RECORDS (OUTPATIENT)
Dept: HEALTH INFORMATION MANAGEMENT | Facility: CLINIC | Age: 78
End: 2023-12-07
Payer: COMMERCIAL

## 2023-12-24 DIAGNOSIS — E78.00 HYPERCHOLESTEROLEMIA: ICD-10-CM

## 2023-12-27 RX ORDER — PRAVASTATIN SODIUM 40 MG
40 TABLET ORAL DAILY
Qty: 90 TABLET | Refills: 1 | Status: SHIPPED | OUTPATIENT
Start: 2023-12-27 | End: 2024-07-31

## 2024-01-03 NOTE — TELEPHONE ENCOUNTER
Pt has history of Raynaud's syndrome and she takes a med that may cause hypertension. (Trental 400 mg -3 times per day, onset 1 year ago).    Her last BP was 140/80.    She noticed increased sleeping of  11 + hrs a day.    Her Eye MD found a tiny leak in right eye (bleeding). They are continuing to monitor this.    Pt wanted information about hypertension, symptoms, and ways to prevent hypertension.  Pt exercises daily. She drinks plenty of water.  We discussed starting a low sodium diet.    Pt denies any chest pain, blurred vision, new balance problems, SOB, or severe headaches.    Pt wants to get Dr Lewis's recommendations regarding the possibility of hypertension from Trental (which she states that she needs to take).    Message sent to Dr Lewis's pool requesting that they call her back ASAP.    Dasha Golden RN 08/06/21 12:05 PM  [facility] Nurse Advisor       The site was marked. Prepped with: chlorhexidine. The patient was draped. R back

## 2024-01-23 ENCOUNTER — OFFICE VISIT (OUTPATIENT)
Dept: PODIATRY | Facility: CLINIC | Age: 79
End: 2024-01-23
Payer: COMMERCIAL

## 2024-01-23 VITALS — HEART RATE: 84 BPM | WEIGHT: 184 LBS | BODY MASS INDEX: 32.6 KG/M2 | HEIGHT: 63 IN | OXYGEN SATURATION: 90 %

## 2024-01-23 DIAGNOSIS — M72.2 PLANTAR FASCIITIS, BILATERAL: Primary | ICD-10-CM

## 2024-01-23 DIAGNOSIS — B35.1 ONYCHOMYCOSIS: ICD-10-CM

## 2024-01-23 PROCEDURE — 99213 OFFICE O/P EST LOW 20 MIN: CPT | Performed by: PODIATRIST

## 2024-01-23 RX ORDER — CICLOPIROX 80 MG/ML
SOLUTION TOPICAL
Qty: 6.6 ML | Refills: 1 | Status: SHIPPED | OUTPATIENT
Start: 2024-01-23 | End: 2024-07-31

## 2024-01-23 ASSESSMENT — PAIN SCALES - GENERAL: PAINLEVEL: EXTREME PAIN (8)

## 2024-01-23 NOTE — PATIENT INSTRUCTIONS
What are Prescription Custom Orthotics?  Custom orthotics are specially-made devices designed to support and comfort your feet. Prescription orthotics are crafted for you and no one else. They match the contours of your feet precisely and are designed for the way you move. Orthotics are only manufactured after a podiatrist has conducted a complete evaluation of your feet, ankles, and legs, so the orthotic can accommodate your unique foot structure and pathology.  Prescription orthotics are divided into two categories:  Functional orthotics are designed to control abnormal motion. They may be used to treat foot pain caused by abnormal motion; they can also be used to treat injuries such as shin splints or tendinitis. Functional orthotics are usually crafted of a semi-rigid material such as plastic or graphite.  Accommodative orthotics are softer and meant to provide additional cushioning and support. They can be used to treat diabetic foot ulcers, painful calluses on the bottom of the foot, and other uncomfortable conditions.  Podiatrists use orthotics to treat foot problems such as plantar fasciitis, bursitis, tendinitis, diabetic foot ulcers, and foot, ankle, and heel pain. Clinical research studies have shown that podiatrist-prescribed foot orthotics decrease foot pain and improve function.  Orthotics typically cost more than shoe inserts purchased in a retail store, but the additional cost is usually well worth it. Unlike shoe inserts, orthotics are molded to fit each individual foot, so you can be sure that your orthotics fit and do what they're supposed to do. Prescription orthotics are also made of top-notch materials and last many years when cared for properly. Insurance often helps pay for prescription orthotics.  What are Shoe Inserts?   You've seen them at the grocery store and at the mall. You've probably even seen them on TV and online. Shoe inserts are any kind of non-prescription foot support designed  to be worn inside a shoe. Pre-packaged, mass produced, arch supports are shoe inserts. So are the  custom-made  insoles and foot supports that you can order online or at retail stores. Unless the device has been prescribed by a doctor and crafted for your specific foot, it's a shoe insert, not a custom orthotic device--despite what the ads might say.  Shoe inserts can be very helpful for a variety of foot ailments, including flat arches and foot and leg pain. They can cushion your feet, provide comfort, and support your arches, but they can't correct biomechanical foot problems or cure long-standing foot issues.  The most common types of shoe inserts are:  Arch supports: Some people have high arches. Others have low arches or flat feet. Arch supports generally have a  bumped-up  appearance and are designed to support the foot's natural arch.   Insoles: Insoles slip into your shoe to provide extra cushioning and support. Insoles are often made of gel, foam, or plastic.   Heel liners: Heel liners, sometimes called heel pads or heel cups, provide extra cushioning in the heel region. They may be especially useful for patients who have foot pain caused by age-related thinning of the heels' natural fat pads.   Foot cushions: Do your shoes rub against your heel or your toes? Foot cushions come in many different shapes and sizes and can be used as a barrier between you and your shoe.  Choosing an Over-the-Counter Shoe Insert  Selecting a shoe insert from the wide variety of devices on the market can be overwhelming. Here are some podiatrist-tested tips to help you find the insert that best meets your needs:  Consider your health. Do you have diabetes? Problems with circulation? An over-the-counter insert may not be your best bet. Diabetes and poor circulation increase your risk of foot ulcers and infections, so schedule an appointment with a podiatrist. He or she can help you select a solution that won't cause additional  health problems.   Think about the purpose. Are you planning to run a marathon, or do you just need a little arch support in your work shoes? Look for a product that fits your planned level of activity.   Bring your shoes. For the insert to be effective, it has to fit into your shoes. So bring your sneakers, dress shoes, or work boots--whatever you plan to wear with your insert. Look for an insert that will fit the contours of your shoe.   Try them on. If all possible, slip the insert into your shoe and try it out. Walk around a little. How does it feel? Don't assume that feelings of pressure will go away with continued wear. (If you can't try the inserts at the store, ask about the store's return policy and hold on to your receipt.)    Please call one of the [facility] locations below to schedule an appointment. If you received a prescription please bring it with you to your appointment. Some locations are limited to what they carry.    Office Locations    [facility]  [address] [place] [address]  Home Medical Equipment, [address]   Phone: [phone]   Orthotics and Prosthetics, [address]   Phone: [phone]    [facility]   Home Medical Equipment   [address] [address]  Phone :[phone]  Orthotics and Prosthetics   [address], [facility] [address]  Phone:[phone]          [facility] [place]  [address]  [address]   [place] [address]   Phone: [phone]    [facility] Professional Bldg.  [address]  [place] [address]  Phone: [phone]    [facility]  [address]  [place] [address]  Phone: [phone]  Fax: [phone]    [facility]   [address]  [place] [address]  Phone:  [phone]    [facility]  [address]  [place] [address]  Phone: [phone]    [facility]  [address]  [place] [address]  Phone: [phone]    [place]   [address] [place] [address]   Phone: [phone]    WEARING YOUR CUSTOM FOOT ORTHOTICS   Most insurance plans cover one pair of orthotics per year. You must check with your   insurance plan to see what your payment responsibility will be. Please call your   insurance company by calling the number on the back of your insurance card.   Orthotic's are non-refundable and non-returnable.   Orthotics are made of various designs. Some orthotics are covered with material that extends beyond your toes. If your orthotic is of this design, you will likely need to trim the toe end to get a proper fit. The insole from your shoe can be used as a template. Simply overlay the shoe insert on top of the custom orthotic. Align the heel end while tracing the length of the insert onto the custom orthotic. Use a large scissor to trim the toe end until you get a proper fit in the shoe.   The orthotic needs to be pushed as far back in the shoe as possible. The heel portion should not ride forward so as not to irritate your heel.   Orthotics are designed to work with socks. Excessive perspiration will shorten the life span of the orthotics. Remove the orthotic from the shoe frequently for proper drying.   The break-in period lasts for weeks. People new to orthotics will likely experience new aches and pains. The orthotic is forcing your foot into a new position. Arch, foot and leg muscle aches and fatigue are common during these weeks. Minor discomfort can be considered normal break in phenomenon. Start wearing your orthotic around your home your first day. Limited activity for one to two hours is recommended. You can increase one or two additional hours each  day provided the aches and pains are subsiding. The degree of discomfort, fatigue and problems will dictate the speed of break in. You may require multiple weeks to work up to full time use.   Do not continue wearing your orthotics if they are creating problems such as blisters or sores. Do not hesitate to call the clinic to speak with a nurse regarding orthotic   break in, fit, trimming, etc. You may also need to see the doctor if the orthotics are   simply not working out. Adjustments are sometimes made to improve orthotic   function.     Orthotics will only work in certain styles and types of shoes. Orthotics rarely work in dress shoes. Slip-ons, clogs, sandals and heels are particularly troublesome. Specially designed orthotics may be necessary for these types of shoes. Your custom orthotic was designed for activities that require appropriate walking or running shoes. Lace up athletic shoes, walking shoes or work boots should work appropriately. You may need a wider or longer shoe. Shoes with a removable  or insert work best. In general, you want to remove an insert from the shoe before placing the orthotic into the shoe. Shoes without a removable liner may not work as well.     When purchasing new shoes, bring your orthotics along to get a proper fit. Shop at stores that are familiar with orthotics.   Frequent washing of the orthotic may shorten the life span of the top cover. The top cover can be replaced but will generally last one to five years depending on use and foot perspiration.

## 2024-01-23 NOTE — PROGRESS NOTES
FOOT AND ANKLE SURGERY/PODIATRY Progress Note        ASSESSMENT:   Onychomycosis both great toenails  Bilateral heel pain    HPI: Tangela Chapa was seen again today complaining of discoloration and mild thickening of both great toenails.  She has no pain.  She has not had any redness, swelling, drainage or bleeding.  She is able to wear shoes without much discomfort.  The patient also complained of bilateral heel pain.  She stated that there is a thickening of the skin on the bottom of both heels which is aggravated with weightbearing and ambulation.  The pain can be moderate to severe.  She has no pain while resting.  She denies any trauma to her feet.    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        Past Surgical History:   Procedure Laterality Date    ABDOMINOPLASTY      ABDOMINOPLASTY      ARTHROSCOPY KNEE IRRIGATION AND DEBRIDEMENT      RT-Articular Cartilage    BIOPSY BREAST      benign open brest biopsy    BIOPSY BREAST Left     benign x2    BIOPSY CERVICAL, LOCAL EXCISION, SINGLE/MULTIPLE  10/09/2017    BIOPSY OF BREAST, INCISIONAL      Description: Biopsy Breast Open;  Recorded: 06/06/2008;  Comments: BENIGN    CATARACT EXTRACTION W/ INTRAOCULAR LENS IMPLANT Left 01/2016    CATARACT EXTRACTION, BILATERAL  04/2015    COLPOSCOPY, BIOPSY, COMBINED  2/16/10    TZ-not seen    ENDOMETRIAL SAMPLING (BIOPSY)  3/31/05    benign    GENITOURINARY SURGERY  6 April 2011    cystoscopy with +1 trabeculaion    HC KNEE SCOPE, DIAGNOSTIC      Description: Arthroscopy Knee Right;  Recorded: 06/06/2008;  Comments: FOR MENISCUS TEAR    HC OSTEOTOMY METATARSAL (NOT 1ST)      Description: Metatarsal Osteotomy Of The Fifth Metatarsal;  Proc Date: 02/01/2007;    IR LUMBAR EPIDURAL STEROID  INJECTION  9/30/2004    IR LUMBAR EPIDURAL STEROID INJECTION  10/11/2004    IR LUMBAR EPIDURAL STEROID INJECTION  11/5/2004    JOINT REPLACEMENT, HIP RT/LT  2008    Joint Replacement Hip RT/LT    RELEASE CARPAL TUNNEL BILATERAL      ZZC TOTAL HIP ARTHROPLASTY      Description: Total Hip Replacement;  Proc Date: 06/01/2008;  Comments: RIGHT    ZZC TOTAL HIP ARTHROPLASTY      Description: Total Hip Replacement;  Proc Date: 08/01/2008;       Allergies   Allergen Reactions    Dust Mite Extract     Seasonal Allergies          Current Outpatient Medications:     citalopram (CELEXA) 20 MG tablet, Take 1.5 tablets (30 mg) by mouth daily, Disp: 135 tablet, Rfl: 1    cycloSPORINE (RESTASIS) 0.05 % ophthalmic emulsion, 1 drop every 12 hours., Disp: , Rfl:     estradiol (ESTRACE) 0.5 MG tablet, Take 1 tablet (0.5 mg) by mouth daily, Disp: 90 tablet, Rfl: 3    estradiol (ESTRING) 7.5 MCG/24HR vaginal ring, Place once vaginally and refill as instructed, Disp: 1 each, Rfl: 3    fish oil-omega-3 fatty acids 1000 MG capsule, Take 1 capsule by mouth daily., Disp: , Rfl:     latanoprost (XALATAN) 0.005 % ophthalmic solution, , Disp: , Rfl:     losartan (COZAAR) 100 MG tablet, Take 1 tablet (100 mg) by mouth daily, Disp: 90 tablet, Rfl: 3    LUMIGAN 0.01 % SOLN ophthalmic solution, , Disp: , Rfl:     Magnesium 100 MG TABS, Take 3 tablets by mouth At Bedtime., Disp: , Rfl:     mometasone (NASONEX) 50 MCG/ACT nasal spray, 2 sprays by Both Nostrils route daily., Disp: , Rfl:     NONFORMULARY, 1 Dose daily henrik 128 apply small amount to eyes at night, Disp: , Rfl:     omeprazole (PRILOSEC) 20 MG DR capsule, Take 1 capsule (20 mg) by mouth daily, Disp: 90 capsule, Rfl: 1    pentoxifylline ER (TRENTAL) 400 MG CR tablet, TAKE 1 TABLET (400 MG) BY MOUTH 3 TIMES DAILY (WITH MEALS), Disp: 90 tablet, Rfl: 4    pravastatin (PRAVACHOL) 40 MG tablet, TAKE 1 TABLET (40 MG) BY MOUTH DAILY, Disp: 90 tablet, Rfl: 1    progesterone (PROMETRIUM) 100 MG  capsule, Take 1 capsule (100 mg) by mouth daily, Disp: 90 capsule, Rfl: 3    Tetrahydrozoline HCl (EYE DROPS OP), Apply  to eye. Hydro eye, Disp: , Rfl:     traZODone (DESYREL) 100 MG tablet, Take 1 tablet (100 mg) by mouth At Bedtime, Disp: 90 tablet, Rfl: 3    Family History   Problem Relation Age of Onset    Alcohol/Drug Father     Depression Father     Alcohol/Drug Brother     Breast Cancer Mother     Depression Mother     Diabetes Maternal Grandfather     Cancer Brother 68        blood cancer    Breast Cancer Cousin     Breast Cancer Cousin     Breast Cancer Cousin     Leukemia Brother        Social History     Socioeconomic History    Marital status: Single     Spouse name: Not on file    Number of children: Not on file    Years of education: Not on file    Highest education level: Not on file   Occupational History    Not on file   Tobacco Use    Smoking status: Former    Smokeless tobacco: Never    Tobacco comments:     quit 30 years ago 1970   Substance and Sexual Activity    Alcohol use: Yes     Alcohol/week: 0.0 - 0.8 standard drinks of alcohol     Comment: Rare    Drug use: No    Sexual activity: Not Currently     Partners: Male     Birth control/protection: Post-menopausal   Other Topics Concern     Service No    Blood Transfusions No    Caffeine Concern No     Comment: 3-4 pop/d (diet)    Occupational Exposure No    Hobby Hazards No    Sleep Concern Yes     Comment: sleeps too much -- tired after 9-10 hrs/nite    Stress Concern Yes     Comment: holiday lonliness    Weight Concern Yes     Comment: admits to eating more than she uses -- declines wt today    Special Diet No    Back Care No    Exercise Yes     Comment: sedentary -- plans to restart    Bike Helmet No    Seat Belt No    Self-Exams No    Parent/sibling w/ CABG, MI or angioplasty before 65F 55M? Not Asked   Social History Narrative    How much exercise per week? Stationary bike, recent weight loss    How much calcium per day?  Supplement       How much caffeine per day? 2 cans a day    How much vitamin D per day? supplment    Do you/your family wear seatbelts?  Yes    Do you/your family use safety helmets? Yes    Do you/your family use sunscreen? Yes    Do you/your family keep firearms in the home? Yes, locked    Do you/your family have a smoke detector(s)? Yes        Do you feel safe in your home? Yes    Has anyone ever touched you in an unwanted manner? No     Explain     Updated 8/28 CHIKI Santiago          Social Determinants of Health     Financial Resource Strain: Not on file   Food Insecurity: Not on file   Transportation Needs: Not on file   Physical Activity: Not on file   Stress: Not on file   Social Connections: Not on file   Interpersonal Safety: Not on file   Housing Stability: Not on file       10 point Review of Systems is negative     LMP  (LMP Unknown)     BMI= There is no height or weight on file to calculate BMI.    OBJECTIVE:  General appearance: Patient is alert and fully cooperative with history & exam.  No sign of distress is noted during the visit.  Vascular: Dorsalis pedis and posterior tibial pulses are palpable. There is no pedal hair growth bilaterally.  CFT < 3 sec from anterior tibial surface to distal digits bilaterally. There is no appreciable edema noted.  Dermatologic: Slightly thickened discolored toenails both great toes.  Turgor and texture are within normal limits. No coloration or temperature changes. No primary or secondary lesions noted.  Neurologic: All epicritic and proprioceptive sensations are grossly intact bilaterally.  Musculoskeletal: All active and passive ankle, subtalar, midtarsal, and 1st MPJ range of motion are grossly intact without pain or crepitus, with the exception of none. Manual muscle strength is within normal limits bilaterally. All dorsiflexors, plantarflexors, invertors, evertors are intact bilaterally. Tenderness present to the plantar aspect of both heels on palpation.  No  tenderness to bilateral feet or ankles with range of motion. Calf is soft/non-tender without warmth/induration    Imaging:         No results found.         TREATMENT:  The patient was given a prescription for Penlac to be applied as directed.  I have also recommended prescription orthotics.  The patient is to return to the clinic as needed.        Miguel Doherty; CUBA  [facility] Foot & Ankle Surgery/Podiatry

## 2024-01-23 NOTE — Clinical Note
1/23/2024         RE: Tangela Chapa  [address]  [place] [address]        Dear Colleague,    Thank you for referring your patient, Tangela Chapa, to the [facility]. Please see a copy of my visit note below.    FOOT AND ANKLE SURGERY/PODIATRY Progress Note        ASSESSMENT:   ***    HPI: Tangela Chapa was seen again today  ***.      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        Past Surgical History:   Procedure Laterality Date     ABDOMINOPLASTY       ABDOMINOPLASTY       ARTHROSCOPY KNEE IRRIGATION AND DEBRIDEMENT      RT-Articular Cartilage     BIOPSY BREAST      benign open brest biopsy     BIOPSY BREAST Left     benign x2     BIOPSY CERVICAL, LOCAL EXCISION, SINGLE/MULTIPLE  10/09/2017     BIOPSY OF BREAST, INCISIONAL      Description: Biopsy Breast Open;  Recorded: 06/06/2008;  Comments: BENIGN     CATARACT EXTRACTION W/ INTRAOCULAR LENS IMPLANT Left 01/2016     CATARACT EXTRACTION, BILATERAL  04/2015     COLPOSCOPY, BIOPSY, COMBINED  2/16/10    TZ-not seen     ENDOMETRIAL SAMPLING (BIOPSY)  3/31/05    benign     GENITOURINARY SURGERY  6 April 2011    cystoscopy with +1 trabeculaion     HC KNEE SCOPE, DIAGNOSTIC      Description: Arthroscopy Knee Right;  Recorded: 06/06/2008;  Comments: FOR MENISCUS TEAR     HC OSTEOTOMY METATARSAL (NOT 1ST)      Description: Metatarsal Osteotomy Of The Fifth Metatarsal;  Proc Date: 02/01/2007;     IR LUMBAR EPIDURAL STEROID INJECTION  9/30/2004     IR LUMBAR EPIDURAL STEROID INJECTION  10/11/2004     IR LUMBAR EPIDURAL STEROID INJECTION  11/5/2004     JOINT REPLACEMENT, HIP RT/LT  2008    Joint Replacement Hip RT/LT     RELEASE CARPAL TUNNEL BILATERAL       ZZC TOTAL HIP ARTHROPLASTY       Description: Total Hip Replacement;  Proc Date: 06/01/2008;  Comments: RIGHT     ZZ TOTAL HIP ARTHROPLASTY      Description: Total Hip Replacement;  Proc Date: 08/01/2008;       Allergies   Allergen Reactions     Dust Mite Extract      Seasonal Allergies          Current Outpatient Medications:      citalopram (CELEXA) 20 MG tablet, Take 1.5 tablets (30 mg) by mouth daily, Disp: 135 tablet, Rfl: 1     cycloSPORINE (RESTASIS) 0.05 % ophthalmic emulsion, 1 drop every 12 hours., Disp: , Rfl:      estradiol (ESTRACE) 0.5 MG tablet, Take 1 tablet (0.5 mg) by mouth daily, Disp: 90 tablet, Rfl: 3     estradiol (ESTRING) 7.5 MCG/24HR vaginal ring, Place once vaginally and refill as instructed, Disp: 1 each, Rfl: 3     fish oil-omega-3 fatty acids 1000 MG capsule, Take 1 capsule by mouth daily., Disp: , Rfl:      latanoprost (XALATAN) 0.005 % ophthalmic solution, , Disp: , Rfl:      losartan (COZAAR) 100 MG tablet, Take 1 tablet (100 mg) by mouth daily, Disp: 90 tablet, Rfl: 3     LUMIGAN 0.01 % SOLN ophthalmic solution, , Disp: , Rfl:      Magnesium 100 MG TABS, Take 3 tablets by mouth At Bedtime., Disp: , Rfl:      mometasone (NASONEX) 50 MCG/ACT nasal spray, 2 sprays by Both Nostrils route daily., Disp: , Rfl:      NONFORMULARY, 1 Dose daily henrik 128 apply small amount to eyes at night, Disp: , Rfl:      omeprazole (PRILOSEC) 20 MG DR capsule, Take 1 capsule (20 mg) by mouth daily, Disp: 90 capsule, Rfl: 1     pentoxifylline ER (TRENTAL) 400 MG CR tablet, TAKE 1 TABLET (400 MG) BY MOUTH 3 TIMES DAILY (WITH MEALS), Disp: 90 tablet, Rfl: 4     pravastatin (PRAVACHOL) 40 MG tablet, TAKE 1 TABLET (40 MG) BY MOUTH DAILY, Disp: 90 tablet, Rfl: 1     progesterone (PROMETRIUM) 100 MG capsule, Take 1 capsule (100 mg) by mouth daily, Disp: 90 capsule, Rfl: 3     Tetrahydrozoline HCl (EYE DROPS OP), Apply  to eye. Hydro eye, Disp: , Rfl:      traZODone (DESYREL) 100 MG tablet, Take 1 tablet (100 mg) by mouth At Bedtime, Disp:  90 tablet, Rfl: 3    Family History   Problem Relation Age of Onset     Alcohol/Drug Father      Depression Father      Alcohol/Drug Brother      Breast Cancer Mother      Depression Mother      Diabetes Maternal Grandfather      Cancer Brother 68        blood cancer     Breast Cancer Cousin      Breast Cancer Cousin      Breast Cancer Cousin      Leukemia Brother        Social History     Socioeconomic History     Marital status: Single     Spouse name: Not on file     Number of children: Not on file     Years of education: Not on file     Highest education level: Not on file   Occupational History     Not on file   Tobacco Use     Smoking status: Former     Smokeless tobacco: Never     Tobacco comments:     quit 30 years ago 1970   Substance and Sexual Activity     Alcohol use: Yes     Alcohol/week: 0.0 - 0.8 standard drinks of alcohol     Comment: Rare     Drug use: No     Sexual activity: Not Currently     Partners: Male     Birth control/protection: Post-menopausal   Other Topics Concern      Service No     Blood Transfusions No     Caffeine Concern No     Comment: 3-4 pop/d (diet)     Occupational Exposure No     Hobby Hazards No     Sleep Concern Yes     Comment: sleeps too much -- tired after 9-10 hrs/nite     Stress Concern Yes     Comment: holiday lonliness     Weight Concern Yes     Comment: admits to eating more than she uses -- declines wt today     Special Diet No     Back Care No     Exercise Yes     Comment: sedentary -- plans to restart     Bike Helmet No     Seat Belt No     Self-Exams No     Parent/sibling w/ CABG, MI or angioplasty before 65F 55M? Not Asked   Social History Narrative    How much exercise per week? Stationary bike, recent weight loss    How much calcium per day? Supplement       How much caffeine per day? 2 cans a day    How much vitamin D per day? supplment    Do you/your family wear seatbelts?  Yes    Do you/your family use safety helmets? Yes    Do you/your family use  sunscreen? Yes    Do you/your family keep firearms in the home? Yes, locked    Do you/your family have a smoke detector(s)? Yes        Do you feel safe in your home? Yes    Has anyone ever touched you in an unwanted manner? No     Explain     Updated 8/28 CHIKI Santiago          Social Determinants of Health     Financial Resource Strain: Not on file   Food Insecurity: Not on file   Transportation Needs: Not on file   Physical Activity: Not on file   Stress: Not on file   Social Connections: Not on file   Interpersonal Safety: Not on file   Housing Stability: Not on file       10 point Review of Systems is negative except for ***.     LMP  (LMP Unknown)     BMI= There is no height or weight on file to calculate BMI.    OBJECTIVE:  General appearance: Patient is alert and fully cooperative with history & exam.  No sign of distress is noted during the visit.  Vascular: Dorsalis pedis and posterior tibial pulses are palpable. There is pedal hair growth ***.  CFT < 3 sec from anterior tibial surface to distal digits ***. There is no appreciable edema noted.  Dermatologic: Turgor and texture are within normal limits. No coloration or temperature changes. No primary or secondary lesions noted.  Neurologic: All epicritic and proprioceptive sensations are grossly intact ***.  Musculoskeletal: All active and passive ankle, subtalar, midtarsal, and 1st MPJ range of motion are grossly intact without pain or crepitus, with the exception of ***. Manual muscle strength is ***. All dorsiflexors, plantarflexors, invertors, evertors are intact ***. Tenderness present to *** on palpation. Tenderness to *** with range of motion. Calf is soft/non-tender with/without warmth/induration    Imaging:     {Imaging order/result:57629}    No results found.         TREATMENT:  ***        Miguel Doherty; CUBA  [facility] Foot & Ankle Surgery/Podiatry         Again, thank you for allowing me to participate in the care of your patient.         Sincerely,        Miguel Doherty DPM

## 2024-01-29 ENCOUNTER — PATIENT OUTREACH (OUTPATIENT)
Dept: CARE COORDINATION | Facility: CLINIC | Age: 79
End: 2024-01-29
Payer: COMMERCIAL

## 2024-02-15 ENCOUNTER — OFFICE VISIT (OUTPATIENT)
Dept: INTERNAL MEDICINE | Facility: CLINIC | Age: 79
End: 2024-02-15
Payer: COMMERCIAL

## 2024-02-15 VITALS
WEIGHT: 186 LBS | HEART RATE: 50 BPM | BODY MASS INDEX: 32.96 KG/M2 | HEIGHT: 63 IN | SYSTOLIC BLOOD PRESSURE: 118 MMHG | OXYGEN SATURATION: 97 % | TEMPERATURE: 97 F | RESPIRATION RATE: 10 BRPM | DIASTOLIC BLOOD PRESSURE: 73 MMHG

## 2024-02-15 DIAGNOSIS — E78.00 HYPERCHOLESTEROLEMIA: ICD-10-CM

## 2024-02-15 DIAGNOSIS — M17.11 PRIMARY OSTEOARTHRITIS OF RIGHT KNEE: ICD-10-CM

## 2024-02-15 DIAGNOSIS — I10 ESSENTIAL HYPERTENSION: ICD-10-CM

## 2024-02-15 DIAGNOSIS — F33.42 RECURRENT MAJOR DEPRESSIVE DISORDER, IN FULL REMISSION (H): ICD-10-CM

## 2024-02-15 DIAGNOSIS — Z01.818 PREOPERATIVE EXAMINATION: Primary | ICD-10-CM

## 2024-02-15 LAB
ANION GAP SERPL CALCULATED.3IONS-SCNC: 9 MMOL/L (ref 7–15)
ATRIAL RATE - MUSE: 45 BPM
BUN SERPL-MCNC: 13.7 MG/DL (ref 8–23)
CALCIUM SERPL-MCNC: 9.5 MG/DL (ref 8.8–10.2)
CHLORIDE SERPL-SCNC: 104 MMOL/L (ref 98–107)
CREAT SERPL-MCNC: 0.85 MG/DL (ref 0.51–0.95)
DEPRECATED HCO3 PLAS-SCNC: 24 MMOL/L (ref 22–29)
DIASTOLIC BLOOD PRESSURE - MUSE: NORMAL MMHG
EGFRCR SERPLBLD CKD-EPI 2021: 71 ML/MIN/1.73M2
ERYTHROCYTE [DISTWIDTH] IN BLOOD BY AUTOMATED COUNT: 12.8 % (ref 10–15)
GLUCOSE SERPL-MCNC: 86 MG/DL (ref 70–99)
HCT VFR BLD AUTO: 44.8 % (ref 35–47)
HGB BLD-MCNC: 15.1 G/DL (ref 11.7–15.7)
INTERPRETATION ECG - MUSE: NORMAL
MCH RBC QN AUTO: 30.8 PG (ref 26.5–33)
MCHC RBC AUTO-ENTMCNC: 33.7 G/DL (ref 31.5–36.5)
MCV RBC AUTO: 91 FL (ref 78–100)
P AXIS - MUSE: 80 DEGREES
PLATELET # BLD AUTO: 244 10E3/UL (ref 150–450)
POTASSIUM SERPL-SCNC: 4.2 MMOL/L (ref 3.4–5.3)
PR INTERVAL - MUSE: 174 MS
QRS DURATION - MUSE: 118 MS
QT - MUSE: 518 MS
QTC - MUSE: 448 MS
R AXIS - MUSE: 39 DEGREES
RBC # BLD AUTO: 4.9 10E6/UL (ref 3.8–5.2)
SODIUM SERPL-SCNC: 137 MMOL/L (ref 135–145)
SYSTOLIC BLOOD PRESSURE - MUSE: NORMAL MMHG
T AXIS - MUSE: 41 DEGREES
VENTRICULAR RATE- MUSE: 45 BPM
WBC # BLD AUTO: 8.7 10E3/UL (ref 4–11)

## 2024-02-15 PROCEDURE — 99214 OFFICE O/P EST MOD 30 MIN: CPT | Mod: 25 | Performed by: INTERNAL MEDICINE

## 2024-02-15 PROCEDURE — 85027 COMPLETE CBC AUTOMATED: CPT | Performed by: INTERNAL MEDICINE

## 2024-02-15 PROCEDURE — 36415 COLL VENOUS BLD VENIPUNCTURE: CPT | Performed by: INTERNAL MEDICINE

## 2024-02-15 PROCEDURE — 93005 ELECTROCARDIOGRAM TRACING: CPT | Performed by: INTERNAL MEDICINE

## 2024-02-15 PROCEDURE — 80048 BASIC METABOLIC PNL TOTAL CA: CPT | Performed by: INTERNAL MEDICINE

## 2024-02-15 PROCEDURE — 93010 ELECTROCARDIOGRAM REPORT: CPT | Performed by: STUDENT IN AN ORGANIZED HEALTH CARE EDUCATION/TRAINING PROGRAM

## 2024-02-15 RX ORDER — RESPIRATORY SYNCYTIAL VIRUS VACCINE 120MCG/0.5
0.5 KIT INTRAMUSCULAR ONCE
Qty: 1 EACH | Refills: 0 | Status: CANCELLED | OUTPATIENT
Start: 2024-02-15 | End: 2024-02-15

## 2024-02-15 ASSESSMENT — PATIENT HEALTH QUESTIONNAIRE - PHQ9
SUM OF ALL RESPONSES TO PHQ QUESTIONS 1-9: 5
10. IF YOU CHECKED OFF ANY PROBLEMS, HOW DIFFICULT HAVE THESE PROBLEMS MADE IT FOR YOU TO DO YOUR WORK, TAKE CARE OF THINGS AT HOME, OR GET ALONG WITH OTHER PEOPLE: SOMEWHAT DIFFICULT
SUM OF ALL RESPONSES TO PHQ QUESTIONS 1-9: 5

## 2024-02-15 ASSESSMENT — PAIN SCALES - GENERAL: PAINLEVEL: NO PAIN (0)

## 2024-02-15 NOTE — LETTER
February 16, 2024      Elizabeth Doverrichie  [address] [place] [address]        Dear ,    We are writing to inform you of your test results.        Resulted Orders   CBC with platelets   Result Value Ref Range    WBC Count 8.7 4.0 - 11.0 10e3/uL    RBC Count 4.90 3.80 - 5.20 10e6/uL    Hemoglobin 15.1 11.7 - 15.7 g/dL    Hematocrit 44.8 35.0 - 47.0 %    MCV 91 78 - 100 fL    MCH 30.8 26.5 - 33.0 pg    MCHC 33.7 31.5 - 36.5 g/dL    RDW 12.8 10.0 - 15.0 %    Platelet Count 244 150 - 450 10e3/uL   Basic metabolic panel  (Ca, Cl, CO2, Creat, Gluc, K, Na, BUN)   Result Value Ref Range    Sodium 137 135 - 145 mmol/L      Comment:      Reference intervals for this test were updated on 09/26/2023 to more accurately reflect our healthy population. There may be differences in the flagging of prior results with similar values performed with this method. Interpretation of those prior results can be made in the context of the updated reference intervals.     Potassium 4.2 3.4 - 5.3 mmol/L    Chloride 104 98 - 107 mmol/L    Carbon Dioxide (CO2) 24 22 - 29 mmol/L    Anion Gap 9 7 - 15 mmol/L    Urea Nitrogen 13.7 8.0 - 23.0 mg/dL    Creatinine 0.85 0.51 - 0.95 mg/dL    GFR Estimate 71 >60 mL/min/1.73m2    Calcium 9.5 8.8 - 10.2 mg/dL    Glucose 86 70 - 99 mg/dL       If you have any questions or concerns, please call the clinic at the number listed above.       Sincerely,      Brennan De Luna MD

## 2024-02-15 NOTE — PROGRESS NOTES
Preoperative Evaluation  [facility]  [address] [place] [address]  Phone: [phone]  Fax: [phone]  Primary Provider: Tracy De Luna  Pre-op Performing Provider: TRACY DE LUNA  Feb 15, 2024       Elizabeth is a 78 year old, presenting for the following:  Pre-Op Exam (Pre Op-Feb 23rd- [facility] -Right knee replacement-Dr. Brendan Mohr time TBD-she has a list of mediation's that she will have after her surgery.)        2/15/2024     1:24 PM   Additional Questions   Roomed by Sheila GOFF     Surgical Information  Surgery/ProcedureRight knee replacement-Dr. Brendan Mohr time TBD  Surgery Location: [facility]   Surgeon: Dr. Brendan Mohr   Surgery Date: 2/23/24  Time of Surgery: TBD  Where patient plans to recover: At home with family  Fax number for surgical facility: [phone]    Assessment & Plan     The proposed surgical procedure is considered LOW risk.    (Z01.818) Preoperative examination  (primary encounter diagnosis)  Comment: TKR planned  Plan: CBC with platelets, EKG 12-lead, tracing only,         Basic metabolic panel  (Ca, Cl, CO2, Creat,         Gluc, K, Na, BUN)        Pertinent labs, normal EKG    (M17.11) Primary osteoarthritis of right knee  Comment: chronic pain  Plan: procedure as above    (F33.42) Recurrent major depressive disorder, in full remission (H24)  Comment: stable on medication  Plan: Will plan to continue on previous medication without changes     (E78.00) Hypercholesterolemia  Comment: on statin  Plan: Will plan to continue on previous medication without changes     (I10) Essential hypertension  Comment: controlled  Plan: Will plan to continue on previous medication without changes             - No identified additional risk factors other than previously addressed    Antiplatelet or Anticoagulation Medication Instructions   - Patient is on no antiplatelet or anticoagulation  medications.    Additional Medication Instructions   - ACE/ARB: HOLD on day of surgery (minimum 11 hours for general anesthesia).    Recommendation  APPROVAL GIVEN to proceed with proposed procedure, without further diagnostic evaluation.          Subjective       Via the Health Maintenance questionnaire, the patient has reported the following services have been completed -Mammogram, this information has been sent to the abstraction team.  HPI related to upcoming procedure: TKR.         2/15/2024     1:06 PM   Preop Questions   1. Have you ever had a heart attack or stroke? No   2. Have you ever had surgery on your heart or blood vessels, such as a stent placement, a coronary artery bypass, or surgery on an artery in your head, neck, heart, or legs? No   3. Do you have chest pain with activity? No   4. Do you have a history of  heart failure? No   5. Do you currently have a cold, bronchitis or symptoms of other infection? No   6. Do you have a cough, shortness of breath, or wheezing? YES - nothing new.   7. Do you or anyone in your family have previous history of blood clots? YES - na   8. Do you or does anyone in your family have a serious bleeding problem such as prolonged bleeding following surgeries or cuts? No   9. Have you ever had problems with anemia or been told to take iron pills? No   10. Have you had any abnormal blood loss such as black, tarry or bloody stools, or abnormal vaginal bleeding? No   11. Have you ever had a blood transfusion? No   12. Are you willing to have a blood transfusion if it is medically needed before, during, or after your surgery? Yes   13. Have you or any of your relatives ever had problems with anesthesia? No   14. Do you have sleep apnea, excessive snoring or daytime drowsiness? No   15. Do you have any artifical heart valves or other implanted medical devices like a pacemaker, defibrillator, or continuous glucose monitor? No   16. Do you have artificial joints? YES - hips  "  17. Are you allergic to latex? No       Health Care Directive  Patient does not have a Health Care Directive or Living Will: Discussed advance care planning with patient; however, patient declined at this time.          Patient Active Problem List    Diagnosis Date Noted    Insomnia, unspecified type 06/28/2023     Priority: Medium    Essential hypertension 07/31/2022     Priority: Medium    Cervical high risk HPV (human papillomavirus) test positive 02/09/2021     Priority: Medium     2011, 2012 NIL paps  5/28/14 NIL pap, +HR HPV (not 16/18)  8/18/16 NIL pap, +HR HPV (not 16/18)  10/4/16 Colpo bx and ECC inflammation, negative  8/28/17 NIL pap, +HR HPV (not 16/18)  10/9/17 LEEP inflammation, negative  10/29/18 NIL pap, +HR HPV (not 16/18)  1/29/19 Colpo bx and ECC neg  12/11/19 NIL pap, +HR HPV (not 16/18)  2/5/20 Colpo ECC inflammation  2/9/21 NIL pap, +HR HPV (not 16/18)  3/29/21 Colpo bx and ECC neg  5/9/22 Visit: \"Elizabeth has decided she no longer wants to f/u with annual pap smears/colpos.  She is tired of doing the follow up.\"  12/28/22 Lost to follow-up for pap tracking   2/27/23 ASCUS pap, + HR HPV (not 16 or 18). Plan colp due by 5/27/23  3/8/23 LM on VM. OneDoc message sent. Pt read message  4/26/23 Reminder mychart (2 month)  5/25/23 [place] not done. Tracking updated for 6 mo colp/pap due 8/27/23.   7/26/23 Reminder mychart  8/23/23 Reminder call-LM  10/9/23 Appt. Provider reminded pt she needs to make a follow up appt for colp and cotest  11/9/23 Lost to follow up      Raynauds syndrome 06/01/2020     Priority: Medium    Attention deficit disorder 08/18/2016     Priority: Medium     Overview:   Created by Conversion      Atopic rhinitis 08/18/2016     Priority: Medium     Overview:   Created by Conversion    Replacement Utility updated for latest IMO load      Benign neoplasm of colon 08/18/2016     Priority: Medium     Overview:   Created by Conversion      Gastroesophageal reflux disease 08/18/2016     " Priority: Medium     Overview:   Created by Conversion      Hypercholesterolemia 08/18/2016     Priority: Medium     Overview:   Created by Conversion      Localized osteoarthrosis 08/18/2016     Priority: Medium     Overview:   Created by Conversion      Migraine 08/18/2016     Priority: Medium     Overview:   Created by Conversion    Replacement Utility updated for latest IMO load      Recurrent major depressive disorder, in full remission (H24) 08/18/2016     Priority: Medium     Overview:   Created by Conversion      Herpes zoster 04/16/2015     Priority: Medium     Overview:   Left facial      Menopausal and postmenopausal disorder 08/08/2011     Priority: Medium     5/28/1014:On HT therapy since age 50 for hot flush and recurrent UTI control.      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     Past Surgical History:   Procedure Laterality Date    ABDOMINOPLASTY      ABDOMINOPLASTY      ARTHROSCOPY KNEE IRRIGATION AND DEBRIDEMENT      RT-Articular Cartilage    BIOPSY BREAST      benign open brest biopsy    BIOPSY BREAST Left     benign x2    BIOPSY CERVICAL, LOCAL EXCISION, SINGLE/MULTIPLE  10/09/2017    BIOPSY OF BREAST, INCISIONAL      Description: Biopsy Breast Open;  Recorded: 06/06/2008;  Comments: BENIGN    CATARACT EXTRACTION W/ INTRAOCULAR LENS IMPLANT Left 01/2016    CATARACT EXTRACTION, BILATERAL  04/2015    COLPOSCOPY, BIOPSY, COMBINED  2/16/10    TZ-not seen    ENDOMETRIAL SAMPLING (BIOPSY)  3/31/05    benign    GENITOURINARY SURGERY  6 April 2011    cystoscopy with +1 trabeculaion    HC KNEE SCOPE, DIAGNOSTIC      Description: Arthroscopy Knee Right;  Recorded: 06/06/2008;   Comments: FOR MENISCUS TEAR    HC OSTEOTOMY METATARSAL (NOT 1ST)      Description: Metatarsal Osteotomy Of The Fifth Metatarsal;  Proc Date: 02/01/2007;    IR LUMBAR EPIDURAL STEROID INJECTION  9/30/2004    IR LUMBAR EPIDURAL STEROID INJECTION  10/11/2004    IR LUMBAR EPIDURAL STEROID INJECTION  11/5/2004    JOINT REPLACEMENT, HIP RT/LT  2008    Joint Replacement Hip RT/LT    RELEASE CARPAL TUNNEL BILATERAL      ZZC TOTAL HIP ARTHROPLASTY      Description: Total Hip Replacement;  Proc Date: 06/01/2008;  Comments: RIGHT    ZZC TOTAL HIP ARTHROPLASTY      Description: Total Hip Replacement;  Proc Date: 08/01/2008;     Current Outpatient Medications   Medication Sig Dispense Refill    ciclopirox (PENLAC) 8 % external solution Apply to adjacent skin and affected nails daily.  Remove with alcohol every 7 days, then repeat. 6.6 mL 1    citalopram (CELEXA) 20 MG tablet Take 1.5 tablets (30 mg) by mouth daily 135 tablet 1    cycloSPORINE (RESTASIS) 0.05 % ophthalmic emulsion 1 drop every 12 hours.      estradiol (ESTRACE) 0.5 MG tablet Take 1 tablet (0.5 mg) by mouth daily 90 tablet 3    estradiol (ESTRING) 7.5 MCG/24HR vaginal ring Place once vaginally and refill as instructed 1 each 3    fish oil-omega-3 fatty acids 1000 MG capsule Take 1 capsule by mouth daily.      latanoprost (XALATAN) 0.005 % ophthalmic solution       losartan (COZAAR) 100 MG tablet Take 1 tablet (100 mg) by mouth daily 90 tablet 3    LUMIGAN 0.01 % SOLN ophthalmic solution       Magnesium 100 MG TABS Take 3 tablets by mouth At Bedtime.      mometasone (NASONEX) 50 MCG/ACT nasal spray 2 sprays by Both Nostrils route daily.      NONFORMULARY 1 Dose daily henrik 128 apply small amount to eyes at night      omeprazole (PRILOSEC) 20 MG DR capsule Take 1 capsule (20 mg) by mouth daily 90 capsule 1    pentoxifylline ER (TRENTAL) 400 MG CR tablet TAKE 1 TABLET (400 MG) BY MOUTH 3 TIMES DAILY (WITH MEALS) 90 tablet 4    pravastatin (PRAVACHOL) 40 MG tablet TAKE  "1 TABLET (40 MG) BY MOUTH DAILY 90 tablet 1    progesterone (PROMETRIUM) 100 MG capsule Take 1 capsule (100 mg) by mouth daily 90 capsule 3    Tetrahydrozoline HCl (EYE DROPS OP) Apply  to eye. Hydro eye      traZODone (DESYREL) 100 MG tablet Take 1 tablet (100 mg) by mouth At Bedtime 90 tablet 3       Allergies   Allergen Reactions    Dust Mite Extract     Seasonal Allergies         Social History     Tobacco Use    Smoking status: Former    Smokeless tobacco: Never    Tobacco comments:     quit 30 years ago 1970   Substance Use Topics    Alcohol use: Yes     Alcohol/week: 0.0 - 0.8 standard drinks of alcohol     Comment: Rare       History   Drug Use No         Review of Systems    Objective    /73 (BP Location: Left arm, Patient Position: Sitting, Cuff Size: Adult Large)   Pulse 50   Temp 97  F (36.1  C) (Tympanic)   Resp 10   Ht 1.61 m (5' 3.39\")   Wt 84.4 kg (186 lb)   LMP  (LMP Unknown)   SpO2 97%   BMI 32.55 kg/m     Estimated body mass index is 32.55 kg/m  as calculated from the following:    Height as of this encounter: 1.61 m (5' 3.39\").    Weight as of this encounter: 84.4 kg (186 lb).  Physical Exam  GENERAL: alert and no distress  NECK: no adenopathy, no asymmetry, masses, or scars  RESP: lungs clear to auscultation - no rales, rhonchi or wheezes  CV: regular rate and rhythm, normal S1 S2, no S3 or S4, no murmur, click or rub, no peripheral edema  NEURO: Normal strength and tone, mentation intact and speech normal  PSYCH: mentation appears normal, affect normal/bright    Recent Labs   Lab Test 06/28/23  1619 11/17/22 2051   HGB  --  14.6   PLT  --  193    138   POTASSIUM 3.9 3.6   CR 0.74 0.68   A1C 5.5  --         Diagnostics  Labs pending at this time.  Results will be reviewed when available.   EKG: appears normal, NSR, incomplete RBBB    Revised Cardiac Risk Index (RCRI)  The patient has the following serious cardiovascular risks for perioperative complications:   - No serious " cardiac risks = 0 points     RCRI Interpretation: 0 points: Class I (very low risk - 0.4% complication rate)         Signed Electronically by: Brennan De Luna MD  Copy of this evaluation report is provided to requesting physician.

## 2024-02-23 ENCOUNTER — APPOINTMENT (OUTPATIENT)
Dept: RADIOLOGY | Facility: HOSPITAL | Age: 79
DRG: 494 | End: 2024-02-23
Attending: STUDENT IN AN ORGANIZED HEALTH CARE EDUCATION/TRAINING PROGRAM
Payer: COMMERCIAL

## 2024-02-23 ENCOUNTER — HOSPITAL ENCOUNTER (INPATIENT)
Facility: HOSPITAL | Age: 79
LOS: 5 days | Discharge: NURSING, REHAB OR LONG-TERM CARE | DRG: 494 | End: 2024-02-28
Attending: EMERGENCY MEDICINE | Admitting: INTERNAL MEDICINE
Payer: COMMERCIAL

## 2024-02-23 ENCOUNTER — TRANSFERRED RECORDS (OUTPATIENT)
Dept: HEALTH INFORMATION MANAGEMENT | Facility: CLINIC | Age: 79
End: 2024-02-23
Payer: COMMERCIAL

## 2024-02-23 ENCOUNTER — APPOINTMENT (OUTPATIENT)
Dept: CT IMAGING | Facility: HOSPITAL | Age: 79
DRG: 494 | End: 2024-02-23
Attending: EMERGENCY MEDICINE
Payer: COMMERCIAL

## 2024-02-23 DIAGNOSIS — S82.851B TYPE I OR II OPEN TRIMALLEOLAR FRACTURE OF RIGHT ANKLE, INITIAL ENCOUNTER: ICD-10-CM

## 2024-02-23 DIAGNOSIS — S82.891B ANKLE FRACTURE, RIGHT, OPEN TYPE I OR II, INITIAL ENCOUNTER: Primary | ICD-10-CM

## 2024-02-23 LAB
ANION GAP SERPL CALCULATED.3IONS-SCNC: 6 MMOL/L (ref 7–15)
BUN SERPL-MCNC: 18.3 MG/DL (ref 8–23)
CALCIUM SERPL-MCNC: 8.5 MG/DL (ref 8.8–10.2)
CHLORIDE SERPL-SCNC: 104 MMOL/L (ref 98–107)
CREAT SERPL-MCNC: 0.92 MG/DL (ref 0.51–0.95)
DEPRECATED HCO3 PLAS-SCNC: 23 MMOL/L (ref 22–29)
EGFRCR SERPLBLD CKD-EPI 2021: 64 ML/MIN/1.73M2
ERYTHROCYTE [DISTWIDTH] IN BLOOD BY AUTOMATED COUNT: 12.7 % (ref 10–15)
GLUCOSE SERPL-MCNC: 196 MG/DL (ref 70–99)
HCT VFR BLD AUTO: 37.8 % (ref 35–47)
HGB BLD-MCNC: 13.2 G/DL (ref 11.7–15.7)
MCH RBC QN AUTO: 32.3 PG (ref 26.5–33)
MCHC RBC AUTO-ENTMCNC: 34.9 G/DL (ref 31.5–36.5)
MCV RBC AUTO: 92 FL (ref 78–100)
PLATELET # BLD AUTO: 212 10E3/UL (ref 150–450)
POTASSIUM SERPL-SCNC: 4.8 MMOL/L (ref 3.4–5.3)
RBC # BLD AUTO: 4.09 10E6/UL (ref 3.8–5.2)
SODIUM SERPL-SCNC: 133 MMOL/L (ref 135–145)
WBC # BLD AUTO: 19.3 10E3/UL (ref 4–11)

## 2024-02-23 PROCEDURE — 999N000065 XR ANKLE RIGHT G/E 3 VIEWS

## 2024-02-23 PROCEDURE — 99285 EMERGENCY DEPT VISIT HI MDM: CPT | Mod: 25

## 2024-02-23 PROCEDURE — 250N000011 HC RX IP 250 OP 636: Performed by: EMERGENCY MEDICINE

## 2024-02-23 PROCEDURE — 36415 COLL VENOUS BLD VENIPUNCTURE: CPT | Performed by: STUDENT IN AN ORGANIZED HEALTH CARE EDUCATION/TRAINING PROGRAM

## 2024-02-23 PROCEDURE — 99223 1ST HOSP IP/OBS HIGH 75: CPT | Mod: AI | Performed by: INTERNAL MEDICINE

## 2024-02-23 PROCEDURE — 73600 X-RAY EXAM OF ANKLE: CPT | Mod: RT

## 2024-02-23 PROCEDURE — 29515 APPLICATION SHORT LEG SPLINT: CPT | Mod: RT

## 2024-02-23 PROCEDURE — 73560 X-RAY EXAM OF KNEE 1 OR 2: CPT | Mod: RT

## 2024-02-23 PROCEDURE — 99418 PROLNG IP/OBS E/M EA 15 MIN: CPT | Performed by: INTERNAL MEDICINE

## 2024-02-23 PROCEDURE — 96374 THER/PROPH/DIAG INJ IV PUSH: CPT

## 2024-02-23 PROCEDURE — 80048 BASIC METABOLIC PNL TOTAL CA: CPT | Performed by: STUDENT IN AN ORGANIZED HEALTH CARE EDUCATION/TRAINING PROGRAM

## 2024-02-23 PROCEDURE — 73700 CT LOWER EXTREMITY W/O DYE: CPT | Mod: RT

## 2024-02-23 PROCEDURE — 120N000001 HC R&B MED SURG/OB

## 2024-02-23 PROCEDURE — 85027 COMPLETE CBC AUTOMATED: CPT | Performed by: STUDENT IN AN ORGANIZED HEALTH CARE EDUCATION/TRAINING PROGRAM

## 2024-02-23 RX ORDER — AMOXICILLIN 250 MG
1 CAPSULE ORAL 2 TIMES DAILY PRN
Status: DISCONTINUED | OUTPATIENT
Start: 2024-02-23 | End: 2024-02-28 | Stop reason: HOSPADM

## 2024-02-23 RX ORDER — LIDOCAINE 40 MG/G
CREAM TOPICAL
Status: DISCONTINUED | OUTPATIENT
Start: 2024-02-23 | End: 2024-02-25

## 2024-02-23 RX ORDER — CEFAZOLIN SODIUM 1 G/3ML
1 INJECTION, POWDER, FOR SOLUTION INTRAMUSCULAR; INTRAVENOUS EVERY 8 HOURS
Status: DISCONTINUED | OUTPATIENT
Start: 2024-02-24 | End: 2024-02-24

## 2024-02-23 RX ORDER — AMOXICILLIN 250 MG
2 CAPSULE ORAL 2 TIMES DAILY PRN
Status: DISCONTINUED | OUTPATIENT
Start: 2024-02-23 | End: 2024-02-28 | Stop reason: HOSPADM

## 2024-02-23 RX ORDER — CEFAZOLIN SODIUM 1 G/3ML
1 INJECTION, POWDER, FOR SOLUTION INTRAMUSCULAR; INTRAVENOUS ONCE
Status: COMPLETED | OUTPATIENT
Start: 2024-02-23 | End: 2024-02-23

## 2024-02-23 RX ORDER — ONDANSETRON 2 MG/ML
4 INJECTION INTRAMUSCULAR; INTRAVENOUS EVERY 6 HOURS PRN
Status: DISCONTINUED | OUTPATIENT
Start: 2024-02-23 | End: 2024-02-25

## 2024-02-23 RX ORDER — CALCIUM CARBONATE 500 MG/1
1000 TABLET, CHEWABLE ORAL 4 TIMES DAILY PRN
Status: DISCONTINUED | OUTPATIENT
Start: 2024-02-23 | End: 2024-02-28 | Stop reason: HOSPADM

## 2024-02-23 RX ORDER — ONDANSETRON 4 MG/1
4 TABLET, ORALLY DISINTEGRATING ORAL EVERY 6 HOURS PRN
Status: DISCONTINUED | OUTPATIENT
Start: 2024-02-23 | End: 2024-02-25

## 2024-02-23 RX ADMIN — CEFAZOLIN 1 G: 1 INJECTION, POWDER, FOR SOLUTION INTRAMUSCULAR; INTRAVENOUS at 22:08

## 2024-02-23 ASSESSMENT — ACTIVITIES OF DAILY LIVING (ADL)
ADLS_ACUITY_SCORE: 35

## 2024-02-23 ASSESSMENT — COLUMBIA-SUICIDE SEVERITY RATING SCALE - C-SSRS
6. HAVE YOU EVER DONE ANYTHING, STARTED TO DO ANYTHING, OR PREPARED TO DO ANYTHING TO END YOUR LIFE?: NO
1. IN THE PAST MONTH, HAVE YOU WISHED YOU WERE DEAD OR WISHED YOU COULD GO TO SLEEP AND NOT WAKE UP?: NO
2. HAVE YOU ACTUALLY HAD ANY THOUGHTS OF KILLING YOURSELF IN THE PAST MONTH?: NO

## 2024-02-24 ENCOUNTER — ANESTHESIA (OUTPATIENT)
Dept: SURGERY | Facility: HOSPITAL | Age: 79
DRG: 494 | End: 2024-02-24
Payer: COMMERCIAL

## 2024-02-24 ENCOUNTER — APPOINTMENT (OUTPATIENT)
Dept: RADIOLOGY | Facility: HOSPITAL | Age: 79
DRG: 494 | End: 2024-02-24
Attending: STUDENT IN AN ORGANIZED HEALTH CARE EDUCATION/TRAINING PROGRAM
Payer: COMMERCIAL

## 2024-02-24 ENCOUNTER — ANESTHESIA EVENT (OUTPATIENT)
Dept: SURGERY | Facility: HOSPITAL | Age: 79
DRG: 494 | End: 2024-02-24
Payer: COMMERCIAL

## 2024-02-24 PROBLEM — N39.0 CHRONIC UTI: Status: ACTIVE | Noted: 2024-02-24

## 2024-02-24 LAB
ALBUMIN UR-MCNC: NEGATIVE MG/DL
APPEARANCE UR: CLEAR
BASOPHILS # BLD AUTO: 0 10E3/UL (ref 0–0.2)
BASOPHILS NFR BLD AUTO: 0 %
BILIRUB UR QL STRIP: NEGATIVE
COLOR UR AUTO: COLORLESS
EOSINOPHIL # BLD AUTO: 0 10E3/UL (ref 0–0.7)
EOSINOPHIL NFR BLD AUTO: 0 %
ERYTHROCYTE [DISTWIDTH] IN BLOOD BY AUTOMATED COUNT: 12.5 % (ref 10–15)
GLUCOSE BLDC GLUCOMTR-MCNC: 115 MG/DL (ref 70–99)
GLUCOSE UR STRIP-MCNC: NEGATIVE MG/DL
HCT VFR BLD AUTO: 38.8 % (ref 35–47)
HGB BLD-MCNC: 13.2 G/DL (ref 11.7–15.7)
HGB UR QL STRIP: ABNORMAL
HOLD SPECIMEN: NORMAL
IMM GRANULOCYTES # BLD: 0.1 10E3/UL
IMM GRANULOCYTES NFR BLD: 1 %
KETONES UR STRIP-MCNC: NEGATIVE MG/DL
LEUKOCYTE ESTERASE UR QL STRIP: ABNORMAL
LYMPHOCYTES # BLD AUTO: 1.9 10E3/UL (ref 0.8–5.3)
LYMPHOCYTES NFR BLD AUTO: 11 %
MCH RBC QN AUTO: 31.7 PG (ref 26.5–33)
MCHC RBC AUTO-ENTMCNC: 34 G/DL (ref 31.5–36.5)
MCV RBC AUTO: 93 FL (ref 78–100)
MONOCYTES # BLD AUTO: 1.5 10E3/UL (ref 0–1.3)
MONOCYTES NFR BLD AUTO: 9 %
NEUTROPHILS # BLD AUTO: 14 10E3/UL (ref 1.6–8.3)
NEUTROPHILS NFR BLD AUTO: 79 %
NITRATE UR QL: NEGATIVE
NRBC # BLD AUTO: 0 10E3/UL
NRBC BLD AUTO-RTO: 0 /100
PH UR STRIP: 6 [PH] (ref 5–7)
PLATELET # BLD AUTO: 218 10E3/UL (ref 150–450)
RBC # BLD AUTO: 4.17 10E6/UL (ref 3.8–5.2)
RBC URINE: 3 /HPF
SP GR UR STRIP: 1.01 (ref 1–1.03)
SQUAMOUS EPITHELIAL: <1 /HPF
UROBILINOGEN UR STRIP-MCNC: <2 MG/DL
WBC # BLD AUTO: 17.6 10E3/UL (ref 4–11)
WBC URINE: 20 /HPF

## 2024-02-24 PROCEDURE — 85025 COMPLETE CBC W/AUTO DIFF WBC: CPT | Performed by: INTERNAL MEDICINE

## 2024-02-24 PROCEDURE — 360N000084 HC SURGERY LEVEL 4 W/ FLUORO, PER MIN: Performed by: STUDENT IN AN ORGANIZED HEALTH CARE EDUCATION/TRAINING PROGRAM

## 2024-02-24 PROCEDURE — 0QSJ04Z REPOSITION RIGHT FIBULA WITH INTERNAL FIXATION DEVICE, OPEN APPROACH: ICD-10-PCS | Performed by: STUDENT IN AN ORGANIZED HEALTH CARE EDUCATION/TRAINING PROGRAM

## 2024-02-24 PROCEDURE — 250N000013 HC RX MED GY IP 250 OP 250 PS 637: Performed by: INTERNAL MEDICINE

## 2024-02-24 PROCEDURE — 0SSF0ZZ REPOSITION RIGHT ANKLE JOINT, OPEN APPROACH: ICD-10-PCS | Performed by: STUDENT IN AN ORGANIZED HEALTH CARE EDUCATION/TRAINING PROGRAM

## 2024-02-24 PROCEDURE — 250N000011 HC RX IP 250 OP 636: Performed by: ANESTHESIOLOGY

## 2024-02-24 PROCEDURE — 258N000003 HC RX IP 258 OP 636: Performed by: ANESTHESIOLOGY

## 2024-02-24 PROCEDURE — 250N000011 HC RX IP 250 OP 636: Performed by: STUDENT IN AN ORGANIZED HEALTH CARE EDUCATION/TRAINING PROGRAM

## 2024-02-24 PROCEDURE — 99233 SBSQ HOSP IP/OBS HIGH 50: CPT | Performed by: INTERNAL MEDICINE

## 2024-02-24 PROCEDURE — 250N000013 HC RX MED GY IP 250 OP 250 PS 637: Performed by: STUDENT IN AN ORGANIZED HEALTH CARE EDUCATION/TRAINING PROGRAM

## 2024-02-24 PROCEDURE — 81001 URINALYSIS AUTO W/SCOPE: CPT | Performed by: INTERNAL MEDICINE

## 2024-02-24 PROCEDURE — 999N000180 XR SURGERY CARM FLUORO LESS THAN 5 MIN

## 2024-02-24 PROCEDURE — 250N000011 HC RX IP 250 OP 636: Performed by: INTERNAL MEDICINE

## 2024-02-24 PROCEDURE — 0QSG04Z REPOSITION RIGHT TIBIA WITH INTERNAL FIXATION DEVICE, OPEN APPROACH: ICD-10-PCS | Performed by: STUDENT IN AN ORGANIZED HEALTH CARE EDUCATION/TRAINING PROGRAM

## 2024-02-24 PROCEDURE — 120N000001 HC R&B MED SURG/OB

## 2024-02-24 PROCEDURE — 250N000011 HC RX IP 250 OP 636: Performed by: NURSE ANESTHETIST, CERTIFIED REGISTERED

## 2024-02-24 PROCEDURE — 250N000013 HC RX MED GY IP 250 OP 250 PS 637: Performed by: ANESTHESIOLOGY

## 2024-02-24 PROCEDURE — 87086 URINE CULTURE/COLONY COUNT: CPT | Performed by: INTERNAL MEDICINE

## 2024-02-24 PROCEDURE — 272N000001 HC OR GENERAL SUPPLY STERILE: Performed by: STUDENT IN AN ORGANIZED HEALTH CARE EDUCATION/TRAINING PROGRAM

## 2024-02-24 PROCEDURE — 258N000001 HC RX 258: Performed by: STUDENT IN AN ORGANIZED HEALTH CARE EDUCATION/TRAINING PROGRAM

## 2024-02-24 PROCEDURE — 370N000017 HC ANESTHESIA TECHNICAL FEE, PER MIN: Performed by: STUDENT IN AN ORGANIZED HEALTH CARE EDUCATION/TRAINING PROGRAM

## 2024-02-24 PROCEDURE — C1713 ANCHOR/SCREW BN/BN,TIS/BN: HCPCS | Performed by: STUDENT IN AN ORGANIZED HEALTH CARE EDUCATION/TRAINING PROGRAM

## 2024-02-24 PROCEDURE — 258N000003 HC RX IP 258 OP 636: Performed by: STUDENT IN AN ORGANIZED HEALTH CARE EDUCATION/TRAINING PROGRAM

## 2024-02-24 PROCEDURE — C1769 GUIDE WIRE: HCPCS | Performed by: STUDENT IN AN ORGANIZED HEALTH CARE EDUCATION/TRAINING PROGRAM

## 2024-02-24 PROCEDURE — 999N000141 HC STATISTIC PRE-PROCEDURE NURSING ASSESSMENT: Performed by: STUDENT IN AN ORGANIZED HEALTH CARE EDUCATION/TRAINING PROGRAM

## 2024-02-24 PROCEDURE — 36415 COLL VENOUS BLD VENIPUNCTURE: CPT | Performed by: INTERNAL MEDICINE

## 2024-02-24 PROCEDURE — 250N000009 HC RX 250: Performed by: ANESTHESIOLOGY

## 2024-02-24 DEVICE — LO-PRO SCRW,TI,3.5MMX 24MM
Type: IMPLANTABLE DEVICE | Site: ANKLE | Status: FUNCTIONAL
Brand: ARTHREX®

## 2024-02-24 DEVICE — LOCKING DISTAL FIBULA PLATE,TI,RIGHT,4H
Type: IMPLANTABLE DEVICE | Site: ANKLE | Status: FUNCTIONAL
Brand: ARTHREX®

## 2024-02-24 DEVICE — QCKFIX SCRW,TI,CANN ST,CANC.,4.0X 44MM
Type: IMPLANTABLE DEVICE | Site: ANKLE | Status: FUNCTIONAL
Brand: ARTHREX®

## 2024-02-24 DEVICE — SCREW BN 18MM 3MM TI VA NS KREULOCK LF AR-8933VCL-18: Type: IMPLANTABLE DEVICE | Site: ANKLE | Status: FUNCTIONAL

## 2024-02-24 DEVICE — SCREW VAL KREULOCK TI 3.0 X 16 AR-8933VCL-16: Type: IMPLANTABLE DEVICE | Site: ANKLE | Status: FUNCTIONAL

## 2024-02-24 DEVICE — SUTR ANCH,CRKSCRW FT,3.5X 10MM
Type: IMPLANTABLE DEVICE | Site: ANKLE | Status: FUNCTIONAL
Brand: ARTHREX®

## 2024-02-24 DEVICE — SCREW BONE KREULOCK TITANIUM 12X3MM AR-8933VCL-12: Type: IMPLANTABLE DEVICE | Site: ANKLE | Status: FUNCTIONAL

## 2024-02-24 DEVICE — LO-PRO SCRW,TI,3.5MMX 14MM
Type: IMPLANTABLE DEVICE | Site: ANKLE | Status: FUNCTIONAL
Brand: ARTHREX®

## 2024-02-24 RX ORDER — NALOXONE HYDROCHLORIDE 0.4 MG/ML
0.4 INJECTION, SOLUTION INTRAMUSCULAR; INTRAVENOUS; SUBCUTANEOUS
Status: DISCONTINUED | OUTPATIENT
Start: 2024-02-24 | End: 2024-02-28 | Stop reason: HOSPADM

## 2024-02-24 RX ORDER — CEFAZOLIN SODIUM/WATER 2 G/20 ML
2 SYRINGE (ML) INTRAVENOUS SEE ADMIN INSTRUCTIONS
Status: DISCONTINUED | OUTPATIENT
Start: 2024-02-24 | End: 2024-02-25

## 2024-02-24 RX ORDER — SODIUM CHLORIDE, SODIUM LACTATE, POTASSIUM CHLORIDE, CALCIUM CHLORIDE 600; 310; 30; 20 MG/100ML; MG/100ML; MG/100ML; MG/100ML
INJECTION, SOLUTION INTRAVENOUS CONTINUOUS
Status: DISCONTINUED | OUTPATIENT
Start: 2024-02-24 | End: 2024-02-25

## 2024-02-24 RX ORDER — CEFAZOLIN SODIUM/WATER 2 G/20 ML
2 SYRINGE (ML) INTRAVENOUS
Status: COMPLETED | OUTPATIENT
Start: 2024-02-24 | End: 2024-02-24

## 2024-02-24 RX ORDER — ASPIRIN 325 MG
325 TABLET, DELAYED RELEASE (ENTERIC COATED) ORAL DAILY
Status: DISCONTINUED | OUTPATIENT
Start: 2024-02-25 | End: 2024-02-28 | Stop reason: HOSPADM

## 2024-02-24 RX ORDER — ONDANSETRON 2 MG/ML
4 INJECTION INTRAMUSCULAR; INTRAVENOUS EVERY 6 HOURS PRN
Status: DISCONTINUED | OUTPATIENT
Start: 2024-02-24 | End: 2024-02-28 | Stop reason: HOSPADM

## 2024-02-24 RX ORDER — ESTRADIOL 0.5 MG/1
0.5 TABLET ORAL DAILY
Status: DISCONTINUED | OUTPATIENT
Start: 2024-02-24 | End: 2024-02-28 | Stop reason: HOSPADM

## 2024-02-24 RX ORDER — NALOXONE HYDROCHLORIDE 0.4 MG/ML
0.2 INJECTION, SOLUTION INTRAMUSCULAR; INTRAVENOUS; SUBCUTANEOUS
Status: DISCONTINUED | OUTPATIENT
Start: 2024-02-24 | End: 2024-02-28 | Stop reason: HOSPADM

## 2024-02-24 RX ORDER — DEXTROSE, SODIUM CHLORIDE, SODIUM LACTATE, POTASSIUM CHLORIDE, AND CALCIUM CHLORIDE 5; .6; .31; .03; .02 G/100ML; G/100ML; G/100ML; G/100ML; G/100ML
INJECTION, SOLUTION INTRAVENOUS CONTINUOUS
Status: DISCONTINUED | OUTPATIENT
Start: 2024-02-24 | End: 2024-02-25

## 2024-02-24 RX ORDER — LIDOCAINE 40 MG/G
CREAM TOPICAL
Status: DISCONTINUED | OUTPATIENT
Start: 2024-02-24 | End: 2024-02-28 | Stop reason: HOSPADM

## 2024-02-24 RX ORDER — TRAZODONE HYDROCHLORIDE 50 MG/1
50 TABLET, FILM COATED ORAL
Status: DISCONTINUED | OUTPATIENT
Start: 2024-02-24 | End: 2024-02-28 | Stop reason: HOSPADM

## 2024-02-24 RX ORDER — MAGNESIUM OXIDE 400 MG/1
400 TABLET ORAL AT BEDTIME
Status: DISCONTINUED | OUTPATIENT
Start: 2024-02-24 | End: 2024-02-28 | Stop reason: HOSPADM

## 2024-02-24 RX ORDER — GLYCOPYRROLATE 0.2 MG/ML
INJECTION, SOLUTION INTRAMUSCULAR; INTRAVENOUS PRN
Status: DISCONTINUED | OUTPATIENT
Start: 2024-02-24 | End: 2024-02-24

## 2024-02-24 RX ORDER — BUPIVACAINE HYDROCHLORIDE AND EPINEPHRINE 5; 5 MG/ML; UG/ML
INJECTION, SOLUTION PERINEURAL
Status: COMPLETED | OUTPATIENT
Start: 2024-02-24 | End: 2024-02-24

## 2024-02-24 RX ORDER — PRAVASTATIN SODIUM 20 MG
40 TABLET ORAL EVERY EVENING
Status: DISCONTINUED | OUTPATIENT
Start: 2024-02-24 | End: 2024-02-28 | Stop reason: HOSPADM

## 2024-02-24 RX ORDER — OXYCODONE HYDROCHLORIDE 5 MG/1
5 TABLET ORAL EVERY 4 HOURS PRN
Status: DISCONTINUED | OUTPATIENT
Start: 2024-02-24 | End: 2024-02-28 | Stop reason: HOSPADM

## 2024-02-24 RX ORDER — PROGESTERONE 100 MG/1
100 CAPSULE ORAL DAILY
Status: DISCONTINUED | OUTPATIENT
Start: 2024-02-24 | End: 2024-02-28 | Stop reason: HOSPADM

## 2024-02-24 RX ORDER — PENTOXIFYLLINE 400 MG/1
400 TABLET, EXTENDED RELEASE ORAL
Status: DISCONTINUED | OUTPATIENT
Start: 2024-02-24 | End: 2024-02-28 | Stop reason: HOSPADM

## 2024-02-24 RX ORDER — ACETAMINOPHEN 325 MG/1
650 TABLET ORAL EVERY 4 HOURS PRN
Status: DISCONTINUED | OUTPATIENT
Start: 2024-02-27 | End: 2024-02-28 | Stop reason: HOSPADM

## 2024-02-24 RX ORDER — ACETAMINOPHEN 325 MG/1
975 TABLET ORAL EVERY 8 HOURS
Qty: 27 TABLET | Refills: 0 | Status: COMPLETED | OUTPATIENT
Start: 2024-02-24 | End: 2024-02-27

## 2024-02-24 RX ORDER — FLUTICASONE PROPIONATE 50 MCG
2 SPRAY, SUSPENSION (ML) NASAL DAILY
Status: DISCONTINUED | OUTPATIENT
Start: 2024-02-24 | End: 2024-02-24

## 2024-02-24 RX ORDER — LOSARTAN POTASSIUM 50 MG/1
100 TABLET ORAL DAILY
Status: DISCONTINUED | OUTPATIENT
Start: 2024-02-24 | End: 2024-02-28 | Stop reason: HOSPADM

## 2024-02-24 RX ORDER — SODIUM CHLORIDE 5 %
1 OINTMENT (GRAM) OPHTHALMIC (EYE) AT BEDTIME
COMMUNITY
End: 2024-07-31

## 2024-02-24 RX ORDER — BISACODYL 10 MG
10 SUPPOSITORY, RECTAL RECTAL DAILY PRN
Status: DISCONTINUED | OUTPATIENT
Start: 2024-02-27 | End: 2024-02-28 | Stop reason: HOSPADM

## 2024-02-24 RX ORDER — HYDROMORPHONE HCL IN WATER/PF 6 MG/30 ML
0.2 PATIENT CONTROLLED ANALGESIA SYRINGE INTRAVENOUS
Status: DISCONTINUED | OUTPATIENT
Start: 2024-02-24 | End: 2024-02-28 | Stop reason: HOSPADM

## 2024-02-24 RX ORDER — MAGNESIUM GLYCINATE 100 MG
300 CAPSULE ORAL AT BEDTIME
Status: DISCONTINUED | OUTPATIENT
Start: 2024-02-24 | End: 2024-02-24

## 2024-02-24 RX ORDER — ONDANSETRON 4 MG/1
4 TABLET, ORALLY DISINTEGRATING ORAL EVERY 6 HOURS PRN
Status: DISCONTINUED | OUTPATIENT
Start: 2024-02-24 | End: 2024-02-28 | Stop reason: HOSPADM

## 2024-02-24 RX ORDER — CEFAZOLIN SODIUM 2 G/100ML
2 INJECTION, SOLUTION INTRAVENOUS EVERY 8 HOURS
Qty: 200 ML | Refills: 0 | Status: COMPLETED | OUTPATIENT
Start: 2024-02-24 | End: 2024-02-25

## 2024-02-24 RX ORDER — LIDOCAINE 40 MG/G
CREAM TOPICAL
Status: DISCONTINUED | OUTPATIENT
Start: 2024-02-24 | End: 2024-02-25

## 2024-02-24 RX ORDER — PROPOFOL 10 MG/ML
INJECTION, EMULSION INTRAVENOUS PRN
Status: DISCONTINUED | OUTPATIENT
Start: 2024-02-24 | End: 2024-02-24

## 2024-02-24 RX ORDER — ACETAMINOPHEN 325 MG/1
975 TABLET ORAL ONCE
Status: COMPLETED | OUTPATIENT
Start: 2024-02-24 | End: 2024-02-24

## 2024-02-24 RX ORDER — HYDROMORPHONE HCL IN WATER/PF 6 MG/30 ML
0.4 PATIENT CONTROLLED ANALGESIA SYRINGE INTRAVENOUS
Status: DISCONTINUED | OUTPATIENT
Start: 2024-02-24 | End: 2024-02-28 | Stop reason: HOSPADM

## 2024-02-24 RX ORDER — POLYETHYLENE GLYCOL 3350 17 G/17G
17 POWDER, FOR SOLUTION ORAL DAILY
Status: DISCONTINUED | OUTPATIENT
Start: 2024-02-25 | End: 2024-02-28 | Stop reason: HOSPADM

## 2024-02-24 RX ORDER — PROPOFOL 10 MG/ML
INJECTION, EMULSION INTRAVENOUS CONTINUOUS PRN
Status: DISCONTINUED | OUTPATIENT
Start: 2024-02-24 | End: 2024-02-24

## 2024-02-24 RX ORDER — CYCLOSPORINE 0.5 MG/ML
1 EMULSION OPHTHALMIC EVERY 12 HOURS
Status: DISCONTINUED | OUTPATIENT
Start: 2024-02-24 | End: 2024-02-28 | Stop reason: HOSPADM

## 2024-02-24 RX ORDER — OXYCODONE HYDROCHLORIDE 5 MG/1
10 TABLET ORAL EVERY 4 HOURS PRN
Status: DISCONTINUED | OUTPATIENT
Start: 2024-02-24 | End: 2024-02-28 | Stop reason: HOSPADM

## 2024-02-24 RX ORDER — LATANOPROST 50 UG/ML
1 SOLUTION/ DROPS OPHTHALMIC DAILY
Status: DISCONTINUED | OUTPATIENT
Start: 2024-02-24 | End: 2024-02-24

## 2024-02-24 RX ORDER — PROCHLORPERAZINE MALEATE 5 MG
5 TABLET ORAL EVERY 6 HOURS PRN
Status: DISCONTINUED | OUTPATIENT
Start: 2024-02-24 | End: 2024-02-28 | Stop reason: HOSPADM

## 2024-02-24 RX ORDER — FENTANYL CITRATE 50 UG/ML
25-100 INJECTION, SOLUTION INTRAMUSCULAR; INTRAVENOUS
Status: DISCONTINUED | OUTPATIENT
Start: 2024-02-24 | End: 2024-02-25

## 2024-02-24 RX ORDER — TETRAHYDROZOLINE HCL 0.05 %
1 DROPS OPHTHALMIC (EYE) 4 TIMES DAILY PRN
Status: DISCONTINUED | OUTPATIENT
Start: 2024-02-24 | End: 2024-02-24

## 2024-02-24 RX ORDER — AMOXICILLIN 250 MG
1 CAPSULE ORAL 2 TIMES DAILY
Status: DISCONTINUED | OUTPATIENT
Start: 2024-02-24 | End: 2024-02-28 | Stop reason: HOSPADM

## 2024-02-24 RX ORDER — PANTOPRAZOLE SODIUM 40 MG/1
40 TABLET, DELAYED RELEASE ORAL
Status: DISCONTINUED | OUTPATIENT
Start: 2024-02-24 | End: 2024-02-28 | Stop reason: HOSPADM

## 2024-02-24 RX ORDER — HYDROMORPHONE HYDROCHLORIDE 1 MG/ML
0.3 INJECTION, SOLUTION INTRAMUSCULAR; INTRAVENOUS; SUBCUTANEOUS
Status: DISCONTINUED | OUTPATIENT
Start: 2024-02-24 | End: 2024-02-28 | Stop reason: HOSPADM

## 2024-02-24 RX ADMIN — GLYCOPYRROLATE 0.2 MG: 0.2 INJECTION INTRAMUSCULAR; INTRAVENOUS at 14:08

## 2024-02-24 RX ADMIN — ACETAMINOPHEN 975 MG: 325 TABLET ORAL at 11:45

## 2024-02-24 RX ADMIN — CEFAZOLIN 1 G: 1 INJECTION, POWDER, FOR SOLUTION INTRAMUSCULAR; INTRAVENOUS at 05:38

## 2024-02-24 RX ADMIN — CEFAZOLIN SODIUM 2 G: 2 INJECTION, SOLUTION INTRAVENOUS at 17:01

## 2024-02-24 RX ADMIN — PHENYLEPHRINE HYDROCHLORIDE 100 MCG: 10 INJECTION INTRAVENOUS at 14:08

## 2024-02-24 RX ADMIN — Medication 2 G: at 12:27

## 2024-02-24 RX ADMIN — PRAVASTATIN SODIUM 40 MG: 20 TABLET ORAL at 20:55

## 2024-02-24 RX ADMIN — CYCLOSPORINE 1 DROP: 0.5 EMULSION OPHTHALMIC at 21:00

## 2024-02-24 RX ADMIN — SODIUM CHLORIDE, POTASSIUM CHLORIDE, SODIUM LACTATE AND CALCIUM CHLORIDE: 600; 310; 30; 20 INJECTION, SOLUTION INTRAVENOUS at 13:15

## 2024-02-24 RX ADMIN — SODIUM CHLORIDE, SODIUM LACTATE, POTASSIUM CHLORIDE, CALCIUM CHLORIDE AND DEXTROSE MONOHYDRATE: 5; 600; 310; 30; 20 INJECTION, SOLUTION INTRAVENOUS at 08:27

## 2024-02-24 RX ADMIN — LOSARTAN POTASSIUM 100 MG: 50 TABLET, FILM COATED ORAL at 08:43

## 2024-02-24 RX ADMIN — BIMATOPROST 1 DROP: 0.1 SOLUTION/ DROPS OPHTHALMIC at 20:54

## 2024-02-24 RX ADMIN — HYDROMORPHONE HYDROCHLORIDE 0.3 MG: 1 INJECTION, SOLUTION INTRAMUSCULAR; INTRAVENOUS; SUBCUTANEOUS at 05:41

## 2024-02-24 RX ADMIN — BUPIVACAINE HYDROCHLORIDE AND EPINEPHRINE BITARTRATE 10 ML: 5; .005 INJECTION, SOLUTION PERINEURAL at 12:08

## 2024-02-24 RX ADMIN — DOCUSATE SODIUM AND SENNOSIDES 1 TABLET: 8.6; 5 TABLET, FILM COATED ORAL at 20:54

## 2024-02-24 RX ADMIN — SODIUM CHLORIDE, POTASSIUM CHLORIDE, SODIUM LACTATE AND CALCIUM CHLORIDE: 600; 310; 30; 20 INJECTION, SOLUTION INTRAVENOUS at 14:58

## 2024-02-24 RX ADMIN — PENTOXIFYLLINE 400 MG: 400 TABLET, EXTENDED RELEASE ORAL at 17:04

## 2024-02-24 RX ADMIN — MIDAZOLAM HYDROCHLORIDE 1 MG: 1 INJECTION, SOLUTION INTRAMUSCULAR; INTRAVENOUS at 12:03

## 2024-02-24 RX ADMIN — ACETAMINOPHEN 975 MG: 325 TABLET ORAL at 20:55

## 2024-02-24 RX ADMIN — FENTANYL CITRATE 100 MCG: 50 INJECTION, SOLUTION INTRAMUSCULAR; INTRAVENOUS at 12:03

## 2024-02-24 RX ADMIN — BUPIVACAINE HYDROCHLORIDE AND EPINEPHRINE 8 ML: 5; 5 INJECTION, SOLUTION PERINEURAL at 12:11

## 2024-02-24 RX ADMIN — CYCLOSPORINE 1 DROP: 0.5 EMULSION OPHTHALMIC at 10:34

## 2024-02-24 RX ADMIN — PROPOFOL 25 MCG/KG/MIN: 10 INJECTION, EMULSION INTRAVENOUS at 12:33

## 2024-02-24 RX ADMIN — PROPOFOL 30 MG: 10 INJECTION, EMULSION INTRAVENOUS at 12:34

## 2024-02-24 RX ADMIN — SODIUM CHLORIDE, POTASSIUM CHLORIDE, SODIUM LACTATE AND CALCIUM CHLORIDE: 600; 310; 30; 20 INJECTION, SOLUTION INTRAVENOUS at 11:38

## 2024-02-24 RX ADMIN — MAGNESIUM OXIDE TAB 400 MG (241.3 MG ELEMENTAL MG) 400 MG: 400 (241.3 MG) TAB at 20:55

## 2024-02-24 ASSESSMENT — ACTIVITIES OF DAILY LIVING (ADL)
ADLS_ACUITY_SCORE: 35
ADLS_ACUITY_SCORE: 29
ADLS_ACUITY_SCORE: 35
ADLS_ACUITY_SCORE: 44
ADLS_ACUITY_SCORE: 35
ADLS_ACUITY_SCORE: 32
ADLS_ACUITY_SCORE: 35
ADLS_ACUITY_SCORE: 44
ADLS_ACUITY_SCORE: 44
ADLS_ACUITY_SCORE: 32
ADLS_ACUITY_SCORE: 35
ADLS_ACUITY_SCORE: 32
ADLS_ACUITY_SCORE: 32
ADLS_ACUITY_SCORE: 35
ADLS_ACUITY_SCORE: 32
ADLS_ACUITY_SCORE: 32
ADLS_ACUITY_SCORE: 44
ADLS_ACUITY_SCORE: 32

## 2024-02-24 NOTE — ANESTHESIA CARE TRANSFER NOTE
Patient: Tangela Chapa    Procedure: Procedure(s):  OPEN REDUCTION INTERNAL FIXATION, FRACTURE, TRIMALLEOLAR ANKLE       Diagnosis: Ankle fracture, right, open type I or II, initial encounter [S83.814T]  Diagnosis Additional Information: No value filed.    Anesthesia Type:   General     Note:    Oropharynx: oropharynx clear of all foreign objects  Level of Consciousness: awake  Oxygen Supplementation: room air    Independent Airway: airway patency satisfactory and stable  Dentition: dentition unchanged  Vital Signs Stable: post-procedure vital signs reviewed and stable  Report to RN Given: handoff report given  Patient transferred to: Medical/Surgical Unit    Handoff Report: Identifed the Patient, Identified the Reponsible Provider, Reviewed the pertinent medical history, Discussed the surgical course, Reviewed Intra-OP anesthesia mangement and issues during anesthesia, Set expectations for post-procedure period and Allowed opportunity for questions and acknowledgement of understanding      Vitals:  Vitals Value Taken Time   /56    Temp     Pulse 57    Resp 18    SpO2 99        Electronically Signed By: TONY Urrutia CRNA  February 24, 2024  2:44 PM

## 2024-02-24 NOTE — PROGRESS NOTES
"[facility]    Medicine Progress Note - Hospitalist Service    Date of Admission:  2/23/2024    Assessment & Plan   Tangela Chapa is a 78 year old female with a pertinent history of hypertension, hypercholesterolemia, total right knee arthoplasty on 2/23/24 (outpatient setting) who was unfortunately readmitted the same day after she sustained a right ankle dislocation after going home.     Right ankle fracture with dislocation  -Reportedly tripped and fell down a few stairs.   -Ortho consulted. This was reduced and immobilized in the ER. Nerve block still working.   -Likely to OR today  -Routine post op care including DVT prophylaxis and pain control per ortho     Status post right knee arthroplasty 2/23/24.    -Per ortho     Leucocytosis  -Likely reactive. UA looks abnormal. Given frequency/dysuria, start antibiotics awaiting U/C. Currently, getting rambo-operative IV cefazolin. Switch this as needed based on culture report.    GERD  Hypertension-losartan  Depression  -Cont home meds        Diet: Advance Diet as Tolerated: Regular Diet Adult    DVT Prophylaxis: Defer to ortho  Alanis Catheter: PRESENT, indication:    Lines: None     Cardiac Monitoring: None  Code Status: Full Code      Clinically Significant Risk Factors Present on Admission                  # Hypertension: Noted on problem list      # Obesity: Estimated body mass index is 30.76 kg/m  as calculated from the following:    Height as of this encounter: 1.638 m (5' 4.5\").    Weight as of this encounter: 82.6 kg (182 lb).              Disposition Plan     Expected Discharge Date: 02/25/2024                    BLANQUITA White  Hospitalist Service  [facility]  Securely message with [employer] (more info)  Text page via [employer] Paging/Directory   ______________________________________________________________________    Interval History   Patient is new to me today. Doing fairly well with no acute issues " overnight.     Physical Exam   Vital Signs: Temp: 98.1  F (36.7  C) Temp src: Oral BP: 109/51 Pulse: 56   Resp: 20 SpO2: 96 % O2 Device: None (Room air) Oxygen Delivery: 6 LPM  Weight: 182 lbs 0 oz      General: Not in obvious distress.  HEENT: NC, AT   Chest: Clear to auscultation bilaterally  Heart: S1S2 normal, regular. No M/R/G  Abdomen: Soft. NT, ND. Bowel sounds- active.  Extremities: Right ankle under a dressing  Neuro: Alert and awake, grossly non-focal      Medical Decision Making             Data

## 2024-02-24 NOTE — ANESTHESIA PROCEDURE NOTES
Popliteal Procedure Note    Pre-Procedure   Staff -        Anesthesiologist:  Marty Alvarez MD       Performed By: anesthesiologist       Location: pre-op       Procedure Start/Stop Times: 2/24/2024 12:08 PM and 2/24/2024 12:10 AM       Pre-Anesthestic Checklist: patient identified, IV checked, site marked, risks and benefits discussed, informed consent, monitors and equipment checked, pre-op evaluation, at physician/surgeon's request and post-op pain management  Timeout:       Correct Patient: Yes        Correct Procedure: Yes        Correct Site: Yes        Correct Position: Yes        Correct Laterality: Yes        Site Marked: Yes  Procedure Documentation  Procedure: Popliteal       Diagnosis: POSTOP PAIN CONTROL PER SURGEON REQUEST       Laterality: right       Patient Position: supine       Patient Prep/Sterile Barriers: sterile gloves, mask       Skin prep: Chloraprep       Needle Type: short bevel       Needle Gauge: 20.        Needle Length (Inches): 4        Ultrasound guided       1. Ultrasound was used to identify targeted nerve, plexus, vascular marker, or fascial plane and place a needle adjacent to it in real-time.       2. Ultrasound was used to visualize the spread of anesthetic in close proximity to the above referenced structure.       3. A permanent image is entered into the patient's record.       4. The visualized anatomic structures appeared normal.       5. There were no apparent abnormal pathologic findings.    Assessment/Narrative         The placement was negative for: blood aspirated and painful injection       Paresthesias: No.       Bolus given via needle. no blood aspirated via catheter.        Secured via.        Insertion/Infusion Method: Single Shot       Complications: none    Medication(s) Administered   Bupivacaine 0.5% w/ 1:200K Epi (Other) - Other   10 mL - 2/24/2024 12:08:00 PM  Medication Administration Time: 2/24/2024 12:08 PM      FOR [facility] ([facility]) ONLY:   Pain  "Team Contact information: please page the Pain Team Via [facility]. Search \"Pain\". During daytime hours, please page the attending first. At night please page the resident first.      "

## 2024-02-24 NOTE — OP NOTE
Operative Note    Name:  Tangela Chapa  PCP:  Brennan De Luna  Procedure Date:  2/24/2024      Pre-Procedure Diagnosis:  Closed right trimalleolar ankle fracture dislocation    Post-Procedure Diagnosis:    Same    Procedure: Procedure(s):  1.  Open reduction internal fixation right trimalleolar ankle fracture with fixation of the posterior lip    Surgeon(s):  Manpreet Cai MD    Assistant: Kellen Leone PA-C  The PAs assistance was necessary in soft tissue retraction, maintenance of reduction during fixation, closure of the wound, splint application, and for overall progression and efficiency of the case.    Anesthesia Type:  MAC with Block     Estimated Blood Loss:   15 cc    Specimens: * No specimens in log *     Complications:    None    Implants: Arthrex locking distal fibular plate with Kreulock screws, and 3.5 mm cortical screws.  Titanium corkscrew anchor for the medial malleolus.  4.0 mm cancellous cannulated screw for the posterior malleolus.    Findings: Fracture dislocation of the right ankle.  Thready pulses preoperatively with sluggish capillary refill, when the ankle was reduced, posterior tibial pulse was able to be found on Doppler, dorsalis pedis was thready.  After reduction and fixation, perfusion improved although capillary refill still sluggish.  Posterior tibial pulse able to be found with ultrasound, dorsalis pedis found proximal to the ankle, and lost distal over the foot.    Indications for procedure: The patient is a pleasant 78-year-old female who had a right total knee arthroplasty with my partner yesterday.  She had a fall at home and sustained a right ankle trimalleolar fracture dislocation.  The fracture was initially diagnosed in the emergency department as an open fracture.  On further examination, the abrasion over the medial malleolus wound is noted to be superficial, and on my examination this does not represent an open fracture.  Regardless, given the unstable  nature of her injury my recommendation was for open reduction internal fixation.  She was admitted to the hospitalist service, and optimized for surgery.  I met with the patient today at the bedside and we discussed operative intervention.  We discussed the risks of anesthesia including but not limited to heart attack, stroke, blood clot, pneumonia, and death.  We discussed the risks of surgery including but not limited to bleeding, infection, wound healing issues, damage to surrounding structures like blood vessels and nerves, postoperative numbness that may or may not resolve, complex regional pain syndrome, nonunion, malunion, prominent hardware, posttraumatic arthritis, need for further surgery.  No guarantees were made or given.  Ultimately after thorough discussion, the patient elected to proceed with surgery.  Signed informed consent was obtained and placed in the chart.    Procedure: The patient was met in the preoperative holding area.  The correct surgical site was marked with the patient's participation.  Informed consent was again reviewed with the patient, and all questions were answered.  The patient was administered regional blocks in the preoperative holding area by the anesthesia service.  The patient was transferred into the operating theater and placed supine on the operating table.  The patient has successful induction of anesthesia.  The patient was secured to the table using a safety strap, and all bony prominences were well-padded.  Prior to draping, when the ankle was subluxed, the patient's foot was noted to be cool with sluggish capillary refill.  After reduction of the ankle, perfusion of the toes improved, but the DP and PT pulses were unable to be palpated.  A Doppler was brought in, and the DP pulse was unable to be found with Doppler.  The patient had a strong posterior tibial pulse with Doppler.  Given the increased perfusion with reduction of the ankle, I made the decision to proceed  with fixation, as stabilizing the ankle will help improve vascular inflow.  A tourniquet was applied to the operative calf.  The right lower extremity was then prepped and draped in usual sterile fashion.  A timeout was performed with all members the operating team participating per hospital policy.  The correct patient, site, and procedure were all confirmed.  All in attendance were in agreement.  Preoperative antibiotic administration was confirmed.    The limb was exsanguinated using an Esmarch bandage.  The tourniquet was insufflated to 250 mmHg.  The Esmarch was removed.    A longitudinal incision was made over the distal fibula.  Dissection was taken through the subcutaneous tissues and crossing vessels were cauterized.  The superficial peroneal nerve was not encountered in the dissection.  Periosteal flaps were raised over the distal fibula.  The fracture site was cleared of all hematoma and interposed soft tissue.  A reduction was attempted using a pointed reduction forceps, but it was unable to be completed.  A longitudinal incision that was then made over the anterior third of the medial malleolus.  Dissection was again taken through the subcutaneous tissues, and crossing vessels were cauterized.  The deltoid ligament was found to be completely avulsed off the medial malleolus and interposed in the medial gutter.  It was flipped out of the medial gutter and the talus was reduced.  Attention was turned back to the lateral side.  The fibula fracture was able to be reduced using a pointed reduction forceps and a lobster-claw clamp.  A 3.5 mm lag screw was placed from anterior to posterior perpendicular to the fracture site.  An Arthrex distal fibular locking plate was then contoured to the lateral aspect of the distal fibula and pinned into place.  Its position was noted to be appropriate on C arm.  3.5 mm cortical screw was placed proximal to the fracture site to lag the plate to bone.  The distal cluster  was then filled with locking screws.  2 more cortical screws were placed proximal to the fracture site bicortical.  C-arm imaging confirmed appropriate reduction of the fracture, and appropriate placement of the implants.    On the lateral image, the posterior malleolar fracture was noted to be nondisplaced.  Using C arm guidance, a percutaneous incision was made over the anterolateral distal tibia.  Dissection was taken carefully through the soft tissues so as not to damage to the neurovascular structures.  Using a drill guide to protect the neurovascular structures, a guidewire for a 4.0 mm cancellous screw was placed from anterior to posterior across the posterior malleolar fracture.  The appropriate drill bit was used, and a 4.0 mm partially-threaded cancellous cannulated screw was placed from anterior to posterior with appropriate purchase.  C-arm imaging confirmed appropriate placement.    Attention was turned to the deltoid ligament.  An Arthrex corkscrew anchor was placed into the medial malleolus.  Sutures were passed through the deltoid ligament in mattress fashion and back through the periosteum proximally.  Well the first assistant held the foot and ankle in inversion, the sutures were tied down.  The repair was oversewn with the remnant of a FiberWire suture.  The ankle was then noted to be stable to valgus and external rotation stress.    Final C-arm imaging confirmed appropriate reduction of the fracture, and appropriate placement of the implants.  There was no instability noted with cotton test under fluoroscopy as well as external rotation stress.    Pressure was held, and tourniquet cuff pressure was let down.  Hemostasis was achieved.  The toes became pink and well-perfused with slightly sluggish capillary refill.  A sterile Doppler was brought onto the field and DP and PT pulses were unable to be found.  An ultrasound was brought onto the field in sterile fashion, and the posterior tibial artery  was noted to be intact and patent.  The dorsalis pedis was noted to be patent proximal to the ankle, but seemed to lose flow distal to the ankle joint.  Given the toes were warm and well-perfused, we proceeded with wound closure with plan for discussion with vascular surgery immediately postoperatively.      The wounds were copiously irrigated using sterile saline.  The deep layer was closed with 3-0 Monocryl in running fashion.  The subcutaneous layer was closed with 3-0 Monocryl in buried interrupted fashion.  The skin was closed with 3-0 nylon sutures.  The leg was cleaned and dried.  A sterile dressing of Adaptic, 4 x 8 gauze, ABD pads, and sterile cast padding was applied.  A Luna compression dressing with posterior and U plaster splints with the ankle held in neutral dorsiflexion was then applied.  The patient was awakened from anesthesia and transferred the PACU in stable and awake condition.  All sponge and needle counts were correct at the end the case.    POSTOPERATIVE PLAN  -Will discuss the patient's vascular status with vascular surgery, possible that dorsalis pedis is in spasm from the patient's ankle being subluxed all night, do not think any urgent intervention will be needed as the patient's toes are warm and well-perfused  -Strict nonweightbearing right lower extremity okay to work on knee range of motion  -Elevate above the level of the heart at all times  -DVT prophylaxis per the patient's knee surgeon, will discuss  -24 hours IV antibiotics, no further antibiotics will be needed as this does not represent an open fracture  -PT/OT, discharge planning  -Follow-up in 2 weeks with me for wound check, suture removal, radiographs, and placement of a short leg cast    Manpreet Cai MD    Date: 2/24/2024  Time: 2:30 PM

## 2024-02-24 NOTE — ED TRIAGE NOTES
History of right total knee replacement today. Reports missed a step going down a step fell, twisted right ankle. Obvious deformity noted to the right ankle.     Triage Assessment (Adult)       Row Name 02/23/24 2007          Triage Assessment    Airway WDL WDL        Respiratory WDL    Respiratory WDL WDL        Skin Circulation/Temperature WDL    Skin Circulation/Temperature WDL WDL        Cardiac WDL    Cardiac WDL WDL        Peripheral/Neurovascular WDL    Peripheral Neurovascular WDL pulse assessment;neurovascular assessment upper;capillary refill     Capillary Refill, RLE less than/equal to 3 secs     Pulse Assessment popliteal        Cognitive/Neuro/Behavioral WDL    Cognitive/Neuro/Behavioral WDL WDL        Pulse Popliteal    Right Popliteal Pulse 2+ (normal)

## 2024-02-24 NOTE — TREATMENT PLAN
Called about Ms. Chapa, 78F with with right open ankle fracture.    Patient not personally evaluated at time of this note.    Patient underwent right TKA with Dr. Mohr at [facility] today. Block was reportedly still in effect and tripped and fell down stairs at home. Reportedly has a small open wound on medial side of right ankle.    Radiographs reviewed demonstrate a right ankle trimalleolar fracture dislocation.    Patient underwent closed reduction with ED Staff. Post-reduction radiographs and CT for preop planning are pending.    Plan:  - Ancef and tetanus in ED  - Continue ancef q8h  - OR tomorrow AM with Dr. Cai for I&D and right ankle ORIF  - Admit to hospital medicine for optimization/clearance for OR in AM  - Preop labs ordered (CBC and BMP)  - Knee XR ordered given TKA today and new fall  - NPO at midnight  - Full consult in AM    Ry Alvarez MD  [facility]      Addendum:  Right knee XR demonstrate a right cemented cruciate retaining TKA that appears well fixed and aligned. In comparison to fluoro images of TKA earlier in day, no acute changes.    Right ankle post-reduction XR demonstrate acceptably reduced right trimall equivalent ankle fracture.    Continue plan of care as above.    Ry Alvarez MD  [facility]

## 2024-02-24 NOTE — H&P
[facility]    History and Physical - Hospitalist Service       Date of Admission:  2/23/2024    Assessment & Plan      Tangela Chapa is a 78 year old female with a pertinent history of hypertension, hypercholesterolemia, total right knee arthoplasty who was unfortunately readmitted the same day after she sustained a right ankle dislocation after going home.        Patient Active Problem List   Diagnosis    Menopausal and postmenopausal disorder    Family history of breast cancer in mother    Attention deficit disorder    Atopic rhinitis    Benign neoplasm of colon    Gastroesophageal reflux disease    Herpes zoster    Hypercholesterolemia    Localized osteoarthrosis    Migraine    Recurrent major depressive disorder, in full remission (H24)    Essential hypertension    Cervical high risk HPV (human papillomavirus) test positive    Raynauds syndrome    Insomnia, unspecified type    Ankle fracture, right, open type I or II, initial encounter    Type I or II open trimalleolar fracture of right ankle, initial encounter      Right ankle fracture with dislocation-reportedly tripped and fell down a few stairs. Ortho consulted to see.  This was reduced and immobilized in the ER. Pain control for now. Nerve block still working.     Status post right knee arthroplasty.  Pain control.  As needed nerve block done after knee replacement.  Appreciate Ortho input    GERD-on Prilosec.  Medications not reconciled yet    Hypertension-losartan 100 mg    Depression-trazodone at bedtime    Chronic UTI- check ua as she has ongoing dysuria and urinary frequency     Leukocytolysis- likely reactive. Needs f/u    Rest of patient's medical problems management remains unchanged        Diet: NPO for Medical/Clinical Reasons Except for: Meds    DVT Prophylaxis: scd  Alanis Catheter: Not present  Lines: None     Cardiac Monitoring: None  Code Status:  Full code    Clinically Significant Risk Factors Present on  "Admission                  # Hypertension: Noted on problem list      # Obesity: Estimated body mass index is 30.76 kg/m  as calculated from the following:    Height as of this encounter: 1.638 m (5' 4.5\").    Weight as of this encounter: 82.6 kg (182 lb).              Disposition Plan      Expected Discharge Date: 02/25/2024                  Erin Bermeo MD  Hospitalist Service  [facility]  Securely message with [employer] (more info)  Text page via [employer] Paging/Directory     ______________________________________________________________________    Chief Complaint   Right ankle dislocation         History of Present Illness   Tangela Chapa is a 78 year old female with a pertinent history of hypertension, hypercholesterolemia, total right knee arthoplasty who was unfortunately readmitted the same day after she sustained a right ankle dislocation after going home.       The patient reports that she had a total knee replacement in her right knee this morning, and when she was at home today trying to go to the bathroom, she tripped while going down the stairs and fell down a few steps. She is unsure how she landed, but believes she landed on the outside of her right ankle, and notes a small abrasion on the inside of her ankle that is bleeding. Patient endorses 0/10 pain due to her current nerve block from surgery. She can wiggle her toes on her right foot but cannot bear weight.      Patient also mentions that she \"tapped' the back side of her head on the steps when she fell. She denies headache, loss of consciousness, nausea, vomiting, or any other concerns at this time. Patient is not on blood thinners at baseline.      Patient is being admitted for pain control and Ortho evaluation.  Her right ankle dislocation was reduced in the ER.Her main complaints are ongoing dysria and urinary frequency. She denied pain    Imaging of her knee done showed below:    Narrative & Impression   EXAM: XR " ANKLE RIGHT 2 VIEWS  LOCATION: [facility]  DATE: 2/23/2024     INDICATION: Pain.  COMPARISON: None.                                                                      IMPRESSION: Fracture dislocation right ankle with a displaced posterior malleolus fracture fragment. Displaced and angulated oblique fracture of the lateral malleolus/distal fibula. Tiny fracture fragments along the medial tibiotalar clear space.   Surrounding soft tissue swelling. There is lateral and slightly posterior dislocation of the tibiotalar joint.          Narrative & Impression   EXAM: CT ANKLE RIGHT W/O CONTRAST  LOCATION: [facility]  DATE: 2/23/2024     INDICATION: Trimalleolar right ankle fracture.  COMPARISON: Same day radiographs.  TECHNIQUE: Noncontrast. Axial, sagittal and coronal thin-section reconstruction. Dose reduction techniques were used.      FINDINGS:      There is a trimalleolar right ankle fracture, characterized as follows:     Mildly displaced transsyndesmotic fracture of the distal right fibula. Additional small avulsion fracture at the tibial insertion of the anterior inferior tibiofibular ligament at Chaput's tubercle. Mild widening of the posterior tibiofibular   syndesmosis.     Nondisplaced intra-articular fracture of the posterior malleolus.      Mildly displaced avulsion fracture involving the distal insertion of the deltoid ligament at the medial talar body, with associated widening of the medial ankle mortise.      No significant osteoarthritic changes. No suspicious lytic or blastic osseous lesions.     Muscularity is normal in bulk and in signal attenuation, for patient's age. Ankle tendons appear normal in course and caliber.     Diffuse right ankle soft tissue swelling.                                                                      IMPRESSION:     Trimalleolar right ankle fracture, with evidence of widening at the medial ankle mortise and possibly the  tibiofibular syndesmosis; correlation with stress radiography may be helpful.        Narrative & Impression   EXAM: XR ANKLE RIGHT G/E 3 VIEWS  LOCATION: [facility]  DATE: 2/23/2024     INDICATION: post reduction  COMPARISON: 02/23/2024                                                                      IMPRESSION: Improved alignment of the trimalleolar fracture dislocation. Persistent ankle mortise malalignment with mild lateral subluxation of the talus. Splint.     According to patient, she lives by herself and has 7 steps at home. 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       Past Surgical History   Past Surgical History:   Procedure Laterality Date    ABDOMINOPLASTY      ABDOMINOPLASTY      ARTHROSCOPY KNEE IRRIGATION AND DEBRIDEMENT      RT-Articular Cartilage    BIOPSY BREAST      benign open brest biopsy    BIOPSY BREAST Left     benign x2    BIOPSY CERVICAL, LOCAL EXCISION, SINGLE/MULTIPLE  10/09/2017    BIOPSY OF BREAST, INCISIONAL      Description: Biopsy Breast Open;  Recorded: 06/06/2008;  Comments: BENIGN    CATARACT EXTRACTION W/ INTRAOCULAR LENS IMPLANT Left 01/2016    CATARACT EXTRACTION, BILATERAL  04/2015    COLPOSCOPY, BIOPSY, COMBINED  2/16/10    TZ-not seen    ENDOMETRIAL SAMPLING (BIOPSY)  3/31/05    benign    GENITOURINARY SURGERY  6 April 2011    cystoscopy with +1 trabeculaion    HC KNEE SCOPE, DIAGNOSTIC      Description: Arthroscopy Knee Right;  Recorded: 06/06/2008;  Comments: FOR MENISCUS TEAR    HC OSTEOTOMY METATARSAL (NOT 1ST)      Description: Metatarsal Osteotomy Of The Fifth Metatarsal;  Proc Date: 02/01/2007;    IR LUMBAR EPIDURAL STEROID  INJECTION  2004    IR LUMBAR EPIDURAL STEROID INJECTION  10/11/2004    IR LUMBAR EPIDURAL STEROID INJECTION  2004    JOINT REPLACEMENT, HIP RT/LT      Joint Replacement Hip RT/LT    RELEASE CARPAL TUNNEL BILATERAL      ZZC TOTAL HIP ARTHROPLASTY      Description: Total Hip Replacement;  Proc Date: 2008;  Comments: RIGHT    ZZC TOTAL HIP ARTHROPLASTY      Description: Total Hip Replacement;  Proc Date: 2008;       Prior to Admission Medications   Prior to Admission Medications   Prescriptions Last Dose Informant Patient Reported? Taking?   LUMIGAN 0.01 % SOLN ophthalmic solution   Yes No   Magnesium 100 MG TABS   Yes No   Sig: Take 3 tablets by mouth At Bedtime.   NONFORMULARY   Yes No   Si Dose daily henrik 128 apply small amount to eyes at night   Tetrahydrozoline HCl (EYE DROPS OP)   Yes No   Sig: Apply  to eye. Hydro eye   ciclopirox (PENLAC) 8 % external solution   No No   Sig: Apply to adjacent skin and affected nails daily.  Remove with alcohol every 7 days, then repeat.   citalopram (CELEXA) 20 MG tablet   No No   Sig: Take 1.5 tablets (30 mg) by mouth daily   cycloSPORINE (RESTASIS) 0.05 % ophthalmic emulsion   Yes No   Si drop every 12 hours.   estradiol (ESTRACE) 0.5 MG tablet   No No   Sig: Take 1 tablet (0.5 mg) by mouth daily   estradiol (ESTRING) 7.5 MCG/24HR vaginal ring   No No   Sig: Place once vaginally and refill as instructed   fish oil-omega-3 fatty acids 1000 MG capsule   Yes No   Sig: Take 1 capsule by mouth daily.   latanoprost (XALATAN) 0.005 % ophthalmic solution   Yes No   losartan (COZAAR) 100 MG tablet   No No   Sig: Take 1 tablet (100 mg) by mouth daily   mometasone (NASONEX) 50 MCG/ACT nasal spray   Yes No   Si sprays by Both Nostrils route daily.   omeprazole (PRILOSEC) 20 MG DR capsule   No No   Sig: Take 1 capsule (20 mg) by mouth daily   pentoxifylline ER (TRENTAL) 400 MG CR tablet   No No   Sig: TAKE 1 TABLET (400 MG) BY MOUTH 3 TIMES DAILY  (WITH MEALS)   pravastatin (PRAVACHOL) 40 MG tablet   No No   Sig: TAKE 1 TABLET (40 MG) BY MOUTH DAILY   progesterone (PROMETRIUM) 100 MG capsule   No No   Sig: Take 1 capsule (100 mg) by mouth daily   traZODone (DESYREL) 100 MG tablet   No No   Sig: Take 1 tablet (100 mg) by mouth At Bedtime      Facility-Administered Medications: None        Review of Systems    The 10 point Review of Systems is negative other than noted in the HPI or here.     Social History   I have reviewed this patient's social history and updated it with pertinent information if needed.  Social History     Tobacco Use    Smoking status: Former    Smokeless tobacco: Never    Tobacco comments:     quit 30 years ago 1970   Substance Use Topics    Alcohol use: Yes     Alcohol/week: 0.0 - 0.8 standard drinks of alcohol     Comment: Rare    Drug use: No         Family History   I have reviewed this patient's family history and updated it with pertinent information if needed.  Family History   Problem Relation Age of Onset    Alcohol/Drug Father     Depression Father     Alcohol/Drug Brother     Breast Cancer Mother     Depression Mother     Diabetes Maternal Grandfather     Cancer Brother 68        blood cancer    Breast Cancer Cousin     Breast Cancer Cousin     Breast Cancer Cousin     Leukemia Brother          Allergies   Allergies   Allergen Reactions    Dust Mite Extract     Seasonal Allergies         Physical Exam   Vital Signs: Temp: 97.7  F (36.5  C) Temp src: Oral BP: 132/60 Pulse: 52   Resp: 18 SpO2: 96 % O2 Device: None (Room air)    Weight: 182 lbs 0 oz      General Aox3, appropriate affect, NAD, on RA  HEENT  MMM, EOMI, PERRL  Chest Adeq E b/l, No wheezing  Heart RRR, No M/R/G  Abd- Soft, NT, BS+  - Deferred,   Extremity- Moving all extremities, No digital clubbing,   No edema  Neuro- Aox3, moving all extremities except at the surgical site.    gait not checked- right knee arthroplasty. + right ankle fracture. + immobilized  Skin   Has no tattoo, No skin rash     Medical Decision Making       85 MINUTES SPENT BY ME on the date of service doing chart review, history, exam, documentation & further activities per the note.      ------------------ MEDICAL DECISION MAKING ------------------------------------------------------------------------------------------------------  MANAGEMENT DISCUSSED with the following over the past 24 hours:     NOTE(S)/MEDICAL RECORDS REVIEWED over the past 24 hours:         Data         Imaging results reviewed over the past 24 hrs:   Recent Results (from the past 24 hour(s))   XR Ankle Right 2 Views    Narrative    EXAM: XR ANKLE RIGHT 2 VIEWS  LOCATION: [facility]  DATE: 2/23/2024    INDICATION: Pain.  COMPARISON: None.      Impression    IMPRESSION: Fracture dislocation right ankle with a displaced posterior malleolus fracture fragment. Displaced and angulated oblique fracture of the lateral malleolus/distal fibula. Tiny fracture fragments along the medial tibiotalar clear space.   Surrounding soft tissue swelling. There is lateral and slightly posterior dislocation of the tibiotalar joint.    NOTE: ABNORMAL REPORT    THE DICTATION ABOVE DESCRIBES AN ABNORMALITY FOR WHICH FOLLOW-UP IS NEEDED.

## 2024-02-24 NOTE — ANESTHESIA PREPROCEDURE EVALUATION
Anesthesia Pre-Procedure Evaluation    Patient: Tangela Chapa   MRN: [id number] : 1945        Procedure : Procedure(s):  OPEN REDUCTION INTERNAL FIXATION, FRACTURE, ANKLE          Past Medical History:   Diagnosis Date    Chronic UTI     controlled w PO and vaginal estrogen tx    YESSICA I (cervical intraepithelial neoplasia I) 14: Pap NIL; HR HPV neg -> D/C screening    Herpes zoster     Left facial     Vaginal dysplasia     cryo      Past Surgical History:   Procedure Laterality Date    ABDOMINOPLASTY      ABDOMINOPLASTY      ARTHROSCOPY KNEE IRRIGATION AND DEBRIDEMENT      RT-Articular Cartilage    BIOPSY BREAST      benign open brest biopsy    BIOPSY BREAST Left     benign x2    BIOPSY CERVICAL, LOCAL EXCISION, SINGLE/MULTIPLE  10/09/2017    BIOPSY OF BREAST, INCISIONAL      Description: Biopsy Breast Open;  Recorded: 2008;  Comments: BENIGN    CATARACT EXTRACTION W/ INTRAOCULAR LENS IMPLANT Left 2016    CATARACT EXTRACTION, BILATERAL  2015    COLPOSCOPY, BIOPSY, COMBINED  2/16/10    TZ-not seen    ENDOMETRIAL SAMPLING (BIOPSY)  3/31/05    benign    GENITOURINARY SURGERY  2011    cystoscopy with +1 trabeculaion    HC KNEE SCOPE, DIAGNOSTIC      Description: Arthroscopy Knee Right;  Recorded: 2008;  Comments: FOR MENISCUS TEAR    HC OSTEOTOMY METATARSAL (NOT 1ST)      Description: Metatarsal Osteotomy Of The Fifth Metatarsal;  Proc Date: 2007;    IR LUMBAR EPIDURAL STEROID INJECTION  2004    IR LUMBAR EPIDURAL STEROID INJECTION  10/11/2004    IR LUMBAR EPIDURAL STEROID INJECTION  2004    JOINT REPLACEMENT, HIP RT/LT  2008    Joint Replacement Hip RT/LT    RELEASE CARPAL TUNNEL BILATERAL      ZZC TOTAL HIP ARTHROPLASTY      Description: Total Hip Replacement;  Proc Date: 2008;  Comments: RIGHT    ZZC TOTAL HIP ARTHROPLASTY      Description: Total Hip Replacement;  Proc Date: 2008;      Allergies   Allergen Reactions    Dust  "Mite Extract     Seasonal Allergies       Social History     Tobacco Use    Smoking status: Former    Smokeless tobacco: Never    Tobacco comments:     quit 30 years ago 1970   Substance Use Topics    Alcohol use: Yes     Alcohol/week: 0.0 - 0.8 standard drinks of alcohol     Comment: Rare      Wt Readings from Last 1 Encounters:   02/23/24 82.6 kg (182 lb)        Anesthesia Evaluation            ROS/MED HX  ENT/Pulmonary:       Neurologic:       Cardiovascular:     (+)  hypertension- -   -  - -                                      METS/Exercise Tolerance:     Hematologic:       Musculoskeletal:       GI/Hepatic:     (+) GERD,                   Renal/Genitourinary:       Endo:     (+)               Obesity,       Psychiatric/Substance Use:       Infectious Disease:       Malignancy:       Other:            Physical Exam    Airway  airway exam normal      Mallampati: II   TM distance: > 3 FB   Neck ROM: full   Mouth opening: > 3 cm    Respiratory Devices and Support         Dental  no notable dental history         Cardiovascular   cardiovascular exam normal       Rhythm and rate: regular and normal     Pulmonary   pulmonary exam normal        breath sounds clear to auscultation           OUTSIDE LABS:  CBC:   Lab Results   Component Value Date    WBC 17.6 (H) 02/24/2024    WBC 19.3 (H) 02/23/2024    HGB 13.2 02/24/2024    HGB 13.2 02/23/2024    HCT 38.8 02/24/2024    HCT 37.8 02/23/2024     02/24/2024     02/23/2024     BMP:   Lab Results   Component Value Date     (L) 02/23/2024     02/15/2024    POTASSIUM 4.8 02/23/2024    POTASSIUM 4.2 02/15/2024    CHLORIDE 104 02/23/2024    CHLORIDE 104 02/15/2024    CO2 23 02/23/2024    CO2 24 02/15/2024    BUN 18.3 02/23/2024    BUN 13.7 02/15/2024    CR 0.92 02/23/2024    CR 0.85 02/15/2024     (H) 02/24/2024     (H) 02/23/2024     COAGS: No results found for: \"PTT\", \"INR\", \"FIBR\"  POC: No results found for: \"BGM\", \"HCG\", " "\"HCGS\"  HEPATIC:   Lab Results   Component Value Date    ALBUMIN 4.5 06/28/2023    PROTTOTAL 7.4 06/28/2023    ALT 46 06/28/2023    AST 40 06/28/2023    ALKPHOS 69 06/28/2023    BILITOTAL 0.7 06/28/2023     OTHER:   Lab Results   Component Value Date    A1C 5.5 06/28/2023    EVARISTO 8.5 (L) 02/23/2024    MAG 2.2 10/12/2018    TSH 2.31 09/07/2021       Anesthesia Plan    ASA Status:  2, emergent    NPO Status:  NPO Appropriate    Anesthesia Type: General.     - Airway: Mask Only   Induction: Intravenous, Propofol.   Maintenance: Balanced.        Consents    Anesthesia Plan(s) and associated risks, benefits, and realistic alternatives discussed. Questions answered and patient/representative(s) expressed understanding.     - Discussed:     - Discussed with:  Patient      - Extended Intubation/Ventilatory Support Discussed: No.      - Patient is DNR/DNI Status: No     Use of blood products discussed: No .     Postoperative Care    Pain management: IV analgesics, Multi-modal analgesia.   PONV prophylaxis: Ondansetron (or other 5HT-3), Dexamethasone or Solumedrol     Comments:               Marty Alvarez MD    I have reviewed the pertinent notes and labs in the chart from the past 30 days and (re)examined the patient.  Any updates or changes from those notes are reflected in this note.     # Hyponatremia: Lowest Na = 133 mmol/L in last 30 days, will monitor as appropriate          # Obesity: Estimated body mass index is 30.76 kg/m  as calculated from the following:    Height as of this encounter: 1.638 m (5' 4.5\").    Weight as of this encounter: 82.6 kg (182 lb).      "

## 2024-02-24 NOTE — CONSULTS
Vascular Surgery Consult  2024    Tangela Chapa  : 1945    Date of Service: 2024 3:44 PM    Assessment and Plan:  Tangela Chapa is a 78 year old female with PMH of HTN, HLD, and outpatient R knee arthroplasty  who was re-admitted last evening (same day) after right ankle dislocation at home, now POD0 s/p R ORIF of her ankle, with concern for loss of perfusion to right foot given inability to doppler DP postoperatively. On exam in room, now has pink, warm toes with good capillary refill and biphasic DP signal, unable to assess motor/sensory function due to patient with nerve block. Will hold off on further imaging for now, but will continue to monitor and ensure appropriate return of neuro function after block wears off.     - No indication for vascular surgical intervention   - q4 doppler checks per nursing - OK to cut hole in ace/cast wrapping to find DP signal.   - Please alert vascular surgery with change in doppler exam or if patient develops dusky discoloration of toes, pain in toes, or if neuro/motor deficits are worsening instead of improving as block wears off.     Discussed with Dr. Adams, vascular surgery staff    Julian Mcknight MD  Surgery PGY3    I Logan Adams MD examined the patient on this day.  Good arterial doppler signal at DP, so transient postop flow problem has resolved.  Skin of toes pink with quick refill.  ...Logan Adams MD    History of Present Illness:    Tangela Chapa is a 78 year old female with PMH of HTN, HLD, and outpatient R knee arthroplasty  who was re-admitted last evening (same day) after right ankle dislocation at home. Reports she was walking down stairs when she slid down and twisted her right ankle. She denies any pain at the moment in her right foot. Reports some pain in her right knee.     She did have a palpable R DP pulse prior to ankle fixation per orthopedic surgeon. Notably orthopedic team able to find PT with  ultrasound however not hear signal with doppler, and able to find AT however not DP with ultrasound but again not hear signal with doppler.     Denies history of nonhealing wounds, rest pain, claudication.   Used to work as a .     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       Past Surgical History  Past Surgical History:   Procedure Laterality Date    ABDOMINOPLASTY      ABDOMINOPLASTY      ARTHROSCOPY KNEE IRRIGATION AND DEBRIDEMENT      RT-Articular Cartilage    BIOPSY BREAST      benign open brest biopsy    BIOPSY BREAST Left     benign x2    BIOPSY CERVICAL, LOCAL EXCISION, SINGLE/MULTIPLE  10/09/2017    BIOPSY OF BREAST, INCISIONAL      Description: Biopsy Breast Open;  Recorded: 06/06/2008;  Comments: BENIGN    CATARACT EXTRACTION W/ INTRAOCULAR LENS IMPLANT Left 01/2016    CATARACT EXTRACTION, BILATERAL  04/2015    COLPOSCOPY, BIOPSY, COMBINED  2/16/10    TZ-not seen    ENDOMETRIAL SAMPLING (BIOPSY)  3/31/05    benign    GENITOURINARY SURGERY  6 April 2011    cystoscopy with +1 trabeculaion    HC KNEE SCOPE, DIAGNOSTIC      Description: Arthroscopy Knee Right;  Recorded: 06/06/2008;  Comments: FOR MENISCUS TEAR    HC OSTEOTOMY METATARSAL (NOT 1ST)      Description: Metatarsal Osteotomy Of The Fifth Metatarsal;  Proc Date: 02/01/2007;    IR LUMBAR EPIDURAL STEROID INJECTION  9/30/2004    IR LUMBAR EPIDURAL STEROID INJECTION  10/11/2004    IR LUMBAR EPIDURAL STEROID INJECTION  11/5/2004    JOINT REPLACEMENT, HIP RT/LT  2008    Joint Replacement Hip RT/LT    RELEASE CARPAL TUNNEL BILATERAL      ZZC TOTAL HIP ARTHROPLASTY      Description: Total Hip Replacement;  Proc Date: 06/01/2008;  Comments: RIGHT    ZZC TOTAL HIP ARTHROPLASTY       Description: Total Hip Replacement;  Proc Date: 08/01/2008;       Family History:  Family History   Problem Relation Age of Onset    Alcohol/Drug Father     Depression Father     Alcohol/Drug Brother     Breast Cancer Mother     Depression Mother     Diabetes Maternal Grandfather     Cancer Brother 68        blood cancer    Breast Cancer Cousin     Breast Cancer Cousin     Breast Cancer Cousin     Leukemia Brother        Social History:  Social History     Socioeconomic History    Marital status: Single     Spouse name: Not on file    Number of children: Not on file    Years of education: Not on file    Highest education level: Not on file   Occupational History    Not on file   Tobacco Use    Smoking status: Former    Smokeless tobacco: Never    Tobacco comments:     quit 30 years ago 1970   Substance and Sexual Activity    Alcohol use: Yes     Alcohol/week: 0.0 - 0.8 standard drinks of alcohol     Comment: Rare    Drug use: No    Sexual activity: Not Currently     Partners: Male     Birth control/protection: Post-menopausal   Other Topics Concern     Service No    Blood Transfusions No    Caffeine Concern No     Comment: 3-4 pop/d (diet)    Occupational Exposure No    Hobby Hazards No    Sleep Concern Yes     Comment: sleeps too much -- tired after 9-10 hrs/nite    Stress Concern Yes     Comment: holiday lonliness    Weight Concern Yes     Comment: admits to eating more than she uses -- declines wt today    Special Diet No    Back Care No    Exercise Yes     Comment: sedentary -- plans to restart    Bike Helmet No    Seat Belt No    Self-Exams No    Parent/sibling w/ CABG, MI or angioplasty before 65F 55M? Not Asked   Social History Narrative    How much exercise per week? Stationary bike, recent weight loss    How much calcium per day? Supplement       How much caffeine per day? 2 cans a day    How much vitamin D per day? supplment    Do you/your family wear seatbelts?  Yes    Do you/your family use  "safety helmets? Yes    Do you/your family use sunscreen? Yes    Do you/your family keep firearms in the home? Yes, locked    Do you/your family have a smoke detector(s)? Yes        Do you feel safe in your home? Yes    Has anyone ever touched you in an unwanted manner? No     Explain     Updated 8/28 CHIKI Santiago          Social Determinants of Health     Financial Resource Strain: Not on file   Food Insecurity: Not on file   Transportation Needs: Not on file   Physical Activity: Not on file   Stress: Not on file   Social Connections: Not on file   Interpersonal Safety: Low Risk  (2/15/2024)    Interpersonal Safety     Do you feel physically and emotionally safe where you currently live?: Yes     Within the past 12 months, have you been hit, slapped, kicked or otherwise physically hurt by someone?: No     Within the past 12 months, have you been humiliated or emotionally abused in other ways by your partner or ex-partner?: No   Housing Stability: Not on file       Medications:  No current outpatient medications on file.       Allergies:     Allergies   Allergen Reactions    Dust Mite Extract     Seasonal Allergies        Review of Symptoms:  A 10 point review of symptoms has been conducted and is negative except for that mentioned in the above HPI.    Physical Exam:    Blood pressure 119/58, pulse 59, temperature 98.1  F (36.7  C), temperature source Oral, resp. rate 20, height 1.638 m (5' 4.5\"), weight 82.6 kg (182 lb), SpO2 94%, not currently breastfeeding.  Gen:    Lying in bed in NAD, A&OX3  HEENT: Normocephalic and atraumatic  CV:  RRR  Pulm:  Non-labored breathing RA  Ext:  Warm and well perfused, palpable PT LLE.    RLE with warm, pink toes with brisk capillary refill. Strong multiphasic DP signal heard. Patient unable to wiggle toes or feel light touch on foot - however has had block for procedure.     Labs:  CBC RESULTS:   Recent Labs   Lab Test 02/24/24  0536   WBC 17.6*   RBC 4.17   HGB 13.2   HCT 38.8 " "  MCV 93   MCH 31.7   MCHC 34.0   RDW 12.5        Last Basic Metabolic Panel:  Lab Results   Component Value Date     02/23/2024      Lab Results   Component Value Date    POTASSIUM 4.8 02/23/2024    POTASSIUM 4.6 02/08/2022     Lab Results   Component Value Date    CHLORIDE 104 02/23/2024    CHLORIDE 104 02/08/2022     Lab Results   Component Value Date    EVARISTO 8.5 02/23/2024     Lab Results   Component Value Date    CO2 23 02/23/2024    CO2 28 02/08/2022     Lab Results   Component Value Date    BUN 18.3 02/23/2024    BUN 12 02/08/2022     Lab Results   Component Value Date    CR 0.92 02/23/2024     Lab Results   Component Value Date     02/24/2024    GLC 96 02/08/2022       Imaging:  XR Surgery KANE Fluoro L/T 5 Min    Result Date: 2/24/2024  This exam was marked as non-reportable because it will not be read by a radiologist or a [facility] non-radiologist provider.     POC US Guidance Needle Placement    Result Date: 2/24/2024  Ultrasound was performed as guidance to an anesthesia procedure.  Click \"PACS images\" hyperlink below to view any stored images.  For specific procedure details, view procedure note authored by anesthesia.    POC US Guidance Needle Placement    Result Date: 2/24/2024  Ultrasound was performed as guidance to an anesthesia procedure.  Click \"PACS images\" hyperlink below to view any stored images.  For specific procedure details, view procedure note authored by anesthesia.    POC US Guidance Needle Placement    Result Date: 2/24/2024  Ultrasound was performed as guidance to an anesthesia procedure.  Click \"PACS images\" hyperlink below to view any stored images.  For specific procedure details, view procedure note authored by anesthesia.    XR Ankle Right G/E 3 Views    Result Date: 2/23/2024  EXAM: XR ANKLE RIGHT G/E 3 VIEWS LOCATION: [facility] DATE: 2/23/2024 INDICATION: post reduction COMPARISON: 02/23/2024     IMPRESSION: Improved alignment of " the trimalleolar fracture dislocation. Persistent ankle mortise malalignment with mild lateral subluxation of the talus. Splint.    XR Knee Right 1/2 Views    Result Date: 2/23/2024  EXAM: XR KNEE RIGHT 1/2 VIEWS LOCATION: [facility] DATE: 2/23/2024 INDICATION: right TKA today. recent fall COMPARISON: 04/20/2023     IMPRESSION: Total knee arthroplasty. No fracture. Normal alignment. Immediate postoperative changes including soft tissue swelling, small foci of soft tissue gas and small joint effusion.    CT Ankle Right w/o Contrast    Result Date: 2/23/2024  EXAM: CT ANKLE RIGHT W/O CONTRAST LOCATION: [facility] DATE: 2/23/2024 INDICATION: Trimalleolar right ankle fracture. COMPARISON: Same day radiographs. TECHNIQUE: Noncontrast. Axial, sagittal and coronal thin-section reconstruction. Dose reduction techniques were used. FINDINGS: There is a trimalleolar right ankle fracture, characterized as follows: Mildly displaced transsyndesmotic fracture of the distal right fibula. Additional small avulsion fracture at the tibial insertion of the anterior inferior tibiofibular ligament at Chaput's tubercle. Mild widening of the posterior tibiofibular syndesmosis. Nondisplaced intra-articular fracture of the posterior malleolus. Mildly displaced avulsion fracture involving the distal insertion of the deltoid ligament at the medial talar body, with associated widening of the medial ankle mortise. No significant osteoarthritic changes. No suspicious lytic or blastic osseous lesions. Muscularity is normal in bulk and in signal attenuation, for patient's age. Ankle tendons appear normal in course and caliber. Diffuse right ankle soft tissue swelling.     IMPRESSION: Trimalleolar right ankle fracture, with evidence of widening at the medial ankle mortise and possibly the tibiofibular syndesmosis; correlation with stress radiography may be helpful.     XR Ankle Right 2  Views    Result Date: 2/23/2024  EXAM: XR ANKLE RIGHT 2 VIEWS LOCATION: [facility] DATE: 2/23/2024 INDICATION: Pain. COMPARISON: None.     IMPRESSION: Fracture dislocation right ankle with a displaced posterior malleolus fracture fragment. Displaced and angulated oblique fracture of the lateral malleolus/distal fibula. Tiny fracture fragments along the medial tibiotalar clear space. Surrounding soft tissue swelling. There is lateral and slightly posterior dislocation of the tibiotalar joint. NOTE: ABNORMAL REPORT THE DICTATION ABOVE DESCRIBES AN ABNORMALITY FOR WHICH FOLLOW-UP IS NEEDED.

## 2024-02-24 NOTE — ED PROVIDER NOTES
"Emergency Department Midlevel Supervisory Note     I had a face to face encounter with this patient seen by the Advanced Practice Provider (SHANNON). I personally made/approved the management plan and take responsibility for the patient management. I personally saw patient and performed a substantive portion of the visit including all aspects of the medical decision making.     ED Course:  8:58 PM Lauryn Johnson PA-C staffed patient with me. I agree with their assessment and plan of management, and I will see the patient.  9:30 PM I met with the patient to introduce myself, gather additional history, perform my initial exam, and discuss the plan.   9:46 PM I spoke to Dr. Alvarez, [facility], regarding patient plan of care.    Brief HPI:     Tangela Chapa is a 78 year old female who presents for evaluation of right ankle pain.     This morning, the patient had a right-sided total knee replacement. At home today, she tripped and fell down a few steps. She believes she landed on the outside of her right ankle, and notes a small bleeding abrasion to the medial aspect of the right ankle. 0/10 pain due to current nerve block. She cannot bear weight.    I, Salazar Smith, am serving as a scribe to document services personally performed by Dr. Silva, based on my observations and the provider's statements to me.   I, Jessy Silva MD, attest that Salazar Smith was acting in a scribe capacity, has observed my performance of the services and has documented them in accordance with my direction.    Brief Physical Exam: /60   Pulse 52   Temp 97.7  F (36.5  C) (Oral)   Resp 18   Ht 1.638 m (5' 4.5\")   Wt 82.6 kg (182 lb)   LMP  (LMP Unknown)   SpO2 97%   BMI 30.76 kg/m    Constitutional:  Alert, in no acute distress  EYES: Conjunctivae clear  HENT:  Atraumatic  Respiratory:  Respirations even, unlabored, in no acute respiratory distress  Cardiovascular:  Regular rate and rhythm, good peripheral perfusion  GI: " Soft, non-distended, non-tender  Musculoskeletal:  Moves all 4 extremities equally, grossly symmetrical strength, postoperative dressing on the right knee from knee replacement, no sensation of the right lower extremity but still has a nerve block intact, obvious deformity of the right ankle  Integument: Warm & dry. No appreciable rash, erythema.  Wound approximately the size of a quarter, 2 cm x 2 cm opening over the medial malleolus         MDM:  78-year-old female with a recent knee replacement here with a traumatic injury to her right ankle after she fell when trying to go home.  Clinically, she has approximately an open fracture dislocation confirmed by x-ray as a trimalleolar injury.  She already has a nerve block in place so we did not need to perform sedation but I did do a fracture reduction and we splinted in plaster.  The injury is quite unstable and did fall out once and I had to reduce it twice, but the post reduction x-ray is still pending.  Case discussed with orthopedics who recommended that we start Ancef every 8 hours, make her n.p.o. at midnight, order a CT of the ankle for operative planning and they will plan to do surgery in the morning for this open unstable injury.       1. Ankle fracture, right, open type I or II, initial encounter    2. Type I or II open trimalleolar fracture of right ankle, initial encounter        Consults:  I discussed the patient with orthopedics who recommends Ancef, admission    Labs and Imaging:       I have reviewed the relevant laboratory studies above.    I independently interpreted the following imaging study(s):   X-ray of the right ankle    EKG: I reviewed and independently interpreted the patient's EKG, with comments made as listed below. Please see scanned EKG for full report.       Procedures:  I was present for the key portions of procedures documented in SHANNON/midlevel note, see midlevel note for further details.    PROCEDURE: Reduction   INDICATIONS: R  trimalleolar fracture/dislocation   PROCEDURE PROVIDER: Dr Jessy Pastor   CONSENT: Risks, benefits and alternatives were discussed with and Verbal consent was obtained from Patient.   MEDICATIONS: none   REDUCTION PROCEDURE DESCRIPTION: Manual reduction of the dislocated ankle performed.  Clinically improves with intact capillary refill to the toes following.  Splinting in place.  See SHANNON note for those details.   COMPLICATIONS:  Patient tolerated procedure well, without complication         Jessy Silva MD  [facility] EMERGENCY DEPARTMENT  [address] [place] [id number] [phone]     Jessy Silva MD  02/23/24 1545

## 2024-02-24 NOTE — ANESTHESIA PROCEDURE NOTES
Adductor canal Procedure Note    Pre-Procedure   Staff -        Anesthesiologist:  Marty Alvarez MD       Performed By: anesthesiologist       Location: pre-op       Procedure Start/Stop Times: 2/24/2024 12:11 AM and 2/24/2024 12:12 AM       Pre-Anesthestic Checklist: patient identified, IV checked, site marked, risks and benefits discussed, informed consent, monitors and equipment checked, pre-op evaluation, at physician/surgeon's request and post-op pain management  Timeout:       Correct Patient: Yes        Correct Procedure: Yes        Correct Site: Yes        Correct Position: Yes        Correct Laterality: Yes        Site Marked: Yes  Procedure Documentation  Procedure: Adductor canal       Diagnosis: POSTOP PAIN CONTROL PER SURGEON REQUEST       Patient Position: supine       Patient Prep/Sterile Barriers: sterile gloves, mask       Skin prep: Chloraprep       Needle Type: short bevel       Needle Gauge: 20.        Needle Length (Inches): 4        Ultrasound guided       1. Ultrasound was used to identify targeted nerve, plexus, vascular marker, or fascial plane and place a needle adjacent to it in real-time.       2. Ultrasound was used to visualize the spread of anesthetic in close proximity to the above referenced structure.       3. A permanent image is entered into the patient's record.       4. The visualized anatomic structures appeared normal.       5. There were no apparent abnormal pathologic findings.    Assessment/Narrative         The placement was negative for: blood aspirated and painful injection       Paresthesias: No.       Bolus given via needle. no blood aspirated via catheter.        Secured via.        Insertion/Infusion Method: Single Shot       Complications: none    Medication(s) Administered   Bupivacaine 0.5% w/ 1:200K Epi (Other) - Other   8 mL - 2/24/2024 12:11:00 PM  Medication Administration Time: 2/24/2024 12:11 AM      FOR [facility] ([facility]) ONLY:   Pain Team Contact  "information: please page the Pain Team Via [facility]. Search \"Pain\". During daytime hours, please page the attending first. At night please page the resident first.      "

## 2024-02-24 NOTE — CONSULTS
ORTHOPEDIC CONSULTATION    CHIEF COMPLAINT: right ankle pain       HISTORY OF PRESENT ILLNESS:  The patient is seen in orthopedic consultation at the request of Leighton Mandujano MBBS.  The patient is a 78 year old female with c/o right ankle pain.  The patient has a history of hypertension and hypercholesterolemia.  She had a right total knee arthroplasty done by my partner Dr. Mohr yesterday and [facility].  She was discharged to home.  She was walking down the stairs yesterday evening when she slid down the stairs, and twisted her right ankle.  She noticed immediate onset of pain, and deformity.  She was brought to the emergency department and diagnosed with an ankle fracture dislocation.  This was reduced.  Per report, she has a wound over her medial ankle representing an open fracture. She was started on IV ancef and given tetanus. She was admitted for further cares. She has moderate pain in her right ankle this morning.  She has some soreness in her knee.  She denies history of previous injury to her right foot or ankle.      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  They do include:   Medications Prior to Admission   Medication Sig Dispense Refill Last Dose    ciclopirox (PENLAC) 8 % external solution Apply to adjacent skin and affected nails daily.  Remove with alcohol every 7 days, then repeat. 6.6 mL 1 2/23/2024 at am    citalopram (CELEXA) 20 MG tablet Take 1.5 tablets (30 mg) by mouth daily 135 tablet 1 2/22/2024 at am    cycloSPORINE (RESTASIS) 0.05 % ophthalmic emulsion 1  drop every 12 hours.   2/22/2024 at pm    estradiol (ESTRACE) 0.5 MG tablet Take 1 tablet (0.5 mg) by mouth daily 90 tablet 3 2/22/2024 at am    estradiol (ESTRING) 7.5 MCG/24HR vaginal ring Place once vaginally and refill as instructed 1 each 3 12/21/2023 at replaces [id number]    fish oil-omega-3 fatty acids 1000 MG capsule Take 1 capsule by mouth daily.   Unknown    losartan (COZAAR) 100 MG tablet Take 1 tablet (100 mg) by mouth daily 90 tablet 3 2/22/2024 at am    LUMIGAN 0.01 % SOLN ophthalmic solution Place 1 drop into both eyes at bedtime   2/23/2024 at [facility]    Magnesium 100 MG TABS Take 3 tablets by mouth At Bedtime.   2/22/2024 at [facility]    omeprazole (PRILOSEC) 20 MG DR capsule Take 1 capsule (20 mg) by mouth daily 90 capsule 1 2/22/2024 at am    pentoxifylline ER (TRENTAL) 400 MG CR tablet TAKE 1 TABLET (400 MG) BY MOUTH 3 TIMES DAILY (WITH MEALS) 90 tablet 4 2/22/2024 at pm    pravastatin (PRAVACHOL) 40 MG tablet TAKE 1 TABLET (40 MG) BY MOUTH DAILY 90 tablet 1 2/22/2024 at pm    progesterone (PROMETRIUM) 100 MG capsule Take 1 capsule (100 mg) by mouth daily 90 capsule 3 2/22/2024 at am    sodium chloride (ELIZABETH 128) 5 % ophthalmic ointment Place 1 Application into both eyes at bedtime   2/23/2024 at [facility] [place]    traZODone (DESYREL) 100 MG tablet Take 1 tablet (100 mg) by mouth At Bedtime 90 tablet 3 2/22/2024 at [facility] [place]       SOCIAL HISTORY:   Social History     Tobacco Use    Smoking status: Former    Smokeless tobacco: Never    Tobacco comments:     quit 30 years ago 1970   Substance Use Topics    Alcohol use: Yes     Alcohol/week: 0.0 - 0.8 standard drinks of alcohol     Comment: Rare    Drug use: No        FAMILY HISTORY:  Family History   Problem Relation Age of Onset    Alcohol/Drug Father     Depression Father     Alcohol/Drug Brother     Breast Cancer Mother     Depression Mother     Diabetes Maternal Grandfather     Cancer Brother 68        blood cancer    Breast Cancer Cousin     Breast Cancer Cousin      Breast Cancer Cousin     Leukemia Brother        REVIEW OF SYSTEMS:   See Admission History and Physical     PHYSICAL EXAMINATION:    Temp:  [97.7  F (36.5  C)-98.7  F (37.1  C)] 98.7  F (37.1  C)  Pulse:  [48-60] 48  Resp:  [18] 18  BP: (128-170)/(60-75) 128/60  SpO2:  [95 %-97 %] 95 %    General: On examination, the patient is A&Ox3  HEENT: Normal  Neuro: Patient is alert and oriented, no focal deficits  Respiratory: Breathing unlabored on room air  Cardiovascular: Extremities warm and well-perfused.  Right lower extremity: Focused examination of the right lower extremity reveals a bandage over an anterior knee surgical incision.  There is no strikethrough.  The bandage is clean, dry, and intact.  There is appropriate swelling for a postoperative day #1 total knee arthroplasty about the right knee.  There is appropriate tenderness although not focally or intense.  The right lower extremity is in a short leg splint.  This is not taken down.  The patient is able to wiggle her toes firing EHL and FHL without difficulty.  There is no pain with passive stretch of the toes.  She reports sensation intact light touch throughout the DP/SP/tibial nerve distributions.  She does have a palpable DP pulse, and her foot is warm and well-perfused.    RADIOGRAPHIC EVALUATION:  AP and lateral of the right ankle on initial presentation show a posterolateral fracture dislocation.  There is an avulsion off the medial malleolus but no displaced intra-articular fracture.  There is a small posterior malleolar fracture.  Postreduction films show improvement in the alignment although there is still significant widening of the medial clear space and displacement of the fibular fracture.  CT scan reveals trimalleolar ankle fracture dislocation.  There is an avulsion of the AITFL insertion on the anterior tibia.  The posterior malleolar fracture is fairly minimally displaced.  Again noted is medial malleolar avulsion representing deltoid  injury but no displaced medial malleolar fracture.    2 views of the right knee reveal a well seated and well aligned total knee arthroplasty without evidence of complication.      IMPRESSION:  78-year-old female with hypertension and hyperlipidemia status post right total knee arthroplasty 2/23/2024 who sustained a fall at home.  She has a type I open trimalleolar ankle fracture dislocation.     PLAN:  The patient has appropriately received IV antibiotics and an update to her tetanus.  My recommendation is we proceed to the operating room urgently this morning for irrigation and debridement and fixation of her fracture.  We discussed the risk, benefits, and alternatives to this approach.  Ultimately after thorough discussion she elected to proceed.  She has not taken her blood thinners and her surgery is urgent.  She will be nonweightbearing for 6 weeks postoperatively and may need transitional care.  She understands.  We will proceed this morning to surgery.    Manpreet Cai MD  [facility]

## 2024-02-24 NOTE — PLAN OF CARE
Problem: Adult Inpatient Plan of Care  Goal: Absence of Hospital-Acquired Illness or Injury  Intervention: Identify and Manage Fall Risk  Recent Flowsheet Documentation  Taken 2/24/2024 0830 by Hicks, Sarah, RN  Safety Promotion/Fall Prevention:   activity supervised   safety round/check completed   clutter free environment maintained   room near nurse's station     Problem: Adult Inpatient Plan of Care  Goal: Absence of Hospital-Acquired Illness or Injury  Intervention: Prevent Skin Injury  Recent Flowsheet Documentation  Taken 2/24/2024 0830 by Sarah Hicks RN  Body Position:   position changed independently   position maintained     Problem: Adult Inpatient Plan of Care  Goal: Optimal Comfort and Wellbeing  Intervention: Monitor Pain and Promote Comfort  Recent Flowsheet Documentation  Taken 2/24/2024 0830 by Sarah Hicks RN  Pain Management Interventions:   distraction   emotional support   quiet environment facilitated   pillow support provided     Problem: Adult Inpatient Plan of Care  Goal: Readiness for Transition of Care  Intervention: Mutually Develop Transition Plan  Recent Flowsheet Documentation  Taken 2/24/2024 0900 by Sarah Hicks RN  Equipment Currently Used at Home: none   Goal Outcome Evaluation:  Patient alert and oriented x4. NWB to RLE. Bradycardic. On room air. Regular diet after surgery. RLE casted and ace wrapped, CDI. Nerve block in place, denies pain in RLE. LR running at 50 ml/hr. IV antibiotics. Alanis in place with adequate output. Niece present at bedside.

## 2024-02-24 NOTE — PLAN OF CARE
Problem: Adult Inpatient Plan of Care  Goal: Plan of Care Review  Description: The Plan of Care Review/Shift note should be completed every shift.  The Outcome Evaluation is a brief statement about your assessment that the patient is improving, declining, or no change.  This information will be displayed automatically on your shift  note.  Outcome: Progressing   Goal Outcome Evaluation:         Pt alert and oriented x4, denies pain, had a nerved block done yesterday, hx of chronic UTI, complains of urinary frequency and burning sensation, Alanis Catheter placed, slept between cares, IV dilaudid given,

## 2024-02-24 NOTE — PHARMACY-ADMISSION MEDICATION HISTORY
Pharmacist Admission Medication History    Admission medication history is complete. The information provided in this note is only as accurate as the sources available at the time of the update.    Information Source(s): Patient and CareEverywhere/SureScripts via in-person    Pertinent Information: none    Changes made to PTA medication list:  Added: Tammy 128 eye ointment  Deleted: Latanoprost, Mometasone  Changed: None    Allergies reviewed with patient and updates made in EHR: yes    Medication History Completed By: MEGAN ARROYO [facility] 2/24/2024 9:03 AM    PTA Med List   Medication Sig Note Last Dose    ciclopirox (PENLAC) 8 % external solution Apply to adjacent skin and affected nails daily.  Remove with alcohol every 7 days, then repeat.  2/23/2024 at am    citalopram (CELEXA) 20 MG tablet Take 1.5 tablets (30 mg) by mouth daily  2/22/2024 at am    cycloSPORINE (RESTASIS) 0.05 % ophthalmic emulsion 1 drop every 12 hours.  2/22/2024 at pm    estradiol (ESTRACE) 0.5 MG tablet Take 1 tablet (0.5 mg) by mouth daily  2/22/2024 at am    estradiol (ESTRING) 7.5 MCG/24HR vaginal ring Place once vaginally and refill as instructed  12/21/2023 at [id number]    fish oil-omega-3 fatty acids 1000 MG capsule Take 1 capsule by mouth daily. 2/24/2024: Unsure last dose Unknown    losartan (COZAAR) 100 MG tablet Take 1 tablet (100 mg) by mouth daily  2/22/2024 at am    LUMIGAN 0.01 % SOLN ophthalmic solution Place 1 drop into both eyes at bedtime  2/23/2024 at [facility]    Magnesium 100 MG TABS Take 3 tablets by mouth At Bedtime.  2/22/2024 at [facility]    omeprazole (PRILOSEC) 20 MG DR capsule Take 1 capsule (20 mg) by mouth daily  2/22/2024 at am    pentoxifylline ER (TRENTAL) 400 MG CR tablet TAKE 1 TABLET (400 MG) BY MOUTH 3 TIMES DAILY (WITH MEALS) 2/24/2024: Normally only remembers 1-2 doses per day 2/22/2024 at pm    pravastatin (PRAVACHOL) 40 MG tablet TAKE 1 TABLET (40 MG) BY MOUTH DAILY  2/22/2024 at pm    progesterone  (PROMETRIUM) 100 MG capsule Take 1 capsule (100 mg) by mouth daily  2/22/2024 at am    sodium chloride (ELIZABETH 128) 5 % ophthalmic ointment Place 1 Application into both eyes at bedtime  2/23/2024 at [facility]    traZODone (DESYREL) 100 MG tablet Take 1 tablet (100 mg) by mouth At Bedtime  2/22/2024 at [facility]

## 2024-02-24 NOTE — ED PROVIDER NOTES
Emergency Department Encounter   NAME: Tangela Chapa ; AGE: 78 year old female ; YOB: 1945 ; MRN: [id number] ; PCP: Brennan De Luna   ED PROVIDER: Lauryn Johnson PA-C    Evaluation Date & Time:   2/23/2024  8:00 PM    CHIEF COMPLAINT:  Ankle Pain        Impression and Plan   FINAL IMPRESSION:    ICD-10-CM    1. Ankle fracture, right, open type I or II, initial encounter  S82.891B Case Request: OPEN REDUCTION INTERNAL FIXATION, FRACTURE, ANKLE     Case Request: OPEN REDUCTION INTERNAL FIXATION, FRACTURE, ANKLE      2. Type I or II open trimalleolar fracture of right ankle, initial encounter  S82.851B           MDM:  Elizabeth Chapa is a 78 year old female with PMH of hyperlipidemia, GERD, Raynaud's, migraines, and essential HTN presenting to the emergency department for evaluation of a right ankle injury.  She had a right-sided total knee replacement performed earlier today by [facility].  She states upon discharge she went home and was using the stairs trying to get to the bathroom when she missed a step and fell directly on to her right ankle. She notes significant deformity following and was unable to ambulate following. She denies any pain currently due to still having nerve block in place from her surgery.  She last ate around 5:30 PM.    Vitals reviewed and unremarkable. On exam she is resting comfortably in bed.  The right ankle appears deformed and is externally rotated. There is a 0.5cm diameter abrasion present over the medial ankle concerning for open fracture. Differential diagnosis includes but not limited to sprain, fracture, dislocation, arterial supply disruption, nerve injury, compartment syndrome. Capillary refill is about 3 seconds in all digits on the right foot. There is some mild red/purple discoloration to the right lower extremity but I do not suspect acute arterial injury. She is able to wiggle the toes on the right side. It is difficult to assess lower  extremity sensation given she still has a nerve block from her knee surgery earlier today. The right-sided calf and lower extremity is soft and warm with faint pulses present so I have very low suspicion for compartment syndrome. Initial XR found trimalleolar fracture with postero-lateral dislocation. We were able to reduce the ankle without sedation because she still had this nerve block in place. The ankle was noted to be quite unstable while reducing it very easily slipped out of place. Post reduction XR finds improved alignment of the trimalleolar fracture dislocation. There is persistent ankle varus malalignment with mild lateral subluxation of the talus.  XR right knee no evidence of fracture or damage to hardware to TKA. There is immediate postoperative soft tissue swelling and small foci of soft tissue gas and small joint effusion. Dr. Silva spoke with Dr. Ry Alvarez [facility].     Per Ortho's recommendations:   -start ancef q8h and admit  -NPO at midnight  -Obtain CT of ankle  -Plan for Ortho to see tomorrow and likely take to OR    She was admitted to med surg and IV Ancef was started.        Medical Decision Making    History:  Supplemental history from: Documented in chart and Family Member/Significant Other  External Record(s) reviewed: Documented in chart    Work Up:  Chart documentation includes differential considered and any EKGs or imaging independently interpreted by provider, where specified.  In additional to work up documented, I considered the following work up: Documented in chart, if applicable.    External consultation:  Discussion of management with another provider: Dr. Silva, Dr. Alvarez    Complicating factors:  Care impacted by chronic illness: Hyperlipidemia and Hypertension  Care affected by social determinants of health: N/A    Disposition considerations: Admit.      ED COURSE:  8:39 PM I met and introduced myself to the patient. I gathered initial history and performed my  physical exam. We discussed plan for initial workup.   9:00 PM I have staffed the patient with Dr. Silva, ED MD, who has evaluated the patient and agrees with all aspects of today's care.   9:38 PM I reduced and splinted the patient's ankle with Dr. Silva.  9:44 PM Dr. Silva spoke with Dr. [facility]     I rechecked the patient and discussed results, discharge, follow up, and reasons to return to the ED.     At the conclusion of the encounter I discussed the results of all the tests and the disposition. The questions were answered. The patient or family acknowledged understanding and was agreeable with the care plan.        MEDICATIONS GIVEN IN THE EMERGENCY DEPARTMENT:  Medications   lidocaine 1 % 0.1-1 mL (has no administration in time range)   lidocaine (LMX4) cream (has no administration in time range)   sodium chloride (PF) 0.9% PF flush 3 mL (3 mLs Intracatheter Not Given 2/23/24 2227)   sodium chloride (PF) 0.9% PF flush 3 mL (has no administration in time range)   senna-docusate (SENOKOT-S/PERICOLACE) 8.6-50 MG per tablet 1 tablet (has no administration in time range)     Or   senna-docusate (SENOKOT-S/PERICOLACE) 8.6-50 MG per tablet 2 tablet (has no administration in time range)   calcium carbonate (TUMS) chewable tablet 1,000 mg (has no administration in time range)   ondansetron (ZOFRAN ODT) ODT tab 4 mg (has no administration in time range)     Or   ondansetron (ZOFRAN) injection 4 mg (has no administration in time range)   ceFAZolin (ANCEF) 1 g vial to attach to  ml bag for ADULT or 50 ml bag for PEDS (has no administration in time range)   ceFAZolin (ANCEF) 1 g vial to attach to  ml bag for ADULT or 50 ml bag for PEDS (1 g Intravenous $New Bag 2/23/24 2208)         NEW PRESCRIPTIONS STARTED AT TODAY'S ED VISIT:  New Prescriptions    No medications on file         HPI   Patient information was obtained from: Patient    Use of Intrepreter: N/A     Tangela Chapa is a 78  "year old female with a pertinent history of hypertension, hypercholesterolemia, total right knee arthoplasty who presents to the ED by ambulance for evaluation of right ankle pain.     The patient reports that she had a total knee replacement in her right knee this morning, and when she was at home today trying to go to the bathroom, she tripped while going down the stairs and fell down a few steps. She is unsure how she landed, but believes she landed on the outside of her right ankle, and notes a small abrasion on the inside of her ankle that is bleeding. Patient endorses 0/10 pain due to her current nerve block from surgery. She can wiggle her toes on her right foot but cannot bear weight.     Patient also mentions that she \"tapped' the back side of her head on the steps when she fell. She denies headache, loss of consciousness, nausea, vomiting, or any other concerns at this time. Patient is not on blood thinners at baseline.       Medical History     Past Medical History:   Diagnosis Date    Chronic UTI 2010    YESSICA I (cervical intraepithelial neoplasia I) 16 Feb 2010    Herpes zoster     Vaginal dysplasia 2004       Past Surgical History:   Procedure Laterality Date    ABDOMINOPLASTY      ABDOMINOPLASTY      ARTHROSCOPY KNEE IRRIGATION AND DEBRIDEMENT      RT-Articular Cartilage    BIOPSY BREAST      benign open brest biopsy    BIOPSY BREAST Left     benign x2    BIOPSY CERVICAL, LOCAL EXCISION, SINGLE/MULTIPLE  10/09/2017    BIOPSY OF BREAST, INCISIONAL      Description: Biopsy Breast Open;  Recorded: 06/06/2008;  Comments: BENIGN    CATARACT EXTRACTION W/ INTRAOCULAR LENS IMPLANT Left 01/2016    CATARACT EXTRACTION, BILATERAL  04/2015    COLPOSCOPY, BIOPSY, COMBINED  2/16/10    TZ-not seen    ENDOMETRIAL SAMPLING (BIOPSY)  3/31/05    benign    GENITOURINARY SURGERY  6 April 2011    cystoscopy with +1 trabeculaion    HC KNEE SCOPE, DIAGNOSTIC      Description: Arthroscopy Knee Right;  Recorded: 06/06/2008;  " Comments: FOR MENISCUS TEAR    HC OSTEOTOMY METATARSAL (NOT 1ST)      Description: Metatarsal Osteotomy Of The Fifth Metatarsal;  Proc Date: 02/01/2007;    IR LUMBAR EPIDURAL STEROID INJECTION  9/30/2004    IR LUMBAR EPIDURAL STEROID INJECTION  10/11/2004    IR LUMBAR EPIDURAL STEROID INJECTION  11/5/2004    JOINT REPLACEMENT, HIP RT/LT  2008    Joint Replacement Hip RT/LT    RELEASE CARPAL TUNNEL BILATERAL      ZZC TOTAL HIP ARTHROPLASTY      Description: Total Hip Replacement;  Proc Date: 06/01/2008;  Comments: RIGHT    ZZC TOTAL HIP ARTHROPLASTY      Description: Total Hip Replacement;  Proc Date: 08/01/2008;       Family History   Problem Relation Age of Onset    Alcohol/Drug Father     Depression Father     Alcohol/Drug Brother     Breast Cancer Mother     Depression Mother     Diabetes Maternal Grandfather     Cancer Brother 68        blood cancer    Breast Cancer Cousin     Breast Cancer Cousin     Breast Cancer Cousin     Leukemia Brother        Social History     Tobacco Use    Smoking status: Former    Smokeless tobacco: Never    Tobacco comments:     quit 30 years ago 1970   Substance Use Topics    Alcohol use: Yes     Alcohol/week: 0.0 - 0.8 standard drinks of alcohol     Comment: Rare    Drug use: No       ciclopirox (PENLAC) 8 % external solution  citalopram (CELEXA) 20 MG tablet  cycloSPORINE (RESTASIS) 0.05 % ophthalmic emulsion  estradiol (ESTRACE) 0.5 MG tablet  estradiol (ESTRING) 7.5 MCG/24HR vaginal ring  fish oil-omega-3 fatty acids 1000 MG capsule  latanoprost (XALATAN) 0.005 % ophthalmic solution  losartan (COZAAR) 100 MG tablet  LUMIGAN 0.01 % SOLN ophthalmic solution  Magnesium 100 MG TABS  mometasone (NASONEX) 50 MCG/ACT nasal spray  NONFORMULARY  omeprazole (PRILOSEC) 20 MG DR capsule  pentoxifylline ER (TRENTAL) 400 MG CR tablet  pravastatin (PRAVACHOL) 40 MG tablet  progesterone (PROMETRIUM) 100 MG capsule  Tetrahydrozoline HCl (EYE DROPS OP)  traZODone (DESYREL) 100 MG  "tablet          Physical Exam     First Vitals:  Patient Vitals for the past 24 hrs:   BP Temp Temp src Pulse Resp SpO2 Height Weight   02/23/24 2209 -- -- -- 52 -- 96 % -- --   02/23/24 2005 132/60 97.7  F (36.5  C) Oral 52 18 97 % 1.638 m (5' 4.5\") 82.6 kg (182 lb)         PHYSICAL EXAM    General Appearance:  Alert, cooperative, no distress, appears stated age. Resting comfortably in the bed, not ill appearing.  HENT: Normocephalic without obvious deformity, atraumatic. Mucous membranes moist   Eyes: Conjunctiva clear, Lids normal. No discharge.   Respiratory: No distress. Lungs clear to ausculation bilaterally. No wheezes, rhonchi or stridor  Cardiovascular: Regular rate and rhythm, no murmur. Normal cap refill. No peripheral edema  Musculoskeletal: Moving all extremities. No gross deformities. No midline spinal tenderness. The right ankle appears deformed and is externally rotated. She is able to wiggle her toes on the right.   Integument: Warm, dry, no rashes or lesions. There is a 0.5cm diameter abrasion present over the medial ankle, no visible bone or active bleeding. There is some mild red/purple discoloration to the right lower extremity. Capillary refill about 3 seconds in the right lower extremity. DP pulse faint but present.  Neurologic: Alert and orientated x3. No focal deficits. Diminished sensation of the entire right lower extremity below the knee due to previous nerve block.  Psych: Normal mood and affect        Results     LAB:  All pertinent labs reviewed and interpreted  Labs Ordered and Resulted from Time of ED Arrival to Time of ED Departure   CBC WITH PLATELETS - Abnormal       Result Value    WBC Count 19.3 (*)     RBC Count 4.09      Hemoglobin 13.2      Hematocrit 37.8      MCV 92      MCH 32.3      MCHC 34.9      RDW 12.7      Platelet Count 212     BASIC METABOLIC PANEL - Abnormal    Sodium 133 (*)     Potassium 4.8      Chloride 104      Carbon Dioxide (CO2) 23      Anion Gap 6 (*)     " Urea Nitrogen 18.3      Creatinine 0.92      GFR Estimate 64      Calcium 8.5 (*)     Glucose 196 (*)        RADIOLOGY:  XR Knee Right 1/2 Views   Final Result   IMPRESSION: Total knee arthroplasty. No fracture. Normal alignment. Immediate postoperative changes including soft tissue swelling, small foci of soft tissue gas and small joint effusion.      XR Ankle Right G/E 3 Views   Final Result   IMPRESSION: Improved alignment of the trimalleolar fracture dislocation. Persistent ankle mortise malalignment with mild lateral subluxation of the talus. Splint.      CT Ankle Right w/o Contrast   Final Result   IMPRESSION:      Trimalleolar right ankle fracture, with evidence of widening at the medial ankle mortise and possibly the tibiofibular syndesmosis; correlation with stress radiography may be helpful.         XR Ankle Right 2 Views   Final Result   IMPRESSION: Fracture dislocation right ankle with a displaced posterior malleolus fracture fragment. Displaced and angulated oblique fracture of the lateral malleolus/distal fibula. Tiny fracture fragments along the medial tibiotalar clear space.    Surrounding soft tissue swelling. There is lateral and slightly posterior dislocation of the tibiotalar joint.      NOTE: ABNORMAL REPORT      THE DICTATION ABOVE DESCRIBES AN ABNORMALITY FOR WHICH FOLLOW-UP IS NEEDED.         PROCEDURES:    PROCEDURE: Splint Placement   INDICATIONS: right ankle fracture and dislocation   PROCEDURE PROVIDER: Lauryn Johnson PA-C   NOTE:  A Posterior slab (short leg) with Stirrup splint made of Plaster was applied to the Right lower extremity by the above provider. As noted in the physical exam, distal CMS was intact prior to placement. The splint was checked and the fit was adjusted to ensure proper positioning after placement. Sensation and circulation, as well as motor function, are unchanged after splint placement and the patient is more comfortable with the splint in place.           I,  Erin Camara, am serving as a scribe to document services personally performed by Lauryn Johnson PA-C, based on my observation and the provider's statements to me. I, Lauryn Johnson PA-C attest that Erin Camara is acting in a scribe capacity, has observed my performance of the services and has documented them in accordance with my direction.       Lauryn Johnson PA-C   Emergency Medicine   [facility] EMERGENCY DEPARTMENT       Lauryn Johnson PA-C  02/23/24 2160

## 2024-02-24 NOTE — ED NOTES
"[facility] ED Handoff Report    ED Chief Complaint: Right Ankle pain    ED Diagnosis:  (S81.477J) Ankle fracture, right, open type I or II, initial encounter  (primary encounter diagnosis)  Comment:   Plan: Case Request: OPEN REDUCTION INTERNAL FIXATION,        FRACTURE, ANKLE            (B08.132B) Type I or II open trimalleolar fracture of right ankle, initial encounter  Comment:   Plan:        PMH:    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        Code Status:  Full Code     Falls Risk: Yes Band: Applied    Current Living Situation/Residence: lives in a house     Elimination Status: Continent: Yes     Activity Level: 2 assist    Patients Preferred Language:  English     Needed: No    Vital Signs:  /60   Pulse 52   Temp 97.7  F (36.5  C) (Oral)   Resp 18   Ht 1.638 m (5' 4.5\")   Wt 82.6 kg (182 lb)   LMP  (LMP Unknown)   SpO2 96%   BMI 30.76 kg/m       Cardiac Rhythm: Bradycardia    Pain Score: 0/10, due to nerve block from knee replacement surgery    Is the Patient Confused:  No    Last Food or Drink: 02/23/24 at 10pm    Focused Assessment:  This morning, the patient had a right-sided total knee replacement. At home today, she tripped and fell down a few steps. She believes she landed on the outside of her right ankle, and notes a small bleeding abrasion to the medial aspect of the right ankle. 0/10 pain due to current nerve block. She cannot bear weight. A/O x 4. Pt has known hx of chronic UTI and Bradycardia as low as 50's    Tests Performed: Done: Labs and Imaging    Treatments Provided:  Cefazolin IV started    Family Dynamics/Concerns: No    Family Updated On Visitor Policy: No    Plan of Care Communicated to Family: No    Who Was Updated " about Plan of Care: Pt    Belongings Checklist Done and Signed by Patient: Yes    Medications sent with patient: none    Covid: asymptomatic , not tested    Additional Information: NPO post midnight    RN: Harinder Ojdea RN   2/23/2024 10:29 PM

## 2024-02-24 NOTE — ANESTHESIA POSTPROCEDURE EVALUATION
Patient: Tangela Chapa    Procedure: Procedure(s):  OPEN REDUCTION INTERNAL FIXATION, FRACTURE, TRIMALLEOLAR ANKLE       Anesthesia Type:  General    Note:  Disposition: Inpatient   Postop Pain Control: Uneventful            Sign Out: Well controlled pain   PONV: No   Neuro/Psych: Uneventful            Sign Out: Acceptable/Baseline neuro status   Airway/Respiratory: Uneventful            Sign Out: Acceptable/Baseline resp. status   CV/Hemodynamics: Uneventful            Sign Out: Acceptable CV status; No obvious hypovolemia; No obvious fluid overload   Other NRE: NONE   DID A NON-ROUTINE EVENT OCCUR? No           Last vitals:  Vitals:    02/24/24 1220 02/24/24 1446 02/24/24 1515   BP: 113/56 121/56 119/58   Pulse: (!) 44 56 59   Resp: 22 20 20   Temp:  36.7  C (98  F) 36.7  C (98.1  F)   SpO2: 98%  94%       Electronically Signed By: Marty Alvarez MD  February 24, 2024  3:41 PM

## 2024-02-25 ENCOUNTER — APPOINTMENT (OUTPATIENT)
Dept: OCCUPATIONAL THERAPY | Facility: HOSPITAL | Age: 79
DRG: 494 | End: 2024-02-25
Attending: STUDENT IN AN ORGANIZED HEALTH CARE EDUCATION/TRAINING PROGRAM
Payer: COMMERCIAL

## 2024-02-25 ENCOUNTER — APPOINTMENT (OUTPATIENT)
Dept: PHYSICAL THERAPY | Facility: HOSPITAL | Age: 79
DRG: 494 | End: 2024-02-25
Attending: STUDENT IN AN ORGANIZED HEALTH CARE EDUCATION/TRAINING PROGRAM
Payer: COMMERCIAL

## 2024-02-25 LAB
BACTERIA UR CULT: NORMAL
GLUCOSE BLDC GLUCOMTR-MCNC: 101 MG/DL (ref 70–99)
GLUCOSE BLDC GLUCOMTR-MCNC: 88 MG/DL (ref 70–99)
HGB BLD-MCNC: 11.7 G/DL (ref 11.7–15.7)

## 2024-02-25 PROCEDURE — 250N000013 HC RX MED GY IP 250 OP 250 PS 637: Performed by: STUDENT IN AN ORGANIZED HEALTH CARE EDUCATION/TRAINING PROGRAM

## 2024-02-25 PROCEDURE — 97162 PT EVAL MOD COMPLEX 30 MIN: CPT | Mod: GP

## 2024-02-25 PROCEDURE — 97166 OT EVAL MOD COMPLEX 45 MIN: CPT | Mod: GO

## 2024-02-25 PROCEDURE — 99232 SBSQ HOSP IP/OBS MODERATE 35: CPT | Performed by: INTERNAL MEDICINE

## 2024-02-25 PROCEDURE — 250N000011 HC RX IP 250 OP 636: Performed by: STUDENT IN AN ORGANIZED HEALTH CARE EDUCATION/TRAINING PROGRAM

## 2024-02-25 PROCEDURE — 120N000001 HC R&B MED SURG/OB

## 2024-02-25 PROCEDURE — 85018 HEMOGLOBIN: CPT | Performed by: STUDENT IN AN ORGANIZED HEALTH CARE EDUCATION/TRAINING PROGRAM

## 2024-02-25 PROCEDURE — 250N000013 HC RX MED GY IP 250 OP 250 PS 637: Performed by: INTERNAL MEDICINE

## 2024-02-25 PROCEDURE — 36415 COLL VENOUS BLD VENIPUNCTURE: CPT | Performed by: STUDENT IN AN ORGANIZED HEALTH CARE EDUCATION/TRAINING PROGRAM

## 2024-02-25 PROCEDURE — 97530 THERAPEUTIC ACTIVITIES: CPT | Mod: GP

## 2024-02-25 RX ADMIN — ACETAMINOPHEN 975 MG: 325 TABLET ORAL at 11:08

## 2024-02-25 RX ADMIN — BIMATOPROST 1 DROP: 0.1 SOLUTION/ DROPS OPHTHALMIC at 20:48

## 2024-02-25 RX ADMIN — ACETAMINOPHEN 975 MG: 325 TABLET ORAL at 04:52

## 2024-02-25 RX ADMIN — PROGESTERONE 100 MG: 100 CAPSULE ORAL at 09:20

## 2024-02-25 RX ADMIN — PRAVASTATIN SODIUM 40 MG: 20 TABLET ORAL at 20:47

## 2024-02-25 RX ADMIN — PENTOXIFYLLINE 400 MG: 400 TABLET, EXTENDED RELEASE ORAL at 17:24

## 2024-02-25 RX ADMIN — ACETAMINOPHEN 975 MG: 325 TABLET ORAL at 20:46

## 2024-02-25 RX ADMIN — ESTRADIOL 0.5 MG: 0.5 TABLET ORAL at 09:20

## 2024-02-25 RX ADMIN — POLYETHYLENE GLYCOL 3350 17 G: 17 POWDER, FOR SOLUTION ORAL at 09:19

## 2024-02-25 RX ADMIN — CITALOPRAM HYDROBROMIDE 30 MG: 20 TABLET ORAL at 09:19

## 2024-02-25 RX ADMIN — OXYCODONE HYDROCHLORIDE 5 MG: 5 TABLET ORAL at 21:10

## 2024-02-25 RX ADMIN — CYCLOSPORINE 1 DROP: 0.5 EMULSION OPHTHALMIC at 09:20

## 2024-02-25 RX ADMIN — PENTOXIFYLLINE 400 MG: 400 TABLET, EXTENDED RELEASE ORAL at 09:19

## 2024-02-25 RX ADMIN — DOCUSATE SODIUM AND SENNOSIDES 1 TABLET: 8.6; 5 TABLET, FILM COATED ORAL at 20:47

## 2024-02-25 RX ADMIN — CYCLOSPORINE 1 DROP: 0.5 EMULSION OPHTHALMIC at 21:10

## 2024-02-25 RX ADMIN — PENTOXIFYLLINE 400 MG: 400 TABLET, EXTENDED RELEASE ORAL at 13:05

## 2024-02-25 RX ADMIN — LOSARTAN POTASSIUM 100 MG: 50 TABLET, FILM COATED ORAL at 09:19

## 2024-02-25 RX ADMIN — ASPIRIN 325 MG: 325 TABLET, COATED ORAL at 09:19

## 2024-02-25 RX ADMIN — DOCUSATE SODIUM AND SENNOSIDES 1 TABLET: 8.6; 5 TABLET, FILM COATED ORAL at 09:19

## 2024-02-25 RX ADMIN — MAGNESIUM OXIDE TAB 400 MG (241.3 MG ELEMENTAL MG) 400 MG: 400 (241.3 MG) TAB at 20:47

## 2024-02-25 RX ADMIN — PANTOPRAZOLE SODIUM 40 MG: 40 TABLET, DELAYED RELEASE ORAL at 06:28

## 2024-02-25 RX ADMIN — CEFAZOLIN SODIUM 2 G: 2 INJECTION, SOLUTION INTRAVENOUS at 00:26

## 2024-02-25 ASSESSMENT — ACTIVITIES OF DAILY LIVING (ADL)
ADLS_ACUITY_SCORE: 35
ADLS_ACUITY_SCORE: 39
DEPENDENT_IADLS:: INDEPENDENT
ADLS_ACUITY_SCORE: 39
ADLS_ACUITY_SCORE: 39
ADLS_ACUITY_SCORE: 35
ADLS_ACUITY_SCORE: 39
ADLS_ACUITY_SCORE: 35
ADLS_ACUITY_SCORE: 35
ADLS_ACUITY_SCORE: 39
ADLS_ACUITY_SCORE: 39
ADLS_ACUITY_SCORE: 35
ADLS_ACUITY_SCORE: 39
ADLS_ACUITY_SCORE: 39
ADLS_ACUITY_SCORE: 35
ADLS_ACUITY_SCORE: 39
ADLS_ACUITY_SCORE: 35
ADLS_ACUITY_SCORE: 35
ADLS_ACUITY_SCORE: 39
ADLS_ACUITY_SCORE: 35

## 2024-02-25 NOTE — PROGRESS NOTES
Ortho Post op Check    S: Patient doing well. Block still in effect.     O:   Gen: resting in bed, NAD  Resp: breathing nonlabored  RLE: splint c/d/I. No motor or sensory yet due to block. Toes are pink and well perfused, brisk capillary refill. DP pulse now palpable.     A: POD#0 s/p ORIF right ankle, some concern for vascular compromise post op, now returned to baseline.     P:   -appreciate vascular assessment  -continue pulse checks  -otherwise proceed per post op note    Manpreet Cai MD  [facility]

## 2024-02-25 NOTE — PROGRESS NOTES
"Vascular Surgery Progress Note    Patient seen resting in bed comfortably. Denies complaints. Reports she is able to wiggle her toes now if she looks at them.     /66 (BP Location: Right arm)   Pulse 53   Temp 98.6  F (37  C) (Oral)   Resp 18   Ht 1.638 m (5' 4.5\")   Wt 82.6 kg (182 lb)   LMP  (LMP Unknown)   SpO2 91%   BMI 30.76 kg/m    Gen: awake, alert, nad  Resp: nlb ra  Cv: rr per dp pulse  Vasc: warm, well perfused left toes, pink, brisk capillary refill. Strong multiphasic DP signal in L foot.   Neuro: able to wiggle toes easily today, diminished sensation left foot.     A/P:    Tangela Chapa is a 78 year old female with PMH of HTN, HLD, and outpatient R knee arthroplasty 2/23 who was re-admitted from home day of discharge after right ankle dislocation, now s/p R ORIF of her ankle 2/24, with concern for loss of perfusion to right foot given inability to doppler DP postoperatively. On exam in room, now has pink, warm toes with good capillary refill and biphasic DP signal, she did receive block, however is now able to wiggler her toes on this foot. Continues to have diminished sensation expected based on nerve block <48 hours ago, but based on current exam, not concerned for vascular injury.      - No indication for vascular surgical intervention   - Vascular surgery will sign off at this time  - Please do not hesitate to reach out with questions or concerns.       Discussed with Dr. Adams, vascular surgery staff   "

## 2024-02-25 NOTE — PROGRESS NOTES
"Orthopedic Progress Note    Subjective:  No acute events.    Objective:  BP (!) 142/61 (BP Location: Right arm)   Pulse 50   Temp 98.1  F (36.7  C) (Oral)   Resp 18   Ht 1.638 m (5' 4.5\")   Wt 82.6 kg (182 lb)   LMP  (LMP Unknown)   SpO2 96%   BMI 30.76 kg/m    Alert, interactive.  RLE:  Splint CDI  Diminished sensation in toes  Unable to wiggle toes    Assessment: 1 Day Post-Op  S/P Procedure(s):  OPEN REDUCTION INTERNAL FIXATION, FRACTURE, TRIMALLEOLAR ANKLE    Plan:   - Continue PT/OT  - Weightbearing status: NWB RLE  - Anticoagulation: ASA in addition to SCDs, edith stockings and early ambulation.  - Discharge planning: Pending.     Report completed by:  Franky Villagomez MD  Date: 2/25/2024  Time: 7:49 AM        "

## 2024-02-25 NOTE — CONSULTS
Care Management Initial Consult    General Information  Assessment completed with: Patient, patient  Type of CM/SW Visit: Initial Assessment    Primary Care Provider verified and updated as needed: Yes   Readmission within the last 30 days: no previous admission in last 30 days      Reason for Consult: discharge planning  Advance Care Planning: Advance Care Planning Reviewed: no concerns identified          Communication Assessment  Patient's communication style: spoken language (English or Bilingual)    Hearing Difficulty or Deaf: no   Wear Glasses or Blind: yes    Cognitive  Cognitive/Neuro/Behavioral: WDL                      Living Environment:   People in home: alone     Current living Arrangements: house      Able to return to prior arrangements: yes  Living Arrangement Comments: multi level home    Family/Social Support:  Care provided by: self  Provides care for: no one  Marital Status: Single             Description of Support System: Supportive    Support Assessment: Adequate family and caregiver support    Current Resources:   Patient receiving home care services: No     Community Resources: None  Equipment currently used at home: none  Supplies currently used at home: None    Employment/Financial:  Employment Status: retired        Financial Concerns:     Referral to Financial Worker: No       Does the patient's insurance plan have a 3 day qualifying hospital stay waiver?  No    Lifestyle & Psychosocial Needs:  Social Determinants of Health     Food Insecurity: Not on file   Depression: At risk (2/15/2024)    PHQ-2     PHQ-2 Score: 3   Housing Stability: Not on file   Tobacco Use: Medium Risk (2/15/2024)    Patient History     Smoking Tobacco Use: Former     Smokeless Tobacco Use: Never     Passive Exposure: Not on file   Financial Resource Strain: Not on file   Alcohol Use: Not on file   Transportation Needs: Not on file   Physical Activity: Not on file   Interpersonal Safety: Low Risk  (2/15/2024)     "Interpersonal Safety     Do you feel physically and emotionally safe where you currently live?: Yes     Within the past 12 months, have you been hit, slapped, kicked or otherwise physically hurt by someone?: No     Within the past 12 months, have you been humiliated or emotionally abused in other ways by your partner or ex-partner?: No   Stress: Not on file   Social Connections: Not on file       Functional Status:  Prior to admission patient needed assistance:   Dependent ADLs:: Independent  Dependent IADLs:: Independent  Assesssment of Functional Status: Not at baseline with ADL Functioning    Mental Health Status:  Mental Health Status: No Current Concerns       Chemical Dependency Status:  Chemical Dependency Status: No Current Concerns             Values/Beliefs:  Spiritual, Cultural Beliefs, Hindu Practices, Values that affect care: no               Additional Information:  Chart reviewed. SW completed initial assessment with patient at bedside. She lives alone in a \"zig zag house\" with multiple stairs and multiple levels. She is independent at baseline.   Patient is agreeable to TCU (has Health system). PT will complete initial evaluation, anticipating TCU.  Patient understands she plans to be non weight bearing for several weeks and may need to discharge home from TCU and remain non weight bearing. [facility] TCU choice list provided to patient.     AMY Garcia      "

## 2024-02-25 NOTE — PROGRESS NOTES
02/25/24 1130   Appointment Info   Signing Clinician's Name / Credentials (PT) Alma Cote DPT   Living Environment   People in Home alone   Current Living Arrangements house   Home Accessibility stairs within home   Number of Stairs, Within Home, Primary   (lots of stairs in home)   Stair Railings, Within Home, Primary railings safe and in good condition   Self-Care   Usual Activity Tolerance good  (prior to knee surgery 2/23)   Current Activity Tolerance fair   Equipment Currently Used at Home walker, rolling;cane, quad   Fall history within last six months yes   Number of times patient has fallen within last six months 1   Activity/Exercise/Self-Care Comment was ind with ADLs prior to knee replacement on 2/23. Fell down the stairs when descending stairs while block still in place from knee surgery. Had been using quad cane on stairs, walker for most other ambualtion   General Information   Onset of Illness/Injury or Date of Surgery 02/23/24   Referring Physician Kellen Leone PA-C   Patient/Family Therapy Goals Statement (PT) return home   Pertinent History of Current Problem (include personal factors and/or comorbidities that impact the POC) Open reduction internal fixation right trimalleolar ankle fracture with fixation of the posterior lip   Existing Precautions/Restrictions weight bearing   Weight-Bearing Status - LLE weight-bearing as tolerated   Weight-Bearing Status - RLE nonweight-bearing   Pain Assessment   Patient Currently in Pain Yes, see Vital Sign flowsheet  (burning above R knee)   Range of Motion (ROM)   Range of Motion ROM deficits secondary to surgical procedure;ROM deficits secondary to pain;ROM deficits secondary to weakness;ROM deficits secondary to swelling   ROM Comment RLE limited with all ROM d/t knee surgery and now ankle surgery   Strength (Manual Muscle Testing)   Strength (Manual Muscle Testing) Deficits observed during functional mobility   Bed Mobility   Bed Mobility  supine-sit   Supine-Sit [place] (Bed Mobility) moderate assist (50% patient effort);2 person assist   Bed Mobility Limitations decreased ability to use legs for bridging/pushing   Impairments Contributing to Impaired Bed Mobility pain;decreased strength;decreased ROM   Assistive Device (Bed Mobility) bed rails;draw sheet   Comment, (Bed Mobility) A for RLE off EOB and towards floor   Transfers   Transfers sit-stand transfer   Sit-Stand Transfer   Sit-Stand [place] (Transfers) moderate assist (50% patient effort)   Assistive Device (Sit-Stand Transfers) other (see comments)  (arm in arm of 2)   Comment, (Sit-Stand Transfer) maxA for maintaining NWB RLE   Gait/Stairs (Locomotion)   Comment, (Gait/Stairs) not tested, NWB RLE   Clinical Impression   Criteria for Skilled Therapeutic Intervention Yes, treatment indicated   PT Diagnosis (PT) impaired functional mobility, gait abnormality   Influenced by the following impairments decreased strength, decreased endurance, orthopedic restrictions, pain   Functional limitations due to impairments gait, transfers, bed mob, stairs   Clinical Presentation (PT Evaluation Complexity) evolving   Clinical Presentation Rationale pt presents as medically diagnosed   Clinical Decision Making (Complexity) moderate complexity   Planned Therapy Interventions (PT) balance training;bed mobility training;gait training;home exercise program;neuromuscular re-education;patient/family education;strengthening;transfer training;stair training;ROM (range of motion)   Risk & Benefits of therapy have been explained evaluation/treatment results reviewed;patient   PT Total Evaluation Time   PT Eval, Moderate Complexity Minutes (04283) 15   Physical Therapy Goals   PT Frequency Daily   PT Predicted Duration/Target Date for Goal Attainment 03/03/24   PT Goals Bed Mobility;Transfers;Gait   PT: Bed Mobility Supervision/stand-by assist;Supine to/from sit;Within precautions   PT: Transfers Minimal  assist;Assistive device;Sit to/from stand;Bed to/from chair;Within precautions   PT: Gait Minimal assist;Assistive device;Rolling walker;Within precautions;10 feet   Interventions   Interventions Quick Adds Therapeutic Activity   Therapeutic Activity   Therapeutic Activities: dynamic activities to improve functional performance Minutes (72958) 8   Symptoms Noted During/After Treatment Increased pain   Treatment Detail/Skilled Intervention inc fear. Pt sitting EOB x 5 minutes with SBA-CGA. No LOB noted. instructed in proper NWB with standing and transfering. Instructed pt it is okay to gently palce foot on ground when sitting only. Pt understanding. EOB > chair with maxA x 2 plus maxA to maintain NWB RLE, attempting to pivot on L heel, too difficult to move foot. Once in chair, SBA for scooting back into chair, cues for NWB   PT Discharge Planning   PT Plan transfers, NWB RLE, knee ROM/TKA ex, bed mob, trial FWW?   PT Discharge Recommendation (DC Rec) Transitional Care Facility   PT Rationale for DC Rec lives alone, lots of stairs, currently needing inc A x 2 for pivot transfers   PT Brief overview of current status mod-maxA x 2 for sit <> stand and pivot transfers, bed mob modA x 1   PT Equipment Needed at Discharge walker, rolling   Total Session Time   Timed Code Treatment Minutes 8   Total Session Time (sum of timed and untimed services) 23

## 2024-02-25 NOTE — PLAN OF CARE
Problem: Mobility Impairment  Goal: Optimal Mobility  Outcome: Not Progressing     Problem: Pain Acute  Goal: Optimal Pain Control and Function  Intervention: Optimize Psychosocial Wellbeing  Recent Flowsheet Documentation  Taken 2/24/2024 1700 by Nic Costa RN  Supportive Measures: active listening utilized   Goal Outcome Evaluation:               Patient A and O times 4. Arrived from PACU at 1500. RLE wrapped in cast and ace wrap. NWB. Room air. Eating and drinking well. Running LR at 50 ML/HR. Alanis in place. RLE still numb from effect of block. Cannot wiggle toes on rt foot or feel pressure.  NP from vascular surgery assessed patient with doppler. Pulses on foot present but currently faint.   Patient runs bradycardic. Patient instructed to alert nursing staff when block wears off so pain can be managed.

## 2024-02-25 NOTE — PROGRESS NOTES
02/25/24 1300   Appointment Info   Signing Clinician's Name / Credentials (OT) Soni MUNOZ OTR/L CLT   Living Environment   People in Home alone   Current Living Arrangements house   Home Accessibility stairs within home   Self-Care   Equipment Currently Used at Home walker, rolling   Activity/Exercise/Self-Care Comment I with ADLs/IADLs at baseline. Has a multi level  home with no good option to stay primarily on a floor that would have all her needs met. Would need to take 7 stairs up/down for either a bathroom or kitchen.   General Information   Onset of Illness/Injury or Date of Surgery 02/23/24   Referring Physician    Additional Occupational Profile Info/Pertinent History of Current Problem The patient is seen in orthopedic consultation at the request of Dr. Simon, BLANQUITA Weaver.  The patient is a 78 year old female with c/o right ankle pain.  The patient has a history of hypertension and hypercholesterolemia.  She had a right total knee arthroplasty done by my partner Dr. Mohr yesterday and [facility].  She was discharged to home.  She was walking down the stairs yesterday evening when she slid down the stairs, and twisted her right ankle.  She noticed immediate onset of pain, and deformity.  She was brought to the emergency department and diagnosed with an ankle fracture dislocation.  This was reduced.   Right Lower Extremity (Weight-bearing Status) non weight-bearing (NWB)   Cognitive Status Examination   Affect/Mental Status (Cognitive) WNL   Follows Commands WNL   Bed Mobility   Bed Mobility sit-supine   Sit-Supine [place] (Bed Mobility) minimum assist (75% patient effort)   Comment (Bed Mobility) able to lift RLE with little A into bed   Transfers   Transfers sit-stand transfer;bed-chair transfer   Transfer Skill: Bed to Chair/Chair to Bed   Bed-Chair [place] (Transfers) maximum assist (25% patient effort);2 person assist  (+1 to hold RLE up off the floor)    Transfer Comments arm/arm   Sit-Stand Transfer   Sit-Stand [place] (Transfers) maximum assist (25% patient effort);moderate assist (50% patient effort);2 person assist   Balance   Balance Comments EOB sitting- SBA   Activities of Daily Living   BADL Assessment/Intervention   (will address needs for LB drsg during treatment session, A will need AD)   Clinical Impression   Criteria for Skilled Therapeutic Interventions Met (OT) Yes, treatment indicated   OT Diagnosis decreased ADL i'dence   Assessment of Occupational Performance 1-3 Performance Deficits   Identified Performance Deficits bed mob, standing, trsfs   Planned Therapy Interventions (OT) ADL retraining;strengthening;transfer training   Clinical Decision Making Complexity (OT) detailed assessment/moderate complexity   Risk & Benefits of therapy have been explained care plan/treatment goals reviewed   OT Total Evaluation Time   OT Eval Moderate Complexity Minutes (02616) 12   OT Goals   Therapy Frequency (OT) Daily   OT Predicted Duration/Target Date for Goal Attainment 03/03/24   OT Goals Lower Body Dressing;Transfers;Toilet Transfer/Toileting   OT: Lower Body Dressing Minimal assist   OT: Transfer Moderate assist   OT: Toilet Transfer/Toileting Moderate assist   OT Discharge Planning   OT Plan trsfs A of 2, LB drsg with AE, RLE NWB   OT Discharge Recommendation (DC Rec) Transitional Care Facility   Total Session Time   Total Session Time (sum of timed and untimed services) 12

## 2024-02-25 NOTE — PLAN OF CARE
Problem: Adult Inpatient Plan of Care  Goal: Optimal Comfort and Wellbeing  Outcome: Progressing     Problem: Pain Acute  Goal: Optimal Pain Control and Function  Outcome: Progressing  Intervention: Optimize Psychosocial Wellbeing  Recent Flowsheet Documentation  Taken 2/25/2024 0020 by Marcus Sequeira RN  Supportive Measures: active listening utilized   Goal Outcome Evaluation:       Patient had a good night. Block is still in effect, patient denied pain. Started to wiggle her toes at about 0500. Drinking  adequate amounts, saline locked IV. Alanis patent with 1300ml clear yellow urine. Plan of Care reviewed with patient.

## 2024-02-25 NOTE — PROGRESS NOTES
"[facility]    Medicine Progress Note - Hospitalist Service    Date of Admission:  2/23/2024    Assessment & Plan   Tangela Chapa is a 78 year old female with a pertinent history of hypertension, hypercholesterolemia, total right knee arthoplasty on 2/23/24 (outpatient setting) who was unfortunately readmitted the same day after she sustained a right ankle dislocation after going home.     Right ankle fracture with dislocation  -Reportedly tripped and fell down a few stairs.   -Ortho consulted. This was reduced and immobilized in the ER. Nerve block still working.   -S/P ORIF 2/24  -Routine post op care including DVT prophylaxis and pain control per ortho     Status post right knee arthroplasty 2/23/24.    -Per ortho     Leucocytosis  -Likely reactive. UA looks abnormal. U/C urogenital khari. Received rambo-operative IV cefazolin.     Hyperglycemia  -Check A1c in am    GERD  Hypertension-losartan  Depression  -Cont home meds        Diet: Advance Diet as Tolerated: Regular Diet Adult    DVT Prophylaxis: Defer to ortho  Alanis Catheter: PRESENT, indication: Surgical procedure  Lines: None     Cardiac Monitoring: None  Code Status: Full Code      Clinically Significant Risk Factors                  # Hypertension: Noted on problem list        # Obesity: Estimated body mass index is 30.76 kg/m  as calculated from the following:    Height as of this encounter: 1.638 m (5' 4.5\").    Weight as of this encounter: 82.6 kg (182 lb)., PRESENT ON ADMISSION            Disposition Plan      Expected Discharge Date: 02/27/2024      Destination: inpatient rehabilitation facility  Discharge Comments: BLANQUITA Owens  Hospitalist Service  [facility]  Securely message with [email or web address] (more info)  Text page via [employer] Paging/Directory   ______________________________________________________________________    Interval History   Patient seen and examined this morning. " Sitting in a recliner. Pain seemed to be well controlled. Offer no complaints to me.    Physical Exam   Vital Signs: Temp: 98.1  F (36.7  C) Temp src: Oral BP: 129/59 Pulse: 56   Resp: 18 SpO2: 96 % O2 Device: None (Room air)    Weight: 182 lbs 0 oz      General: Not in obvious distress.  HEENT: NC, AT   Chest: Clear to auscultation bilaterally  Heart: S1S2 normal, regular. No M/R/G  Abdomen: Soft. NT, ND. Bowel sounds- active.  Extremities: Right ankle and knee under a dressing  Neuro: Alert and awake, grossly non-focal      Medical Decision Making             Data

## 2024-02-26 ENCOUNTER — APPOINTMENT (OUTPATIENT)
Dept: OCCUPATIONAL THERAPY | Facility: HOSPITAL | Age: 79
DRG: 494 | End: 2024-02-26
Payer: COMMERCIAL

## 2024-02-26 ENCOUNTER — APPOINTMENT (OUTPATIENT)
Dept: PHYSICAL THERAPY | Facility: HOSPITAL | Age: 79
DRG: 494 | End: 2024-02-26
Payer: COMMERCIAL

## 2024-02-26 LAB
ERYTHROCYTE [DISTWIDTH] IN BLOOD BY AUTOMATED COUNT: 13.1 % (ref 10–15)
GLUCOSE BLDC GLUCOMTR-MCNC: 124 MG/DL (ref 70–99)
HBA1C MFR BLD: 5.9 %
HCT VFR BLD AUTO: 35.5 % (ref 35–47)
HGB BLD-MCNC: 12 G/DL (ref 11.7–15.7)
MCH RBC QN AUTO: 31.2 PG (ref 26.5–33)
MCHC RBC AUTO-ENTMCNC: 33.8 G/DL (ref 31.5–36.5)
MCV RBC AUTO: 92 FL (ref 78–100)
PLATELET # BLD AUTO: 222 10E3/UL (ref 150–450)
RBC # BLD AUTO: 3.85 10E6/UL (ref 3.8–5.2)
WBC # BLD AUTO: 12.7 10E3/UL (ref 4–11)

## 2024-02-26 PROCEDURE — 85027 COMPLETE CBC AUTOMATED: CPT | Performed by: INTERNAL MEDICINE

## 2024-02-26 PROCEDURE — 97530 THERAPEUTIC ACTIVITIES: CPT | Mod: GP

## 2024-02-26 PROCEDURE — 250N000013 HC RX MED GY IP 250 OP 250 PS 637: Performed by: INTERNAL MEDICINE

## 2024-02-26 PROCEDURE — 97535 SELF CARE MNGMENT TRAINING: CPT | Mod: GO

## 2024-02-26 PROCEDURE — 36415 COLL VENOUS BLD VENIPUNCTURE: CPT | Performed by: INTERNAL MEDICINE

## 2024-02-26 PROCEDURE — 99232 SBSQ HOSP IP/OBS MODERATE 35: CPT | Performed by: INTERNAL MEDICINE

## 2024-02-26 PROCEDURE — 250N000013 HC RX MED GY IP 250 OP 250 PS 637: Performed by: STUDENT IN AN ORGANIZED HEALTH CARE EDUCATION/TRAINING PROGRAM

## 2024-02-26 PROCEDURE — 120N000001 HC R&B MED SURG/OB

## 2024-02-26 PROCEDURE — 83036 HEMOGLOBIN GLYCOSYLATED A1C: CPT | Performed by: INTERNAL MEDICINE

## 2024-02-26 PROCEDURE — 97110 THERAPEUTIC EXERCISES: CPT | Mod: GP

## 2024-02-26 RX ORDER — OXYCODONE HYDROCHLORIDE 5 MG/1
5 TABLET ORAL EVERY 4 HOURS PRN
Qty: 25 TABLET | Refills: 0 | Status: SHIPPED | OUTPATIENT
Start: 2024-02-26 | End: 2024-07-31

## 2024-02-26 RX ORDER — ASPIRIN 325 MG
325 TABLET, DELAYED RELEASE (ENTERIC COATED) ORAL DAILY
Qty: 30 TABLET | Refills: 0 | Status: SHIPPED | OUTPATIENT
Start: 2024-02-27 | End: 2024-02-28

## 2024-02-26 RX ORDER — LIDOCAINE 4 G/G
2 PATCH TOPICAL
Status: DISCONTINUED | OUTPATIENT
Start: 2024-02-26 | End: 2024-02-28 | Stop reason: HOSPADM

## 2024-02-26 RX ORDER — AMOXICILLIN 250 MG
2 CAPSULE ORAL 2 TIMES DAILY PRN
Qty: 60 TABLET | Refills: 0 | Status: SHIPPED | OUTPATIENT
Start: 2024-02-26 | End: 2024-02-28

## 2024-02-26 RX ORDER — ACETAMINOPHEN 325 MG/1
975 TABLET ORAL EVERY 8 HOURS
Qty: 100 TABLET | Refills: 0 | Status: SHIPPED | OUTPATIENT
Start: 2024-02-26 | End: 2024-02-28

## 2024-02-26 RX ADMIN — OXYCODONE HYDROCHLORIDE 5 MG: 5 TABLET ORAL at 22:22

## 2024-02-26 RX ADMIN — PENTOXIFYLLINE 400 MG: 400 TABLET, EXTENDED RELEASE ORAL at 09:41

## 2024-02-26 RX ADMIN — ACETAMINOPHEN 975 MG: 325 TABLET ORAL at 20:22

## 2024-02-26 RX ADMIN — POLYETHYLENE GLYCOL 3350 17 G: 17 POWDER, FOR SOLUTION ORAL at 09:41

## 2024-02-26 RX ADMIN — PRAVASTATIN SODIUM 40 MG: 20 TABLET ORAL at 20:23

## 2024-02-26 RX ADMIN — OXYCODONE HYDROCHLORIDE 5 MG: 5 TABLET ORAL at 03:59

## 2024-02-26 RX ADMIN — ESTRADIOL 0.5 MG: 0.5 TABLET ORAL at 09:41

## 2024-02-26 RX ADMIN — PROGESTERONE 100 MG: 100 CAPSULE ORAL at 09:42

## 2024-02-26 RX ADMIN — BIMATOPROST 1 DROP: 0.1 SOLUTION/ DROPS OPHTHALMIC at 20:27

## 2024-02-26 RX ADMIN — CYCLOSPORINE 1 DROP: 0.5 EMULSION OPHTHALMIC at 09:41

## 2024-02-26 RX ADMIN — CYCLOSPORINE 1 DROP: 0.5 EMULSION OPHTHALMIC at 22:19

## 2024-02-26 RX ADMIN — PENTOXIFYLLINE 400 MG: 400 TABLET, EXTENDED RELEASE ORAL at 13:16

## 2024-02-26 RX ADMIN — LIDOCAINE 2 PATCH: 4 PATCH TOPICAL at 20:22

## 2024-02-26 RX ADMIN — LOSARTAN POTASSIUM 100 MG: 50 TABLET, FILM COATED ORAL at 09:42

## 2024-02-26 RX ADMIN — OXYCODONE HYDROCHLORIDE 10 MG: 5 TABLET ORAL at 09:42

## 2024-02-26 RX ADMIN — TRAZODONE HYDROCHLORIDE 50 MG: 50 TABLET ORAL at 22:22

## 2024-02-26 RX ADMIN — MAGNESIUM OXIDE TAB 400 MG (241.3 MG ELEMENTAL MG) 400 MG: 400 (241.3 MG) TAB at 20:23

## 2024-02-26 RX ADMIN — ACETAMINOPHEN 975 MG: 325 TABLET ORAL at 03:58

## 2024-02-26 RX ADMIN — OXYCODONE HYDROCHLORIDE 10 MG: 5 TABLET ORAL at 13:54

## 2024-02-26 RX ADMIN — CITALOPRAM HYDROBROMIDE 30 MG: 20 TABLET ORAL at 09:42

## 2024-02-26 RX ADMIN — PANTOPRAZOLE SODIUM 40 MG: 40 TABLET, DELAYED RELEASE ORAL at 06:22

## 2024-02-26 RX ADMIN — PENTOXIFYLLINE 400 MG: 400 TABLET, EXTENDED RELEASE ORAL at 17:55

## 2024-02-26 RX ADMIN — ACETAMINOPHEN 975 MG: 325 TABLET ORAL at 13:17

## 2024-02-26 RX ADMIN — DOCUSATE SODIUM AND SENNOSIDES 1 TABLET: 8.6; 5 TABLET, FILM COATED ORAL at 09:42

## 2024-02-26 RX ADMIN — ASPIRIN 325 MG: 325 TABLET, COATED ORAL at 09:42

## 2024-02-26 ASSESSMENT — ACTIVITIES OF DAILY LIVING (ADL)
ADLS_ACUITY_SCORE: 39
ADLS_ACUITY_SCORE: 41
ADLS_ACUITY_SCORE: 39

## 2024-02-26 NOTE — PROGRESS NOTES
"Orthopedic Progress Note      Assessment: 2 Days Post-Op  S/P Procedure(s):  OPEN REDUCTION INTERNAL FIXATION, FRACTURE, TRIMALLEOLAR ANKLE     Plan:   - Continue PT/OT  - Weightbearing status: NWB RLE  - Activity: Up with assist and assistive device until independent.  - Anticoagulation:  daily in addition to SCDs, edith stockings and early ambulation.  - Antibiotics: 24 hours post op  - Pain Management; continue current regimen  - Diet: progress diet as tolerated  - Labs: hgb 12.0, transfuse if <7.0. No indication today  - Dressing: Keep dry and intact.  Splint to remain on at all times  - Elevation: Elevate RLE on pillow to keep above the level of the heart as much as possible  - Follow-up: Outpatient follow up in 2 weeks  - Disposition: Anticipate discharge to TCU pending placement.  Okay to discharge from an orthopedic standpoint.        Subjective:  Pain: moderate  Nausea, Vomiting:  No  Lightheadedness, Dizziness:  No  Neuro:  Patient denies new onset numbness or paresthesias  Fever, chills: No  Chest pain: No  SOB: No    Patient reports feeling well today. Patient reports pain is tolerable with current pain regimen.  She has not done much with therapy though due to pain and difficulty with moving.  She still has patel catheter in today. Patient eating and drinking well. Patient voiding and passing gas and has had a BM. All questions/concerns answered.      Objective:  /57 (BP Location: Right arm)   Pulse 57   Temp 98.9  F (37.2  C) (Oral)   Resp 18   Ht 1.638 m (5' 4.5\")   Wt 82.6 kg (182 lb)   LMP  (LMP Unknown)   SpO2 94%   BMI 30.76 kg/m      The patient is A&Ox3. Appears comfortable, sitting up in bed  Calves without tenderness, neg Nellie's  Brisk capillary refill in the toes.   Pulses not palpable d/t splint.  Toes warm and well perfused  Sensation intact in toes  ROM: Appropriately wiggles toes.   Splint C/D/I  Knee dressing C/D/I      No drain     Pertinent Labs   Lab Results: " "personally reviewed.   No results found for: \"INR\", \"PROTIME\"  Lab Results   Component Value Date    WBC 12.7 (H) 02/26/2024    HGB 12.0 02/26/2024    HCT 35.5 02/26/2024    MCV 92 02/26/2024     02/26/2024     Lab Results   Component Value Date     (L) 02/23/2024    CO2 23 02/23/2024         Report completed by:  PEGGY THOMAS PA-C  Date: 02/26/2024  [facility]                          "

## 2024-02-26 NOTE — PLAN OF CARE
Problem: Pain Acute  Goal: Optimal Pain Control and Function  Outcome: Progressing   Goal Outcome Evaluation:       Reports pain in right ankle and knee. Extremity is swollen, able to wiggle toes and reports good sensation. RLE is tender. Pain managed with oral medications, ice therapy and positioning. Patient reports some relief. Alanis catheter removed patient voiding. CMS to right lower extremity intact, patient able to wiggle toes, good cap refill, and warm. Patient up to chair with heavy assist of 3, utilized heather to assist to commode.

## 2024-02-26 NOTE — PLAN OF CARE
Problem: Adult Inpatient Plan of Care  Goal: Plan of Care Review  Description: The Plan of Care Review/Shift note should be completed every shift.  The Outcome Evaluation is a brief statement about your assessment that the patient is improving, declining, or no change.  This information will be displayed automatically on your shift  note.  Outcome: Progressing     Problem: Adult Inpatient Plan of Care  Goal: Optimal Comfort and Wellbeing  Outcome: Progressing   Goal Outcome Evaluation:  Patient is alert and oriented x4 and makes her needs known. Medicated with scheduled and PRN Oxy, effective. RLE casted and ace wrapped, elevated on pillows, can wiggle toes. Plan of Care reviewed with patient.

## 2024-02-26 NOTE — PLAN OF CARE
Problem: Pain Acute  Goal: Optimal Pain Control and Function  Outcome: Progressing  Intervention: Prevent or Manage Pain  Recent Flowsheet Documentation  Taken 2/25/2024 1552 by Sofia Beth, RN  Medication Review/Management: medications reviewed  Intervention: Optimize Psychosocial Wellbeing  Recent Flowsheet Documentation  Taken 2/25/2024 1552 by Sofia Beth, RN  Supportive Measures: active listening utilized     Problem: Mobility Impairment  Goal: Optimal Mobility  Outcome: Progressing   Goal Outcome Evaluation:                    Pt. Is AX4. She didn't get out of bed. She refused her patel to be removed. She had no BM but passing flatus. She complained of pain in her right thigh and Oxy was given per order.

## 2024-02-27 ENCOUNTER — APPOINTMENT (OUTPATIENT)
Dept: PHYSICAL THERAPY | Facility: HOSPITAL | Age: 79
DRG: 494 | End: 2024-02-27
Payer: COMMERCIAL

## 2024-02-27 ENCOUNTER — APPOINTMENT (OUTPATIENT)
Dept: OCCUPATIONAL THERAPY | Facility: HOSPITAL | Age: 79
DRG: 494 | End: 2024-02-27
Payer: COMMERCIAL

## 2024-02-27 PROCEDURE — 250N000013 HC RX MED GY IP 250 OP 250 PS 637: Performed by: INTERNAL MEDICINE

## 2024-02-27 PROCEDURE — 97110 THERAPEUTIC EXERCISES: CPT | Mod: GP

## 2024-02-27 PROCEDURE — 250N000013 HC RX MED GY IP 250 OP 250 PS 637: Performed by: STUDENT IN AN ORGANIZED HEALTH CARE EDUCATION/TRAINING PROGRAM

## 2024-02-27 PROCEDURE — 97530 THERAPEUTIC ACTIVITIES: CPT | Mod: GP

## 2024-02-27 PROCEDURE — 99232 SBSQ HOSP IP/OBS MODERATE 35: CPT | Performed by: STUDENT IN AN ORGANIZED HEALTH CARE EDUCATION/TRAINING PROGRAM

## 2024-02-27 PROCEDURE — 120N000001 HC R&B MED SURG/OB

## 2024-02-27 PROCEDURE — 97535 SELF CARE MNGMENT TRAINING: CPT | Mod: GO

## 2024-02-27 PROCEDURE — 250N000013 HC RX MED GY IP 250 OP 250 PS 637: Performed by: PHYSICIAN ASSISTANT

## 2024-02-27 RX ORDER — HYDROXYZINE HYDROCHLORIDE 25 MG/1
25 TABLET, FILM COATED ORAL EVERY 6 HOURS PRN
Status: DISCONTINUED | OUTPATIENT
Start: 2024-02-27 | End: 2024-02-28 | Stop reason: HOSPADM

## 2024-02-27 RX ADMIN — CYCLOSPORINE 1 DROP: 0.5 EMULSION OPHTHALMIC at 21:54

## 2024-02-27 RX ADMIN — OXYCODONE HYDROCHLORIDE 5 MG: 5 TABLET ORAL at 20:05

## 2024-02-27 RX ADMIN — CITALOPRAM HYDROBROMIDE 30 MG: 20 TABLET ORAL at 08:56

## 2024-02-27 RX ADMIN — BIMATOPROST 1 DROP: 0.1 SOLUTION/ DROPS OPHTHALMIC at 21:54

## 2024-02-27 RX ADMIN — PENTOXIFYLLINE 400 MG: 400 TABLET, EXTENDED RELEASE ORAL at 08:57

## 2024-02-27 RX ADMIN — HYDROXYZINE HYDROCHLORIDE 25 MG: 25 TABLET, FILM COATED ORAL at 12:45

## 2024-02-27 RX ADMIN — HYDROXYZINE HYDROCHLORIDE 25 MG: 25 TABLET, FILM COATED ORAL at 19:33

## 2024-02-27 RX ADMIN — PRAVASTATIN SODIUM 40 MG: 20 TABLET ORAL at 21:54

## 2024-02-27 RX ADMIN — OXYCODONE HYDROCHLORIDE 5 MG: 5 TABLET ORAL at 18:07

## 2024-02-27 RX ADMIN — OXYCODONE HYDROCHLORIDE 5 MG: 5 TABLET ORAL at 09:18

## 2024-02-27 RX ADMIN — PENTOXIFYLLINE 400 MG: 400 TABLET, EXTENDED RELEASE ORAL at 12:03

## 2024-02-27 RX ADMIN — DOCUSATE SODIUM AND SENNOSIDES 1 TABLET: 8.6; 5 TABLET, FILM COATED ORAL at 08:56

## 2024-02-27 RX ADMIN — PROGESTERONE 100 MG: 100 CAPSULE ORAL at 08:58

## 2024-02-27 RX ADMIN — ESTRADIOL 0.5 MG: 0.5 TABLET ORAL at 08:58

## 2024-02-27 RX ADMIN — ACETAMINOPHEN 975 MG: 325 TABLET ORAL at 03:25

## 2024-02-27 RX ADMIN — ASPIRIN 325 MG: 325 TABLET, COATED ORAL at 08:55

## 2024-02-27 RX ADMIN — OXYCODONE HYDROCHLORIDE 10 MG: 5 TABLET ORAL at 13:04

## 2024-02-27 RX ADMIN — OXYCODONE HYDROCHLORIDE 5 MG: 5 TABLET ORAL at 09:08

## 2024-02-27 RX ADMIN — LIDOCAINE 2 PATCH: 4 PATCH TOPICAL at 19:33

## 2024-02-27 RX ADMIN — PENTOXIFYLLINE 400 MG: 400 TABLET, EXTENDED RELEASE ORAL at 18:03

## 2024-02-27 RX ADMIN — LOSARTAN POTASSIUM 100 MG: 50 TABLET, FILM COATED ORAL at 08:56

## 2024-02-27 RX ADMIN — MAGNESIUM OXIDE TAB 400 MG (241.3 MG ELEMENTAL MG) 400 MG: 400 (241.3 MG) TAB at 21:54

## 2024-02-27 RX ADMIN — CYCLOSPORINE 1 DROP: 0.5 EMULSION OPHTHALMIC at 10:27

## 2024-02-27 RX ADMIN — ACETAMINOPHEN 975 MG: 325 TABLET ORAL at 12:02

## 2024-02-27 RX ADMIN — DOCUSATE SODIUM AND SENNOSIDES 1 TABLET: 8.6; 5 TABLET, FILM COATED ORAL at 21:54

## 2024-02-27 RX ADMIN — PANTOPRAZOLE SODIUM 40 MG: 40 TABLET, DELAYED RELEASE ORAL at 06:53

## 2024-02-27 ASSESSMENT — ACTIVITIES OF DAILY LIVING (ADL)
ADLS_ACUITY_SCORE: 38
ADLS_ACUITY_SCORE: 38
ADLS_ACUITY_SCORE: 37
ADLS_ACUITY_SCORE: 39
ADLS_ACUITY_SCORE: 37
ADLS_ACUITY_SCORE: 37
ADLS_ACUITY_SCORE: 39
ADLS_ACUITY_SCORE: 37
ADLS_ACUITY_SCORE: 38
ADLS_ACUITY_SCORE: 38
ADLS_ACUITY_SCORE: 37
ADLS_ACUITY_SCORE: 38
ADLS_ACUITY_SCORE: 37
ADLS_ACUITY_SCORE: 38
ADLS_ACUITY_SCORE: 37
ADLS_ACUITY_SCORE: 38
ADLS_ACUITY_SCORE: 37

## 2024-02-27 NOTE — PLAN OF CARE
The patient was up to the chair late afternoon. Unable to pivot to the bed, heather lift used. Oxycodone, Tylenol, and Lidocain patch used for pain control. Ice pack applied to right knee. Pedal pulse palpable. Splint and acewrap in place. Dressings are clean, dry, and intact. Remains on room air, saturation at 96%. Right leg elevated on 2 pillows, knee is not bent. Call light within reach.     Goal Outcome Evaluation:    Problem: Mobility Impairment  Goal: Optimal Mobility  Outcome: Not Progressing  Intervention: Optimize Mobility  Recent Flowsheet Documentation  Taken 2/26/2024 2045 by Dee Zambrano RN  Positioning/Transfer Devices:   pillows   in use  Taken 2/26/2024 1654 by Dee Zambrano RN  Positioning/Transfer Devices:   pillows   in use     Problem: Pain Acute  Goal: Optimal Pain Control and Function  Outcome: Progressing  Intervention: Develop Pain Management Plan  Recent Flowsheet Documentation  Taken 2/26/2024 2222 by Dee Zambrano, RN  Pain Management Interventions: (elevated)   medication (see MAR)   repositioned   pillow support provided  Taken 2/26/2024 2022 by Dee Zambrano RN  Pain Management Interventions:   medication (see MAR)   pillow support provided   repositioned  Taken 2/26/2024 1653 by Dee Zambrano RN  Pain Management Interventions: cold applied

## 2024-02-27 NOTE — PLAN OF CARE
Problem: Adult Inpatient Plan of Care  Goal: Optimal Comfort and Wellbeing  Outcome: Progressing  Intervention: Monitor Pain and Promote Comfort  Recent Flowsheet Documentation  Taken 2/27/2024 0325 by Judit Selby RN  Pain Management Interventions: medication (see MAR)   Goal Outcome Evaluation:  Pt pleasant and cooperative this shift.  Slept between cares.  PP present.  Pain in right knee minimal but managed with tylenol.  Right leg elevated on pillows. No further issues overnight.

## 2024-02-27 NOTE — PROGRESS NOTES
"Care Management Follow Up    Length of Stay (days): 4    Expected Discharge Date: 02/27/2024     Concerns to be Addressed: TCU acceptance    Patient plan of care discussed at interdisciplinary rounds: Yes    Anticipated Discharge Disposition: Transitional Care     Anticipated Discharge Services:  TCU    Anticipated Discharge DME:  per therapy recs    Patient/family educated on Medicare website which has current facility and service quality ratings:  yes  Education Provided on the Discharge Plan:  per care team    Patient/Family in Agreement with the Plan: yes    Referrals Placed by CM/SW:  TCU    Private pay costs discussed:     Additional Information:  Patient with PMH of hypertension, hypercholesterolemia, total right knee arthoplasty who was unfortunately readmitted the same day after she sustained a right ankle dislocation after going home.     Therapy: assist of 2;  recommending TCU.         Social History:  Patient lives alone in a \"zig zag house\" with multiple stairs and multiple levels. She is independent at baseline.   Patient is agreeable to TCU (has [facility]). PT will complete initial evaluation, anticipating TCU.  Patient understands she plans to be non weight bearing for several weeks and may need to discharge home from TCU and remain non weight bearing. [facility] TCU choice list provided to patient.         2/27/24:  Seeking TCU. Per daughter Mayra, preference is for [facility]. Referral pending. Will need more preferences. PAS needed. Anticipating need for [facility]. Niece Mayra is primary family contact.       1:16 PM  [facility] is full. Spoke with Mayra who requests referrals to [facility], [facility] and [facility]. Referrals faxed.        Katerina Sanches RN      "

## 2024-02-27 NOTE — PROGRESS NOTES
"Orthopedic Progress Note      Assessment: 3 Days Post-Op  S/P Procedure(s):  OPEN REDUCTION INTERNAL FIXATION, FRACTURE, TRIMALLEOLAR ANKLE     Plan:   - Continue PT/OT  - Weightbearing status: NWB RLE  - Activity: Up with assist and assistive device until independent.  - Anticoagulation:  daily in addition to SCDs, edith stockings and early ambulation.  - Pain Management;  Severe pain today on oxy 10 mg.  Will add on hydroxyzine, PRN toradol - if still uncontrolled pain will need new IV  - Diet: progress diet as tolerated  - Dressing: Keep dry and intact.  Splint to remain on at all times  - Elevation: Elevate RLE on pillow to keep above the level of the heart as much as possible  - Follow-up: Outpatient follow up in 2 weeks  - Disposition: Anticipate discharge to TCU pending placement.  Working on pain control, if controlled on orals then okay to leave once placement found        Subjective:  Pain: Severe  Nausea, Vomiting:  No  Lightheadedness, Dizziness:  No  Neuro:  Patient denies new onset numbness or paresthesias  Fever, chills: No  Chest pain: No  SOB: No    Patient reports feeling well today. Patient reports pain is not well controlled on oxy 10 mg, unable to get up to chair due to severe pain.  She has not done much with therapy though due to pain and difficulty with moving. Patient eating and drinking well. Patient voiding and passing gas and has had a BM. All questions/concerns answered.      Objective:  /66 (BP Location: Right arm, Patient Position: Semi-Hernandez's)   Pulse 58   Temp 98.9  F (37.2  C) (Oral)   Resp 18   Ht 1.638 m (5' 4.5\")   Wt 82.6 kg (182 lb)   LMP  (LMP Unknown)   SpO2 94%   BMI 30.76 kg/m      The patient is A&Ox3. Appears comfortable, sitting up in bed  Calves without tenderness, neg Nellie's  Brisk capillary refill in the toes.   Pulses not palpable d/t splint.  Toes warm and well perfused  Sensation intact in toes  ROM: Appropriately wiggles toes.   Splint " "C/D/I  Knee dressing C/D/I      No drain     Pertinent Labs   Lab Results: personally reviewed.   No results found for: \"INR\", \"PROTIME\"  Lab Results   Component Value Date    WBC 12.7 (H) 02/26/2024    HGB 12.0 02/26/2024    HCT 35.5 02/26/2024    MCV 92 02/26/2024     02/26/2024     Lab Results   Component Value Date     (L) 02/23/2024    CO2 23 02/23/2024         Report completed by:  PEGGY THOMAS PA-C  Date: 02/27/2024  [facility]                          "

## 2024-02-27 NOTE — PROGRESS NOTES
"[facility]    Medicine Progress Note - Hospitalist Service    Date of Admission:  2/23/2024    Assessment & Plan   Tangela Chapa is a 78 year old female with a pertinent history of hypertension, hypercholesterolemia, total right knee arthoplasty on 2/23/24 (outpatient setting) who was unfortunately readmitted the same day after she sustained a right ankle dislocation after going home.     Right ankle fracture with dislocation  -Reportedly tripped and fell down a few stairs.   -Ortho consulted. This was reduced and immobilized in the ER.   -S/P ORIF 2/24  -Routine post op care including DVT prophylaxis and pain control per ortho     Status post right knee arthroplasty 2/23/24.    -Per ortho     Leucocytosis  -Likely reactive. UA looks abnormal. U/C urogenital khari. Received rambo-operative IV cefazolin.     Hyperglycemia  -A1c 5.9 suggestive of pre-diabetes. Diet control advised    GERD  Hypertension-losartan  Depression  -Cont home meds        Diet: Advance Diet as Tolerated: Regular Diet Adult  Discharge Instruction - Regular Diet Adult    DVT Prophylaxis: Defer to ortho  Alanis Catheter: Not present  Lines: None     Cardiac Monitoring: None  Code Status: Full Code      Clinically Significant Risk Factors                  # Hypertension: Noted on problem list        # Obesity: Estimated body mass index is 30.76 kg/m  as calculated from the following:    Height as of this encounter: 1.638 m (5' 4.5\").    Weight as of this encounter: 82.6 kg (182 lb)., PRESENT ON ADMISSION            Disposition Plan      Expected Discharge Date: 02/27/2024    Discharge Delays: Placement - TCU  Destination: inpatient rehabilitation facility  Discharge Comments: TCU            BLANQUITA White  Hospitalist Service  [facility]  Securely message with [employer] (more info)  Text page via [employer] Paging/Directory "   ______________________________________________________________________    Interval History   Patient seen and examined this morning. No new issues overnight.     Physical Exam   Vital Signs: Temp: 98.2  F (36.8  C) Temp src: Oral BP: 139/62 Pulse: 54   Resp: 16 SpO2: 97 % O2 Device: None (Room air)    Weight: 182 lbs 0 oz      General: Not in obvious distress.  HEENT: NC, AT   Chest: Clear to auscultation bilaterally  Heart: S1S2 normal, regular. No M/R/G  Abdomen: Soft. NT, ND. Bowel sounds- active.  Extremities: Right ankle and knee under a dressing  Neuro: Alert and awake, grossly non-focal      Medical Decision Making             Data

## 2024-02-27 NOTE — PROGRESS NOTES
"[facility]    Medicine Progress Note - Hospitalist Service    Date of Admission:  2/23/2024    Assessment & Plan   Tangela Chapa is a 78 year old female with a pertinent history of hypertension, hypercholesterolemia, total right knee arthoplasty on 2/23/24 (outpatient setting) who was unfortunately readmitted the same day after she sustained a right ankle dislocation after going home.     Right ankle fracture with dislocation  -Reportedly tripped and fell down a few stairs.   -Ortho consulted. This was reduced and immobilized in the ER.   -S/P ORIF 2/24  -Routine post op care including DVT prophylaxis and pain control per ortho  -Medically ready for TCU, awaiting bed     Status post right knee arthroplasty 2/23/24.    -Per ortho     Leucocytosis  -Likely reactive. UA looks abnormal. U/C urogenital khari. Received rambo-operative IV cefazolin.     Hyperglycemia  -A1c 5.9 suggestive of pre-diabetes. Diet control advised    GERD  Hypertension-losartan  Depression  -Cont home meds        Diet: Advance Diet as Tolerated: Regular Diet Adult  Discharge Instruction - Regular Diet Adult    DVT Prophylaxis: Defer to ortho  Alanis Catheter: Not present  Lines: None     Cardiac Monitoring: None  Code Status: Full Code      Clinically Significant Risk Factors                  # Hypertension: Noted on problem list        # Obesity: Estimated body mass index is 30.76 kg/m  as calculated from the following:    Height as of this encounter: 1.638 m (5' 4.5\").    Weight as of this encounter: 82.6 kg (182 lb)., PRESENT ON ADMISSION            Disposition Plan      Expected Discharge Date: 02/28/2024    Discharge Delays: Placement - TCU  Destination: inpatient rehabilitation facility  Discharge Comments: TCU            FARIBA GERBER MD  Hospitalist Service  [facility]  Securely message with [employer] (more info)  Text page via [employer] Paging/Directory "   ______________________________________________________________________    Interval History   No issues, awaiting TCU bed    Physical Exam   Vital Signs: Temp: 98.9  F (37.2  C) Temp src: Oral BP: 136/64 Pulse: 62   Resp: 18 SpO2: 94 % O2 Device: None (Room air)    Weight: 182 lbs 0 oz      General: Not in obvious distress.  HEENT: NC, AT   Chest: Clear to auscultation bilaterally  Heart: S1S2 normal, regular. No M/R/G  Abdomen: Soft. NT, ND. Bowel sounds- active.  Extremities: Right ankle and knee under a dressing  Neuro: Alert and awake, grossly non-focal      Medical Decision Making       Medical Decision Making       40 MINUTES SPENT BY ME on the date of service doing chart review, history, exam, documentation & further activities per the note.             Data

## 2024-02-27 NOTE — PLAN OF CARE
"  Problem: Pain Acute  Goal: Optimal Pain Control and Function  Outcome: Progressing  Intervention: Develop Pain Management Plan  Recent Flowsheet Documentation  Taken 2/27/2024 0918 by Tiff Zuluaga, RN  Pain Management Interventions:   medication (see MAR)   cold applied   pillow support provided  Taken 2/27/2024 0908 by Tiff Zuluaga, RN  Pain Management Interventions: medication (see MAR)   Goal Outcome Evaluation:       Patient stating knee pain is a 10/10 today.  Patient requested only a 5mg tablet initially.  Added a second 5mg tablet shortly after.  Medication gave some relief, but patient stated having \"shooting pains from her hip to her knee.  New orders received this afernoon for hydoxyzine PRN.  Hydroxyzine and 10mg of oxycodone given this afternoon with good relief of shooting pains.  Patient stated \"I am much more comfortable\".  Right leg elevated on pillows and ice to knee per patient requested.                 "

## 2024-02-27 NOTE — UTILIZATION REVIEW
Admission Status; Secondary Review Determination   INPATIENT DENIAL    Under the authority of the Utilization Management Committee, the utilization review process indicated a secondary review on the above patient. The review outcome is based on review of the medical records, discussions with staff, and applying clinical experience noted on the date of the review.     (x) Inpatient Status Appropriate - This patient's medical care is consistent with medical management for inpatient care and reasonable inpatient medical practice.     RATIONALE FOR DETERMINATION     Ms. Chapa is a 77 yo female with PMH Of recent outpt R knee arthroplasty on 2/23/24 who returned to the ED with injury at home that resulted in severe right ankle dislocation.  Ankle was reduced in the ED.  IV abx given and tetanus updated d/t to the type I open trimalleolar ankle fracture dislocation. Ortho consulted and recommended I &D surgical fixation; s/p right ORIF of ankle 2/24/24.  There was concern over loss of perfusion to the right foot post-op and vasc surgery consulted which extended her hospital stay to require further INPATIENT medical evaluation, treatment and close clinical monitoring.     At the time of admission with the information available to the attending physician more than 2 nights Hospital complex care was anticipated, based on patient risk of adverse outcome if treated as outpatient and complex care required. Inpatient admission is appropriate based on the Medicare guidelines.       The information on this document is developed by the utilization review team in order for the business office to ensure compliance. This only denotes the appropriateness of proper admission status and does not reflect the quality of care rendered.   The definitions of Inpatient Status and Observation Status used in making the determination above are those provided in the CMS Coverage Manual, Chapter 1 and Chapter 6, section 70.4.         Sincerely,      Roslyn Howard, DO  Utilization Review  Physician Advisor  [facility].

## 2024-02-28 ENCOUNTER — APPOINTMENT (OUTPATIENT)
Dept: OCCUPATIONAL THERAPY | Facility: HOSPITAL | Age: 79
DRG: 494 | End: 2024-02-28
Payer: COMMERCIAL

## 2024-02-28 VITALS
OXYGEN SATURATION: 94 % | WEIGHT: 182 LBS | TEMPERATURE: 98.3 F | HEART RATE: 73 BPM | RESPIRATION RATE: 20 BRPM | BODY MASS INDEX: 30.32 KG/M2 | DIASTOLIC BLOOD PRESSURE: 73 MMHG | HEIGHT: 65 IN | SYSTOLIC BLOOD PRESSURE: 165 MMHG

## 2024-02-28 PROCEDURE — 250N000013 HC RX MED GY IP 250 OP 250 PS 637: Performed by: STUDENT IN AN ORGANIZED HEALTH CARE EDUCATION/TRAINING PROGRAM

## 2024-02-28 PROCEDURE — 99239 HOSP IP/OBS DSCHRG MGMT >30: CPT | Performed by: STUDENT IN AN ORGANIZED HEALTH CARE EDUCATION/TRAINING PROGRAM

## 2024-02-28 PROCEDURE — 97110 THERAPEUTIC EXERCISES: CPT | Mod: GO

## 2024-02-28 PROCEDURE — 97535 SELF CARE MNGMENT TRAINING: CPT | Mod: GO

## 2024-02-28 PROCEDURE — 250N000013 HC RX MED GY IP 250 OP 250 PS 637: Performed by: PHYSICIAN ASSISTANT

## 2024-02-28 RX ORDER — ASPIRIN 325 MG
325 TABLET, DELAYED RELEASE (ENTERIC COATED) ORAL DAILY
DISCHARGE
Start: 2024-02-28 | End: 2024-03-29

## 2024-02-28 RX ORDER — HYDROXYZINE HYDROCHLORIDE 25 MG/1
25 TABLET, FILM COATED ORAL EVERY 6 HOURS PRN
Qty: 30 TABLET | Refills: 0 | Status: SHIPPED | OUTPATIENT
Start: 2024-02-28 | End: 2024-02-28

## 2024-02-28 RX ORDER — AMOXICILLIN 250 MG
2 CAPSULE ORAL 2 TIMES DAILY PRN
DISCHARGE
Start: 2024-02-28 | End: 2024-03-06

## 2024-02-28 RX ORDER — ACETAMINOPHEN 325 MG/1
975 TABLET ORAL EVERY 8 HOURS
DISCHARGE
Start: 2024-02-28 | End: 2024-03-29

## 2024-02-28 RX ORDER — HYDROXYZINE HYDROCHLORIDE 25 MG/1
25 TABLET, FILM COATED ORAL EVERY 6 HOURS PRN
DISCHARGE
Start: 2024-02-28 | End: 2024-03-06

## 2024-02-28 RX ADMIN — POLYETHYLENE GLYCOL 3350 17 G: 17 POWDER, FOR SOLUTION ORAL at 09:47

## 2024-02-28 RX ADMIN — PANTOPRAZOLE SODIUM 40 MG: 40 TABLET, DELAYED RELEASE ORAL at 06:59

## 2024-02-28 RX ADMIN — PENTOXIFYLLINE 400 MG: 400 TABLET, EXTENDED RELEASE ORAL at 09:48

## 2024-02-28 RX ADMIN — PROGESTERONE 100 MG: 100 CAPSULE ORAL at 09:49

## 2024-02-28 RX ADMIN — OXYCODONE HYDROCHLORIDE 10 MG: 5 TABLET ORAL at 13:59

## 2024-02-28 RX ADMIN — OXYCODONE HYDROCHLORIDE 5 MG: 5 TABLET ORAL at 04:52

## 2024-02-28 RX ADMIN — OXYCODONE HYDROCHLORIDE 5 MG: 5 TABLET ORAL at 00:15

## 2024-02-28 RX ADMIN — HYDROXYZINE HYDROCHLORIDE 25 MG: 25 TABLET, FILM COATED ORAL at 01:50

## 2024-02-28 RX ADMIN — ESTRADIOL 0.5 MG: 0.5 TABLET ORAL at 09:51

## 2024-02-28 RX ADMIN — DOCUSATE SODIUM AND SENNOSIDES 1 TABLET: 8.6; 5 TABLET, FILM COATED ORAL at 09:52

## 2024-02-28 RX ADMIN — CITALOPRAM HYDROBROMIDE 30 MG: 20 TABLET ORAL at 09:51

## 2024-02-28 RX ADMIN — CYCLOSPORINE 1 DROP: 0.5 EMULSION OPHTHALMIC at 09:49

## 2024-02-28 RX ADMIN — ACETAMINOPHEN 650 MG: 325 TABLET ORAL at 00:15

## 2024-02-28 RX ADMIN — ASPIRIN 325 MG: 325 TABLET, COATED ORAL at 09:52

## 2024-02-28 RX ADMIN — PENTOXIFYLLINE 400 MG: 400 TABLET, EXTENDED RELEASE ORAL at 11:18

## 2024-02-28 RX ADMIN — ACETAMINOPHEN 650 MG: 325 TABLET ORAL at 04:51

## 2024-02-28 RX ADMIN — LOSARTAN POTASSIUM 100 MG: 50 TABLET, FILM COATED ORAL at 11:11

## 2024-02-28 ASSESSMENT — ACTIVITIES OF DAILY LIVING (ADL)
ADLS_ACUITY_SCORE: 38
ADLS_ACUITY_SCORE: 37
ADLS_ACUITY_SCORE: 38
ADLS_ACUITY_SCORE: 37
ADLS_ACUITY_SCORE: 38
ADLS_ACUITY_SCORE: 37
ADLS_ACUITY_SCORE: 37
ADLS_ACUITY_SCORE: 38
ADLS_ACUITY_SCORE: 37

## 2024-02-28 NOTE — PLAN OF CARE
Problem: Adult Inpatient Plan of Care  Goal: Absence of Hospital-Acquired Illness or Injury  Intervention: Identify and Manage Fall Risk  Recent Flowsheet Documentation  Taken 2/28/2024 1230 by Adrián Schulte, RN  Safety Promotion/Fall Prevention: increased rounding and observation     Problem: Adult Inpatient Plan of Care  Goal: Optimal Comfort and Wellbeing  Outcome: Adequate for Care Transition     Problem: Adult Inpatient Plan of Care  Goal: Readiness for Transition of Care  Outcome: Adequate for Care Transition    SUBJECTIVE:  Ready to discharge and/or transiton care. Pt A/O with VSS.  Belongings remain with patient. Transportation provided by [employer] via wheel chair. Discharge time [id number].     Goal Outcome Evaluation:      Plan of Care Reviewed With: patient    Overall Patient Progress: improving    Adrián Fernandez, RN

## 2024-02-28 NOTE — PROGRESS NOTES
Physical Therapy Discharge Summary    Reason for therapy discharge:    Discharged to transitional care facility.    Progress towards therapy goal(s). See [facility] on [facility] for goal details.  Goals not met.  Barriers to achieving goals:   discharge from facility.    Therapy recommendation(s):    Continued therapy is recommended.  Rationale/Recommendations:  TCU to progress independence and safety with functional mobility.

## 2024-02-28 NOTE — PROGRESS NOTES
Care Management Discharge Note    Discharge Date: 02/28/2024       Discharge Disposition: Transitional Care    Discharge Services: None    Discharge DME: None    Discharge Transportation:  [facility] wheelchair ride 1530    Private pay costs discussed: transportation costs    Does the patient's insurance plan have a 3 day qualifying hospital stay waiver?  No    PAS Confirmation Code: [id number]  Patient/family educated on Medicare website which has current facility and service quality ratings:  yes    Education Provided on the Discharge Plan:  per care team    Persons Notified of Discharge Plans: patient, Mayra-niece, TCU    Patient/Family in Agreement with the Plan: yes    Handoff Referral Completed: Yes    Additional Information:    Patient states agreement to discharge to [facility] TCU in a shared room.     Katerina Sanches RN

## 2024-02-28 NOTE — PLAN OF CARE
Occupational Therapy Discharge Summary    Reason for therapy discharge:    Discharged to transitional care facility.    Progress towards therapy goal(s). See goals on Care Plan in [facility] electronic health record for goal details.  Goals not met.  Barriers to achieving goals:   discharge from facility.    Therapy recommendation(s):    Continued therapy is recommended.  Rationale/Recommendations:  decreased ADLs.    Goal Outcome Evaluation:             Mary Reyes, OTR/L    2/28/2024

## 2024-02-28 NOTE — PROGRESS NOTES
"Orthopedic Progress Note      Assessment: 4 Days Post-Op  S/P Procedure(s):  OPEN REDUCTION INTERNAL FIXATION, FRACTURE, TRIMALLEOLAR ANKLE     Plan:   - Continue PT/OT  - Weightbearing status: NWB RLE  - Activity: Up with assist and assistive device until independent.  - Anticoagulation:  daily in addition to SCDs, edith stockings and early ambulation.  - Pain Management; continue current regimen. Much better control today  - Diet: progress diet as tolerated  - Dressing: Keep dry and intact.  Splint to remain on at all times  - Elevation: Elevate RLE on pillow to keep above the level of the heart as much as possible  - Follow-up: Outpatient follow up in 2 weeks  - Disposition: Anticipate discharge to TCU pending placement.  Okay to discharge from an orthopedic standpoint.          Subjective:  Pain: moderate  Nausea, Vomiting:  No  Lightheadedness, Dizziness:  No  Neuro:  Patient denies new onset numbness or paresthesias  Fever, chills: No  Chest pain: No  SOB: No    Patient reports feeling well today. Pain much better with hydroxyzine added.  No acute events, no concerns. All questions/concerns answered.      Objective:  BP (!) 141/65 (BP Location: Left arm)   Pulse 60   Temp 98.1  F (36.7  C) (Oral)   Resp 20   Ht 1.638 m (5' 4.5\")   Wt 82.6 kg (182 lb)   LMP  (LMP Unknown)   SpO2 93%   BMI 30.76 kg/m      The patient is A&Ox3. Appears comfortable, sitting up in bed  Calves without tenderness, neg Nellie's  Brisk capillary refill in the toes.   Pulses not palpable d/t splint.  Toes warm and well perfused  Sensation intact in toes  ROM: Appropriately wiggles toes.   Splint C/D/I  Knee dressing C/D/I      No drain     Pertinent Labs   Lab Results: personally reviewed.   No results found for: \"INR\", \"PROTIME\"  Lab Results   Component Value Date    WBC 12.7 (H) 02/26/2024    HGB 12.0 02/26/2024    HCT 35.5 02/26/2024    MCV 92 02/26/2024     02/26/2024     Lab Results   Component Value Date    NA " 133 (L) 02/23/2024    CO2 23 02/23/2024         Report completed by:  PEGGY THOMAS PA-C  Date: 02/28/2024  [facility]

## 2024-02-28 NOTE — PLAN OF CARE
Problem: Pain Acute  Goal: Optimal Pain Control and Function  Outcome: Progressing  Intervention: Develop Pain Management Plan  Recent Flowsheet Documentation  Taken 2/28/2024 0452 by Judit Selby RN  Pain Management Interventions: medication (see MAR)  Taken 2/28/2024 0015 by Judit Selby RN  Pain Management Interventions: medication (see MAR)  Intervention: Prevent or Manage Pain  Recent Flowsheet Documentation  Taken 2/28/2024 0015 by Judit Selby RN  Medication Review/Management: medications reviewed   Goal Outcome Evaluation:  No new events overnight.  Slept well between cares.  Pain management primary focus for this shift.  Pt utilized tylenol, oxycodone, and hyroxyzine this shift for pain control with good effect.  Leg elevated in bed. Pt using call light appropriately.

## 2024-02-28 NOTE — DISCHARGE SUMMARY
"[facility]  Hospitalist Discharge Summary      Date of Admission:  2/23/2024  Date of Discharge:  2/28/2024  Discharging Provider: FARIBA GERBER MD  Discharge Service: Hospitalist Service    Discharge Diagnoses     #Acute right ankle fracture with dislocation  #Recent right TKA on 2/23/24  #Leukocytosis  #Pre-DM2  #GERD  #Essential HTN  #Depression    Clinically Significant Risk Factors     # Obesity: Estimated body mass index is 30.76 kg/m  as calculated from the following:    Height as of this encounter: 1.638 m (5' 4.5\").    Weight as of this encounter: 82.6 kg (182 lb).       Follow-ups Needed After Discharge   Follow-up Appointments     Follow Up Care      Please follow-up with Dr. Cai' team in 2 weeks at [facility].   Call our scheduling line at [phone] to make an appointment, if you do   not already have one scheduled.            Unresulted Labs Ordered in the Past 30 Days of this Admission       No orders found from 1/24/2024 to 2/24/2024.          Discharge Disposition   Discharged to short-term care facility  Condition at discharge: Stable    Hospital Course   Tangela Chapa is a 78 year old female with a pertinent history of hypertension, hypercholesterolemia, total right knee arthoplasty on 2/23/24 (outpatient setting) who was unfortunately readmitted the same day after she sustained a right ankle dislocation after going home.    #Acute right ankle fracture with dislocation  -Reportedly tripped and fell down a few stairs.   -Ortho consulted. This was reduced and immobilized in the ER.   -S/P ORIF 2/24  -Routine post op care including DVT prophylaxis and pain control per ortho  -Medically ready for TCU    #Recent right TKA on 2/23/24   - Managed per ortho, therapy     #Leukocytosis  - Likely reactive to fracture and surgery. Down-trended, no signs of acute infection.    #Pre-DM2  -A1c 5.9 suggestive of pre-diabetes. Diet control advised    #GERD  #Essential " "hypertension  #Depression  -Cont home meds    Consultations This Hospital Stay   ORTHOPEDIC SURGERY IP CONSULT  PHYSICAL THERAPY ADULT IP CONSULT  VASCULAR SURGERY IP CONSULT  OCCUPATIONAL THERAPY ADULT IP CONSULT  CARE MANAGEMENT / SOCIAL WORK IP CONSULT  PHYSICAL THERAPY ADULT IP CONSULT  OCCUPATIONAL THERAPY ADULT IP CONSULT    Code Status   Full Code    Time Spent on this Encounter   I, FARIBA GERBER MD, personally saw the patient today and spent greater than 30 minutes discharging this patient.       FARIBA GERBER MD  [address] [address]  Phone: [phone]  Fax: [phone]  ______________________________________________________________________    Physical Exam   Vital Signs: Temp: 98.1  F (36.7  C) Temp src: Oral BP: (!) 141/65 Pulse: 60   Resp: 20 SpO2: 93 % O2 Device: None (Room air)    Weight: 182 lbs 0 oz    General: Not in obvious distress.  HEENT: NC, AT   Chest: Clear to auscultation bilaterally  Heart: RRR  Abdomen: Soft. NT, ND.  Extremities: Right ankle and knee under a dressing  Neuro: Alert and awake, grossly non-focal       Primary Care Physician   Brennan De Luna    Discharge Orders      Reason for your hospital stay    S/p ankle repair     When to call - Contact Surgeon Team    You may experience symptoms that require follow-up before your scheduled appointment. Refer to the \"Stoplight Tool\" for instructions on when to contact your Surgeon Team if you are concerned about pain control, blood clots, constipation, or if you are unable to urinate.     When to call - Reach out to Urgent Care    If you are not able to reach your Surgeon Team and you need immediate care, go to the Orthopedic Walk-in Clinic or Urgent Care at your Surgeon's office.  Do NOT go to the Emergency Room unless you have shortness of breath, chest pain, or other signs of a medical emergency.     When to call - Reasons to Call 911    Call 911 " immediately if you experience sudden-onset chest pain, arm weakness/numbness, slurred speech, or shortness of breath     Discharge Instruction - Breathing exercises    Perform breathing exercises using your Incentive Spirometer 10 times per hour while awake for 2 weeks.     Symptoms - Fever Management    A low grade fever can be expected after surgery.  Use acetaminophen (TYLENOL) as needed for fever management.  Contact your Surgeon Team if you have a fever greater than 101.5 F, chills, and/or night sweats.     Symptoms - Constipation management    Constipation (hard, dry bowel movements) is expected after surgery due to the combination of being less active, the anesthetic, and the opioid pain medication.  You can do the following to help reduce constipation:  ~  FLUIDS:  Drink clear liquids (water or Gatorade), or juice (apple/prune).  ~  DIET:  Eat a fiber rich diet.    ~  ACTIVITY:  Get up and move around several times a day.  Increase your activity as you are able.  MEDICATIONS:  Reduce the risk of constipation by starting medications before you are constipated.  You can take Miralax   (1 packet as directed) and/or a stool softener (Senokot 1-2 tablets 1-2 times a day).  If you already have constipation and these medications are not working, you can get magnesium citrate and use as directed.  If you continue to have constipation you can try an over the counter suppository or enema.  Call your Surgeon Team if it has been greater than 3 days since your last bowel movement.     Symptoms - Reduced Urine Output    Changes in the amount of fluids you drank before and after surgery may result in problems urinating.  It is important to stay well-hydrated after surgery and drink plenty of water. If it has been greater than 8 hours since you have urinated despite drinking plenty of water, call your Surgeon Team.     Activity - Exercises to prevent blood clots    Unless otherwise directed by your Surgeon team, perform the  "following exercises at least three times per day for the first four weeks after surgery to prevent blood clots in your legs: 1) Point and flex your feet (Ankle Pumps), 2) Move your ankle around in big circles, 3) Wiggle your toes, 4) Walk, even for short distances, several times a day, will help decrease the risk of blood clots.     Comfort and Pain Management - Pain after Surgery    Pain after surgery is normal and expected.  You will have some amount of pain for several weeks after surgery.  Your pain will improve with time.  There are several things you can do to help reduce your pain including: rest, ice, elevation, and using pain medications as needed. Contact your Surgeon Team if you have pain that persists or worsens after surgery despite rest, ice, elevation, and taking your medication(s) as prescribed. Contact your Surgeon Team if you have new numbness, tingling, or weakness in your operative extremity.     Comfort and Pain Management - Swelling after Surgery    Swelling and/or bruising of the surgical extremity is common and may persist for several months after surgery. In addition to frequent icing and elevation, gentle compressive support with an ACE wrap or tubigrip may help with swelling. Apply compression regularly, removing at least twice daily to perform skin checks. Contact your Surgeon Team if your swelling increases and is NOT associated with an increase in your activity level, or if your swelling increases and is associated with redness and pain.     Comfort and Pain Management - LOWER Extremity Elevation    Swelling is expected for several months after surgery. This type of swelling is usually associated with gravity and activity, and can be improved with elevation.   The best way to do this is to get your \"toes above your nose\" by laying down and placing several pillows lengthwise under your calf and heel. When elevating your leg keep your knee completely straight. Perform this elevation as " often as possible especially for the first two weeks after surgery.     Comfort and Pain Management - Cold therapy    Ice can be used to control swelling and discomfort after surgery. Place a thin towel over your operative site and apply the ice pack overtop. Leave ice pack in place for 20 minutes, then remove for 20 minutes. Repeat this 20 minutes on/20 minutes off routine as often as tolerated.     Medication Instructions - Acetaminophen (TYLENOL) Instructions    You were discharged with acetaminophen (TYLENOL) for pain management after surgery. Acetaminophen most effectively manages pain symptoms when it is taken on a schedule without missing doses (every four, six, or eight hours). Your Provider will prescribe a safe daily dose between 3000 - 4000 mg.  Do NOT exceed this daily dose. Most patients use acetaminophen for pain control for the first four weeks after surgery.  You can wean from this medication as your pain decreases.     Medication Instructions - Opioid Instructions (1 - 2 tablets Q 4-6 hours, MAX 6 tablets)    You were discharged with an opioid medication (hydromorphone, oxycodone, hydrocodone, or tramadol). This medication should only be taken for breakthrough pain that is not controlled with acetaminophen (TYLENOL). If you rate your pain less than 3 you do not need this medication.  Pain rating 0-3:  You do not need this medication.  Pain rating 4-6:  Take 1 tablet every 4-6 hours as needed  Pain rating 7-10:  Take 2 tablets every 4-6 hours as needed.  Do not exceed 6 tablets per day     Medication Instructions - Opioids - Tapering Instructions    In the first three days following surgery, your symptoms may warrant use of the narcotic pain medication every four to six hours as prescribed. This is normal. As your pain symptoms improve, focus your efforts on decreasing (tapering) use of narcotic medications. The most successful tapering strategy is to first, decrease the number of tablets you take  every 4-6 hours to the minimum prescribed. Then, increase the amount of time between doses.  For example:  First, taper to   or 1 tablet every 4-6 hours.  Then, taper to   or 1 tablet every 6-8 hours.  Then, taper to   or 1 tablet every 8-10 hours.  Then, taper to   or 1 tablet every 10-12 hours.  Then, taper to   or 1 tablet at bedtime.  The bedtime dose can help with comfort during sleep and is typically the last dose to be discontinued after surgery.     Follow Up Care    Please follow-up with Dr. Cai' team in 2 weeks at [facility]. Call our scheduling line at [phone] to make an appointment, if you do not already have one scheduled.     NO range of motion     Return to Driving    Return to driving - Driving is NOT permitted until directed by your provider. Under no circumstance are you permitted to drive while using narcotic pain medications.     NO Precautions    No precautions directed by your Provider.  You may perform range of motion activities as tolerated.     NO weight bearing    No weight bearing on your operative extremity.     Splint / Cast    Do NOT remove your splint/cast.  Keep your splint/cast clean and dry.  If your splint/cast gets wet, contact your surgeon team.     Dressing / Wound Care - Wound    You have a clean splint on your surgical wound. Dressing change instructions as follows: splint will be removed at your follow-up appointment. Contact your Surgeon Team if you have increased redness, warmth around the surgical wound, and/or drainage from the surgical wound.     Dressing / Wound care - Shower with wound/dressing covered    Splint must be covered while in the shower.  You may shower starting POD2.  Use a plastic bag sealed at the top or another impermeable wrap to keep dry.     Dressing / Wound Care - NO Tub Bathing    Tub bathing, swimming, or any other activities that will cause your incision to be submerged in water should be avoided until allowed by your Surgeon.      Medication instructions -  Anticoagulation - aspirin    Take the aspirin as prescribed for a total of four weeks after surgery.  This is given to help minimize your risk of blood clot.     General info for SNF    Length of Stay Estimate: Short Term Care: Estimated # of Days <30  Condition at Discharge: Improving  Level of care:skilled   Rehabilitation Potential: Good  Admission H&P remains valid and up-to-date: Yes  Recent Chemotherapy: N/A  Use Nursing Home Standing Orders: Yes     Reason for your hospital stay    You were admitted for an ankle fracture after falling. You completed surgery but will need further therapy at the TCU before returning home.     Activity - Up with nursing assistance     Weight bearing status    Weightbearing status: NWB RLE  Dressing: Keep dry and intact.  Splint to remain on at all times  - Elevation: Elevate RLE on pillow to keep above the level of the heart as much as possible     Physical Therapy Adult Consult    Evaluate and treat as clinically indicated.    Reason:  Per IP PT, after right ankle fracture     Occupational Therapy Adult Consult    Evaluate and treat as clinically indicated.    Reason:  Per IP OT after right ankle fracture     Fall precautions     Walker DME    DME Documentation: Describe the reason for need to support medical necessity: Impaired gait due to Foot/Ankle surgery. Anticipated length of need: 3 months     Crutches DME    DME Documentation: Describe the reason for need to support medical necessity: Impaired gait due to Foot/Ankle surgery. Anticipated length of need: 3 months     Wheelchair DME    DME Documentation: Describe the reason for need to support medical necessity: Impaired gait due to Foot/Ankle surgery. Anticipated length of need: 3 months     Discharge Instruction - Regular Diet Adult    Return to your pre-surgery diet unless instructed otherwise     Diet    Follow this diet upon discharge: Orders Placed This Encounter      Advance Diet as  Tolerated: Regular Diet Adult      Discharge Instruction - Regular Diet Adult       Significant Results and Procedures   Most Recent 3 CBC's:  Recent Labs   Lab Test 02/26/24  0517 02/25/24  0533 02/24/24  0536 02/23/24  2302   WBC 12.7*  --  17.6* 19.3*   HGB 12.0 11.7 13.2 13.2   MCV 92  --  93 92     --  218 212     Most Recent 3 BMP's:  Recent Labs   Lab Test 02/26/24  0642 02/25/24  0638 02/25/24  0204 02/24/24  1112 02/23/24  2302 02/15/24  1427 06/28/23  1619   NA  --   --   --   --  133* 137 140   POTASSIUM  --   --   --   --  4.8 4.2 3.9   CHLORIDE  --   --   --   --  104 104 106   CO2  --   --   --   --  23 24 25   BUN  --   --   --   --  18.3 13.7 11.3   CR  --   --   --   --  0.92 0.85 0.74   ANIONGAP  --   --   --   --  6* 9 9   EVARISTO  --   --   --   --  8.5* 9.5 9.4   * 101* 88   < > 196* 86 93    < > = values in this interval not displayed.   ,   Results for orders placed or performed during the hospital encounter of 02/23/24   XR Ankle Right 2 Views    Narrative    EXAM: XR ANKLE RIGHT 2 VIEWS  LOCATION: [facility]  DATE: 2/23/2024    INDICATION: Pain.  COMPARISON: None.      Impression    IMPRESSION: Fracture dislocation right ankle with a displaced posterior malleolus fracture fragment. Displaced and angulated oblique fracture of the lateral malleolus/distal fibula. Tiny fracture fragments along the medial tibiotalar clear space.   Surrounding soft tissue swelling. There is lateral and slightly posterior dislocation of the tibiotalar joint.    NOTE: ABNORMAL REPORT    THE DICTATION ABOVE DESCRIBES AN ABNORMALITY FOR WHICH FOLLOW-UP IS NEEDED.    CT Ankle Right w/o Contrast    Narrative    EXAM: CT ANKLE RIGHT W/O CONTRAST  LOCATION: [facility]  DATE: 2/23/2024    INDICATION: Trimalleolar right ankle fracture.  COMPARISON: Same day radiographs.  TECHNIQUE: Noncontrast. Axial, sagittal and coronal thin-section reconstruction. Dose  reduction techniques were used.     FINDINGS:     There is a trimalleolar right ankle fracture, characterized as follows:    Mildly displaced transsyndesmotic fracture of the distal right fibula. Additional small avulsion fracture at the tibial insertion of the anterior inferior tibiofibular ligament at Chaput's tubercle. Mild widening of the posterior tibiofibular   syndesmosis.    Nondisplaced intra-articular fracture of the posterior malleolus.     Mildly displaced avulsion fracture involving the distal insertion of the deltoid ligament at the medial talar body, with associated widening of the medial ankle mortise.     No significant osteoarthritic changes. No suspicious lytic or blastic osseous lesions.    Muscularity is normal in bulk and in signal attenuation, for patient's age. Ankle tendons appear normal in course and caliber.    Diffuse right ankle soft tissue swelling.      Impression    IMPRESSION:    Trimalleolar right ankle fracture, with evidence of widening at the medial ankle mortise and possibly the tibiofibular syndesmosis; correlation with stress radiography may be helpful.     XR Knee Right 1/2 Views    Narrative    EXAM: XR KNEE RIGHT 1/2 VIEWS  LOCATION: [facility]  DATE: 2/23/2024    INDICATION: right TKA today. recent fall  COMPARISON: 04/20/2023      Impression    IMPRESSION: Total knee arthroplasty. No fracture. Normal alignment. Immediate postoperative changes including soft tissue swelling, small foci of soft tissue gas and small joint effusion.   XR Ankle Right G/E 3 Views    Narrative    EXAM: XR ANKLE RIGHT G/E 3 VIEWS  LOCATION: [facility]  DATE: 2/23/2024    INDICATION: post reduction  COMPARISON: 02/23/2024      Impression    IMPRESSION: Improved alignment of the trimalleolar fracture dislocation. Persistent ankle mortise malalignment with mild lateral subluxation of the talus. Splint.   XR Surgery KANE Fluoro L/T 5 Min    Narrative  "   This exam was marked as non-reportable because it will not be read by a   radiologist or a [facility] non-radiologist provider.         POC US Guidance Needle Placement    Narrative    Ultrasound was performed as guidance to an anesthesia procedure.  Click   \"PACS images\" hyperlink below to view any stored images.  For specific   procedure details, view procedure note authored by anesthesia.   POC US Guidance Needle Placement    Narrative    Ultrasound was performed as guidance to an anesthesia procedure.  Click   \"PACS images\" hyperlink below to view any stored images.  For specific   procedure details, view procedure note authored by anesthesia.   POC US Guidance Needle Placement    Narrative    Ultrasound was performed as guidance to an anesthesia procedure.  Click   \"PACS images\" hyperlink below to view any stored images.  For specific   procedure details, view procedure note authored by anesthesia.       Discharge Medications   Current Discharge Medication List        START taking these medications    Details   acetaminophen (TYLENOL) 325 MG tablet Take 3 tablets (975 mg) by mouth every 8 hours for 30 days    Associated Diagnoses: Ankle fracture, right, open type I or II, initial encounter      aspirin (ASA) 325 MG EC tablet Take 1 tablet (325 mg) by mouth daily for 30 days    Associated Diagnoses: Ankle fracture, right, open type I or II, initial encounter      hydrOXYzine HCl (ATARAX) 25 MG tablet Take 1 tablet (25 mg) by mouth every 6 hours as needed for other (adjuvant pain)    Associated Diagnoses: Ankle fracture, right, open type I or II, initial encounter      oxyCODONE (ROXICODONE) 5 MG tablet Take 1 tablet (5 mg) by mouth every 4 hours as needed for moderate pain  Qty: 25 tablet, Refills: 0    Associated Diagnoses: Ankle fracture, right, open type I or II, initial encounter      senna-docusate (SENOKOT-S/PERICOLACE) 8.6-50 MG tablet Take 2 tablets by mouth 2 times daily as needed for constipation    " Associated Diagnoses: Ankle fracture, right, open type I or II, initial encounter           CONTINUE these medications which have NOT CHANGED    Details   ciclopirox (PENLAC) 8 % external solution Apply to adjacent skin and affected nails daily.  Remove with alcohol every 7 days, then repeat.  Qty: 6.6 mL, Refills: 1    Associated Diagnoses: Onychomycosis      citalopram (CELEXA) 20 MG tablet Take 1.5 tablets (30 mg) by mouth daily  Qty: 135 tablet, Refills: 1    Associated Diagnoses: Anxiety      cycloSPORINE (RESTASIS) 0.05 % ophthalmic emulsion 1 drop every 12 hours.      estradiol (ESTRACE) 0.5 MG tablet Take 1 tablet (0.5 mg) by mouth daily  Qty: 90 tablet, Refills: 3    Associated Diagnoses: Menopausal syndrome (hot flashes)      estradiol (ESTRING) 7.5 MCG/24HR vaginal ring Place once vaginally and refill as instructed  Qty: 1 each, Refills: 3    Associated Diagnoses: Menopausal and postmenopausal disorder      fish oil-omega-3 fatty acids 1000 MG capsule Take 1 capsule by mouth daily.      losartan (COZAAR) 100 MG tablet Take 1 tablet (100 mg) by mouth daily  Qty: 90 tablet, Refills: 3    Associated Diagnoses: Essential hypertension      LUMIGAN 0.01 % SOLN ophthalmic solution Place 1 drop into both eyes at bedtime      Magnesium 100 MG TABS Take 3 tablets by mouth At Bedtime.      omeprazole (PRILOSEC) 20 MG DR capsule Take 1 capsule (20 mg) by mouth daily  Qty: 90 capsule, Refills: 1    Associated Diagnoses: Gastroesophageal reflux disease, unspecified whether esophagitis present      pentoxifylline ER (TRENTAL) 400 MG CR tablet TAKE 1 TABLET (400 MG) BY MOUTH 3 TIMES DAILY (WITH MEALS)  Qty: 90 tablet, Refills: 4    Associated Diagnoses: Raynaud's disease without gangrene      pravastatin (PRAVACHOL) 40 MG tablet TAKE 1 TABLET (40 MG) BY MOUTH DAILY  Qty: 90 tablet, Refills: 1    Associated Diagnoses: Hypercholesterolemia      progesterone (PROMETRIUM) 100 MG capsule Take 1 capsule (100 mg) by mouth  daily  Qty: 90 capsule, Refills: 3    Associated Diagnoses: Menopausal syndrome (hot flashes)      sodium chloride (ELIZABETH 128) 5 % ophthalmic ointment Place 1 Application into both eyes at bedtime      traZODone (DESYREL) 100 MG tablet Take 1 tablet (100 mg) by mouth At Bedtime  Qty: 90 tablet, Refills: 3    Associated Diagnoses: Persistent insomnia           Allergies   Allergies   Allergen Reactions    Dust Mite Extract     Seasonal Allergies

## 2024-02-28 NOTE — PLAN OF CARE
The patient's pain is controlled with Tylenol, Hydroxyzine, Oxycodone, and Lidocaine patches. Ice pack applied. Using the purwick for voiding, refused to get up in the chair for dinner. Dressings are clean dry and intact. Elevated right leg on pillows, mindful of keeping knee straight.     Goal Outcome Evaluation:       Problem: Adult Inpatient Plan of Care  Goal: Optimal Comfort and Wellbeing  Outcome: Progressing  Intervention: Monitor Pain and Promote Comfort  Recent Flowsheet Documentation  Taken 2/27/2024 2005 by Dee Zambrano, RN  Pain Management Interventions:   medication (see MAR)   cold applied   emotional support   repositioned  Taken 2/27/2024 1807 by Dee Zambrano, RN  Pain Management Interventions:   medication (see MAR)   pillow support provided   cold applied

## 2024-02-28 NOTE — DISCHARGE SUMMARY
Pt alert and oriented upon discharge at 1644, pt verbally understands the discharge process. reported pt and belonging over to transported EMS.

## 2024-03-07 ENCOUNTER — TRANSFERRED RECORDS (OUTPATIENT)
Dept: HEALTH INFORMATION MANAGEMENT | Facility: CLINIC | Age: 79
End: 2024-03-07
Payer: COMMERCIAL

## 2024-03-08 ENCOUNTER — TRANSFERRED RECORDS (OUTPATIENT)
Dept: HEALTH INFORMATION MANAGEMENT | Facility: CLINIC | Age: 79
End: 2024-03-08
Payer: COMMERCIAL

## 2024-03-29 ENCOUNTER — TRANSFERRED RECORDS (OUTPATIENT)
Dept: HEALTH INFORMATION MANAGEMENT | Facility: CLINIC | Age: 79
End: 2024-03-29
Payer: COMMERCIAL

## 2024-04-03 ENCOUNTER — MEDICAL CORRESPONDENCE (OUTPATIENT)
Dept: HEALTH INFORMATION MANAGEMENT | Facility: CLINIC | Age: 79
End: 2024-04-03

## 2024-04-03 DIAGNOSIS — G47.00 PERSISTENT INSOMNIA: ICD-10-CM

## 2024-04-03 RX ORDER — TRAZODONE HYDROCHLORIDE 100 MG/1
100 TABLET ORAL AT BEDTIME
Qty: 90 TABLET | Refills: 2 | Status: SHIPPED | OUTPATIENT
Start: 2024-04-03 | End: 2024-07-31

## 2024-04-10 ENCOUNTER — TELEPHONE (OUTPATIENT)
Dept: INTERNAL MEDICINE | Facility: CLINIC | Age: 79
End: 2024-04-10
Payer: COMMERCIAL

## 2024-04-10 DIAGNOSIS — Z53.9 DIAGNOSIS NOT YET DEFINED: Primary | ICD-10-CM

## 2024-04-10 PROCEDURE — G0180 MD CERTIFICATION HHA PATIENT: HCPCS | Performed by: INTERNAL MEDICINE

## 2024-04-10 NOTE — TELEPHONE ENCOUNTER
April 10, 2024    Home health orders was received via fax for Dr. De Luna to sign.  Patient label was attached to paperwork and placed in provider's inbox to be signed.    Keyana Stubbs

## 2024-04-12 NOTE — TELEPHONE ENCOUNTER
April 12, 2024    Home health orders was picked up from outbox of Dr. De Luna.  Paperwork has been reviewed and is complete.  Per initial initial request, this was sent via fax to [phone].     Jeannette Mack

## 2024-04-19 ENCOUNTER — TRANSFERRED RECORDS (OUTPATIENT)
Dept: HEALTH INFORMATION MANAGEMENT | Facility: CLINIC | Age: 79
End: 2024-04-19
Payer: COMMERCIAL

## 2024-05-22 ENCOUNTER — TRANSFERRED RECORDS (OUTPATIENT)
Dept: HEALTH INFORMATION MANAGEMENT | Facility: CLINIC | Age: 79
End: 2024-05-22
Payer: COMMERCIAL

## 2024-05-29 ENCOUNTER — TELEPHONE (OUTPATIENT)
Dept: INTERNAL MEDICINE | Facility: CLINIC | Age: 79
End: 2024-05-29
Payer: COMMERCIAL

## 2024-05-29 NOTE — TELEPHONE ENCOUNTER
May 29, 2024    Home health orders was received via fax for Dr. De Luna.  Patient label was attached to paperwork and placed in provider's inbox to be signed.    Keyana Stubbs

## 2024-05-31 NOTE — TELEPHONE ENCOUNTER
May 31, 2024    Home health orders was picked up from outbox of Dr. De Luna and sent via fax to [phone].    Jeannette Mack

## 2024-06-06 ENCOUNTER — TRANSFERRED RECORDS (OUTPATIENT)
Dept: HEALTH INFORMATION MANAGEMENT | Facility: CLINIC | Age: 79
End: 2024-06-06
Payer: COMMERCIAL

## 2024-06-08 ENCOUNTER — HEALTH MAINTENANCE LETTER (OUTPATIENT)
Age: 79
End: 2024-06-08

## 2024-06-18 ENCOUNTER — TELEPHONE (OUTPATIENT)
Dept: OBGYN | Facility: CLINIC | Age: 79
End: 2024-06-18
Payer: COMMERCIAL

## 2024-06-18 DIAGNOSIS — N39.0 CHRONIC UTI: Primary | ICD-10-CM

## 2024-06-18 NOTE — TELEPHONE ENCOUNTER
Writer called and spoke with the patient to go over her message we received through the call center.     Elizabeth stated that she is suppose to change out her Estring every 3 months and it has now been in for 5 due to she cannot get it out. Patient is worried about having a UTI due to that is why she has the Estring.    I did help Elizabeth to set up a Nurse visit for tomorrow 6-19-24 @ 1 pm to help remove her Estring and place a new one. Elizabeth also would like to leave a urine sample to be checked for a UTI while here as well.     Patient also wanted to set up her annual with a PAP with Dr. Gusman due to she is over due for one. Writer explained we do not do PAP's for someone that is 79. 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     Writer helped to schedule the patient for 8-26-24 @ 2pm with Dr. Gusman for this.     All questions answered and orders placed for a UA and a UC for tomorrow.

## 2024-06-18 NOTE — TELEPHONE ENCOUNTER
[facility] Call Center    Phone Message    May a detailed message be left on voicemail: yes     Reason for Call: Other:  Pt states her estrogen ring is stuck and she is unable to change it. She requests a call to discuss. Advised to send a TE per secure chat. Please reach back out to pt.      Action Taken: Other: ump whs    Travel Screening: Not Applicable     Date of Service:

## 2024-06-19 ENCOUNTER — ALLIED HEALTH/NURSE VISIT (OUTPATIENT)
Dept: OBGYN | Facility: CLINIC | Age: 79
End: 2024-06-19
Attending: OBSTETRICS & GYNECOLOGY
Payer: COMMERCIAL

## 2024-06-19 DIAGNOSIS — N39.0 CHRONIC UTI: ICD-10-CM

## 2024-06-19 DIAGNOSIS — Z30.44 ENCOUNTER FOR SURVEILLANCE OF VAGINAL RING HORMONAL CONTRACEPTIVE DEVICE: Primary | ICD-10-CM

## 2024-06-19 LAB
ALBUMIN UR-MCNC: 30 MG/DL
APPEARANCE UR: ABNORMAL
BILIRUB UR QL STRIP: NEGATIVE
COLOR UR AUTO: YELLOW
GLUCOSE UR STRIP-MCNC: NEGATIVE MG/DL
HGB UR QL STRIP: ABNORMAL
KETONES UR STRIP-MCNC: NEGATIVE MG/DL
LEUKOCYTE ESTERASE UR QL STRIP: ABNORMAL
MUCOUS THREADS #/AREA URNS LPF: PRESENT /LPF
NITRATE UR QL: POSITIVE
PH UR STRIP: 5.5 [PH] (ref 5–7)
RBC URINE: 22 /HPF
SP GR UR STRIP: 1.02 (ref 1–1.03)
UROBILINOGEN UR STRIP-MCNC: NORMAL MG/DL
WBC CLUMPS #/AREA URNS HPF: PRESENT /HPF
WBC URINE: >182 /HPF

## 2024-06-19 PROCEDURE — 87086 URINE CULTURE/COLONY COUNT: CPT

## 2024-06-19 PROCEDURE — 81003 URINALYSIS AUTO W/O SCOPE: CPT

## 2024-06-19 NOTE — PROGRESS NOTES
Pt presented to clinic for Estring removal. She changed this current Estring 5 months ago and has been unable to remove it on her own as it had tucked behind her pelvic bone.     Removed Estring and discarded. No vaginal bleeding or pain indicative of internal breakdown; pt denies any vaginal bleeding at home. Pt did not bring Estring replacement today, as she prefers to replace it herself at home (and is able to effectively).     Pt would like UA done today due to burning with urination and increased urinary frequency. No urinary urgency, hematuria, fever/chills, or flank pain. UA collected, placed in lab specimen container for processing.    Recommended pt present back to clinic in 3 months so we could assist in removing her Estring again. Pt amenable to this plan.

## 2024-06-19 NOTE — PATIENT INSTRUCTIONS
Thank you for trusting us with your care!     If you need to contact us for questions about:  Symptoms, Scheduling & Medical Questions; Non-urgent (2-3 day response) Bautista message, Urgent (needing response today) [phone] (if after 3:30pm next day response)   Prescriptions: Please call your Pharmacy   Billing: Sofie [phone] or [facility]:[phone]

## 2024-06-21 LAB
BACTERIA UR CULT: ABNORMAL
BACTERIA UR CULT: ABNORMAL

## 2024-06-21 RX ORDER — CIPROFLOXACIN 500 MG/1
500 TABLET, FILM COATED ORAL 2 TIMES DAILY
Qty: 10 TABLET | Refills: 0 | Status: SHIPPED | OUTPATIENT
Start: 2024-06-21 | End: 2024-06-26

## 2024-06-25 ENCOUNTER — TELEPHONE (OUTPATIENT)
Dept: OBGYN | Facility: CLINIC | Age: 79
End: 2024-06-25
Payer: COMMERCIAL

## 2024-06-25 NOTE — TELEPHONE ENCOUNTER
----- Message from Macarena Gusman sent at 6/25/2024  8:55 AM CDT -----  Regarding: UTI  Can you please reach out to Elizabeth to make sure she has gotten the antibiotics?  Ucx + and I don't see that she has read my messages.    SMD

## 2024-06-25 NOTE — TELEPHONE ENCOUNTER
Writer tried to reach the patient to go over with her that she has a UTI and Dr. Gusman has called in an antibiotic Cipro for her. Cipro is 1 pill BID for 5 days and if you are not feeling better after completing the antibiotic to please call us at [phone].  will also send a Sazze message as well.      did receive her voicemail and the above message was left.

## 2024-07-18 ENCOUNTER — TRANSFERRED RECORDS (OUTPATIENT)
Dept: HEALTH INFORMATION MANAGEMENT | Facility: CLINIC | Age: 79
End: 2024-07-18
Payer: COMMERCIAL

## 2024-07-31 ENCOUNTER — OFFICE VISIT (OUTPATIENT)
Dept: INTERNAL MEDICINE | Facility: CLINIC | Age: 79
End: 2024-07-31
Payer: COMMERCIAL

## 2024-07-31 VITALS
TEMPERATURE: 97.7 F | RESPIRATION RATE: 9 BRPM | WEIGHT: 183.2 LBS | SYSTOLIC BLOOD PRESSURE: 134 MMHG | BODY MASS INDEX: 30.52 KG/M2 | OXYGEN SATURATION: 97 % | HEIGHT: 65 IN | HEART RATE: 52 BPM | DIASTOLIC BLOOD PRESSURE: 76 MMHG

## 2024-07-31 DIAGNOSIS — F33.42 RECURRENT MAJOR DEPRESSIVE DISORDER, IN FULL REMISSION (H): ICD-10-CM

## 2024-07-31 DIAGNOSIS — I10 ESSENTIAL HYPERTENSION: ICD-10-CM

## 2024-07-31 DIAGNOSIS — R73.03 PREDIABETES: ICD-10-CM

## 2024-07-31 DIAGNOSIS — E78.00 HYPERCHOLESTEROLEMIA: ICD-10-CM

## 2024-07-31 DIAGNOSIS — Z00.00 ENCOUNTER FOR MEDICARE ANNUAL WELLNESS EXAM: Primary | ICD-10-CM

## 2024-07-31 DIAGNOSIS — G47.00 PERSISTENT INSOMNIA: ICD-10-CM

## 2024-07-31 DIAGNOSIS — F41.9 ANXIETY: ICD-10-CM

## 2024-07-31 DIAGNOSIS — R87.810 CERVICAL HIGH RISK HPV (HUMAN PAPILLOMAVIRUS) TEST POSITIVE: ICD-10-CM

## 2024-07-31 LAB — HBA1C MFR BLD: 5.8 % (ref 0–5.6)

## 2024-07-31 PROCEDURE — 36415 COLL VENOUS BLD VENIPUNCTURE: CPT | Performed by: INTERNAL MEDICINE

## 2024-07-31 PROCEDURE — G0438 PPPS, INITIAL VISIT: HCPCS | Performed by: INTERNAL MEDICINE

## 2024-07-31 PROCEDURE — 99214 OFFICE O/P EST MOD 30 MIN: CPT | Mod: 25 | Performed by: INTERNAL MEDICINE

## 2024-07-31 PROCEDURE — 80061 LIPID PANEL: CPT | Performed by: INTERNAL MEDICINE

## 2024-07-31 PROCEDURE — 80053 COMPREHEN METABOLIC PANEL: CPT | Performed by: INTERNAL MEDICINE

## 2024-07-31 PROCEDURE — 83036 HEMOGLOBIN GLYCOSYLATED A1C: CPT | Performed by: INTERNAL MEDICINE

## 2024-07-31 RX ORDER — TRAZODONE HYDROCHLORIDE 50 MG/1
50 TABLET, FILM COATED ORAL AT BEDTIME
Qty: 90 TABLET | Refills: 3 | Status: SHIPPED | OUTPATIENT
Start: 2024-07-31

## 2024-07-31 RX ORDER — CITALOPRAM HYDROBROMIDE 40 MG/1
40 TABLET ORAL DAILY
Qty: 90 TABLET | Refills: 1 | Status: SHIPPED | OUTPATIENT
Start: 2024-07-31

## 2024-07-31 RX ORDER — PRAVASTATIN SODIUM 40 MG
40 TABLET ORAL DAILY
Qty: 90 TABLET | Refills: 1 | Status: SHIPPED | OUTPATIENT
Start: 2024-07-31

## 2024-07-31 SDOH — HEALTH STABILITY: PHYSICAL HEALTH: ON AVERAGE, HOW MANY DAYS PER WEEK DO YOU ENGAGE IN MODERATE TO STRENUOUS EXERCISE (LIKE A BRISK WALK)?: 2 DAYS

## 2024-07-31 ASSESSMENT — PATIENT HEALTH QUESTIONNAIRE - PHQ9
SUM OF ALL RESPONSES TO PHQ QUESTIONS 1-9: 3
SUM OF ALL RESPONSES TO PHQ QUESTIONS 1-9: 3
10. IF YOU CHECKED OFF ANY PROBLEMS, HOW DIFFICULT HAVE THESE PROBLEMS MADE IT FOR YOU TO DO YOUR WORK, TAKE CARE OF THINGS AT HOME, OR GET ALONG WITH OTHER PEOPLE: SOMEWHAT DIFFICULT

## 2024-07-31 ASSESSMENT — SOCIAL DETERMINANTS OF HEALTH (SDOH): HOW OFTEN DO YOU GET TOGETHER WITH FRIENDS OR RELATIVES?: ONCE A WEEK

## 2024-07-31 ASSESSMENT — PAIN SCALES - GENERAL: PAINLEVEL: MILD PAIN (2)

## 2024-07-31 NOTE — PROGRESS NOTES
"Answers submitted by the patient for this visit:  Patient Health Questionnaire (Submitted on 7/31/2024)  If you checked off any problems, how difficult have these problems made it for you to do your work, take care of things at home, or get along with other people?: Somewhat difficult  PHQ9 TOTAL SCORE: 3  Preventive Care Visit  [facility]  Brennan De Luna MD, Internal Medicine  Jul 31, 2024      Assessment & Plan     (Z00.00) Encounter for Medicare annual wellness exam  (primary encounter diagnosis)  Comment: stable overall  Plan: Age appropriate cognitive and physical abilities.  Independent      (F33.42) Recurrent major depressive disorder, in full remission (H24)  Comment: admits to still having room for improvement. Currently on 30mg citalopram.   Plan: recommended an increase to 40mg and then have follow up in the coming 6 months.    (I10) Essential hypertension  Comment: controlled  Plan: Will plan to continue on previous medication without changes     (E78.00) Hypercholesterolemia  Comment: on statin  Plan: Lipid panel reflex to direct LDL Non-fasting,         Comprehensive metabolic panel, pravastatin         (PRAVACHOL) 40 MG tablet        Will plan to continue on previous medication without changes     (R73.03) Prediabetes  Comment: noted  Plan: Hemoglobin A1c        Recheck.    (R87.810) Cervical high risk HPV (human papillomavirus) test positive  Comment: ongoing care through GYN  Plan: it looks like repeat pap and Colpo planned at next visit.    (G47.00) Persistent insomnia  Comment: ongoing, trazodone does help some  Plan: traZODone (DESYREL) 50 MG tablet        Will plan to continue on previous medication without changes     See me in 6 months            BMI  Estimated body mass index is 30.96 kg/m  as calculated from the following:    Height as of this encounter: 1.638 m (5' 4.5\").    Weight as of this encounter: 83.1 kg (183 lb 3.2 oz).       Counseling  Appropriate " preventive services were addressed with this patient via screening, questionnaire, or discussion as appropriate for fall prevention, nutrition, physical activity, Tobacco-use cessation, weight loss and cognition.  Checklist reviewing preventive services available has been given to the patient.  Reviewed patient's diet, addressing concerns and/or questions.   She is at risk for lack of exercise and has been provided with information to increase physical activity for the benefit of her well-being.   She is at risk for psychosocial distress and has been provided with information to reduce risk.   Discussed possible causes of fatigue. Patient reported safety concerns were addressed today.Information on urinary incontinence and treatment options given to patient.           Re Johnson is a 79 year old, presenting for the following:  Medicare Visit (Annual wellness visit)        7/31/2024     1:42 PM   Additional Questions   Roomed by Yessenia GODFREY [facility]         Health Care Directive  Patient does not have a Health Care Directive or Living Will: Discussed advance care planning with patient; information given to patient to review.    HPI  Pt is here for a wellness visit.            7/31/2024   General Health   How would you rate your overall physical health? (!) FAIR   Feel stress (tense, anxious, or unable to sleep) Only a little      (!) STRESS CONCERN      7/31/2024   Nutrition   Diet: Regular (no restrictions)            7/31/2024   Exercise   Days per week of moderate/strenous exercise 2 days      (!) EXERCISE CONCERN      7/31/2024   Social Factors   Frequency of gathering with friends or relatives Once a week   Worry food won't last until get money to buy more No   Food not last or not have enough money for food? No   Do you have housing? (Housing is defined as stable permanent housing and does not include staying ouside in a car, in a tent, in an abandoned building, in an overnight shelter, or couch-surfing.) Yes    Are you worried about losing your housing? No   Lack of transportation? No   Unable to get utilities (heat,electricity)? No            7/31/2024   Fall Risk   Fallen 2 or more times in the past year? Yes   Trouble with walking or balance? Yes              7/31/2024   Activities of Daily Living- Home Safety   Needs help with the following daily activites None of the above   Safety concerns in the home No grab bars in the bathroom            7/31/2024   Dental   Dentist two times every year? Yes            7/31/2024   Hearing Screening   Hearing concerns? None of the above            7/31/2024   Driving Risk Screening   Patient/family members have concerns about driving No            7/31/2024   General Alertness/Fatigue Screening   Have you been more tired than usual lately? (!) YES            7/31/2024   Urinary Incontinence Screening   Bothered by leaking urine in past 6 months Yes            7/31/2024   TB Screening   Were you born outside of the [place]? No          Today's PHQ-9 Score:       7/31/2024     1:34 PM   PHQ-9 SCORE   PHQ-9 Total Score MyChart 3 (Minimal depression)   PHQ-9 Total Score 3         7/31/2024   Substance Use   Alcohol more than 3/day or more than 7/wk No   Do you have a current opioid prescription? No   How severe/bad is pain from 1 to 10? 3/10   Do you use any other substances recreationally? No        Social History     Tobacco Use    Smoking status: Former     Passive exposure: Past    Smokeless tobacco: Never    Tobacco comments:     quit 30 years ago 1970   Vaping Use    Vaping status: Never Used   Substance Use Topics    Alcohol use: Yes     Alcohol/week: 0.0 - 0.8 standard drinks of alcohol     Comment: Rare    Drug use: No           2/27/2023   LAST FHS-7 RESULTS   1st degree relative breast or ovarian cancer Yes   Any relative bilateral breast cancer No   Any male have breast cancer No   Any ONE woman have BOTH breast AND ovarian cancer No   Any woman with breast cancer before 50yrs  No   2 or more relatives with breast AND/OR ovarian cancer No   2 or more relatives with breast AND/OR bowel cancer No               ASCVD Risk   The 10-year ASCVD risk score (Romero DUEÑAS, et al., 2019) is: 38.3%    Values used to calculate the score:      Age: 79 years      Sex: Female      Is Non- : No      Diabetic: No      Tobacco smoker: No      Systolic Blood Pressure: 165 mmHg      Is BP treated: Yes      HDL Cholesterol: 42 mg/dL      Total Cholesterol: 203 mg/dL            Reviewed and updated as needed this visit by Provider                      Current providers sharing in care for this patient include:  Patient Care Team:  Brennan De Luna MD as PCP - General (Internal Medicine)  Macarena Gusman MD as MD (OB/Gyn)  Macarena Gusman MD as Assigned OBGYN Provider  Miguel Doherty DPM as Assigned Surgical Provider  Brennan De Luna MD as Assigned PCP    The following health maintenance items are reviewed in Epic and correct as of today:  Health Maintenance   Topic Date Due    DEPRESSION ACTION PLAN  Never done    RSV VACCINE (Pregnancy & 60+) (1 - 1-dose 60+ series) Never done    PAP FOLLOW-UP  05/25/2023    HPV FOLLOW-UP  05/25/2023    COLPOSCOPY  05/27/2023    LUNG CANCER SCREENING  11/17/2023    COVID-19 Vaccine (8 - 2023-24 season) 01/27/2024    MAMMO SCREENING  02/27/2024    MEDICARE ANNUAL WELLNESS VISIT  06/28/2024    LIPID  06/28/2024    ANNUAL REVIEW OF HM ORDERS  06/28/2024    DTAP/TDAP/TD IMMUNIZATION (3 - Td or Tdap) 07/01/2024    INFLUENZA VACCINE (1) 09/01/2024    PHQ-9  01/31/2025    FALL RISK ASSESSMENT  07/31/2025    GLUCOSE  02/26/2027    COLORECTAL CANCER SCREENING  01/04/2028    ADVANCE CARE PLANNING  02/15/2029    DEXA  02/05/2031    HEPATITIS C SCREENING  Completed    Pneumococcal Vaccine: 65+ Years  Completed    ZOSTER IMMUNIZATION  Completed    IPV IMMUNIZATION  Aged Out    HPV IMMUNIZATION  Aged Out    MENINGITIS IMMUNIZATION   "Aged Out    RSV MONOCLONAL ANTIBODY  Aged Out            Objective    Exam  LMP  (LMP Unknown)    Estimated body mass index is 30.76 kg/m  as calculated from the following:    Height as of 2/23/24: 1.638 m (5' 4.5\").    Weight as of 2/23/24: 82.6 kg (182 lb).    Physical Exam           7/31/2024   Mini Cog   Clock Draw Score 2 Normal   3 Item Recall 3 objects recalled   Mini Cog Total Score 5                 Signed Electronically by: Brennan De Luna MD    "

## 2024-08-01 LAB
ALBUMIN SERPL BCG-MCNC: 4.4 G/DL (ref 3.5–5.2)
ALP SERPL-CCNC: 73 U/L (ref 40–150)
ALT SERPL W P-5'-P-CCNC: 27 U/L (ref 0–50)
ANION GAP SERPL CALCULATED.3IONS-SCNC: 11 MMOL/L (ref 7–15)
AST SERPL W P-5'-P-CCNC: 30 U/L (ref 0–45)
BILIRUB SERPL-MCNC: 0.5 MG/DL
BUN SERPL-MCNC: 13.2 MG/DL (ref 8–23)
CALCIUM SERPL-MCNC: 9.8 MG/DL (ref 8.8–10.4)
CHLORIDE SERPL-SCNC: 105 MMOL/L (ref 98–107)
CHOLEST SERPL-MCNC: 262 MG/DL
CREAT SERPL-MCNC: 0.8 MG/DL (ref 0.51–0.95)
EGFRCR SERPLBLD CKD-EPI 2021: 75 ML/MIN/1.73M2
FASTING STATUS PATIENT QL REPORTED: NO
FASTING STATUS PATIENT QL REPORTED: NO
GLUCOSE SERPL-MCNC: 87 MG/DL (ref 70–99)
HCO3 SERPL-SCNC: 24 MMOL/L (ref 22–29)
HDLC SERPL-MCNC: 42 MG/DL
LDLC SERPL CALC-MCNC: 183 MG/DL
NONHDLC SERPL-MCNC: 220 MG/DL
POTASSIUM SERPL-SCNC: 4.5 MMOL/L (ref 3.4–5.3)
PROT SERPL-MCNC: 7.6 G/DL (ref 6.4–8.3)
SODIUM SERPL-SCNC: 140 MMOL/L (ref 135–145)
TRIGL SERPL-MCNC: 186 MG/DL

## 2024-08-19 ENCOUNTER — ANCILLARY PROCEDURE (OUTPATIENT)
Dept: MAMMOGRAPHY | Facility: HOSPITAL | Age: 79
End: 2024-08-19
Attending: OBSTETRICS & GYNECOLOGY
Payer: COMMERCIAL

## 2024-08-19 DIAGNOSIS — Z12.31 VISIT FOR SCREENING MAMMOGRAM: ICD-10-CM

## 2024-08-19 PROCEDURE — 77063 BREAST TOMOSYNTHESIS BI: CPT

## 2024-08-19 NOTE — PROGRESS NOTES
ASSESSMENT:  1. Need for hepatitis C screening test  Hepatitis C treating test will be drawn with next lab draw for health quality measures    2. Screening for osteoporosis  Last DEXA scan was excellent in 2016.  5-year follow-up would be advised    3. Cough  She has had a persistent cough for months.  Your recent allergy evaluation showed no evidence for asthma.  She reports a chest x-ray last autumn which was clear.  She does have considerable postnasal drip.  A CT of the sinuses will be ordered to check for chronic sinusitis  - CT Sinuses Without Contrast; Future    4. Chronic sinusitis, unspecified location  See above  - CT Sinuses Without Contrast; Future        PLAN:  Patient Instructions   1.  Schedule sinus CT     2.  I will notify you of the test results    3. Yearly exam due in October      Orders Placed This Encounter   Procedures     CT Sinuses Without Contrast     Standing Status:   Future     Standing Expiration Date:   8/6/2020     Order Specific Question:   Can the procedure be changed per Radiologist protocol?     Answer:   Yes     Order Specific Question:   If this is a diagnostic procedure, have the patient's age and recent imaging history been considered?     Answer:   Yes     Medications Discontinued During This Encounter   Medication Reason     magnesium 30 mg tablet Therapy completed     co-enzyme Q-10 30 mg capsule Therapy completed       Return in about 3 months (around 11/6/2019) for Annual physical.      ASSESSED PROBLEMS:  1. Screening for osteoporosis     2. Need for hepatitis C screening test     3. Cough  CT Sinuses Without Contrast   4. Chronic sinusitis, unspecified location  CT Sinuses Without Contrast       CHIEF COMPLAINT:  Chief Complaint   Patient presents with     Cough     see triage       HISTORY OF PRESENT ILLNESS:  Tangela is a 74 y.o. female presenting to the clinic today to follow up on a cough.     Cough: She continues to struggle with a cough. She has been taking  "Mucinex. The cough is still productive, but the phlegm is rarely yellow now. The cough does keep her awake at night. The only thing that helps her sleep at night is cough drops. She was seen by Dr. Rucker who did not find any allergies via allergy testing but recommended she try Astelin nasal spray. She disagrees and thinks she has a few minor allergies because she gets congested after mowing the lawn or doing other things outside. She has a lot of post nasal drainage. She was not able to use the albuterol inhaler. She denies pain in her cheeks. She has not been wheezing. Most of the symptoms are not in her chest or nose. She states it \"just lies in the back of her throat.\" She has had something similar years ago and recalls getting allergy shots. She states she had a chest X-ray last fall and that she had the same cough at that time. She was treated with a couple courses of antibiotics earlier this year. She states she has had sinus problems in the past and that she had migraines at that time as well.     GERD: She has been experiencing a lot of heartburn lately. She thinks she is still taking omeprazole.     REVIEW OF SYSTEMS:   The rash on her legs has improved quite a bit. She gives blood. All other systems are negative.    PFSH:  She does quite a bit of gardening and has some weeding to do.     TOBACCO USE:  Social History     Tobacco Use   Smoking Status Former Smoker   Smokeless Tobacco Never Used       VITALS:  Vitals:    08/06/19 1018   BP: 120/70   Patient Site: Left Arm   Patient Position: Sitting   Cuff Size: Adult Large   Weight: 168 lb (76.2 kg)   Height: 5' 2.4\" (1.585 m)     Wt Readings from Last 3 Encounters:   08/06/19 168 lb (76.2 kg)   07/03/19 167 lb (75.8 kg)   06/27/19 167 lb 2 oz (75.8 kg)     Body mass index is 30.33 kg/m .    PHYSICAL EXAM:  Constitutional:   Reveals an alert, talkative woman. Affect appropriate. Does not appear to be in acute distress.  Vitals: per nursing notes.  HEENT: " Atraumatic. Ears:  External canals, TMs clear.    Eyes: No conjunctival hyperemia.  Sinuses non-tender to tapping.   Oropharynx:   Mouth and throat clear, no thrush or exudate.  Neck:  Supple, no carotid bruits or adenopathy.  Back:  No spine or CVA pain.  Thorax:  No bony deformities.  Lungs: Clear to A&P without rales or wheezes.  Respiratory effort normal.  Cardiac:   Regular rate and rhythm, normal S1, S2, no murmur or gallop.  Skin: Faintly erythematous area on shins much improved from last visit.   Psychiatric:  Memory intact, mood appropriate.    ADDITIONAL HISTORY SUMMARIZED (2): Reviewed 6/27/2019 Dr. Rucker note regarding cough.   DECISION TO OBTAIN EXTRA INFORMATION (1): None.   RADIOLOGY TESTS (1): CT sinuses ordered.   LABS (1): None.  MEDICINE TESTS (1): None.  INDEPENDENT REVIEW (2 each): None.     The visit lasted a total of 15 minutes face to face with the patient. Over 50% of the time was spent counseling and educating the patient about her cough.    I, Logan Hernandez, am scribing for and in the presence of, Dr. Aguilar.    ILogan, personally performed the services described in this documentation, as scribed by Logan Hernandez in my presence, and it is both accurate and complete.    Dragon dictation was used for this note.  Speech recognition errors are a possibility.    MEDICATIONS:  Current Outpatient Medications   Medication Sig Dispense Refill     CALCIUM ORAL Take 1 tablet by mouth 2 (two) times a day. 600-200 MG-UNIT Oral Tablet       cholecalciferol, vitamin D3, 1,000 unit tablet Take 1,000 Units by mouth daily.       citalopram (CELEXA) 20 MG tablet TAKE ONE TABLET BY MOUTH ONCE DAILY 90 tablet 3     cycloSPORINE (RESTASIS) 0.05 % ophthalmic emulsion Administer 1 drop to both eyes every 12 (twelve) hours. Daily.       DOCOSAHEXANOIC ACID/EPA (FISH OIL ORAL) Take 4 capsules by mouth daily. 1000 mg oral capsule.       erythromycin with ethanol (ERYDERM) 2 % external solution APPLY  SPARINGLY TO AFFECTED AREA(S) TWICE DAILY. 60 mL 2     estradiol (ESTRING) 2 mg vaginal ring Insert 2 mg into the vagina every 3 (three) months. follow package directions       estrogens conjugated, synthetic A, (CENESTIN) 0.625 MG tablet Take 0.625 mg by mouth daily.       latanoprost (XALATAN) 0.005 % ophthalmic solution        mometasone (NASONEX) 50 mcg/actuation nasal spray USE 2 PUFFS IN EACH NOSTRIL DAILY 17 g 6     MULTIVITAMIN ORAL Take 1 tablet by mouth daily. TABS       NEVANAC 0.1 % ophthalmic suspension        omeprazole (PRILOSEC) 20 MG capsule TAKE ONE CAPSULE BY MOUTH DAILY. 90 capsule 2     phentermine 37.5 MG capsule Take 37.5 mg by mouth every morning.       pravastatin (PRAVACHOL) 40 MG tablet TAKE ONE TABLET DAILY IN THE EVENING. 90 tablet 3     prednisoLONE acetate (PRED-FORTE) 1 % ophthalmic suspension        progesterone (PROMETRIUM) 100 MG capsule        sulindac (CLINORIL) 200 MG tablet TAKE 1 TABLET BY MOUTH 2 TIMES DAILY 180 tablet 3     traZODone (DESYREL) 100 MG tablet TAKE 1/2- 1 TABLET BY MOUTH AT BEDTIME. 90 tablet 3     triamcinolone (KENALOG) 0.1 % cream Apply twice daily to involved area until resolved 80 g 1     vitamin E 100 UNIT capsule Take 100 Units by mouth daily.       albuterol (PROAIR HFA;PROVENTIL HFA;VENTOLIN HFA) 90 mcg/actuation inhaler Inhale 2 puffs every 6 (six) hours as needed for wheezing. 1 each 0     azelastine (ASTELIN) 137 mcg (0.1 %) nasal spray 2 sprays into each nostril 2 (two) times a day. Use in each nostril as directed 30 mL 11     No current facility-administered medications for this visit.        Total data points: 3   Pelvic pain

## 2024-08-26 ENCOUNTER — OFFICE VISIT (OUTPATIENT)
Dept: OBGYN | Facility: CLINIC | Age: 79
End: 2024-08-26
Attending: OBSTETRICS & GYNECOLOGY
Payer: COMMERCIAL

## 2024-08-26 VITALS
HEIGHT: 65 IN | DIASTOLIC BLOOD PRESSURE: 76 MMHG | HEART RATE: 55 BPM | WEIGHT: 187.4 LBS | BODY MASS INDEX: 31.22 KG/M2 | SYSTOLIC BLOOD PRESSURE: 132 MMHG

## 2024-08-26 DIAGNOSIS — B97.7 HPV IN FEMALE: Primary | ICD-10-CM

## 2024-08-26 DIAGNOSIS — Z01.411 ENCOUNTER FOR GYNECOLOGICAL EXAMINATION WITH ABNORMAL FINDING: ICD-10-CM

## 2024-08-26 DIAGNOSIS — N95.9 MENOPAUSAL AND POSTMENOPAUSAL DISORDER: ICD-10-CM

## 2024-08-26 DIAGNOSIS — Z12.4 ENCOUNTER FOR SCREENING FOR MALIGNANT NEOPLASM OF CERVIX: ICD-10-CM

## 2024-08-26 DIAGNOSIS — N30.00 ACUTE CYSTITIS WITHOUT HEMATURIA: ICD-10-CM

## 2024-08-26 LAB
ALBUMIN UR-MCNC: 20 MG/DL
ALBUMIN UR-MCNC: NEGATIVE MG/DL
APPEARANCE UR: ABNORMAL
APPEARANCE UR: ABNORMAL
BACTERIA #/AREA URNS HPF: ABNORMAL /HPF
BILIRUB UR QL STRIP: NEGATIVE
BILIRUB UR QL STRIP: NEGATIVE
COLOR UR AUTO: YELLOW
COLOR UR AUTO: YELLOW
GLUCOSE UR STRIP-MCNC: NEGATIVE MG/DL
GLUCOSE UR STRIP-MCNC: NEGATIVE MG/DL
HGB UR QL STRIP: ABNORMAL
HGB UR QL STRIP: ABNORMAL
KETONES UR STRIP-MCNC: NEGATIVE MG/DL
KETONES UR STRIP-MCNC: NEGATIVE MG/DL
LEUKOCYTE ESTERASE UR QL STRIP: ABNORMAL
LEUKOCYTE ESTERASE UR QL STRIP: ABNORMAL
MUCOUS THREADS #/AREA URNS LPF: PRESENT /LPF
NITRATE UR QL: POSITIVE
NITRATE UR QL: POSITIVE
PH UR STRIP: 5.5 [PH] (ref 5–7)
PH UR STRIP: 5.5 [PH] (ref 5–8)
RBC URINE: 11 /HPF
SP GR UR STRIP: 1.02 (ref 1–1.03)
SP GR UR STRIP: 1.02 (ref 1–1.03)
SQUAMOUS EPITHELIAL: 3 /HPF
UROBILINOGEN UR STRIP-ACNC: 0.2 E.U./DL
UROBILINOGEN UR STRIP-MCNC: NORMAL MG/DL
WBC CLUMPS #/AREA URNS HPF: PRESENT /HPF
WBC URINE: >182 /HPF

## 2024-08-26 PROCEDURE — 81001 URINALYSIS AUTO W/SCOPE: CPT | Performed by: OBSTETRICS & GYNECOLOGY

## 2024-08-26 PROCEDURE — 87624 HPV HI-RISK TYP POOLED RSLT: CPT | Performed by: OBSTETRICS & GYNECOLOGY

## 2024-08-26 PROCEDURE — G0463 HOSPITAL OUTPT CLINIC VISIT: HCPCS | Performed by: OBSTETRICS & GYNECOLOGY

## 2024-08-26 PROCEDURE — 81003 URINALYSIS AUTO W/O SCOPE: CPT | Performed by: OBSTETRICS & GYNECOLOGY

## 2024-08-26 PROCEDURE — G0145 SCR C/V CYTO,THINLAYER,RESCR: HCPCS | Performed by: OBSTETRICS & GYNECOLOGY

## 2024-08-26 PROCEDURE — G0124 SCREEN C/V THIN LAYER BY MD: HCPCS | Performed by: PATHOLOGY

## 2024-08-26 PROCEDURE — 87086 URINE CULTURE/COLONY COUNT: CPT | Performed by: OBSTETRICS & GYNECOLOGY

## 2024-08-26 PROCEDURE — G0101 CA SCREEN;PELVIC/BREAST EXAM: HCPCS | Performed by: OBSTETRICS & GYNECOLOGY

## 2024-08-26 RX ORDER — ESTRADIOL 2 MG/1
RING VAGINAL
Qty: 1 EACH | Refills: 3 | Status: SHIPPED | OUTPATIENT
Start: 2024-08-26

## 2024-08-26 RX ORDER — ESTRADIOL 2 MG/1
RING VAGINAL
Qty: 1 EACH | Refills: 3 | Status: SHIPPED | OUTPATIENT
Start: 2024-08-26 | End: 2024-08-26

## 2024-08-26 RX ORDER — CIPROFLOXACIN 500 MG/1
500 TABLET, FILM COATED ORAL 2 TIMES DAILY
Qty: 20 TABLET | Refills: 0 | Status: SHIPPED | OUTPATIENT
Start: 2024-08-26 | End: 2024-09-05

## 2024-08-26 ASSESSMENT — ANXIETY QUESTIONNAIRES
6. BECOMING EASILY ANNOYED OR IRRITABLE: SEVERAL DAYS
8. IF YOU CHECKED OFF ANY PROBLEMS, HOW DIFFICULT HAVE THESE MADE IT FOR YOU TO DO YOUR WORK, TAKE CARE OF THINGS AT HOME, OR GET ALONG WITH OTHER PEOPLE?: NOT DIFFICULT AT ALL
7. FEELING AFRAID AS IF SOMETHING AWFUL MIGHT HAPPEN: SEVERAL DAYS
GAD7 TOTAL SCORE: 5
1. FEELING NERVOUS, ANXIOUS, OR ON EDGE: SEVERAL DAYS
4. TROUBLE RELAXING: NOT AT ALL
IF YOU CHECKED OFF ANY PROBLEMS ON THIS QUESTIONNAIRE, HOW DIFFICULT HAVE THESE PROBLEMS MADE IT FOR YOU TO DO YOUR WORK, TAKE CARE OF THINGS AT HOME, OR GET ALONG WITH OTHER PEOPLE: NOT DIFFICULT AT ALL
7. FEELING AFRAID AS IF SOMETHING AWFUL MIGHT HAPPEN: SEVERAL DAYS
5. BEING SO RESTLESS THAT IT IS HARD TO SIT STILL: NOT AT ALL
2. NOT BEING ABLE TO STOP OR CONTROL WORRYING: SEVERAL DAYS
3. WORRYING TOO MUCH ABOUT DIFFERENT THINGS: SEVERAL DAYS
GAD7 TOTAL SCORE: 5
GAD7 TOTAL SCORE: 5

## 2024-08-26 NOTE — LETTER
8/26/2024       RE: Tangela Chapa  [address]  [place] [address]     Dear Colleague,    Thank you for referring your patient, Tangela Chapa, to the [facility] [place] at [facility]. Please see a copy of my visit note below.    Progress Note    SUBJECTIVE:  Tangela Chapa is an 79 year old   female with longstanding HR HPV. She is here today for breast and pelvic exam- with f/up pap/hpv testing. In the past she had been uncertain if she wanted to continue testing, but she does desire continued surveillance.  She also needs her estring exchanged, but she forgot to bring the new ring device today.     Also notes urinary frequency and urgency.  UA today + nitrates and +LE      Patient is followed by Brennan De Luna for primary care.    Menstrual History:      8/18/2016    10:59 AM   Menstrual History   Period Cycle (Days) menapausal    Reviewed Today Yes       Last    Lab Results   Component Value Date    PAP NIL 02/09/2021     Last   Lab Results   Component Value Date    HPV16 Negative 02/27/2023    HPV16 Negative 02/09/2021     Last   Lab Results   Component Value Date    HPV18 Negative 02/27/2023    HPV18 Negative 02/09/2021     Last   Lab Results   Component Value Date    HRHPV Positive 02/27/2023    HRHPV Positive 02/09/2021       Mammogram current: yes    HISTORY:  Prescription Medications as of 8/26/2024         Rx Number Disp Refills Start End Last Dispensed Date Next Fill Date Owning Pharmacy    ciprofloxacin (CIPRO) 500 MG tablet  20 tablet 0 8/26/2024 9/5/2024   [address]    Sig: Take 1 tablet (500 mg) by mouth 2 times daily for 10 days.    Class: E-Prescribe    Route: Oral    citalopram (CELEXA) 40 MG tablet  90 tablet 1 7/31/2024 --   [address]    Sig: Take 1 tablet (40 mg) by mouth daily    Class: E-Prescribe    Route: Oral     cycloSPORINE (RESTASIS) 0.05 % ophthalmic emulsion  -- --  --       Si drop every 12 hours.    Class: Historical    estradiol (ESTRING) 7.5 MCG/24HR vaginal ring  1 each 3 2024 --   [address]    Sig: Place once vaginally and refill as instructed    Class: E-Prescribe    pravastatin (PRAVACHOL) 40 MG tablet  90 tablet 1 2024 --   [address]    Sig: Take 1 tablet (40 mg) by mouth daily    Class: E-Prescribe    Route: Oral    traZODone (DESYREL) 50 MG tablet  90 tablet 3 2024 --   [address]    Sig: Take 1 tablet (50 mg) by mouth at bedtime    Class: E-Prescribe    Route: Oral          Allergies   Allergen Reactions     Dust Mite Extract      Seasonal Allergies      Immunization History   Administered Date(s) Administered     COVID-19 12+ () (MODERNA) 2023     COVID-19 Bivalent 12+ (Pfizer) 10/11/2022, 2023     COVID-19 MONOVALENT 12+ (Pfizer) 2021, 2021, 10/10/2021, 2022     DT (PEDS <7y) 2000     Flu, Unspecified 2008, 2011, 2019     Influenza (High Dose) 3 valent vaccine 2016, 10/26/2017, 10/12/2018     Influenza (IIV3) PF 2008, 2011, 01/10/2013     Influenza Vaccine (Flucelvax Quadrivalent) 10/22/2019     Influenza Vaccine 65+ (FLUAD) 10/11/2022     Influenza Vaccine 65+ (Fluzone HD) 2020, 10/10/2021, 2023     Pneumo Conj 13-V (2010&after) 2015     Pneumococcal 23 valent 2013     TDAP (Adacel,Boostrix) 2011, 2014     Td (Adult), Adsorbed 2000     Td,adult,historic,unspecified 2000     Zoster recombinant adjuvanted (SHINGRIX) 10/15/2018, 2018     Zoster vaccine, live 02/10/2009       OB History    Para Term  AB Living   0 0 0 0 0 0   SAB IAB Ectopic Multiple Live Births   0 0 0 0 0     Past Medical History:   Diagnosis Date     Chronic UTI      controlled w PO and vaginal estrogen tx     YESSICA I (cervical intraepithelial neoplasia I) 16 Feb 2010 5/28/14: Pap NIL; HR HPV neg -> D/C screening     Herpes zoster     Left facial      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     Past Surgical History:   Procedure Laterality Date     ABDOMINOPLASTY       ABDOMINOPLASTY       ARTHROSCOPY KNEE IRRIGATION AND DEBRIDEMENT      RT-Articular Cartilage     BIOPSY BREAST      benign open brest biopsy     BIOPSY BREAST Left     benign x2     BIOPSY CERVICAL, LOCAL EXCISION, SINGLE/MULTIPLE  10/09/2017     BIOPSY OF BREAST, INCISIONAL      Description: Biopsy Breast Open;  Recorded: 06/06/2008;  Comments: BENIGN     CATARACT EXTRACTION W/ INTRAOCULAR LENS IMPLANT Left 01/2016     CATARACT EXTRACTION, BILATERAL  04/2015     COLPOSCOPY, BIOPSY, COMBINED  2/16/10    TZ-not seen     ENDOMETRIAL SAMPLING (BIOPSY)  3/31/05    benign     GENITOURINARY SURGERY  6 April 2011    cystoscopy with +1 trabeculaion     HC KNEE SCOPE, DIAGNOSTIC      Description: Arthroscopy Knee Right;  Recorded: 06/06/2008;  Comments: FOR MENISCUS TEAR     HC OSTEOTOMY METATARSAL (NOT 1ST)      Description: Metatarsal Osteotomy Of The Fifth Metatarsal;  Proc Date: 02/01/2007;     IR LUMBAR EPIDURAL STEROID INJECTION  9/30/2004     IR LUMBAR EPIDURAL STEROID INJECTION  10/11/2004     IR LUMBAR EPIDURAL STEROID INJECTION  11/5/2004     JOINT REPLACEMENT, HIP RT/LT  2008    Joint Replacement Hip RT/LT     OPEN REDUCTION INTERNAL FIXATION ANKLE Right 2/24/2024    Procedure: OPEN REDUCTION INTERNAL FIXATION, FRACTURE, TRIMALLEOLAR ANKLE;  Surgeon: Manpreet Cai MD;  [facility] [place]     [facility] CARPAL TUNNEL BILATERAL       ZZC TOTAL HIP ARTHROPLASTY      Description: Total Hip Replacement;  Proc Date: 06/01/2008;  Comments: RIGHT     ZZC TOTAL HIP ARTHROPLASTY      Description: Total Hip Replacement;  Proc Date: 08/01/2008;     Family History   Problem Relation Age of Onset     Alcohol/Drug Father       Depression Father      Alcohol/Drug Brother      Breast Cancer Mother      Depression Mother      Diabetes Maternal Grandfather      Cancer Brother 68        blood cancer     Breast Cancer Cousin      Breast Cancer Cousin      Breast Cancer Cousin      Leukemia Brother      Social History     Socioeconomic History     Marital status: Single     Spouse name: None     Number of children: None     Years of education: None     Highest education level: None   Tobacco Use     Smoking status: Former     Passive exposure: Past     Smokeless tobacco: Never     Tobacco comments:     quit 30 years ago 1970   Vaping Use     Vaping status: Never Used   Substance and Sexual Activity     Alcohol use: Yes     Alcohol/week: 0.0 - 0.8 standard drinks of alcohol     Comment: Rare     Drug use: No     Sexual activity: Not Currently     Partners: Male     Birth control/protection: Post-menopausal   Other Topics Concern      Service No     Blood Transfusions No     Caffeine Concern No     Comment: 3-4 pop/d (diet)     Occupational Exposure No     Hobby Hazards No     Sleep Concern Yes     Comment: sleeps too much -- tired after 9-10 hrs/nite     Stress Concern Yes     Comment: holiday lonliness     Weight Concern Yes     Comment: admits to eating more than she uses -- declines wt today     Special Diet No     Back Care No     Exercise Yes     Comment: sedentary -- plans to restart     Bike Helmet No     Seat Belt No     Self-Exams No   Social History Narrative    How much exercise per week? Stationary bike, recent weight loss    How much calcium per day? Supplement       How much caffeine per day? 2 cans a day    How much vitamin D per day? supplment    Do you/your family wear seatbelts?  Yes    Do you/your family use safety helmets? Yes    Do you/your family use sunscreen? Yes    Do you/your family keep firearms in the home? Yes, locked    Do you/your family have a smoke detector(s)? Yes        Do you feel safe in your  home? Yes    Has anyone ever touched you in an unwanted manner? No     Explain     Updated 8/28 CHIKI Santiago          Social Determinants of Health     Financial Resource Strain: Low Risk  (7/31/2024)    Financial Resource Strain      Within the past 12 months, have you or your family members you live with been unable to get utilities (heat, electricity) when it was really needed?: No   Food Insecurity: Low Risk  (7/31/2024)    Food Insecurity      Within the past 12 months, did you worry that your food would run out before you got money to buy more?: No      Within the past 12 months, did the food you bought just not last and you didn t have money to get more?: No   Transportation Needs: Low Risk  (7/31/2024)    Transportation Needs      Within the past 12 months, has lack of transportation kept you from medical appointments, getting your medicines, non-medical meetings or appointments, work, or from getting things that you need?: No   Physical Activity: Unknown (7/31/2024)    Exercise Vital Sign      Days of Exercise per Week: 2 days   Stress: No Stress Concern Present (7/31/2024)    [employer] [place] [employer] - Occupational Stress Questionnaire      Feeling of Stress : Only a little   Social Connections: Unknown (7/31/2024)    Social Connection and Isolation Panel [NHANES]      Frequency of Social Gatherings with Friends and Family: Once a week   Interpersonal Safety: Low Risk  (7/31/2024)    Interpersonal Safety      Do you feel physically and emotionally safe where you currently live?: Yes      Within the past 12 months, have you been hit, slapped, kicked or otherwise physically hurt by someone?: No      Within the past 12 months, have you been humiliated or emotionally abused in other ways by your partner or ex-partner?: No   Housing Stability: Low Risk  (7/31/2024)    Housing Stability      Do you have housing? : Yes      Are you worried about losing your housing?: No       ROS    EXAM:  Blood  "pressure 132/76, pulse 55, height 1.638 m (5' 4.5\"), weight 85 kg (187 lb 6.4 oz), not currently breastfeeding. Body mass index is 31.67 kg/m .  General appearance: Pleasant female in no acute distress.     BREAST EXAM:  Breast: Without visible skin changes. No dimpling or lesions seen.   Breasts supple, non-tender with palpation, no dominant mass, nodularity, or nipple discharge noted bilaterally. Axillary nodes negative.      PELVIC EXAM:  EG/BUS: Normal genital architecture without lesions, erythema or abnormal secretions. Estrogen loss atrophy present. Normal genital architecture without lesions, erythema or abnormal secretions Bartholin's, Urethra, [place] normal   Urethral meatus: normal   Urethra: no masses, tenderness, or scarring   Bladder: no masses or tenderness   Vagina: atrophic, thin, dry with odorless and physiologic discharge secretions- ring in place  Cervix: no lesions and pale, dry, stenotic  Uterus: small, smooth, firm, mobile w/o pain  Adnexa: Within normal limits and No masses, nodularity, tenderness  Rectum:anus normal       ASSESSMENT:  Encounter Diagnoses   Name Primary?     HPV in female Yes     Menopausal and postmenopausal disorder      Encounter for screening for malignant neoplasm of cervix      Acute cystitis without hematuria       79 year old Female Pelvic and Breast Exam- HRT surveillance    PLAN:   Orders Placed This Encounter   Procedures     Obtaining, preparing and conveyance of cervical or vaginal smear to laboratory.     Clinitek Urine Macroscopic POCT     HPV and Gynecologic Cytology Panel - Recommended Age 30 - 65 Years     Routine UA with Microscopic     Urine Culture   Rx for cipro x 10 days sent  Will return for likely colpo with exchange of Estring  Return in one year/PRN for preventive care or problems/concerns.     Verbalized understanding and agreement with visit plan.    Macarena Gusman MD, FACOG  (she/her/hers)    Department of Ob/Gyn/Women's " Health  [facility]  [place] [facility]  [address] [address]  [email or web address]  p. [phone]  f. [phone]      Answers submitted by the patient for this visit:  Patient Health Questionnaire (G7) (Submitted on 8/26/2024)  FARIDA 7 TOTAL SCORE: 5      Again, thank you for allowing me to participate in the care of your patient.      Sincerely,    Macarena Gusman MD

## 2024-08-26 NOTE — NURSING NOTE
Chief Complaint   Patient presents with    Gyn Exam     Due for pap smear    Consult     Discuss estring/removal

## 2024-08-26 NOTE — PATIENT INSTRUCTIONS
Thank you for trusting us with your care!   Please be aware, if you are on Mychart, you may see your results prior to your providers review. If labs are abnormal, we will call or message you on [employer] with a follow up plan.    If you need to contact us for questions about:  Symptoms, Scheduling & Medical Questions; Non-urgent (2-3 day response) Film Fresh message, Urgent (needing response today) [phone] (if after 3:30pm next day response)   Prescriptions: Please call your Pharmacy   Billing: Sofie [phone] or [facility]:[phone]

## 2024-08-27 LAB — BACTERIA UR CULT: NORMAL

## 2024-08-27 NOTE — PROGRESS NOTES
Progress Note    SUBJECTIVE:  Tangela Chapa is an 79 year old   female with longstanding HR HPV. She is here today for breast and pelvic exam- with f/up pap/hpv testing. In the past she had been uncertain if she wanted to continue testing, but she does desire continued surveillance.  She also needs her estring exchanged, but she forgot to bring the new ring device today.     Also notes urinary frequency and urgency.  UA today + nitrates and +LE      Patient is followed by Brennan De Luna for primary care.    Menstrual History:      2016    10:59 AM   Menstrual History   Period Cycle (Days) menapausal    Reviewed Today Yes       Last    Lab Results   Component Value Date    PAP NIL 2021     Last   Lab Results   Component Value Date    HPV16 Negative 2023    HPV16 Negative 2021     Last   Lab Results   Component Value Date    HPV18 Negative 2023    HPV18 Negative 2021     Last   Lab Results   Component Value Date    HRHPV Positive 2023    HRHPV Positive 2021       Mammogram current: yes    HISTORY:  Prescription Medications as of 2024         Rx Number Disp Refills Start End Last Dispensed Date Next Fill Date Owning Pharmacy    ciprofloxacin (CIPRO) 500 MG tablet  20 tablet 0 2024   [address]    Sig: Take 1 tablet (500 mg) by mouth 2 times daily for 10 days.    Class: E-Prescribe    Route: Oral    citalopram (CELEXA) 40 MG tablet  90 tablet 1 2024 --   [address]    Sig: Take 1 tablet (40 mg) by mouth daily    Class: E-Prescribe    Route: Oral    cycloSPORINE (RESTASIS) 0.05 % ophthalmic emulsion  -- --  --       Si drop every 12 hours.    Class: Historical    estradiol (ESTRING) 7.5 MCG/24HR vaginal ring  1 each 3 2024 --   [address]    Sig: Place once vaginally and refill as instructed    Class: E-Prescribe     pravastatin (PRAVACHOL) 40 MG tablet  90 tablet 1 2024 --   [place] [address]    Sig: Take 1 tablet (40 mg) by mouth daily    Class: E-Prescribe    Route: Oral    traZODone (DESYREL) 50 MG tablet  90 tablet 3 2024 --   [place] [address]    Sig: Take 1 tablet (50 mg) by mouth at bedtime    Class: E-Prescribe    Route: Oral          Allergies   Allergen Reactions    Dust Mite Extract     Seasonal Allergies      Immunization History   Administered Date(s) Administered    COVID-19 12+ (-) (MODERNA) 2023    COVID-19 Bivalent 12+ (Pfizer) 10/11/2022, 2023    COVID-19 MONOVALENT 12+ (Pfizer) 2021, 2021, 10/10/2021, 2022    DT (PEDS <7y) 2000    Flu, Unspecified 2008, 2011, 2019    Influenza (High Dose) 3 valent vaccine 2016, 10/26/2017, 10/12/2018    Influenza (IIV3) PF 2008, 2011, 01/10/2013    Influenza Vaccine (Flucelvax Quadrivalent) 10/22/2019    Influenza Vaccine 65+ (FLUAD) 10/11/2022    Influenza Vaccine 65+ (Fluzone HD) 2020, 10/10/2021, 2023    Pneumo Conj 13-V (2010&after) 2015    Pneumococcal 23 valent 2013    TDAP (Adacel,Boostrix) 2011, 2014    Td (Adult), Adsorbed 2000    Td,adult,historic,unspecified 2000    Zoster recombinant adjuvanted (SHINGRIX) 10/15/2018, 2018    Zoster vaccine, live 02/10/2009       OB History    Para Term  AB Living   0 0 0 0 0 0   SAB IAB Ectopic Multiple Live Births   0 0 0 0 0     Past Medical History:   Diagnosis Date    Chronic UTI     controlled w PO and vaginal estrogen tx    YESSICA I (cervical intraepithelial neoplasia I) 201014: Pap NIL; HR HPV neg -> D/C screening    Herpes zoster     Left facial     Vaginal dysplasia     cryo     Past Surgical History:   Procedure Laterality Date    ABDOMINOPLASTY      ABDOMINOPLASTY      ARTHROSCOPY KNEE  IRRIGATION AND DEBRIDEMENT      RT-Articular Cartilage    BIOPSY BREAST      benign open brest biopsy    BIOPSY BREAST Left     benign x2    BIOPSY CERVICAL, LOCAL EXCISION, SINGLE/MULTIPLE  10/09/2017    BIOPSY OF BREAST, INCISIONAL      Description: Biopsy Breast Open;  Recorded: 06/06/2008;  Comments: BENIGN    CATARACT EXTRACTION W/ INTRAOCULAR LENS IMPLANT Left 01/2016    CATARACT EXTRACTION, BILATERAL  04/2015    COLPOSCOPY, BIOPSY, COMBINED  2/16/10    TZ-not seen    ENDOMETRIAL SAMPLING (BIOPSY)  3/31/05    benign    GENITOURINARY SURGERY  6 April 2011    cystoscopy with +1 trabeculaion    HC KNEE SCOPE, DIAGNOSTIC      Description: Arthroscopy Knee Right;  Recorded: 06/06/2008;  Comments: FOR MENISCUS TEAR    HC OSTEOTOMY METATARSAL (NOT 1ST)      Description: Metatarsal Osteotomy Of The Fifth Metatarsal;  Proc Date: 02/01/2007;    IR LUMBAR EPIDURAL STEROID INJECTION  9/30/2004    IR LUMBAR EPIDURAL STEROID INJECTION  10/11/2004    IR LUMBAR EPIDURAL STEROID INJECTION  11/5/2004    JOINT REPLACEMENT, HIP RT/LT  2008    Joint Replacement Hip RT/LT    OPEN REDUCTION INTERNAL FIXATION ANKLE Right 2/24/2024    Procedure: OPEN REDUCTION INTERNAL FIXATION, FRACTURE, TRIMALLEOLAR ANKLE;  Surgeon: Manpreet Cai MD;  [place] [facility] [place]    [facility] CARPAL TUNNEL BILATERAL      ZZC TOTAL HIP ARTHROPLASTY      Description: Total Hip Replacement;  Proc Date: 06/01/2008;  Comments: RIGHT    ZZC TOTAL HIP ARTHROPLASTY      Description: Total Hip Replacement;  Proc Date: 08/01/2008;     Family History   Problem Relation Age of Onset    Alcohol/Drug Father     Depression Father     Alcohol/Drug Brother     Breast Cancer Mother     Depression Mother     Diabetes Maternal Grandfather     Cancer Brother 68        blood cancer    Breast Cancer Cousin     Breast Cancer Cousin     Breast Cancer Cousin     Leukemia Brother      Social History     Socioeconomic History    Marital status: Single     Spouse name: None     Number of children: None    Years of education: None    Highest education level: None   Tobacco Use    Smoking status: Former     Passive exposure: Past    Smokeless tobacco: Never    Tobacco comments:     quit 30 years ago 1970   Vaping Use    Vaping status: Never Used   Substance and Sexual Activity    Alcohol use: Yes     Alcohol/week: 0.0 - 0.8 standard drinks of alcohol     Comment: Rare    Drug use: No    Sexual activity: Not Currently     Partners: Male     Birth control/protection: Post-menopausal   Other Topics Concern     Service No    Blood Transfusions No    Caffeine Concern No     Comment: 3-4 pop/d (diet)    Occupational Exposure No    Hobby Hazards No    Sleep Concern Yes     Comment: sleeps too much -- tired after 9-10 hrs/nite    Stress Concern Yes     Comment: holiday lonliness    Weight Concern Yes     Comment: admits to eating more than she uses -- declines wt today    Special Diet No    Back Care No    Exercise Yes     Comment: sedentary -- plans to restart    Bike Helmet No    Seat Belt No    Self-Exams No   Social History Narrative    How much exercise per week? Stationary bike, recent weight loss    How much calcium per day? Supplement       How much caffeine per day? 2 cans a day    How much vitamin D per day? supplment    Do you/your family wear seatbelts?  Yes    Do you/your family use safety helmets? Yes    Do you/your family use sunscreen? Yes    Do you/your family keep firearms in the home? Yes, locked    Do you/your family have a smoke detector(s)? Yes        Do you feel safe in your home? Yes    Has anyone ever touched you in an unwanted manner? No     Explain     Updated 8/28 CHIKI Santiago          Social Determinants of Health     Financial Resource Strain: Low Risk  (7/31/2024)    Financial Resource Strain     Within the past 12 months, have you or your family members you live with been unable to get utilities (heat, electricity) when it was really needed?: No   Food  "Insecurity: Low Risk  (7/31/2024)    Food Insecurity     Within the past 12 months, did you worry that your food would run out before you got money to buy more?: No     Within the past 12 months, did the food you bought just not last and you didn t have money to get more?: No   Transportation Needs: Low Risk  (7/31/2024)    Transportation Needs     Within the past 12 months, has lack of transportation kept you from medical appointments, getting your medicines, non-medical meetings or appointments, work, or from getting things that you need?: No   Physical Activity: Unknown (7/31/2024)    Exercise Vital Sign     Days of Exercise per Week: 2 days   Stress: No Stress Concern Present (7/31/2024)    [employer] - Occupational Stress Questionnaire     Feeling of Stress : Only a little   Social Connections: Unknown (7/31/2024)    Social Connection and Isolation Panel [NHANES]     Frequency of Social Gatherings with Friends and Family: Once a week   Interpersonal Safety: Low Risk  (7/31/2024)    Interpersonal Safety     Do you feel physically and emotionally safe where you currently live?: Yes     Within the past 12 months, have you been hit, slapped, kicked or otherwise physically hurt by someone?: No     Within the past 12 months, have you been humiliated or emotionally abused in other ways by your partner or ex-partner?: No   Housing Stability: Low Risk  (7/31/2024)    Housing Stability     Do you have housing? : Yes     Are you worried about losing your housing?: No       ROS    EXAM:  Blood pressure 132/76, pulse 55, height 1.638 m (5' 4.5\"), weight 85 kg (187 lb 6.4 oz), not currently breastfeeding. Body mass index is 31.67 kg/m .  General appearance: Pleasant female in no acute distress.     BREAST EXAM:  Breast: Without visible skin changes. No dimpling or lesions seen.   Breasts supple, non-tender with palpation, no dominant mass, nodularity, or nipple discharge noted bilaterally. " Axillary nodes negative.      PELVIC EXAM:  EG/BUS: Normal genital architecture without lesions, erythema or abnormal secretions. Estrogen loss atrophy present. Normal genital architecture without lesions, erythema or abnormal secretions Bartholin's, Urethra, [place] normal   Urethral meatus: normal   Urethra: no masses, tenderness, or scarring   Bladder: no masses or tenderness   Vagina: atrophic, thin, dry with odorless and physiologic discharge secretions- ring in place  Cervix: no lesions and pale, dry, stenotic  Uterus: small, smooth, firm, mobile w/o pain  Adnexa: Within normal limits and No masses, nodularity, tenderness  Rectum:anus normal       ASSESSMENT:  Encounter Diagnoses   Name Primary?    HPV in female Yes    Menopausal and postmenopausal disorder     Encounter for screening for malignant neoplasm of cervix     Acute cystitis without hematuria       79 year old Female Pelvic and Breast Exam- HRT surveillance    PLAN:   Orders Placed This Encounter   Procedures    Obtaining, preparing and conveyance of cervical or vaginal smear to laboratory.    Clinitek Urine Macroscopic POCT    HPV and Gynecologic Cytology Panel - Recommended Age 30 - 65 Years    Routine UA with Microscopic    Urine Culture   Rx for cipro x 10 days sent  Will return for likely colpo with exchange of Estring  Return in one year/PRN for preventive care or problems/concerns.     Verbalized understanding and agreement with visit plan.    Macarena Gusman MD, FACOG  (she/her/hers)    Department of Ob/Gyn/Women's Health  [employer] [facility] [employer]  [place] [facility]  [address] [place] [address]. [place] [address]  [email or web address]  p. [phone]  f. [phone]      Answers submitted by the patient for this visit:  Patient Health Questionnaire (G7) (Submitted on 8/26/2024)  FARIDA 7 TOTAL SCORE: 5

## 2024-08-28 ENCOUNTER — TRANSFERRED RECORDS (OUTPATIENT)
Dept: HEALTH INFORMATION MANAGEMENT | Facility: CLINIC | Age: 79
End: 2024-08-28
Payer: COMMERCIAL

## 2024-08-28 LAB
HPV HR 12 DNA CVX QL NAA+PROBE: POSITIVE
HPV16 DNA CVX QL NAA+PROBE: NEGATIVE
HPV18 DNA CVX QL NAA+PROBE: NEGATIVE
HUMAN PAPILLOMA VIRUS FINAL DIAGNOSIS: ABNORMAL

## 2024-08-30 LAB
BKR AP ASSOCIATED HPV REPORT: ABNORMAL
BKR LAB AP GYN ADEQUACY: ABNORMAL
BKR LAB AP GYN INTERPRETATION: ABNORMAL
BKR LAB AP PREVIOUS ABNL DX: ABNORMAL
BKR LAB AP PREVIOUS ABNORMAL: ABNORMAL
PATH REPORT.COMMENTS IMP SPEC: ABNORMAL
PATH REPORT.COMMENTS IMP SPEC: ABNORMAL
PATH REPORT.RELEVANT HX SPEC: ABNORMAL

## 2024-09-04 ENCOUNTER — APPOINTMENT (OUTPATIENT)
Dept: OBGYN | Facility: CLINIC | Age: 79
End: 2024-09-04
Attending: OBSTETRICS & GYNECOLOGY
Payer: COMMERCIAL

## 2024-09-04 ENCOUNTER — PATIENT OUTREACH (OUTPATIENT)
Dept: OBGYN | Facility: CLINIC | Age: 79
End: 2024-09-04
Payer: COMMERCIAL

## 2024-09-04 VITALS
BODY MASS INDEX: 31.6 KG/M2 | WEIGHT: 187 LBS | DIASTOLIC BLOOD PRESSURE: 74 MMHG | SYSTOLIC BLOOD PRESSURE: 147 MMHG | HEART RATE: 58 BPM

## 2024-09-04 DIAGNOSIS — B97.7 HPV IN FEMALE: Primary | ICD-10-CM

## 2024-09-04 PROCEDURE — 88305 TISSUE EXAM BY PATHOLOGIST: CPT | Mod: TC | Performed by: OBSTETRICS & GYNECOLOGY

## 2024-09-04 PROCEDURE — 88305 TISSUE EXAM BY PATHOLOGIST: CPT | Mod: 26 | Performed by: PATHOLOGY

## 2024-09-04 PROCEDURE — 57456 ENDOCERV CURETTAGE W/SCOPE: CPT | Performed by: OBSTETRICS & GYNECOLOGY

## 2024-09-04 ASSESSMENT — PAIN SCALES - GENERAL: PAINLEVEL: MILD PAIN (3)

## 2024-09-04 NOTE — PATIENT INSTRUCTIONS

## 2024-09-04 NOTE — LETTER
2024       RE: Tangela Chapa  [address]  [place] [address]     Dear Colleague,    Thank you for referring your patient, Tangela Chapa, to the [facility] [place] [facility]. Please see a copy of my visit note below.     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 Here today for colposcopy and estring change. She continues to desire surveillance. She brought new ring device for change today. Currently on ciprofloxacin for a UTI.    Patient is followed by Brennan De Luna for primary care.    Dates/results of previous cervical pathology:   Pap smear hx:    2024-ASCUS, + HRHPV other  23- ASCUS, + HRHPV other  21 - PAP NILM, HR HPV Positive            20 - colpo visually normal, ECC negative  19 - pap NILM, HR HPV positive  19 - colpo biopsy normal, ECC negative  10/29/18 - pap NILM, HR HPV positive  10/9/17 - LEEP negative  17 - pap NILM, HR HPV positive  10/4/16 - colpo biopsy normal, ECC negative  16 - pap NILM, HR HPV positive        Menstrual History:      2016    10:59 AM   Menstrual History   Period Cycle (Days) menapausal    Reviewed Today Yes       Lab Results   Component Value Date    PAP NIL 2021    PAP NIL 2019    PAP NIL 10/29/2018       Last   Lab Results   Component Value Date    HPV16 Negative 2024    HPV16 Negative 2023    HPV16 Negative 2021     Last   Lab Results   Component Value Date    HPV18 Negative 2024    HPV18 Negative 2023    HPV18 Negative 2021     Last   Lab Results   Component Value Date    HRHPV Positive 2024    HRHPV Positive 2023    HRHPV Positive 2021       History   Smoking Status     Former   Smokeless Tobacco     Never       Allergies as of 2024 - Reviewed 2024    Allergen Reaction Noted     Dust mite extract  11/26/2012     Seasonal allergies  11/26/2012        Colposcopy Procedure:    Consent:  Details of the colposcopic procedure were reviewed. Risks, benefits of treatment, and alternate forms of evaluation were discussed.  Patient's questions were elicited and answered.   Written consent was obtained and scanned into medical record.     OBJECTIVE: BP (!) 147/74   Pulse 58   Wt 84.8 kg (187 lb)   LMP  (LMP Unknown)   BMI 31.60 kg/m      Pelvic Exam:  EG/BUS: Normal genital architecture without lesions, erythema or abnormal secretions. Estrogen loss atrophy present. Bartholin's, Urethra, [place]'s glands are normal.   Vagina: atrophic, thin, dry with whitesecretions  Cervix: pale, dry    PROCEDURE:    Acetic acid applied to cervix.  Colposcopic exam of the cervix and apex of the vagina was conducted in the usual fashion.     Findings:  SCJ was not seen entirely and the exam was unsatisfactory.    There was an area of erythema around the 6 o'clock position- c/w estrogen loss. No acetowhite changes noted. No cervical biopsy taken.    ECC: was obtained and placed in labeled Formalin Jar.      Assessment/Plan:     History of high-risk HPV  ECC sent to pathology.  Will contact patient with results and recommended follow-up plan. Likely repeat pap smear in 6 months. Patient advised to contact clinic with heavy vaginal bleeding, fever over 101 degrees F, or any other concerns.    HRT  - Estring replaced in clinic, follow up in 3 months  - Will work with clinic staff to see if there is a more affordable HRT option for patient.    Verbalized understanding and agreement with plan.    Dr. Gusman was present for the entire procedure.    Latonia Sanches MD PGY-1  09/04/24 2:11 PM       [facility] staff:  Appreciate note by Dr. Sanches.  I have seen and examined the patient without the resident. I have reviewed, edited, and agree with the note.      Macarena Gusman,  MD, FACOG  9/4/2024  3:56 PM      Again, thank you for allowing me to participate in the care of your patient.      Sincerely,    Macarena Gusman MD

## 2024-09-04 NOTE — PROGRESS NOTES
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 Here today for colposcopy and estring change. She continues to desire surveillance. She brought new ring device for change today. Currently on ciprofloxacin for a UTI.    Patient is followed by Brennan De Luna for primary care.    Dates/results of previous cervical pathology:   Pap smear hx:    2024-ASCUS, + HRHPV other  23- ASCUS, + HRHPV other  21 - PAP NILM, HR HPV Positive            20 - colpo visually normal, ECC negative  19 - pap NILM, HR HPV positive  19 - colpo biopsy normal, ECC negative  10/29/18 - pap NILM, HR HPV positive  10/9/17 - LEEP negative  17 - pap NILM, HR HPV positive  10/4/16 - colpo biopsy normal, ECC negative  16 - pap NILM, HR HPV positive        Menstrual History:      2016    10:59 AM   Menstrual History   Period Cycle (Days) menapausal    Reviewed Today Yes       Lab Results   Component Value Date    PAP NIL 2021    PAP NIL 2019    PAP NIL 10/29/2018       Last   Lab Results   Component Value Date    HPV16 Negative 2024    HPV16 Negative 2023    HPV16 Negative 2021     Last   Lab Results   Component Value Date    HPV18 Negative 2024    HPV18 Negative 2023    HPV18 Negative 2021     Last   Lab Results   Component Value Date    HRHPV Positive 2024    HRHPV Positive 2023    HRHPV Positive 2021       History   Smoking Status    Former   Smokeless Tobacco    Never       Allergies as of 2024 - Reviewed 2024   Allergen Reaction Noted    Dust mite extract  2012    Seasonal allergies  2012        Colposcopy Procedure:    Consent:  Details of the colposcopic procedure were reviewed. Risks, benefits of treatment, and alternate forms of evaluation were discussed.  Patient's questions were elicited and answered.    Written consent was obtained and scanned into medical record.     OBJECTIVE: BP (!) 147/74   Pulse 58   Wt 84.8 kg (187 lb)   LMP  (LMP Unknown)   BMI 31.60 kg/m      Pelvic Exam:  EG/BUS: Normal genital architecture without lesions, erythema or abnormal secretions. Estrogen loss atrophy present. Bartholin's, Urethra, [place]'s glands are normal.   Vagina: atrophic, thin, dry with whitesecretions  Cervix: pale, dry    PROCEDURE:    Acetic acid applied to cervix.  Colposcopic exam of the cervix and apex of the vagina was conducted in the usual fashion.     Findings:  SCJ was not seen entirely and the exam was unsatisfactory.    There was an area of erythema around the 6 o'clock position- c/w estrogen loss. No acetowhite changes noted. No cervical biopsy taken.    ECC: was obtained and placed in labeled Formalin Jar.      Assessment/Plan:     History of high-risk HPV  ECC sent to pathology.  Will contact patient with results and recommended follow-up plan. Likely repeat pap smear in 6 months. Patient advised to contact clinic with heavy vaginal bleeding, fever over 101 degrees F, or any other concerns.    HRT  - Estring replaced in clinic, follow up in 3 months  - Will work with clinic staff to see if there is a more affordable HRT option for patient.    Verbalized understanding and agreement with plan.    Dr. Gusman was present for the entire procedure.    Latonia Sanches MD PGY-1  09/04/24 2:11 PM       [facility] staff:  Appreciate note by Dr. Sanches.  I have seen and examined the patient without the resident. I have reviewed, edited, and agree with the note.      Macarena Gusman MD, FACOG  9/4/2024  3:56 PM

## 2024-09-06 LAB
PATH REPORT.COMMENTS IMP SPEC: NORMAL
PATH REPORT.COMMENTS IMP SPEC: NORMAL
PATH REPORT.FINAL DX SPEC: NORMAL
PATH REPORT.GROSS SPEC: NORMAL
PATH REPORT.MICROSCOPIC SPEC OTHER STN: NORMAL
PATH REPORT.RELEVANT HX SPEC: NORMAL
PHOTO IMAGE: NORMAL

## 2024-09-17 ENCOUNTER — MYC MEDICAL ADVICE (OUTPATIENT)
Dept: OBGYN | Facility: CLINIC | Age: 79
End: 2024-09-17
Payer: COMMERCIAL

## 2024-09-17 ENCOUNTER — TELEPHONE (OUTPATIENT)
Dept: OBGYN | Facility: CLINIC | Age: 79
End: 2024-09-17
Payer: COMMERCIAL

## 2024-09-17 NOTE — TELEPHONE ENCOUNTER
Refill request received from [facility] for Estradiol 0.5mg tab. Chart reflects patient has not been taking for at least a few months. [employer] message sent clarifying this.

## 2024-09-25 NOTE — TELEPHONE ENCOUNTER
Attempted to call Elizabeth to clarify situation with no answer; LVM and responded to [employer] message.

## 2024-10-23 ENCOUNTER — PATIENT OUTREACH (OUTPATIENT)
Dept: OBGYN | Facility: CLINIC | Age: 79
End: 2024-10-23
Payer: COMMERCIAL

## 2024-11-25 ENCOUNTER — TELEPHONE (OUTPATIENT)
Dept: OBGYN | Facility: CLINIC | Age: 79
End: 2024-11-25
Payer: COMMERCIAL

## 2024-11-25 DIAGNOSIS — N64.4 BREAST PAIN: Primary | ICD-10-CM

## 2024-11-25 NOTE — TELEPHONE ENCOUNTER
Elizabeth called to report sudden onset severe left breast pain. Pain is localized in breast/axilla, not deeper within the chest (also no signs of cardiac compromise). No palpable lumps within the breast or nipple discharge, though nipples are tender and painful.    Notes the pain and location is similar to when she had cysts in her breast ~15-20 years prior.     Placed urgent referral to breast center, diagnostic mammogram, and breast ultrasound orders.

## 2024-11-26 ENCOUNTER — PATIENT OUTREACH (OUTPATIENT)
Dept: ONCOLOGY | Facility: CLINIC | Age: 79
End: 2024-11-26
Payer: COMMERCIAL

## 2024-11-26 NOTE — PROGRESS NOTES
New Patient Oncology Nurse Navigator Note     Referring provider: Macarena Gusman MD      Referring Clinic/Organization:     [facility]        Referred to (specialty:) Breast Provider Referral     Requested provider (if applicable): NA     Date Referral Received: November 26, 2024     Evaluation for:  N64.4 (ICD-10-CM) - Breast pain     Severe breast pain, hx breast cysts        Clinical History (per Nurse review of records provided):      Elizabeth called to report sudden onset severe left breast pain. Pain is localized in breast/axilla, not deeper within the chest (also no signs of cardiac compromise). No palpable lumps within the breast or nipple discharge, though nipples are tender and painful.     Notes the pain and location is similar to when she had cysts in her breast ~15-20 years prior.      Placed urgent referral to breast center, diagnostic mammogram, and breast ultrasound orders.     Records Location: See Bookmarked material     Records Needed: NA     Additional testing needed prior to consult:     Breast Imaging ordered and scheduled for tomorrow.     Payor: [employer] / Plan: [employer]VANTAGE / Product Type: HMO /     November 26, 2024  Referral received and reviewed.   Patient scheduled for breast imaging tomorrow.   Will follow up tomorrow once imaging has been completed.     November 27, 2024  MA DIAGNOSTIC LEFT W/ FITO -  11/27/2024 1:02 PM     HISTORY:  Diffuse left breast pain.     COMPARISON:  8/19/2024, 2/27/2023, 2/8/2022, 3/29/2021     BREAST DENSITY: There are scattered areas of fibroglandular density.     FINDINGS:  Diagnostic views of the left breast were obtained with  tomosynthesis. The pattern of glandular tissue is stable. There is no  suspicious finding to suggest malignancy nor is there an abnormality  to explain diffuse left breast pain.                                                                       IMPRESSION: BI-RADS CATEGORY: 1 -  Negative.    No evidence of malignancy. Results were discussed with the patient  during her appointment. As long as her physical examination remains  stable, annual screening mammography would be recommended.     RECOMMENDED FOLLOW-UP: Routine yearly mammography beginning at age 40  or as discussed with your provider.    Referral closed at this time.       Naz GATESN, RN, OCN  Oncology Nurse Navigator   [facility]  Cancer Care Service Line   New Patient Hem/Onc Scheduling / Referrals: [phone] (fax: [phone] )

## 2024-11-27 ENCOUNTER — ANCILLARY PROCEDURE (OUTPATIENT)
Dept: MAMMOGRAPHY | Facility: CLINIC | Age: 79
End: 2024-11-27
Attending: OBSTETRICS & GYNECOLOGY
Payer: COMMERCIAL

## 2024-11-27 DIAGNOSIS — N64.4 BREAST PAIN: ICD-10-CM

## 2024-11-27 PROCEDURE — 77065 DX MAMMO INCL CAD UNI: CPT | Mod: LT

## 2024-11-27 PROCEDURE — 77061 BREAST TOMOSYNTHESIS UNI: CPT | Mod: LT

## 2025-01-10 NOTE — TELEPHONE ENCOUNTER
9/4/24 ECC- Neg. Plan colp and  cotest in 1 year per provider due by 9/4/25   CHIEF COMPLAINT:   Black Cali is a 53 year old male who is being seen today for initial consultation for concern of No chief complaint on file.      HPI:    Patient presents today to discuss concerns regarding suspected JAE. He was referred by Dr. Luly Davila MD.    He was previously evaluated in our clinic nearly 2 years ago for snoring with a complex cardiovascular history and COPD. Home sleep study was ordered at that time but not completed. He has been using his mother's CPAP with sleep for years.        Sleep Patterns:  Bedtime: ***  Rise time: ***  Sleep latency: ***  Sleep aids: ***  Nocturia: ***  Frequent awakenings: ***  Prolonged awakenings: ***  Patient-estimated total sleep duration: *** hours  Sleep environment: Is*** kept dark and quiet. No television.     He does*** feel well rested in the mornings. {BMFSLEEPNAPS:155856}. {BMFSLEEPNAPSXS:344011}. ***    Daily activities:  Occupation: ***- on medical leave currently  Drowsiness during work day/daily activities: {YES/NO:914441}    Drowsy driving: {YES/NO:664611}  Countermeasures while driving: {YES/NO:705333}  Car accidents related to drowsy driving: {YES/NO:595849}  {DENIES:396464} falling asleep as a passenger in the car.     [place] Scale:     0 = would never doze  1 = slight chance of dozing  2 = moderate chance of dozing  3 = high chance of dozing      Situation     Chance of Dozing             1. Sitting and reading   {Chance of Dozing?:155607}          2. Watching TV    {Chance of Dozing?:044235}    3. Sitting, inactive in a public place       (e.g. a theatre or a meeting)  {Chance of Dozing?:485954}    4. As a passenger in a car for an hour       without a break    {Chance of Dozing?:805085}    5. Lying down to rest in the afternoon      when circumstances permit  {Chance of Dozing?:608455}    6. Sitting and talking to someone  {Chance of Dozing?:134681}    7. Sitting quietly after lunch without      Alcohol     {Chance of  Dozing?:752617}    8. 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 His features include coarctation of aorta, bicuspid aortic valve with stenosis, subaortic membrane, and mitral valve stenosis. He has undergone multiple palliative surgeries. He developed a-fib in Nov 2023, treated w/pharmacotherapy and underwent cardioversion. Ablation has been discussed but not completed given concern for the risk with his congenital disease.    Patient was admitted for hemoptysis and respiratory failure in December. He was discharged after 6 days but remains in a fib with RVR, fluid overload, orthopnea, and fatigue. Cardiology has recently made increases in his diuretic treatment given SOB and edema on exam. Pediatric cardiology has recommended admission to [facility] for MARGIE, cath, ablation. Recommendation was also for cardioversion ASAP. Staff is attempting to coordinate cardioversion with Dr. Werner.    He had an echo in 12/2024 that showed pulmonary artery systolic pressure of 75 mmHg. Last measurable EF was 52% in 2023 with severely increased left atrial chamber size. He previously has also undergone PFTs in 2014 and diagnosed with COPD, attributed to a 30 year smoking history starting at age 14 (quit in 2014). Recently prescribed Stiolto inhaler from PCP. Uptdated PFTs pending.        Tobacco use: {YES/NO:787438}  Last use before bed: {N/A:1343}  Alcohol intake: {YES/NO:980094}  Caffeine intake: {YES/NO:969375}  Last time of intake: {N/A:1343}    Significant Concurrent Health Conditions  ***: ***    ***: ***    ***: ***    ***: ***    ***: ***    REVIEW OF SYSTEMS:  Denies:  Constitutional: {[facility] HospitalFR[facility]GEN:387952}; Positive for  {ABMCBMFROSGEN:825972}.  HEENT: {ABMCBMFROSHEENT:367554}; Positive for {ABMCBMFROSHEENT:979776}.  Cardiac: {BMFROSHEART:337619}; Positive for {BMFROSHEART:750407}.  Respiratory: {BMFROSLUNGS:004159}; Positive for {BMFROSLUNGS:932929}.  GI/: {ABMCBMFROSGIGU:406047}; Positive for {ABMCBMFROSGIGU:919199}.  Immunology/Hematology: {ABMCBMFROSIMM:866291}; Positive for {ABMCBMFROSIMM:356954}.  Neuro: {BMFROSNEURO:227722}; Positive for {BMFROSNEURO:323040}.  Sleep: {ABMCBMFROSSLEEP:411420}; Positive for {ABMCBMFROSSLEEP:746567}.  Psych: {BMFROSPSYCH:386778}; Positive for {BMFROSPSYCH:745711}.      No outpatient medications have been marked as taking for the 1/14/25 encounter (Appointment) with Margaret Canseco APNP.     Patient Active Problem List   Diagnosis    Mitral valve stenosis    Lymphedema of lower extremity    S/P scar revision    History of mechanical aortic valve replacement    Bilateral carpal tunnel syndrome    Moderate COPD (chronic obstructive pulmonary disease)  (CMD)    Anticoagulated on warfarin    Mechanical heart valve present    Long term (current) use of anticoagulants    Atrial fibrillation  (CMD)    Shone complex (CMD)    Pulmonary hypertension  (CMD)    History of pulmonary edema    History of respiratory failure    Coarctation of the aorta, complex (CMD)    Subaortic membrane (CMD)    Left subclavian artery surgically divided. Monitor BP in RIGHT ARM only.     Past Medical History:   Diagnosis Date    Congenital aortic valve stenosis (CMD)     COPD (chronic obstructive pulmonary disease)  (CMD)     Essential (primary) hypertension     Heart murmur     Obesity (BMI 30-39.9)     Pneumonia     not clearing since 3-2014    Pulmonary hypertension  (CMD) 12/11/2024    S/P AVR (aortic valve replacement) 10/09/2014    Mechanical [employer] Reduced 23mm    Seroma 03/05/2015    sternal     Shone syndrome (CMD) 12/10/2024    Shone's syndrome (CMD)     Subaortic stenosis (CMD)     Unspecified  sinusitis (chronic)      Past Surgical History:   Procedure Laterality Date    Aortic valve replacement  10/09/2014    [employer] 23mm Reduced Mechanical Valve    Bronchoscopy      Bronchoscopy,diagnostic w lavage  2014         Bronchoscopy,transbronch biopsy  2014         Cardiac surgery      Cardiac surgery      Cardiac surgery for subaortic stenosis day at age 7 and age 14    Carpal tunnel release Left 2017    Dr. Chavez    Carpal tunnel release Right 2021    Dr. Valerio     Colonoscopy w/ polypectomy  10/09/2023    colonoscopy for surveillance in five years.    Ct angiogram coronary arteries      Echocardiogram      Elbow surgery Right 2021    Ulnar Nerve Transposition - Dr. Valerio     Place cath in svc ivc      Pulmonary function test      Revision of scar  2015    Sternal Scar Revision with muscle flaps     Social History     Tobacco Use    Smoking status: Former     Current packs/day: 0.00     Average packs/day: 0.5 packs/day for 25.0 years (12.5 ttl pk-yrs)     Types: Cigarettes     Start date: 1989     Quit date: 2014     Years since quittin.0    Smokeless tobacco: Never    Tobacco comments:     Has not used any electronic cigarettes since surgery   Vaping Use    Vaping status: never used   Substance Use Topics    Alcohol use: Not Currently     Alcohol/week: 0.0 - 1.0 standard drinks of alcohol     Comment: occasionally, not often    Drug use: No     Comment: Soda 2-3 cans per day     ALLERGIES:   Allergen Reactions    Lidocaine Other (See Comments)     Severe Facial Redness for a week.         PHYSICAL EXAM:   There were no vitals filed for this visit.    General: A&O x 3 in NAD, Well groomed, Well nourished, and Obese.  ENT: Nasal mucosa pink, moist.  Septum midline.  Oropharynx clear and moist without pharyngeal exudates.  Nares patent with inhalation and exhalation. . Tonsils +2 Mallampati appearance class 3. No retrognathia.  {bmfexMercy Hospitaltition:127540}  Neck: No thyromegaly, cervical LAD, trachea midline. Neck circumference measures *** cm.   Immune/Lymph: No lymphadenopathy present on palpation.  Cardiac: RRR without murmurs/ rubs/ gallops, normal S1/ S2. *** BLE edema.   Pulmonary: CTAB without wheezes/ rales/ rhonchi, no increased work of breathing.  Neuro: CN II-XII grossly intact.  Gait stable.  EOMIs intact. .  Skin: Warm & dry, no rashes on visualized skin. No significant skin lesions noted. .  Psych: well oriented, adequate memory , good historian , and mood/ affect appropriate.    ASSESSMENT/ PLAN:    Snoring  (primary encounter diagnosis)  Shone complex (CMD)  Pulmonary hypertension  (CMD)  Nonrheumatic mitral valve stenosis  Mechanical heart valve present  Coarctation of the aorta, complex (CMD)  Persistent atrial fibrillation  (CMD)  Moderate COPD (chronic obstructive pulmonary disease)  (CMD)  History of respiratory failure  History of pulmonary edema  Class 2 severe obesity due to excess calories with serious comorbidity and body mass index (BMI) of 38.0 to 38.9 in adult (CMD)  - New/unaddressed problem  - Recommend PSG to further evaluate patient's symptoms. His cardiopulmonary status has decompensated and he is no longer an appropriate candidate for a home sleep study given his risk of nocturnal hypoxemia. I would recommend ETCO2 and transcutaneous CO2 monitoring as well.  - He retains risk factors/symptoms of JAE including: snoring, male gender, age >50, and BMI >35.  - Discussed treatment options such as MAD (if applicable with AHI) and CPAP should sleep study be positive for JAE.  - Advised to work on weight loss, initial goal of 5-10% of current body weight. Loss of 15-20% may warrant pressure adjustment or repeat sleep study.  - Elevate head of the bed at night, avoid sleeping supine, avoid alcohol within 2-3 hours of bedtime.  - Questions invited and addressed.  - Further follow up TBD based on *** study  results.      {ABBMFDSLEEPPLAN:369943}      Margaret Canseco DNP, APNP, FNP-BC  [address] [address]       Topics    Alcohol use: Not Currently     Alcohol/week: 0.0 - 1.0 standard drinks of alcohol     Comment: occasionally, not often    Drug use: No     Comment: Soda 2-3 cans per day     ALLERGIES:   Allergen Reactions    Lidocaine Other (See Comments)     Severe Facial Redness for a week.         PHYSICAL EXAM:   Vitals:    01/14/25 0723   BP: 118/76   BP Location: LUE - Left upper extremity   Pulse: (!) 103   SpO2: 93%   Weight: 120.2 kg (265 lb)   Height: 5' 9\" (1.753 m)       General: A&O x 3 in NAD, Well groomed, Well nourished, and Obese.  ENT: Nasal mucosa pink, moist.  Septum midline.  Oropharynx clear and moist without pharyngeal exudates.  Nares patent with inhalation and exhalation. . Tonsils +2 Mallampati appearance class 3. No retrognathia. Dentition fair.  Neck: No thyromegaly, cervical LAD, trachea midline. Neck circumference measures 48 cm.   Immune/Lymph: No lymphadenopathy present on palpation.  Cardiac: RRR without murmurs/ rubs/ gallops, normal S1/ S2. +1 pitting BLE edema.   Pulmonary: Wheezes present with exhalation, more prominent on right posterior .  Neuro: CN II-XII grossly intact.  Gait stable.  EOMIs intact.   Skin: Warm & dry, no rashes on visualized skin. No significant skin lesions noted. .  Psych: well oriented, adequate memory , good historian , and mood/ affect appropriate.    ASSESSMENT/ PLAN:    Snoring  (primary encounter diagnosis)  Moderate COPD (chronic obstructive pulmonary disease)  (CMD)  Shone complex (CMD)  Pulmonary hypertension  (CMD)  Nonrheumatic mitral valve stenosis  Mechanical heart valve present  Coarctation of the aorta, complex (CMD)  Persistent atrial fibrillation  (CMD)  History of respiratory failure  History of pulmonary edema  Class 2 severe obesity with serious comorbidity and body mass index (BMI) of 39.0 to 39.9 in adult, unspecified obesity type (CMD)  - New/unaddressed problem  - Recommend PSG to further evaluate patient's symptoms. He likely has JAE but  this needs to be evaluated properly via a sleep study. He has been using his mother's CPAP which initially was helpful, but he is unsure if this is still the needed treatment. It is possible he may require BiPAP or supplemental O2.  - His cardiopulmonary status has decompensated and he is no longer an appropriate candidate for a home sleep study given his risk of nocturnal hypoxemia and CO2 retention. I would recommend ETCO2 monitoring with his PSG. Last echo showed an EF of 53% in 2023, he is scheduled for another echo in the future.  - He retains risk factors/symptoms of JAE including: snoring, male gender, age >50, BMI >35, choking, gasping, and large neck circumference.  - Discussed treatment with PAP should sleep study be positive for JAE. He is not likely a great candidate for MAD (PAP is likely better given underlying cardiopulmonary issues) and does not qualify for [facility] based on BMI.  - Discussed the importance of adequately treated JAE in terms of impact on his cardiac and pulmonary health, to decrease risk of a-fib recurrence (if cardioversion/ablation is successful), respiratory failure, COPD exacerbations, MI, stroke.  - Advised to work on weight loss, initial goal of 5-10% of current body weight. Loss of 15-20% may warrant pressure adjustment or repeat sleep study.  - Elevate head of the bed at night, avoid sleeping supine, avoid alcohol within 2-3 hours of bedtime.  - Questions invited and addressed.  - Further follow up TBD based on PSG study results.      A total of 50 minutes was spent by provider on today's encounter including review of chart, time with patient, and documentation.      Margaret Canseco, BIBIANA, APNP, FNP-BC  [address] [address]

## 2025-01-14 ENCOUNTER — TELEPHONE (OUTPATIENT)
Dept: OBGYN | Facility: CLINIC | Age: 80
End: 2025-01-14
Payer: COMMERCIAL

## 2025-01-14 DIAGNOSIS — N39.0 RECURRENT UTI: Primary | ICD-10-CM

## 2025-01-14 RX ORDER — PHENAZOPYRIDINE HYDROCHLORIDE 200 MG/1
200 TABLET, FILM COATED ORAL 3 TIMES DAILY PRN
Qty: 6 TABLET | Refills: 0 | Status: SHIPPED | OUTPATIENT
Start: 2025-01-14

## 2025-01-14 NOTE — TELEPHONE ENCOUNTER
[facility] Call Center    Phone Message    May a detailed message be left on voicemail: yes     Reason for Call: Symptoms or Concerns     If patient has red-flag symptoms, warm transfer to triage line    Current symptom or concern: UTI symptoms     Symptoms have been present for:  2 week(s)    Has patient previously been seen for this? Yes      Are there any new or worsening symptoms? Yes: Recently seen on 12/20 for a placement of pt vaginal ring, pt has been having UTI symptoms since and wondering if she can get testing and treatment.     Action Taken: Other: P S     Travel Screening: Not Applicable     Date of Service:

## 2025-01-20 ENCOUNTER — TELEPHONE (OUTPATIENT)
Dept: OBGYN | Facility: CLINIC | Age: 80
End: 2025-01-20
Payer: COMMERCIAL

## 2025-01-20 NOTE — TELEPHONE ENCOUNTER
[facility]    Phone Message    May a detailed message be left on voicemail: yes     Reason for Call: Requesting Results     Name/type of test: urine orders   Date of test: 1/17/2025  Was test done at a location other than [facility] (Please fill in the location if not [facility])?: No    Please call the patient back to discuss the results, she doesn't like to use the [facility] and she would like to speak to a person. Thank you.     Action Taken: Other: obgyn    Travel Screening: Not Applicable     Date of Service:

## 2025-01-20 NOTE — TELEPHONE ENCOUNTER
Called Elizabeth, let her know results are not in yet. Told her will look and see if another doctor can view results so she can start antibiotics.

## 2025-01-22 ENCOUNTER — VIRTUAL VISIT (OUTPATIENT)
Dept: INTERNAL MEDICINE | Facility: CLINIC | Age: 80
End: 2025-01-22
Payer: COMMERCIAL

## 2025-01-22 DIAGNOSIS — R19.5 LOOSE STOOLS: Primary | ICD-10-CM

## 2025-01-22 PROCEDURE — 98013 SYNCH AUDIO-ONLY EST LOW 20: CPT | Performed by: INTERNAL MEDICINE

## 2025-01-22 ASSESSMENT — ENCOUNTER SYMPTOMS: DIARRHEA: 1

## 2025-01-22 ASSESSMENT — PATIENT HEALTH QUESTIONNAIRE - PHQ9
SUM OF ALL RESPONSES TO PHQ QUESTIONS 1-9: 10
10. IF YOU CHECKED OFF ANY PROBLEMS, HOW DIFFICULT HAVE THESE PROBLEMS MADE IT FOR YOU TO DO YOUR WORK, TAKE CARE OF THINGS AT HOME, OR GET ALONG WITH OTHER PEOPLE: EXTREMELY DIFFICULT
SUM OF ALL RESPONSES TO PHQ QUESTIONS 1-9: 10

## 2025-01-22 NOTE — PROGRESS NOTES
"Elizabeth is a 79 year old who is being evaluated via a billable telephone visit.    What phone number would you like to be contacted at? [phone]   How would you like to obtain your AVS? Mail a copy  Originating Location (pt. Location): Home    Distant Location (provider location):  On-site  Telephone visit completed due to the patient did not consent to a video visit.    Assessment & Plan     (R19.5) Loose stools  (primary encounter diagnosis)  Comment: liquid for the last 6 months or more. Unsure of onset. Over the last week or two, very soft now--still fairly frequent. No weight loss or night time sx.  Plan: will try daily metamucil to start. Consider probiotic as well. If no improvement in the coming 1-2 weeks, she will let us know          BMI  Estimated body mass index is 31.6 kg/m  as calculated from the following:    Height as of 8/26/24: 1.638 m (5' 4.5\").    Weight as of 9/4/24: 84.8 kg (187 lb).             Subjective   Elizabeth is a 79 year old, presenting for the following health issues:  Diarrhea (Pt c/o diarrhea for last year, initially persistent, now intermittent)      1/22/2025     3:08 PM   Additional Questions   Roomed by Maximo CHARLES RN     Diarrhea    History of Present Illness       Reason for visit:  Diarrhea  Symptom onset:  More than a month  Symptoms include:  Loose diarrhea  Symptom intensity:  Moderate  Symptom progression:  Improving  Had these symptoms before:  Yes  Has tried/received treatment for these symptoms:  Yes  Previous treatment was successful:  No  What makes it worse:  Nothing  What makes it better:  Nothing   She is taking medications regularly.                   Objective    Vitals - Patient Reported  Pain Score: No Pain (0)        Physical Exam   General: Alert and no distress //Respiratory: No audible wheeze, cough, or shortness of breath // Psychiatric:  Appropriate affect, tone, and pace of words            Phone call duration: 10 minutes  Signed Electronically by: Brennan" Eric De Luna MD

## 2025-01-28 DIAGNOSIS — N39.0 RECURRENT UTI: Primary | ICD-10-CM

## 2025-01-29 ENCOUNTER — OFFICE VISIT (OUTPATIENT)
Dept: UROLOGY | Facility: CLINIC | Age: 80
End: 2025-01-29
Attending: OBSTETRICS & GYNECOLOGY
Payer: COMMERCIAL

## 2025-01-29 VITALS
HEIGHT: 65 IN | WEIGHT: 193.5 LBS | BODY MASS INDEX: 32.24 KG/M2 | HEART RATE: 65 BPM | DIASTOLIC BLOOD PRESSURE: 92 MMHG | SYSTOLIC BLOOD PRESSURE: 154 MMHG

## 2025-01-29 DIAGNOSIS — R30.0 DYSURIA: Primary | ICD-10-CM

## 2025-01-29 DIAGNOSIS — N39.0 CHRONIC UTI: ICD-10-CM

## 2025-01-29 LAB
ALBUMIN UR-MCNC: NEGATIVE MG/DL
ALBUMIN UR-MCNC: NEGATIVE MG/DL
APPEARANCE UR: ABNORMAL
APPEARANCE UR: ABNORMAL
BACTERIA #/AREA URNS HPF: ABNORMAL /HPF
BILIRUB UR QL STRIP: NEGATIVE
BILIRUB UR QL STRIP: NEGATIVE
COLOR UR AUTO: ABNORMAL
COLOR UR AUTO: YELLOW
GLUCOSE UR STRIP-MCNC: NEGATIVE MG/DL
GLUCOSE UR STRIP-MCNC: NEGATIVE MG/DL
HGB UR QL STRIP: ABNORMAL
HGB UR QL STRIP: NEGATIVE
KETONES UR STRIP-MCNC: NEGATIVE MG/DL
KETONES UR STRIP-MCNC: NEGATIVE MG/DL
LEUKOCYTE ESTERASE UR QL STRIP: ABNORMAL
LEUKOCYTE ESTERASE UR QL STRIP: ABNORMAL
MUCOUS THREADS #/AREA URNS LPF: PRESENT /LPF
NITRATE UR QL: NEGATIVE
NITRATE UR QL: NEGATIVE
PH UR STRIP: 6 [PH] (ref 5–7)
PH UR STRIP: 6 [PH] (ref 5–8)
RBC URINE: 4 /HPF
SP GR UR STRIP: 1.01 (ref 1–1.03)
SP GR UR STRIP: 1.02 (ref 1–1.03)
SQUAMOUS EPITHELIAL: 2 /HPF
TRANSITIONAL EPI: <1 /HPF
UROBILINOGEN UR STRIP-ACNC: 0.2 E.U./DL
UROBILINOGEN UR STRIP-MCNC: NORMAL MG/DL
WBC URINE: 141 /HPF

## 2025-01-29 PROCEDURE — G0463 HOSPITAL OUTPT CLINIC VISIT: HCPCS | Performed by: OBSTETRICS & GYNECOLOGY

## 2025-01-29 PROCEDURE — 81003 URINALYSIS AUTO W/O SCOPE: CPT | Performed by: OBSTETRICS & GYNECOLOGY

## 2025-01-29 RX ORDER — NITROFURANTOIN 25; 75 MG/1; MG/1
100 CAPSULE ORAL 2 TIMES DAILY
Qty: 14 CAPSULE | Refills: 0 | Status: SHIPPED | OUTPATIENT
Start: 2025-01-29 | End: 2025-02-05

## 2025-01-29 NOTE — PROGRESS NOTES
[facility] [place]  [address], [address]  [facility] [address]  Phone: [phone]  Fax: [phone]    Patient:  Tangela Chapa, Date of birth 1945  Date of Visit:  01/29/2025  Referring Provider No ref. provider found  Primary Care Provider: Brennan De Luna    Assessment & Plan      A/P: Tangela Chapa is a 79 year old F with PMH significant for chronic recurrent UTIs that seemed to improve after initiation of Estring, but now having recurrence of sx. Will empirically treat for UTI given sx and POC UA concerning for UTI.   - Start Macrobid 1 pill BID for 7 days  - Will evaluate with Urine culture and reach out if antibiotic changes are necessary   - Continue working on diarrhea: do not take Metamucil w/in two hours before/after other medications  - Supplements to prevent UTI to consider  -Probiotics   Biom Vaginal Probiotic   Ana Maria Power Vaginal Probiotic  -Cranberry (for these products let them know a doctor is recommending them)   EllLifeables: [email or web address]   Theracran HP by Theralogix [facility] [id number]  -d-mannose 500mg twice a day  -Vitamin C 500-1000mg twice a day  -Methanamine 1gm twice a day    Katerina Escalante MD   Obstetrics, Gynecology & Women's Health   Resident, PGY-1  01/29/2025 3:17 PM      30 minutes spent by me on the date of the encounter doing chart review, history and exam, documentation and further activities per the note       History of Present Illness     Pertinent history obtain from: chart review and patient    Pt has had recurrent UTI sx since last 3 weeks. She has had recurrent UTIs since her 20's that worsened with menopause (3 per month). She got the Estring, which greatly improved sx up until recently. One culture positive UTI this last year. Sx are burning with urination, increased urgency, pain at the end of stream. Also having chronic diarrhea, and just started taking Metamucil which has helped a  bit.     Stress Incontinence:  No s/sx of CHRISTIAN    Urge Incontinence:  Do you often get sudden urges to urinate? Every 30-60 min  If so, do you leak with these urges? Monthly full volume leakage of urine and stool if can't make it in time  Impact to quality of life? Significant     Voids/day: every hour  Nocturia: 3-5x  Fluid intake: 8 oz.bottle of ice water and Diet Mountain Dew   Caffeine: No    Urinating:  Difficulty starting urination or strain to void? No  Weak or intermittent stream? No  Incomplete emptying or dribbling? No  Pain or burning with urination? See HPI  Any blood in your urine? No    GI:  Constipation? 6 months of diarrhea  Frequency stools every time with urination    Straining for stools No  Stool consistency Chronic diarrhea, started Metamucil which is helping slightly      Ever leak stool (Accidental Bowel Leakage)? Monthly full volume leakage of urine and stool if can't make it in time  History of irritable bowel or Crohn's? Hx/o gastritis     Sexual/Pain:  Are you currently having sex?. No    Prior therapy:  Ever done pelvic floor physical therapy? No  Trial of medication? Estring    Medical History:  Do you have?   High Cholesterol? Yes     Diabetes? No, prediabetes   High Blood pressure? Yes     Recurrent UTIs? Yes, see HPI  Sleep Apnea? Persistent insomnia  Other medical problems: s/p neg ECC for ASCUS Pap and HR HPV (not 16 or 18), MDD    Surgical History:    Hysterectomy? No   Bladder Surgery? Cystoscopy 2011      OB/Gyn History:  G0  Menopause  Last Pap smear? 9/4/24 ECC- Neg. Plan colp and  cotest in 1 yr  Last mammogram? 11/2024,  Last colonoscopy? 2018, Last DEXA? 2016    Medications/Vitamins/Supplements: reviewed    Drug Allergies: reviewed    Latex Allergy: reviewed  Iodine Allergy reviewed    Family History: (list relationship and age at diagnosis)  Breast cancer? reviewed   Ovarian cancer? reviewed   Colon cancer? reviewed  Other? 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       Past Surgical History:   Procedure Laterality Date    ABDOMINOPLASTY      ABDOMINOPLASTY      ARTHROSCOPY KNEE IRRIGATION AND DEBRIDEMENT      RT-Articular Cartilage    BIOPSY BREAST      benign open brest biopsy    BIOPSY BREAST Left     benign x2    BIOPSY CERVICAL, LOCAL EXCISION, SINGLE/MULTIPLE  10/09/2017    BIOPSY OF BREAST, INCISIONAL      Description: Biopsy Breast Open;  Recorded: 06/06/2008;  Comments: BENIGN    CATARACT EXTRACTION W/ INTRAOCULAR LENS IMPLANT Left 01/01/2016    CATARACT EXTRACTION, BILATERAL  04/01/2015    COLPOSCOPY, BIOPSY, COMBINED  02/16/2010    TZ-not seen    ENDOMETRIAL SAMPLING (BIOPSY)  03/31/2005    benign    GENITOURINARY SURGERY  04/06/2011    cystoscopy with +1 trabeculaion    HC KNEE SCOPE, DIAGNOSTIC      Description: Arthroscopy Knee Right;  Recorded: 06/06/2008;  Comments: FOR MENISCUS TEAR    HC OSTEOTOMY METATARSAL (NOT 1ST)      Description: Metatarsal Osteotomy Of The Fifth Metatarsal;  Proc Date: 02/01/2007;    IR LUMBAR EPIDURAL STEROID INJECTION  09/30/2004    IR LUMBAR EPIDURAL STEROID INJECTION  10/11/2004    IR LUMBAR EPIDURAL STEROID INJECTION  11/05/2004    JOINT REPLACEMENT, HIP RT/LT  01/01/2008    Joint Replacement Hip RT/LT    OPEN REDUCTION INTERNAL FIXATION ANKLE Right 02/24/2024    Procedure: OPEN REDUCTION INTERNAL FIXATION, FRACTURE, TRIMALLEOLAR ANKLE;  Surgeon: Manpreet Cai MD;  Location: [facility] OR    [facility] CARPAL TUNNEL BILATERAL      ZZC TOTAL HIP ARTHROPLASTY      Description: Total Hip Replacement;  Proc Date: 06/01/2008;  Comments: RIGHT    ZZC TOTAL HIP ARTHROPLASTY      Description: Total Hip Replacement;  Proc Date: 08/01/2008;       Social History     Socioeconomic History    Marital  status: Single     Spouse name: Not on file    Number of children: Not on file    Years of education: Not on file    Highest education level: Not on file   Occupational History    Not on file   Tobacco Use    Smoking status: Former     Passive exposure: Past    Smokeless tobacco: Never    Tobacco comments:     quit 30 years ago 1970   Vaping Use    Vaping status: Never Used   Substance and Sexual Activity    Alcohol use: Yes     Alcohol/week: 0.0 - 0.8 standard drinks of alcohol     Comment: Rare    Drug use: No    Sexual activity: Not Currently     Partners: Male     Birth control/protection: Post-menopausal   Other Topics Concern     Service No    Blood Transfusions No    Caffeine Concern No     Comment: 3-4 pop/d (diet)    Occupational Exposure No    Hobby Hazards No    Sleep Concern Yes     Comment: sleeps too much -- tired after 9-10 hrs/nite    Stress Concern Yes     Comment: holiday lonliness    Weight Concern Yes     Comment: admits to eating more than she uses -- declines wt today    Special Diet No    Back Care No    Exercise Yes     Comment: sedentary -- plans to restart    Bike Helmet No    Seat Belt No    Self-Exams No    Parent/sibling w/ CABG, MI or angioplasty before 65F 55M? Not Asked   Social History Narrative    How much exercise per week? Stationary bike, recent weight loss    How much calcium per day? Supplement       How much caffeine per day? 2 cans a day    How much vitamin D per day? supplment    Do you/your family wear seatbelts?  Yes    Do you/your family use safety helmets? Yes    Do you/your family use sunscreen? Yes    Do you/your family keep firearms in the home? Yes, locked    Do you/your family have a smoke detector(s)? Yes        Do you feel safe in your home? Yes    Has anyone ever touched you in an unwanted manner? No     Explain     Updated 8/28 CHIKI Santiago          Social Drivers of Health     Financial Resource Strain: Low Risk  (7/31/2024)    Financial Resource Strain      Within the past 12 months, have you or your family members you live with been unable to get utilities (heat, electricity) when it was really needed?: No   Food Insecurity: Low Risk  (7/31/2024)    Food Insecurity     Within the past 12 months, did you worry that your food would run out before you got money to buy more?: No     Within the past 12 months, did the food you bought just not last and you didn t have money to get more?: No   Transportation Needs: Low Risk  (7/31/2024)    Transportation Needs     Within the past 12 months, has lack of transportation kept you from medical appointments, getting your medicines, non-medical meetings or appointments, work, or from getting things that you need?: No   Physical Activity: Unknown (7/31/2024)    Exercise Vital Sign     Days of Exercise per Week: 2 days     Minutes of Exercise per Session: Not on file   Stress: No Stress Concern Present (7/31/2024)    [place] [employer] - Occupational Stress Questionnaire     Feeling of Stress : Only a little   Social Connections: Unknown (7/31/2024)    Social Connection and Isolation Panel [NHANES]     Frequency of Communication with Friends and Family: Not on file     Frequency of Social Gatherings with Friends and Family: Once a week     Attends Restorationism Services: Not on file     Active Member of Clubs or Organizations: Not on file     Attends Club or Organization Meetings: Not on file     Marital Status: Not on file   Interpersonal Safety: Low Risk  (7/31/2024)    Interpersonal Safety     Do you feel physically and emotionally safe where you currently live?: Yes     Within the past 12 months, have you been hit, slapped, kicked or otherwise physically hurt by someone?: No     Within the past 12 months, have you been humiliated or emotionally abused in other ways by your partner or ex-partner?: No   Housing Stability: Low Risk  (7/31/2024)    Housing Stability     Do you have housing? : Yes     Are you  "worried about losing your housing?: No       Family History   Problem Relation Age of Onset    Alcohol/Drug Father     Depression Father     Alcohol/Drug Brother     Breast Cancer Mother     Depression Mother     Diabetes Maternal Grandfather     Cancer Brother 68        blood cancer    Breast Cancer Cousin     Breast Cancer Cousin     Breast Cancer Cousin     Leukemia Brother       ROS: 10 point ROS neg other than the symptoms noted above in the HPI.    Allergies   Allergen Reactions    Dust Mite Extract     Seasonal Allergies        Current Outpatient Medications   Medication Sig Dispense Refill    citalopram (CELEXA) 40 MG tablet Take 1 tablet (40 mg) by mouth daily 90 tablet 1    cycloSPORINE (RESTASIS) 0.05 % ophthalmic emulsion 1 drop every 12 hours.      estradiol (ESTRING) 7.5 MCG/24HR vaginal ring Place once vaginally and refill as instructed 1 each 3    nitroFURantoin macrocrystal-monohydrate (MACROBID) 100 MG capsule Take 1 capsule (100 mg) by mouth 2 times daily for 7 days. 14 capsule 0    pravastatin (PRAVACHOL) 40 MG tablet Take 1 tablet (40 mg) by mouth daily 90 tablet 1    traZODone (DESYREL) 50 MG tablet Take 1 tablet (50 mg) by mouth at bedtime 90 tablet 3    phenazopyridine (PYRIDIUM) 200 MG tablet Take 1 tablet (200 mg) by mouth 3 times daily as needed for irritation. For 2 days (Patient not taking: Reported on 1/29/2025) 6 tablet 0     No current facility-administered medications for this visit.       Physical Exam     Vital signs:  BP (!) 154/92   Pulse 65   Ht 1.638 m (5' 4.5\")   Wt 87.8 kg (193 lb 8 oz)   LMP  (LMP Unknown)   BMI 32.70 kg/m          Data   Laboratory data and imaging listed below were reviewed as part of this encounter.     Results for orders placed or performed in visit on 01/29/25   [facility] Urine Macroscopic POCT     Status: Abnormal   Result Value Ref Range    BILIRUBIN, URINE POCT Negative Negative    GLUCOSE, URINE POCT Negative Negative mg/dL    KETONES, URINE " POCT Negative Negative mg/dL mg/dL    NITRITES POCT Negative Negative    PH, URINE POCT 6.0 5.0 - 8.0    PROTEIN, URINE POCT Negative Negative mg/dL    SPECIFIC GRAVITY POCT 1.020 1.005 - 1.030    UROBILINOGEN, URINE POCT 0.2 0.2, 1.0 E.U./dL    COLOR, URINE POCT Yellow Colorless, Straw, Light Yellow, Yellow    CLARITY, URINE POCT Slightly Cloudy (A) Clear    Blood, Urine POCT Trace (A) Negative    LEUK ESTERASE, POCT Moderate (A) Negative                      Katerina Escalante MD   Obstetrics, Gynecology & Women's Health   Resident, PGY-1    I attest that I was present for the history and personally performed the physical exam and medical decision making and that I agree with the findings and treatment plan outlined above.     I spent a total of 30 minutes with  Ms. Chapa  on the date of the encounter in chart review, face to face patient visit, review of tests, documentation and/or discussion with other providers about the issues documented above.     Shahram Odonnell MD  Professor, OB/GYN  Urogynecologist      01/29/2025 3:17 PM  CC  Patient Care Team:  Brennan De Luna MD as PCP - General (Internal Medicine)  Macarena Gusman MD as MD (OB/Gyn)  Macarena Gusman MD as Assigned OBGYN Provider  Miguel Doherty DPM as Assigned Surgical Provider  Brennan De Luna MD as Assigned PCP  Richelle Dawn PA-C as Physician Assistant (Urology)

## 2025-01-29 NOTE — PATIENT INSTRUCTIONS
Thank you for trusting us with your care!   Please be aware, if you are on Mychart, you may see your results prior to your providers review. If labs are abnormal, we will call or message you on Mychart with a follow up plan.    If you need to contact us for questions about:  Symptoms, Scheduling & Medical Questions; Non-urgent (2-3 day response) Mychart message, Urgent (needing response today) [phone] (if after 3:30pm next day response)   Prescriptions: Please call your Pharmacy   Billing: Sofie [phone] or  Physicians:[phone]    Thank you for coming to see me today. I hope that I was able to answer your questions. Please do not hesitate to call if you have any further questions or concerns.     We discussed the possible cause of your symptoms and what to do to prevent a recurrent bladder infection.   Supplements to prevent UTI to consider  -Probiotics   Biom Vaginal Probiotic   Ana Maria Power Vaginal Probiotic  -Cranberry (for these products let them know a doctor is recommending them)   Ellura: [email or web address]   Theracran HP by Theralogix [facility] [id number]  -d-mannose 500mg twice a day  -Vitamin C 500-1000mg twice a day  -Methanamine 1gm twice a day    I also placed an order to a urinalysis and culture in case your symptoms come back. You can go to any [facility] to get that test done.

## 2025-01-29 NOTE — LETTER
1/29/2025       RE: Tangela Chapa  [address]  [place] [address]     Dear Colleague,    Thank you for referring your patient, Tangela Chapa, to the [facility]. Please see a copy of my visit note below.      [facility]  [address]  [place] [facility] [address]  Phone: [phone]  Fax: [phone]    Patient:  Tangela Chapa, Date of birth 1945  Date of Visit:  01/29/2025  Referring Provider No ref. provider found  Primary Care Provider: Brennan De Luna    Assessment & Plan     A/P: Tangela Chapa is a 79 year old F with PMH significant for chronic recurrent UTIs that seemed to improve after initiation of Estring, but now having recurrence of sx. Will empirically treat for UTI given sx and POC UA concerning for UTI.   - Start Macrobid 1 pill BID for 7 days  - Will evaluate with Urine culture and reach out if antibiotic changes are necessary   - Continue working on diarrhea: do not take Metamucil w/in two hours before/after other medications  - Supplements to prevent UTI to consider  -Probiotics   Biom Vaginal Probiotic   Ana Maria Power Vaginal Probiotic  -Cranberry (for these products let them know a doctor is recommending them)   Ellura: [email or web address]   Theracran HP by [employer] [facility] [id number]  -d-mannose 500mg twice a day  -Vitamin C 500-1000mg twice a day  -Methanamine 1gm twice a day    Katerina Escalante MD   Obstetrics, Gynecology & Women's Health   Resident, PGY-1  01/29/2025 3:17 PM      30 minutes spent by me on the date of the encounter doing chart review, history and exam, documentation and further activities per the note       History of Present Illness    Pertinent history obtain from: chart review and patient    Pt has had recurrent UTI sx since last 3 weeks. She has had recurrent UTIs since her  20's that worsened with menopause (3 per month). She got the Estring, which greatly improved sx up until recently. One culture positive UTI this last year. Sx are burning with urination, increased urgency, pain at the end of stream. Also having chronic diarrhea, and just started taking Metamucil which has helped a bit.     Stress Incontinence:  No s/sx of CHRISTIAN    Urge Incontinence:  Do you often get sudden urges to urinate? Every 30-60 min  If so, do you leak with these urges? Monthly full volume leakage of urine and stool if can't make it in time  Impact to quality of life? Significant     Voids/day: every hour  Nocturia: 3-5x  Fluid intake: 8 oz.bottle of ice water and Diet Mountain Dew   Caffeine: No    Urinating:  Difficulty starting urination or strain to void? No  Weak or intermittent stream? No  Incomplete emptying or dribbling? No  Pain or burning with urination? See HPI  Any blood in your urine? No    GI:  Constipation? 6 months of diarrhea  Frequency stools every time with urination    Straining for stools No  Stool consistency Chronic diarrhea, started Metamucil which is helping slightly      Ever leak stool (Accidental Bowel Leakage)? Monthly full volume leakage of urine and stool if can't make it in time  History of irritable bowel or Crohn's? Hx/o gastritis     Sexual/Pain:  Are you currently having sex?. No    Prior therapy:  Ever done pelvic floor physical therapy? No  Trial of medication? Estring    Medical History:  Do you have?   High Cholesterol? Yes     Diabetes? No, prediabetes   High Blood pressure? Yes     Recurrent UTIs? Yes, see HPI  Sleep Apnea? Persistent insomnia  Other medical problems: s/p neg ECC for ASCUS Pap and HR HPV (not 16 or 18), MDD    Surgical History:    Hysterectomy? No   Bladder Surgery? Cystoscopy 2011      OB/Gyn History:  G0  Menopause  Last Pap smear? 9/4/24 ECC- Neg. Plan colp and  cotest in 1 yr  Last mammogram? 11/2024,  Last colonoscopy? 2018, Last DEXA?  2016    Medications/Vitamins/Supplements: reviewed    Drug Allergies: reviewed    Latex Allergy: reviewed  Iodine Allergy reviewed    Family History: (list relationship and age at diagnosis)  Breast cancer? reviewed   Ovarian cancer? reviewed   Colon cancer? reviewed  Other? 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       Past Surgical History:   Procedure Laterality Date     ABDOMINOPLASTY       ABDOMINOPLASTY       ARTHROSCOPY KNEE IRRIGATION AND DEBRIDEMENT      RT-Articular Cartilage     BIOPSY BREAST      benign open brest biopsy     BIOPSY BREAST Left     benign x2     BIOPSY CERVICAL, LOCAL EXCISION, SINGLE/MULTIPLE  10/09/2017     BIOPSY OF BREAST, INCISIONAL      Description: Biopsy Breast Open;  Recorded: 06/06/2008;  Comments: BENIGN     CATARACT EXTRACTION W/ INTRAOCULAR LENS IMPLANT Left 01/01/2016     CATARACT EXTRACTION, BILATERAL  04/01/2015     COLPOSCOPY, BIOPSY, COMBINED  02/16/2010    TZ-not seen     ENDOMETRIAL SAMPLING (BIOPSY)  03/31/2005    benign     GENITOURINARY SURGERY  04/06/2011    cystoscopy with +1 trabeculaion     HC KNEE SCOPE, DIAGNOSTIC      Description: Arthroscopy Knee Right;  Recorded: 06/06/2008;  Comments: FOR MENISCUS TEAR     HC OSTEOTOMY METATARSAL (NOT 1ST)      Description: Metatarsal Osteotomy Of The Fifth Metatarsal;  Proc Date: 02/01/2007;     IR LUMBAR EPIDURAL STEROID INJECTION  09/30/2004     IR LUMBAR EPIDURAL STEROID INJECTION  10/11/2004     IR LUMBAR EPIDURAL STEROID INJECTION  11/05/2004     JOINT REPLACEMENT, HIP RT/LT  01/01/2008    Joint Replacement Hip RT/LT     OPEN REDUCTION INTERNAL FIXATION ANKLE Right 02/24/2024    Procedure: OPEN REDUCTION INTERNAL FIXATION, FRACTURE, TRIMALLEOLAR ANKLE;  Surgeon:  Manpreet Cai MD;  Location: [facility] OR     RELEASE CARPAL TUNNEL BILATERAL       ZZC TOTAL HIP ARTHROPLASTY      Description: Total Hip Replacement;  Proc Date: 06/01/2008;  Comments: RIGHT     ZZC TOTAL HIP ARTHROPLASTY      Description: Total Hip Replacement;  Proc Date: 08/01/2008;       Social History     Socioeconomic History     Marital status: Single     Spouse name: Not on file     Number of children: Not on file     Years of education: Not on file     Highest education level: Not on file   Occupational History     Not on file   Tobacco Use     Smoking status: Former     Passive exposure: Past     Smokeless tobacco: Never     Tobacco comments:     quit 30 years ago 1970   Vaping Use     Vaping status: Never Used   Substance and Sexual Activity     Alcohol use: Yes     Alcohol/week: 0.0 - 0.8 standard drinks of alcohol     Comment: Rare     Drug use: No     Sexual activity: Not Currently     Partners: Male     Birth control/protection: Post-menopausal   Other Topics Concern      Service No     Blood Transfusions No     Caffeine Concern No     Comment: 3-4 pop/d (diet)     Occupational Exposure No     Hobby Hazards No     Sleep Concern Yes     Comment: sleeps too much -- tired after 9-10 hrs/nite     Stress Concern Yes     Comment: holiday lonliness     Weight Concern Yes     Comment: admits to eating more than she uses -- declines wt today     Special Diet No     Back Care No     Exercise Yes     Comment: sedentary -- plans to restart     Bike Helmet No     Seat Belt No     Self-Exams No     Parent/sibling w/ CABG, MI or angioplasty before 65F 55M? Not Asked   Social History Narrative    How much exercise per week? Stationary bike, recent weight loss    How much calcium per day? Supplement       How much caffeine per day? 2 cans a day    How much vitamin D per day? supplment    Do you/your family wear seatbelts?  Yes    Do you/your family use safety helmets? Yes    Do you/your family use  sunscreen? Yes    Do you/your family keep firearms in the home? Yes, locked    Do you/your family have a smoke detector(s)? Yes        Do you feel safe in your home? Yes    Has anyone ever touched you in an unwanted manner? No     Explain     Updated 8/28 CHIKI Santiago          Social Drivers of Health     Financial Resource Strain: Low Risk  (7/31/2024)    Financial Resource Strain      Within the past 12 months, have you or your family members you live with been unable to get utilities (heat, electricity) when it was really needed?: No   Food Insecurity: Low Risk  (7/31/2024)    Food Insecurity      Within the past 12 months, did you worry that your food would run out before you got money to buy more?: No      Within the past 12 months, did the food you bought just not last and you didn t have money to get more?: No   Transportation Needs: Low Risk  (7/31/2024)    Transportation Needs      Within the past 12 months, has lack of transportation kept you from medical appointments, getting your medicines, non-medical meetings or appointments, work, or from getting things that you need?: No   Physical Activity: Unknown (7/31/2024)    Exercise Vital Sign      Days of Exercise per Week: 2 days      Minutes of Exercise per Session: Not on file   Stress: No Stress Concern Present (7/31/2024)    [place] citizen [place] of Occupational Health - Occupational Stress Questionnaire      Feeling of Stress : Only a little   Social Connections: Unknown (7/31/2024)    Social Connection and Isolation Panel [NHANES]      Frequency of Communication with Friends and Family: Not on file      Frequency of Social Gatherings with Friends and Family: Once a week      Attends Orthodox Services: Not on file      Active Member of Clubs or Organizations: Not on file      Attends Club or Organization Meetings: Not on file      Marital Status: Not on file   Interpersonal Safety: Low Risk  (7/31/2024)    Interpersonal Safety      Do you feel physically and  "emotionally safe where you currently live?: Yes      Within the past 12 months, have you been hit, slapped, kicked or otherwise physically hurt by someone?: No      Within the past 12 months, have you been humiliated or emotionally abused in other ways by your partner or ex-partner?: No   Housing Stability: Low Risk  (7/31/2024)    Housing Stability      Do you have housing? : Yes      Are you worried about losing your housing?: No       Family History   Problem Relation Age of Onset     Alcohol/Drug Father      Depression Father      Alcohol/Drug Brother      Breast Cancer Mother      Depression Mother      Diabetes Maternal Grandfather      Cancer Brother 68        blood cancer     Breast Cancer Cousin      Breast Cancer Cousin      Breast Cancer Cousin      Leukemia Brother       ROS: 10 point ROS neg other than the symptoms noted above in the HPI.    Allergies   Allergen Reactions     Dust Mite Extract      Seasonal Allergies        Current Outpatient Medications   Medication Sig Dispense Refill     citalopram (CELEXA) 40 MG tablet Take 1 tablet (40 mg) by mouth daily 90 tablet 1     cycloSPORINE (RESTASIS) 0.05 % ophthalmic emulsion 1 drop every 12 hours.       estradiol (ESTRING) 7.5 MCG/24HR vaginal ring Place once vaginally and refill as instructed 1 each 3     nitroFURantoin macrocrystal-monohydrate (MACROBID) 100 MG capsule Take 1 capsule (100 mg) by mouth 2 times daily for 7 days. 14 capsule 0     pravastatin (PRAVACHOL) 40 MG tablet Take 1 tablet (40 mg) by mouth daily 90 tablet 1     traZODone (DESYREL) 50 MG tablet Take 1 tablet (50 mg) by mouth at bedtime 90 tablet 3     phenazopyridine (PYRIDIUM) 200 MG tablet Take 1 tablet (200 mg) by mouth 3 times daily as needed for irritation. For 2 days (Patient not taking: Reported on 1/29/2025) 6 tablet 0     No current facility-administered medications for this visit.       Physical Exam    Vital signs:  BP (!) 154/92   Pulse 65   Ht 1.638 m (5' 4.5\")   Wt " 87.8 kg (193 lb 8 oz)   LMP  (LMP Unknown)   BMI 32.70 kg/m          Data  Laboratory data and imaging listed below were reviewed as part of this encounter.     Results for orders placed or performed in visit on 01/29/25   Clinitek Urine Macroscopic POCT     Status: Abnormal   Result Value Ref Range    BILIRUBIN, URINE POCT Negative Negative    GLUCOSE, URINE POCT Negative Negative mg/dL    KETONES, URINE POCT Negative Negative mg/dL mg/dL    NITRITES POCT Negative Negative    PH, URINE POCT 6.0 5.0 - 8.0    PROTEIN, URINE POCT Negative Negative mg/dL    SPECIFIC GRAVITY POCT 1.020 1.005 - 1.030    UROBILINOGEN, URINE POCT 0.2 0.2, 1.0 E.U./dL    COLOR, URINE POCT Yellow Colorless, Straw, Light Yellow, Yellow    CLARITY, URINE POCT Slightly Cloudy (A) Clear    Blood, Urine POCT Trace (A) Negative    LEUK ESTERASE, POCT Moderate (A) Negative                      Katerina Escalante MD   Obstetrics, Gynecology & Women's Health   Resident, PGY-1    I attest that I was present for the history and personally performed the physical exam and medical decision making and that I agree with the findings and treatment plan outlined above.     I spent a total of 30 minutes with  Ms. Chapa  on the date of the encounter in chart review, face to face patient visit, review of tests, documentation and/or discussion with other providers about the issues documented above.     Shahram Odonnell MD  Professor, OB/GYN  Urogynecologist      01/29/2025 3:17 PM  CC  Patient Care Team:  Brennan De Luna MD as PCP - General (Internal Medicine)  aMcarena Gusman MD as MD (OB/Gyn)  Macarena Gusman MD as Assigned OBGYN Provider  Miguel Doherty DPM as Assigned Surgical Provider  Brennan De Luna MD as Assigned PCP  Richelle Dawn PA-C as Physician Assistant (Urology)        Again, thank you for allowing me to participate in the care of your patient.      Sincerely,    Shahram Odonnell MD

## 2025-01-30 LAB — BACTERIA UR CULT: NORMAL

## 2025-02-17 DIAGNOSIS — F41.9 ANXIETY: ICD-10-CM

## 2025-02-17 RX ORDER — CITALOPRAM HYDROBROMIDE 40 MG/1
40 TABLET ORAL DAILY
Qty: 90 TABLET | Refills: 1 | Status: SHIPPED | OUTPATIENT
Start: 2025-02-17

## 2025-03-06 DIAGNOSIS — E78.00 HYPERCHOLESTEROLEMIA: ICD-10-CM

## 2025-03-06 RX ORDER — PRAVASTATIN SODIUM 40 MG
40 TABLET ORAL DAILY
Qty: 90 TABLET | Refills: 2 | Status: SHIPPED | OUTPATIENT
Start: 2025-03-06

## 2025-03-24 ENCOUNTER — OFFICE VISIT (OUTPATIENT)
Dept: UROLOGY | Facility: CLINIC | Age: 80
End: 2025-03-24
Attending: OBSTETRICS & GYNECOLOGY
Payer: COMMERCIAL

## 2025-03-24 VITALS — TEMPERATURE: 98 F | DIASTOLIC BLOOD PRESSURE: 94 MMHG | SYSTOLIC BLOOD PRESSURE: 141 MMHG | HEART RATE: 63 BPM

## 2025-03-24 DIAGNOSIS — N39.0 RECURRENT UTI: ICD-10-CM

## 2025-03-24 DIAGNOSIS — R30.0 DYSURIA: ICD-10-CM

## 2025-03-24 DIAGNOSIS — R15.9 INCONTINENCE OF FECES, UNSPECIFIED FECAL INCONTINENCE TYPE: Primary | ICD-10-CM

## 2025-03-24 LAB
ALBUMIN UR-MCNC: NEGATIVE MG/DL
APPEARANCE UR: ABNORMAL
BACTERIA #/AREA URNS HPF: ABNORMAL /HPF
BILIRUB UR QL STRIP: NEGATIVE
COLOR UR AUTO: YELLOW
GLUCOSE UR STRIP-MCNC: NEGATIVE MG/DL
HGB UR QL STRIP: ABNORMAL
KETONES UR STRIP-MCNC: NEGATIVE MG/DL
LEUKOCYTE ESTERASE UR QL STRIP: ABNORMAL
NITRATE UR QL: NEGATIVE
PH UR STRIP: 6 [PH] (ref 5–8)
RBC #/AREA URNS AUTO: ABNORMAL /HPF
SP GR UR STRIP: 1.02 (ref 1–1.03)
SQUAMOUS #/AREA URNS AUTO: ABNORMAL /LPF
UROBILINOGEN UR STRIP-ACNC: 0.2 E.U./DL
WBC #/AREA URNS AUTO: ABNORMAL /HPF
WBC CLUMPS #/AREA URNS HPF: PRESENT /HPF

## 2025-03-24 PROCEDURE — 3080F DIAST BP >= 90 MM HG: CPT | Performed by: PHYSICIAN ASSISTANT

## 2025-03-24 PROCEDURE — 1126F AMNT PAIN NOTED NONE PRSNT: CPT | Performed by: PHYSICIAN ASSISTANT

## 2025-03-24 PROCEDURE — 87086 URINE CULTURE/COLONY COUNT: CPT | Performed by: PHYSICIAN ASSISTANT

## 2025-03-24 PROCEDURE — 3077F SYST BP >= 140 MM HG: CPT | Performed by: PHYSICIAN ASSISTANT

## 2025-03-24 PROCEDURE — 99204 OFFICE O/P NEW MOD 45 MIN: CPT | Performed by: PHYSICIAN ASSISTANT

## 2025-03-24 PROCEDURE — 81001 URINALYSIS AUTO W/SCOPE: CPT | Performed by: PHYSICIAN ASSISTANT

## 2025-03-24 ASSESSMENT — PAIN SCALES - GENERAL: PAINLEVEL_OUTOF10: NO PAIN (0)

## 2025-03-24 NOTE — PATIENT INSTRUCTIONS
Below is a list of things that can irritate the bladder and should be eliminated:  Caffeinated soft drinks.  Coffee.  Tea.  Chocolate.  Tomato-based foods.  Acidic juices and fruits. (includes cranberry juice)  Alcohol.  Nicotine  Carbonated drinks.  Aspartame/Nutrasweet.    ________________________________     UTI Prevention:    - Recommend cranberry supplement 1gm twice-daily (with at least 36mg PAC on label) or Vitamin C 1 gm twice daily.   - AZO as needed; if symptoms do not improve after 24-48 hours after taking AZO as needed advise contacting clinic.   - Probiotic daily; recommend for Florajen 3   - Make sure you stay hydrated with about 60-80oz of water per day.   - Void after sexual intercourse.   - Recommend good vulvar hygiene such as wearing loose cotton underwear and avoiding scented hygenic products/wipes/soaps or detergents. Wipe front to back after voiding/defecation. Avoid sitting in soiled clothing and keeping vulva dry.   - If you develop symptoms of UTI, please contact clinic. However, if you develop fevers  greater than 100.4 degrees fahrenheit, flank pain or blood in your urine, recommend going to urgent care or ER.   - Make sure to drink plenty of water each day and to really push fluids when have symptoms of a UTI.    - Estrogen cream 2x per week at night; apply blueberry-sized amount on finger and rub along vaginal tissue.     _______________________________________________       CYSTOSCOPY    What is a Cystoscopy?  This is a procedure done to check for problems inside the bladder.  Problems may include polyps (growths), tumors, inflammation (swelling and redness) and other concerns.    The doctor inserts a thin tube (called a cystoscope) into the bladder.  The tube is about the size of a pencil.  We will give you numbing medicine to reduce the pain or discomfort you may feel.    The tube allows the doctor to:  The doctor will be able to see inside the bladder by filling the bladder with  water.  The water makes it easier to see any problems that may be present.    If needed, the doctor may use the tube to:  The doctor is able to take tissue samples (biopsies).  Samples are sent to the lab for testing.  The doctor can also burn off any small growths or tumors that are found.  This is call fulguration.    What happens after the exam?  You may go back to your normal diet and activity as you feel ready, unless your doctor tells you not to.    For the next two days, you may notice:  Some blood in your urine.  Some burning when you urinate (use the toilet).  An urge to urinate more often.  Bladder spasms.    These are normal after the procedure. They should go away on their own after a day or two.      You can help to relieve the above listed symptoms by:  Drinking 6 to 8 large glasses of water each day (includes drinks at meals).  This will help clear the urine.  Take warm baths to relieve pain and bladder spasms.  Do not add anything to the bath water.  Your doctor may prescribe pain medicine.  You may also take Tylenol (acetaminophen) for pain.    When should I call my doctor?  A fever over 100.0 F (38 C) for more than a day.  (Before you call the doctor, check your temperature under your tongue.)  Chills.  Failure to urinate: No urine comes out when you try to use the toilet.  (Try soaking in a bathtub full of warm water.  If still no urine, call your doctor.)  A lot of blood in the urine or blood clots larger than a nickel.  Pain in the back or abdomen (belly / stomach area).  Pain or spasms that are not relieved by warm tub baths and pain medicine.  Severe pain, burning or other problems while passing urine.  Pain that gets worse after two days.     _________________________________________________________      Today:  -Bladder irritants, please consider cutting back on the mountain due as this could be contributing to your burning with urination  -Consider increasing your water intake to 80-90 oz  per day as this can help with burning with urination  -Consider adding Flurajen 3 (probiotic) to your daily regimen as this can help with burning with urination  -Consider adding Citrucel to help with diarrhea  -Referral to pelvic floor physical therapy was sent, they can help strengthen your pelvic floor and help support the bladder and urethra, which could help improve your burning with urination  -Please talk to your OBGYN about estrogen cream, twice weekly at night to help with vaginal atrophy  -A message was sent to the urogyn team (Dr. Buenrostro, Dr. Vail) for cystoscopy (procedure where we look into the urethra and the bladder)  -Referral to GI was sent

## 2025-03-24 NOTE — PROGRESS NOTES
[facility] UROLOGY OUTPATIENT VISIT     Chief Complaint:   Recurrent UTI     Referring Provider   Macarena Gusman     Synopsis:    Tangela Chapa is 79 year old year old female with a history of HTN who presents to clinic with concerns for recurrent UTI. She is not immunosuppressed.  Today she complains of a recent history of frequent urinary tract infections, and states since her e-string was removed she has been experiencing UTI symptoms without positive UC. Her E string was removed in Jan and her insurance company no longer covers them. These are typically symptomatic.  Typical symptoms include:  Frequency, urgency, burning. She denies gross hematuria, flank pain, fevers, chills.  She has no history of stones.  She has never been hospitalized for UTIs.       -Inciting events include: Patient does not hold urine beyond the urge to void  -Daily Fluid intake: water with minimal caffeine.    -Bowels: Diarrhea, experiencing fecal incontinence    Ucx history:  12/11/2019: 10-50K Enterococcus faecalis. Pan sensitive  12/20/2021: >100K Enterococcus faecalis. Pan sensitive  2/24/24: <10K Urogenital khari  6/19/2024: >100K E. Coli, 50-100L Enterococcus faecalis. Pan sensitive  8/26/2024: >100K Urogenital khari  1/17/25: <10K Urogenital khari  1/29/25: 10-50K Urogenital khari     ROS:   A comprehensive 14 point ROS was obtained and was  otherwise negative except for that outlined above in the HPI.     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        Social History:     Past Surgical History:   Procedure Laterality Date    ABDOMINOPLASTY      ABDOMINOPLASTY      ARTHROSCOPY KNEE IRRIGATION AND DEBRIDEMENT      RT-Articular Cartilage    BIOPSY BREAST       benign open brest biopsy    BIOPSY BREAST Left     benign x2    BIOPSY CERVICAL, LOCAL EXCISION, SINGLE/MULTIPLE  10/09/2017    BIOPSY OF BREAST, INCISIONAL      Description: Biopsy Breast Open;  Recorded: 06/06/2008;  Comments: BENIGN    CATARACT EXTRACTION W/ INTRAOCULAR LENS IMPLANT Left 01/01/2016    CATARACT EXTRACTION, BILATERAL  04/01/2015    COLPOSCOPY, BIOPSY, COMBINED  02/16/2010    TZ-not seen    ENDOMETRIAL SAMPLING (BIOPSY)  03/31/2005    benign    GENITOURINARY SURGERY  04/06/2011    cystoscopy with +1 trabeculaion    HC KNEE SCOPE, DIAGNOSTIC      Description: Arthroscopy Knee Right;  Recorded: 06/06/2008;  Comments: FOR MENISCUS TEAR    HC OSTEOTOMY METATARSAL (NOT 1ST)      Description: Metatarsal Osteotomy Of The Fifth Metatarsal;  Proc Date: 02/01/2007;    IR LUMBAR EPIDURAL STEROID INJECTION  09/30/2004    IR LUMBAR EPIDURAL STEROID INJECTION  10/11/2004    IR LUMBAR EPIDURAL STEROID INJECTION  11/05/2004    JOINT REPLACEMENT, HIP RT/LT  01/01/2008    Joint Replacement Hip RT/LT    OPEN REDUCTION INTERNAL FIXATION ANKLE Right 02/24/2024    Procedure: OPEN REDUCTION INTERNAL FIXATION, FRACTURE, TRIMALLEOLAR ANKLE;  Surgeon: Manpreet Cai MD;  Location: [facility] OR    [facility] CARPAL TUNNEL BILATERAL      ZZC TOTAL HIP ARTHROPLASTY      Description: Total Hip Replacement;  Proc Date: 06/01/2008;  Comments: RIGHT    ZZC TOTAL HIP ARTHROPLASTY      Description: Total Hip Replacement;  Proc Date: 08/01/2008;        Family History:     Family History   Problem Relation Age of Onset    Alcohol/Drug Father     Depression Father     Alcohol/Drug Brother     Breast Cancer Mother     Depression Mother     Diabetes Maternal Grandfather     Cancer Brother 68        blood cancer    Breast Cancer Cousin     Breast Cancer Cousin     Breast Cancer Cousin     Leukemia Brother         Medications:     Current Outpatient Medications   Medication Sig Dispense Refill    citalopram (CELEXA) 40 MG tablet TAKE  1 TABLET (40 MG) BY MOUTH DAILY 90 tablet 1    cycloSPORINE (RESTASIS) 0.05 % ophthalmic emulsion 1 drop every 12 hours.      estradiol (ESTRING) 7.5 MCG/24HR vaginal ring Place once vaginally and refill as instructed 1 each 3    pravastatin (PRAVACHOL) 40 MG tablet TAKE 1 TABLET (40 MG) BY MOUTH DAILY 90 tablet 2    traZODone (DESYREL) 50 MG tablet Take 1 tablet (50 mg) by mouth at bedtime 90 tablet 3    phenazopyridine (PYRIDIUM) 200 MG tablet Take 1 tablet (200 mg) by mouth 3 times daily as needed for irritation. For 2 days (Patient not taking: Reported on 1/22/2025) 6 tablet 0        Allergies:   Dust mite extract and Seasonal allergies       The following distinct labs were reviewed    I personally reviewed all applicable laboratory data and went over findings with patient  Significant for:    CBC RESULTS:  Recent Labs   Lab Test 02/26/24  0517 02/25/24  0533 02/24/24  0536 02/23/24  2302 02/15/24  1427   WBC 12.7*  --  17.6* 19.3* 8.7   HGB 12.0 11.7 13.2 13.2 15.1     --  218 212 244        BMP RESULTS:  Recent Labs   Lab Test 07/31/24  1453 02/26/24  0642 02/25/24  0638 02/25/24  0204 02/24/24  1112 02/23/24  2302 02/15/24  1427 06/28/23  1619 09/07/21  1406 11/13/20  0938 10/12/18  0947     --   --   --   --  133* 137 140   < > 140 141   POTASSIUM 4.5  --   --   --   --  4.8 4.2 3.9   < > 4.4 3.9   CHLORIDE 105  --   --   --   --  104 104 106   < > 107 106   CO2 24  --   --   --   --  23 24 25   < > 22 26   ANIONGAP 11  --   --   --   --  6* 9 9   < > 11 9   GLC 87 124* 101* 88   < > 196* 86 93   < > 113 88   BUN 13.2  --   --   --   --  18.3 13.7 11.3   < > 18 17   CR 0.80  --   --   --   --  0.92 0.85 0.74   < > 0.80 0.66   GFRESTIMATED 75  --   --   --   --  64 71 83   < > >60 >60   GFRESTBLACK  --   --   --   --   --   --   --   --   --  >60 >60    < > = values in this interval not displayed.       CALCIUM RESULTS:  Recent Labs   Lab Test 07/31/24  1453 02/23/24  2302 02/15/24  1420  06/28/23  1619   EVARISTO 9.8 8.5* 9.5 9.4       HGB A1C RESULTS:  Lab Results   Component Value Date    A1C 5.8 07/31/2024    A1C 5.9 02/26/2024    A1C 5.5 06/28/2023       UA RESULTS:   Recent Labs   Lab Test 01/29/25  1441 01/29/25  1438 01/17/25  1419 08/26/24  1502   SG 1.014 1.020 1.020 1.016   URINEPH 6.0 6.0 5.5 5.5   NITRITE Negative Negative Negative Positive*   RBCU 4*  --  2-5* 11*   WBCU 141*  --  25-50* >182*        Physical Exam:   Vitals: BP (!) 141/94 (BP Location: Right arm, Patient Position: Sitting, Cuff Size: Adult Regular)   Pulse 63   Temp 98  F (36.7  C) (Temporal)   LMP  (LMP Unknown)   PSYCH: NAD  EYES: EOMI  NEURO: AAO x3    PVR: 7 ml.   Urine - with moderate LE and 25-50 WBC, sent for culture         Assessment/Plan   Tangela Chapa is a 79 year old year old female with a history of HTN who presents with concerns for recurrent UTI     -UA/UC if symptoms: Contact clinic in future with symptoms (fever >100.4, flank pain, hematuria patient is to present to the ED)  -Bladder irritant list provided  -Discussed daily probiotic: Recommend Florajen 3   -Recommended PFPT, referral was sent  -Lifestyle and hygiene modifications were reviewed today in clinic, including wiping front to back, wearing cotton breathable underwear, voiding before and after intercourse to flush the urethra, minimizing baths and opting for showers, and appropriate perineal hygiene. Push fluids to keep the urine dilute  -Recommend estrogen cream twice weekly. She would like to talk to her OBGYN first, which is resonable.  -Message sent to Dr. Buenrostro and Dr. Vail for cystoscopy   -Recommend bowel regimen and consider trying Citrucel for diarrhea. Regarding fecal incontinence, referral to GI was placed    Richelle Dawn PA-C  [facility] Urology    45 minutes spent on the date of the encounter doing chart review, review of outside records, review of test results, interpretation of tests, patient visit and documentation      CC:  Brennan De Luna

## 2025-03-24 NOTE — PROGRESS NOTES
Patient here to see provider for recurrent uti. Maddy Joseph RN    Patient educated regarding bladder scan procedure which writer performed.  Patient voiced understanding of information.  7 ml of urine remaining in bladder.  Maddy Joseph RN

## 2025-03-25 LAB — BACTERIA UR CULT: NORMAL

## 2025-03-26 ENCOUNTER — TELEPHONE (OUTPATIENT)
Dept: UROLOGY | Facility: CLINIC | Age: 80
End: 2025-03-26
Payer: COMMERCIAL

## 2025-03-26 NOTE — TELEPHONE ENCOUNTER
Message left for patient call back regarding scheduling appointment with Dr. Buenrostro or Dr. Vail for recurrent uti needing cystoscopy.  Need to discuss with patient and give central urology scheduling phone number.

## 2025-04-30 ENCOUNTER — TRANSFERRED RECORDS (OUTPATIENT)
Dept: HEALTH INFORMATION MANAGEMENT | Facility: CLINIC | Age: 80
End: 2025-04-30
Payer: COMMERCIAL

## 2025-05-12 ENCOUNTER — TELEPHONE (OUTPATIENT)
Dept: INTERNAL MEDICINE | Facility: CLINIC | Age: 80
End: 2025-05-12
Payer: COMMERCIAL

## 2025-05-12 NOTE — TELEPHONE ENCOUNTER
Patient called regarding her Citalopram medication. Patient states she discovered her bottle in her cabinet and has not been taking it since sometime around November-December of 2024. Writer confirmed with patient that it is still an active med on patient's medication list. Patient would like to start taking the medication again. Writer educated patient on common side effects of anti depressants, as well as when to call if side effects become worse. Patient requested med check appointment with Dr. De Luna. Appointment scheduled 6/18/2025 @1100.

## 2025-05-21 ENCOUNTER — RESULTS FOLLOW-UP (OUTPATIENT)
Dept: GASTROENTEROLOGY | Facility: CLINIC | Age: 80
End: 2025-05-21

## 2025-05-21 ENCOUNTER — OFFICE VISIT (OUTPATIENT)
Dept: GASTROENTEROLOGY | Facility: CLINIC | Age: 80
End: 2025-05-21
Attending: PHYSICIAN ASSISTANT
Payer: COMMERCIAL

## 2025-05-21 VITALS
DIASTOLIC BLOOD PRESSURE: 76 MMHG | BODY MASS INDEX: 32.3 KG/M2 | SYSTOLIC BLOOD PRESSURE: 146 MMHG | WEIGHT: 193.9 LBS | HEART RATE: 52 BPM | HEIGHT: 65 IN | OXYGEN SATURATION: 97 %

## 2025-05-21 DIAGNOSIS — R15.9 INCONTINENCE OF FECES, UNSPECIFIED FECAL INCONTINENCE TYPE: Primary | ICD-10-CM

## 2025-05-21 DIAGNOSIS — R19.5 LOOSE STOOLS: ICD-10-CM

## 2025-05-21 DIAGNOSIS — R19.7 DIARRHEA, UNSPECIFIED TYPE: ICD-10-CM

## 2025-05-21 LAB
ALBUMIN SERPL BCG-MCNC: 4.2 G/DL (ref 3.5–5.2)
ALP SERPL-CCNC: 76 U/L (ref 40–150)
ALT SERPL W P-5'-P-CCNC: 24 U/L (ref 0–50)
ANION GAP SERPL CALCULATED.3IONS-SCNC: 12 MMOL/L (ref 7–15)
AST SERPL W P-5'-P-CCNC: 28 U/L (ref 0–45)
BILIRUB SERPL-MCNC: 0.5 MG/DL
BUN SERPL-MCNC: 12.5 MG/DL (ref 8–23)
CALCIUM SERPL-MCNC: 9.5 MG/DL (ref 8.8–10.4)
CHLORIDE SERPL-SCNC: 106 MMOL/L (ref 98–107)
CREAT SERPL-MCNC: 0.78 MG/DL (ref 0.51–0.95)
CRP SERPL-MCNC: 3.14 MG/L
EGFRCR SERPLBLD CKD-EPI 2021: 77 ML/MIN/1.73M2
ERYTHROCYTE [DISTWIDTH] IN BLOOD BY AUTOMATED COUNT: 13.4 % (ref 10–15)
GLUCOSE SERPL-MCNC: 125 MG/DL (ref 70–99)
HCO3 SERPL-SCNC: 22 MMOL/L (ref 22–29)
HCT VFR BLD AUTO: 42.1 % (ref 35–47)
HGB BLD-MCNC: 15.1 G/DL (ref 11.7–15.7)
MCH RBC QN AUTO: 32.1 PG (ref 26.5–33)
MCHC RBC AUTO-ENTMCNC: 35.9 G/DL (ref 31.5–36.5)
MCV RBC AUTO: 90 FL (ref 78–100)
PLATELET # BLD AUTO: 249 10E3/UL (ref 150–450)
POTASSIUM SERPL-SCNC: 4.2 MMOL/L (ref 3.4–5.3)
PROT SERPL-MCNC: 7.3 G/DL (ref 6.4–8.3)
RBC # BLD AUTO: 4.7 10E6/UL (ref 3.8–5.2)
SODIUM SERPL-SCNC: 140 MMOL/L (ref 135–145)
TSH SERPL DL<=0.005 MIU/L-ACNC: 3.6 UIU/ML (ref 0.3–4.2)
WBC # BLD AUTO: 8.8 10E3/UL (ref 4–11)

## 2025-05-21 NOTE — PATIENT INSTRUCTIONS
It was a pleasure meeting with you today and discussing your healthcare plan. Below is a summary of what we covered:    Blood work today.    Take home stool collection kit. Return the stool sample to any convenient [facility] lab.    Take Metamucil on a regular basis, daily or every other day. If using the powder, recommend 2 teaspoons per day to start, then increase to 1 tablespoon per day. Some people may benefit from 2 tablespoons per day but you can titrate up/down based on frequency and consistency of bowel movements.     Follow up in 2 months to recheck.     Please see below for any additional questions and scheduling guidelines.    Sign up for [employer]: [employer] patient portal serves as a secure platform for accessing your medical records from the [facility]. Additionally, [employer] facilitates easy, timely, and secure messaging with your care team. If you have not signed up, you may do so by using the provided code or calling [phone].    Coordinating your care after your visit:  There are multiple options for scheduling your follow-up care based on your provider's recommendation.    How do I schedule a follow-up clinic appointment:   After your appointment, you may receive scheduling assistance with the Clinic Coordinators by having a seat in the waiting room and a Clinic Coordinator will call you up to schedule.  Virtual visits or after you leave the clinic:  Your provider has placed a follow-up order in the [employer] portal for scheduling your return appointment. A member of the scheduling team will contact you to schedule.  [employer] Scheduling: Timely scheduling through [employer] is advised to ensure appointment availability.   Call to schedule: You may schedule your follow-up appointment(s) by calling [phone], option 1.    How do I schedule my endoscopy or colonoscopy procedure:  If a procedure, such as a colonoscopy or upper endoscopy was ordered by your provider, the scheduling  team will contact you to schedule this procedure. Or you may choose to call to schedule at   [phone], option 2.  Please allow 20-30 minutes when scheduling a procedure.    How do I get my blood work done? To get your blood work done, you need to schedule a lab appointment at an [facility]. There are multiple ways to schedule:   At the clinic: The Clinic Coordinator you meet after your visit can help you schedule a lab appointment.   [employer] scheduling: [employer] offers online lab scheduling at all [facility] laboratory locations.   Call to schedule: You can call [phone] to schedule your lab appointment.    How do I schedule my imaging study: To schedule imaging studies, such as CT scans, ultrasounds, MRIs, or X-rays, contact Imaging Services at [phone].    How do I schedule a referral to another doctor: If your provider recommended a referral to another specialist(s), the referral order was placed by your provider. You will receive a phone call to schedule this referral, or you may choose to call the number attached to the referral to self-schedule.    For Post-Visit Question(s):  For any inquiries following today's visit:  Please utilize [employer] messaging and allow 48 hours for reply or contact the Call Center during normal business hours at [phone], option 3.  For Emergent After-hours questions, contact the On-Call GI Fellow through the [facility]  at [phone].  In addition, you may contact your Nurse directly using the provided contact information.    Test Results:  Test results will be accessible via [employer] in compliance with the 21st Century Cures Act. This means that your results will be available to you at the same time as your provider. Often you may see your results before your provider does. Results are reviewed by staff within two weeks with communication follow-up. Results may be released in the patient portal prior to your care team  review.    Prescription Refill(s):  Medication prescribed by your provider will be addressed during your visit. For future refills, please coordinate with your pharmacy. If you have not had a recent clinic visit or routine labs, for your safety, your provider may not be able to refill your prescription.

## 2025-05-21 NOTE — NURSING NOTE
"Chief Complaint   Patient presents with    New Patient     New pt incontinence of feces. Pt c/o acid indigestion.      She requests these members of her care team be copied on today's visit information:  PCP: Brennan De Luna    Referring Provider:  Richelle Dawn PA-C  [address]  [place] [address]    Vitals:    05/21/25 1335   BP: (!) 146/76   BP Location: Left arm   Patient Position: Sitting   Cuff Size: Adult Large   Pulse: 52   SpO2: 97%   Weight: 88 kg (193 lb 14.4 oz)   Height: 1.638 m (5' 4.5\")     Body mass index is 32.77 kg/m .    Do you have a history of colon cancer in your immediate family? NO    Medications were reconciled.      Jamilah Gale       "

## 2025-05-21 NOTE — PROGRESS NOTES
NEW PATIENT GI CLINIC VISIT    CC/REFERRING MD:  Richelle Dawn  REASON FOR CONSULTATION:   Tangela Chapa is a 80 year old female who I was asked to see in consultation at the request of Dr. Richelle Dawn for   Chief Complaint   Patient presents with    New Patient     New pt incontinence of feces. Pt c/o acid indigestion.        ASSESSMENT/PLAN:  80 year old female with loose stools and fecal incontinence. Started ~1 year ago after she had her knee replaced, then subsequently fell and broke her ankle and was in a nursing home for a period of time. Stools started out watery with significant fecal urgency and associated incontinence, no blood. However, over the last several months since she started taking Metamucil on occasion and stopped taking Prevagen and magnesium supplements, she has had improvement. Still has loose stools on occasion but watery stools are infrequent. She can typically manage without fecal incontinence at home, but is more prone when she leaves the house to go out for lunch, etc. We discussed possible etiologies for her symptoms including infection, malabsorption, IBS, IBD, medication side effect, pelvic floor dysfunction, vs other. Will start by obtaining labs to include CBC, CMP, TSH, Celiac serologies, CRP, fecal calprotectin, fecal elastase. If concern for inflammation on labs, or if loose stools/symptoms persist without other clear explanation, consider updating colonoscopy. In the meantime, recommend a daily fiber supplement such as Metamucil or Citrucel. Also encouraged her to attend pelvic floor PT as recommended and referred by urology.     Follow up in 2 months to recheck.    Thank you for this consultation. It was a pleasure to participate in the care of this patient; please contact us with any further questions.  A total of 25 face-to-face minutes was spent with this patient, >50% of which was counseling regarding the above delineated issues. An additional 30 minutes  "was spent on the date of the encounter doing chart review, documentation, and further activities as noted above.    This note was created with voice recognition software, and while reviewed for accuracy, typos may remain.     Carine Prabhakar PA-C  Division of Gastroenterology, Hepatology and Nutrition  [facility]    ---------------------------------------------------------------------------------------------------  HPI:  Tangela Chapa is a 80 year old female with past medical history significant for YESSICA I and vaginal dysplasia who presents for evaluation of fecal incontinence.  Referred by urology team given issues with recurrent UTIs in the setting of fecal incontinence. Elizabeth reports that fecal incontinence started about a year ago. She had her knee replaced, ended up falling and braking her ankle and was in a nursing home for a period of time. Throughout this, she had multiple medication changes and feels that things got worse as a result. When symptoms first started, she was having frequent watery bowel movements. Not every day, but every few days. There is associated fecal urgency and fecal incontinence at times. She is more prone to having fecal incontinence when she goes out for lunch for example and consumes a lot of liquid. Will then have urgency and sometimes fecal incontinence on her drive home. She denies any blood in the stool. No significant abdominal pain aside from intermittent epigastric \"irritation\" which she has had since her 20's. She takes Tums as needed for this which helps. She does report infrequent dark stool, last occurrence a few months ago. No Pepto Bismol use.     Around 4 months ago, her PCP told her to take Metamucil but she does not take it consistently. Maybe a few times per month. She also stopped taking Prevagen and magnesium (was taking for brain health and sleep) as she thought these may be contributing to bowel issues. Since " "starting Metamucil and stopping Prevagen and magnesium, she does feel that her diarrhea has improved. She now has anywhere from 1-3 BMs per day. Stools can range from Donnellson stool type 1-7, but the watery stools are occurring much less frequently (every few months now as opposed to every few days). Donnellson stool type 1 stools occur every few weeks. She eats a lot of microwaved meals, Cheerios for breakfast so admits that she likely does not get a lot of fiber in her diet. She does drink a lot of water. When she is at home, she can typically avoid fecal incontinence as she is able to get to the bathroom in a timely manner.    Last colonoscopy in 2018 at [facility] which was normal aside from focal active colitis in the right colon favored to represent prep effect. No evidence for microscopic or chronic colitis.     PREVIOUS ENDOSCOPY:  1/4/2018 - Colonoscopy      PROBLEM LIST  Patient Active Problem List    Diagnosis Date Noted    Chronic UTI 02/24/2024     Priority: Medium    Ankle fracture, right, open type I or II, initial encounter 02/23/2024     Priority: Medium    Type I or II open trimalleolar fracture of right ankle, initial encounter 02/23/2024     Priority: Medium    Insomnia, unspecified type 06/28/2023     Priority: Medium    Essential hypertension 07/31/2022     Priority: Medium    Cervical high risk HPV (human papillomavirus) test positive 02/09/2021     Priority: Medium     2011, 2012 NIL paps  5/28/14 NIL pap, +HR HPV (not 16/18)  8/18/16 NIL pap, +HR HPV (not 16/18)  10/4/16 Colpo bx and ECC inflammation, negative  8/28/17 NIL pap, +HR HPV (not 16/18)  10/9/17 LEEP inflammation, negative  10/29/18 NIL pap, +HR HPV (not 16/18)  1/29/19 Colpo bx and ECC neg  12/11/19 NIL pap, +HR HPV (not 16/18)  2/5/20 Colpo ECC inflammation  2/9/21 NIL pap, +HR HPV (not 16/18)  3/29/21 Colpo bx and ECC neg  5/9/22 Visit: \"Elizabeth has decided she no longer wants to f/u with annual pap smears/colpos.  She is tired of doing " "the follow up.\"  12/28/22 Lost to follow-up for pap tracking   2/27/23 ASCUS pap, + HR HPV (not 16 or 18). Plan colp due by 5/27/23 11/9/23 Lost to follow up  8/26/24 ASCUS pap, + HR HPV (not 16 or 18). Plan colp due by 11/26/24 9/4/24 ECC- Neg. Plan colp and  cotest in 1 year per provider due by 9/4/25      Raynauds syndrome 06/01/2020     Priority: Medium    Attention deficit disorder 08/18/2016     Priority: Medium     Overview:   Created by Conversion      Atopic rhinitis 08/18/2016     Priority: Medium     Overview:   Created by Conversion    Replacement Utility updated for latest IMO load      Benign neoplasm of colon 08/18/2016     Priority: Medium     Overview:   Created by Conversion      Gastroesophageal reflux disease 08/18/2016     Priority: Medium     Overview:   Created by Conversion      Hypercholesterolemia 08/18/2016     Priority: Medium     Overview:   Created by Conversion      Localized osteoarthrosis 08/18/2016     Priority: Medium     Overview:   Created by Conversion      Migraine 08/18/2016     Priority: Medium     Overview:   Created by Conversion    Replacement Utility updated for latest IMO load      Recurrent major depressive disorder, in full remission 08/18/2016     Priority: Medium     Overview:   Created by Conversion      Herpes zoster 04/16/2015     Priority: Medium     Overview:   Left facial      Menopausal and postmenopausal disorder 08/08/2011     Priority: Medium     5/28/1014:On HT therapy since age 50 for hot flush and recurrent UTI control.      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       PREVIOUS " SURGERIES:  Past Surgical History:   Procedure Laterality Date    ABDOMINOPLASTY      ABDOMINOPLASTY      ARTHROSCOPY KNEE IRRIGATION AND DEBRIDEMENT      RT-Articular Cartilage    BIOPSY BREAST      benign open brest biopsy    BIOPSY BREAST Left     benign x2    BIOPSY CERVICAL, LOCAL EXCISION, SINGLE/MULTIPLE  10/09/2017    BIOPSY OF BREAST, INCISIONAL      Description: Biopsy Breast Open;  Recorded: 06/06/2008;  Comments: BENIGN    CATARACT EXTRACTION W/ INTRAOCULAR LENS IMPLANT Left 01/01/2016    CATARACT EXTRACTION, BILATERAL  04/01/2015    COLPOSCOPY, BIOPSY, COMBINED  02/16/2010    TZ-not seen    ENDOMETRIAL SAMPLING (BIOPSY)  03/31/2005    benign    GENITOURINARY SURGERY  04/06/2011    cystoscopy with +1 trabeculaion    HC KNEE SCOPE, DIAGNOSTIC      Description: Arthroscopy Knee Right;  Recorded: 06/06/2008;  Comments: FOR MENISCUS TEAR    HC OSTEOTOMY METATARSAL (NOT 1ST)      Description: Metatarsal Osteotomy Of The Fifth Metatarsal;  Proc Date: 02/01/2007;    IR LUMBAR EPIDURAL STEROID INJECTION  09/30/2004    IR LUMBAR EPIDURAL STEROID INJECTION  10/11/2004    IR LUMBAR EPIDURAL STEROID INJECTION  11/05/2004    JOINT REPLACEMENT, HIP RT/LT  01/01/2008    Joint Replacement Hip RT/LT    OPEN REDUCTION INTERNAL FIXATION ANKLE Right 02/24/2024    Procedure: OPEN REDUCTION INTERNAL FIXATION, FRACTURE, TRIMALLEOLAR ANKLE;  Surgeon: Manpreet Cai MD;  Location: [facility] [place] [facility]    [facility] CARPAL TUNNEL BILATERAL      ZZC TOTAL HIP ARTHROPLASTY      Description: Total Hip Replacement;  Proc Date: 06/01/2008;  Comments: RIGHT    ZZC TOTAL HIP ARTHROPLASTY      Description: Total Hip Replacement;  Proc Date: 08/01/2008;       ALLERGIES:     Allergies   Allergen Reactions    Dust Mite Extract     Seasonal Allergies        PERTINENT MEDICATIONS:  Current Outpatient Medications   Medication Sig Dispense Refill    citalopram (CELEXA) 40 MG tablet TAKE 1 TABLET (40 MG) BY MOUTH DAILY 90 tablet 1    cycloSPORINE  (RESTASIS) 0.05 % ophthalmic emulsion 1 drop every 12 hours.      estradiol (ESTRING) 7.5 MCG/24HR vaginal ring Place once vaginally and refill as instructed 1 each 3    pravastatin (PRAVACHOL) 40 MG tablet TAKE 1 TABLET (40 MG) BY MOUTH DAILY 90 tablet 2    traZODone (DESYREL) 50 MG tablet Take 1 tablet (50 mg) by mouth at bedtime 90 tablet 3    phenazopyridine (PYRIDIUM) 200 MG tablet Take 1 tablet (200 mg) by mouth 3 times daily as needed for irritation. For 2 days (Patient not taking: Reported on 5/21/2025) 6 tablet 0     No current facility-administered medications for this visit.       SOCIAL HISTORY:  Social History     Socioeconomic History    Marital status: Single     Spouse name: Not on file    Number of children: Not on file    Years of education: Not on file    Highest education level: Not on file   Occupational History    Not on file   Tobacco Use    Smoking status: Former     Passive exposure: Past    Smokeless tobacco: Never    Tobacco comments:     quit 30 years ago 1970   Vaping Use    Vaping status: Never Used   Substance and Sexual Activity    Alcohol use: Yes     Alcohol/week: 0.0 - 0.8 standard drinks of alcohol     Comment: Rare    Drug use: No    Sexual activity: Not Currently     Partners: Male     Birth control/protection: Post-menopausal   Other Topics Concern     Service No    Blood Transfusions No    Caffeine Concern No     Comment: 3-4 pop/d (diet)    Occupational Exposure No    Hobby Hazards No    Sleep Concern Yes     Comment: sleeps too much -- tired after 9-10 hrs/nite    Stress Concern Yes     Comment: holiday lonliness    Weight Concern Yes     Comment: admits to eating more than she uses -- declines wt today    Special Diet No    Back Care No    Exercise Yes     Comment: sedentary -- plans to restart    Bike Helmet No    Seat Belt No    Self-Exams No    Parent/sibling w/ CABG, MI or angioplasty before 65F 55M? Not Asked   Social History Narrative    How much exercise per  week? Stationary bike, recent weight loss    How much calcium per day? Supplement       How much caffeine per day? 2 cans a day    How much vitamin D per day? supplment    Do you/your family wear seatbelts?  Yes    Do you/your family use safety helmets? Yes    Do you/your family use sunscreen? Yes    Do you/your family keep firearms in the home? Yes, locked    Do you/your family have a smoke detector(s)? Yes        Do you feel safe in your home? Yes    Has anyone ever touched you in an unwanted manner? No     Explain     Updated 8/28 CHIKI Santiago          Social Drivers of Health     Financial Resource Strain: Low Risk  (7/31/2024)    Financial Resource Strain     Within the past 12 months, have you or your family members you live with been unable to get utilities (heat, electricity) when it was really needed?: No   Food Insecurity: Low Risk  (7/31/2024)    Food Insecurity     Within the past 12 months, did you worry that your food would run out before you got money to buy more?: No     Within the past 12 months, did the food you bought just not last and you didn t have money to get more?: No   Transportation Needs: Low Risk  (7/31/2024)    Transportation Needs     Within the past 12 months, has lack of transportation kept you from medical appointments, getting your medicines, non-medical meetings or appointments, work, or from getting things that you need?: No   Physical Activity: Unknown (7/31/2024)    Exercise Vital Sign     Days of Exercise per Week: 2 days     Minutes of Exercise per Session: Not on file   Stress: No Stress Concern Present (7/31/2024)    [employer] - Occupational Stress Questionnaire     Feeling of Stress : Only a little   Social Connections: Unknown (7/31/2024)    Social Connection and Isolation Panel [NHANES]     Frequency of Communication with Friends and Family: Not on file     Frequency of Social Gatherings with Friends and Family: Once a week     Attends  "Holiness Services: Not on file     Active Member of Clubs or Organizations: Not on file     Attends Club or Organization Meetings: Not on file     Marital Status: Not on file   Interpersonal Safety: Low Risk  (7/31/2024)    Interpersonal Safety     Do you feel physically and emotionally safe where you currently live?: Yes     Within the past 12 months, have you been hit, slapped, kicked or otherwise physically hurt by someone?: No     Within the past 12 months, have you been humiliated or emotionally abused in other ways by your partner or ex-partner?: No   Housing Stability: Low Risk  (7/31/2024)    Housing Stability     Do you have housing? : Yes     Are you worried about losing your housing?: No       FAMILY HISTORY:  Family History   Problem Relation Age of Onset    Alcohol/Drug Father     Depression Father     Alcohol/Drug Brother     Breast Cancer Mother     Depression Mother     Diabetes Maternal Grandfather     Cancer Brother 68        blood cancer    Breast Cancer Cousin     Breast Cancer Cousin     Breast Cancer Cousin     Leukemia Brother        Past/family/social history reviewed and no changes    PHYSICAL EXAMINATION:  Vitals BP (!) 146/76 (BP Location: Left arm, Patient Position: Sitting, Cuff Size: Adult Large)   Pulse 52   Ht 1.638 m (5' 4.5\")   Wt 88 kg (193 lb 14.4 oz)   LMP  (LMP Unknown)   SpO2 97%   BMI 32.77 kg/m     Wt   Wt Readings from Last 2 Encounters:   05/21/25 88 kg (193 lb 14.4 oz)   01/29/25 87.8 kg (193 lb 8 oz)      Gen: Resting comfortably in an exam chair, alert, no distress  Eyes: sclerae anicteric  ENT:  OP clear, MMM  Resp: No increased respiratory effort  Skin: Visible skin clear. No significant rash, abnormal pigmentation or lesions.  Neuro: Cranial nerves grossly intact.  Mentation and speech appropriate for age.  Psych: Appropriate affect, tone, and pace of words    PERTINENT STUDIES:  Reviewed    "

## 2025-05-21 NOTE — LETTER
5/21/2025      Tangela Chapa  [address]  [place] [address]      Dear Colleague,    Thank you for referring your patient, Tangela Chapa, to the [facility]. Please see a copy of my visit note below.    NEW PATIENT GI CLINIC VISIT    CC/REFERRING MD:  Richelle Dawn  REASON FOR CONSULTATION:   Tangela Chapa is a 80 year old female who I was asked to see in consultation at the request of Dr. Richelle Dawn for   Chief Complaint   Patient presents with     New Patient     New pt incontinence of feces. Pt c/o acid indigestion.        ASSESSMENT/PLAN:  80 year old female with loose stools and fecal incontinence. Started ~1 year ago after she had her knee replaced, then subsequently fell and broke her ankle and was in a nursing home for a period of time. Stools started out watery with significant fecal urgency and associated incontinence, no blood. However, over the last several months since she started taking Metamucil on occasion and stopped taking Prevagen and magnesium supplements, she has had improvement. Still has loose stools on occasion but watery stools are infrequent. She can typically manage without fecal incontinence at home, but is more prone when she leaves the house to go out for lunch, etc. We discussed possible etiologies for her symptoms including infection, malabsorption, IBS, IBD, medication side effect, pelvic floor dysfunction, vs other. Will start by obtaining labs to include CBC, CMP, TSH, Celiac serologies, CRP, fecal calprotectin, fecal elastase. If concern for inflammation on labs, or if loose stools/symptoms persist without other clear explanation, consider updating colonoscopy. In the meantime, recommend a daily fiber supplement such as Metamucil or Citrucel. Also encouraged her to attend pelvic floor PT as recommended and referred by urology.     Follow up in 2 months to recheck.    Thank you for this consultation. It was a pleasure to  "participate in the care of this patient; please contact us with any further questions.  A total of 25 face-to-face minutes was spent with this patient, >50% of which was counseling regarding the above delineated issues. An additional 30 minutes was spent on the date of the encounter doing chart review, documentation, and further activities as noted above.    This note was created with voice recognition software, and while reviewed for accuracy, typos may remain.     Carine Prabhakar PA-C  Division of Gastroenterology, Hepatology and Nutrition  [facility]    ---------------------------------------------------------------------------------------------------  HPI:  Tangela Chapa is a 80 year old female with past medical history significant for YESSICA I and vaginal dysplasia who presents for evaluation of fecal incontinence.  Referred by urology team given issues with recurrent UTIs in the setting of fecal incontinence. Elizabeth reports that fecal incontinence started about a year ago. She had her knee replaced, ended up falling and braking her ankle and was in a nursing home for a period of time. Throughout this, she had multiple medication changes and feels that things got worse as a result. When symptoms first started, she was having frequent watery bowel movements. Not every day, but every few days. There is associated fecal urgency and fecal incontinence at times. She is more prone to having fecal incontinence when she goes out for lunch for example and consumes a lot of liquid. Will then have urgency and sometimes fecal incontinence on her drive home. She denies any blood in the stool. No significant abdominal pain aside from intermittent epigastric \"irritation\" which she has had since her 20's. She takes Tums as needed for this which helps. She does report infrequent dark stool, last occurrence a few months ago. No Pepto Bismol use.     Around 4 months ago, her PCP told " her to take Metamucil but she does not take it consistently. Maybe a few times per month. She also stopped taking Prevagen and magnesium (was taking for brain health and sleep) as she thought these may be contributing to bowel issues. Since starting Metamucil and stopping Prevagen and magnesium, she does feel that her diarrhea has improved. She now has anywhere from 1-3 BMs per day. Stools can range from Knapp stool type 1-7, but the watery stools are occurring much less frequently (every few months now as opposed to every few days). Knapp stool type 1 stools occur every few weeks. She eats a lot of microwaved meals, Cheerios for breakfast so admits that she likely does not get a lot of fiber in her diet. She does drink a lot of water. When she is at home, she can typically avoid fecal incontinence as she is able to get to the bathroom in a timely manner.    Last colonoscopy in 2018 at [facility] which was normal aside from focal active colitis in the right colon favored to represent prep effect. No evidence for microscopic or chronic colitis.     PREVIOUS ENDOSCOPY:  1/4/2018 - Colonoscopy      PROBLEM LIST  Patient Active Problem List    Diagnosis Date Noted     Chronic UTI 02/24/2024     Priority: Medium     Ankle fracture, right, open type I or II, initial encounter 02/23/2024     Priority: Medium     Type I or II open trimalleolar fracture of right ankle, initial encounter 02/23/2024     Priority: Medium     Insomnia, unspecified type 06/28/2023     Priority: Medium     Essential hypertension 07/31/2022     Priority: Medium     Cervical high risk HPV (human papillomavirus) test positive 02/09/2021     Priority: Medium     2011, 2012 NIL paps  5/28/14 NIL pap, +HR HPV (not 16/18)  8/18/16 NIL pap, +HR HPV (not 16/18)  10/4/16 Colpo bx and ECC inflammation, negative  8/28/17 NIL pap, +HR HPV (not 16/18)  10/9/17 LEEP inflammation, negative  10/29/18 NIL pap, +HR HPV (not 16/18)  1/29/19 Colpo bx and ECC  "neg  12/11/19 NIL pap, +HR HPV (not 16/18)  2/5/20 Colpo ECC inflammation  2/9/21 NIL pap, +HR HPV (not 16/18)  3/29/21 Colpo bx and ECC neg  5/9/22 Visit: \"Elizabeth has decided she no longer wants to f/u with annual pap smears/colpos.  She is tired of doing the follow up.\"  12/28/22 Lost to follow-up for pap tracking   2/27/23 ASCUS pap, + HR HPV (not 16 or 18). Plan colp due by 5/27/23 11/9/23 Lost to follow up  8/26/24 ASCUS pap, + HR HPV (not 16 or 18). Plan colp due by 11/26/24 9/4/24 ECC- Neg. Plan colp and  cotest in 1 year per provider due by 9/4/25       Raynauds syndrome 06/01/2020     Priority: Medium     Attention deficit disorder 08/18/2016     Priority: Medium     Overview:   Created by Conversion       Atopic rhinitis 08/18/2016     Priority: Medium     Overview:   Created by Conversion    Replacement Utility updated for latest IMO load       Benign neoplasm of colon 08/18/2016     Priority: Medium     Overview:   Created by Conversion       Gastroesophageal reflux disease 08/18/2016     Priority: Medium     Overview:   Created by Conversion       Hypercholesterolemia 08/18/2016     Priority: Medium     Overview:   Created by Conversion       Localized osteoarthrosis 08/18/2016     Priority: Medium     Overview:   Created by Conversion       Migraine 08/18/2016     Priority: Medium     Overview:   Created by Conversion    Replacement Utility updated for latest IMO load       Recurrent major depressive disorder, in full remission 08/18/2016     Priority: Medium     Overview:   Created by Conversion       Herpes zoster 04/16/2015     Priority: Medium     Overview:   Left facial       Menopausal and postmenopausal disorder 08/08/2011     Priority: Medium     5/28/1014:On HT therapy since age 50 for hot flush and recurrent UTI control.       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       PREVIOUS SURGERIES:  Past Surgical History:   Procedure Laterality Date     ABDOMINOPLASTY       ABDOMINOPLASTY       ARTHROSCOPY KNEE IRRIGATION AND DEBRIDEMENT      RT-Articular Cartilage     BIOPSY BREAST      benign open brest biopsy     BIOPSY BREAST Left     benign x2     BIOPSY CERVICAL, LOCAL EXCISION, SINGLE/MULTIPLE  10/09/2017     BIOPSY OF BREAST, INCISIONAL      Description: Biopsy Breast Open;  Recorded: 06/06/2008;  Comments: BENIGN     CATARACT EXTRACTION W/ INTRAOCULAR LENS IMPLANT Left 01/01/2016     CATARACT EXTRACTION, BILATERAL  04/01/2015     COLPOSCOPY, BIOPSY, COMBINED  02/16/2010    TZ-not seen     ENDOMETRIAL SAMPLING (BIOPSY)  03/31/2005    benign     GENITOURINARY SURGERY  04/06/2011    cystoscopy with +1 trabeculaion     HC KNEE SCOPE, DIAGNOSTIC      Description: Arthroscopy Knee Right;  Recorded: 06/06/2008;  Comments: FOR MENISCUS TEAR     HC OSTEOTOMY METATARSAL (NOT 1ST)      Description: Metatarsal Osteotomy Of The Fifth Metatarsal;  Proc Date: 02/01/2007;     IR LUMBAR EPIDURAL STEROID INJECTION  09/30/2004     IR LUMBAR EPIDURAL STEROID INJECTION  10/11/2004     IR LUMBAR EPIDURAL STEROID INJECTION  11/05/2004     JOINT REPLACEMENT, HIP RT/LT  01/01/2008    Joint Replacement Hip RT/LT     OPEN REDUCTION INTERNAL FIXATION ANKLE Right 02/24/2024    Procedure: OPEN REDUCTION INTERNAL FIXATION, FRACTURE, TRIMALLEOLAR ANKLE;  Surgeon: Manpreet Cai MD;  Location: [facility] OR     [facility] CARPAL TUNNEL BILATERAL       ZZC TOTAL HIP ARTHROPLASTY      Description: Total Hip Replacement;  Proc Date: 06/01/2008;  Comments: RIGHT     ZZC TOTAL HIP ARTHROPLASTY      Description: Total Hip Replacement;  Proc Date: 08/01/2008;        ALLERGIES:     Allergies   Allergen Reactions     Dust Mite Extract      Seasonal Allergies        PERTINENT MEDICATIONS:  Current Outpatient Medications   Medication Sig Dispense Refill     citalopram (CELEXA) 40 MG tablet TAKE 1 TABLET (40 MG) BY MOUTH DAILY 90 tablet 1     cycloSPORINE (RESTASIS) 0.05 % ophthalmic emulsion 1 drop every 12 hours.       estradiol (ESTRING) 7.5 MCG/24HR vaginal ring Place once vaginally and refill as instructed 1 each 3     pravastatin (PRAVACHOL) 40 MG tablet TAKE 1 TABLET (40 MG) BY MOUTH DAILY 90 tablet 2     traZODone (DESYREL) 50 MG tablet Take 1 tablet (50 mg) by mouth at bedtime 90 tablet 3     phenazopyridine (PYRIDIUM) 200 MG tablet Take 1 tablet (200 mg) by mouth 3 times daily as needed for irritation. For 2 days (Patient not taking: Reported on 5/21/2025) 6 tablet 0     No current facility-administered medications for this visit.       SOCIAL HISTORY:  Social History     Socioeconomic History     Marital status: Single     Spouse name: Not on file     Number of children: Not on file     Years of education: Not on file     Highest education level: Not on file   Occupational History     Not on file   Tobacco Use     Smoking status: Former     Passive exposure: Past     Smokeless tobacco: Never     Tobacco comments:     quit 30 years ago 1970   Vaping Use     Vaping status: Never Used   Substance and Sexual Activity     Alcohol use: Yes     Alcohol/week: 0.0 - 0.8 standard drinks of alcohol     Comment: Rare     Drug use: No     Sexual activity: Not Currently     Partners: Male     Birth control/protection: Post-menopausal   Other Topics Concern      Service No     Blood Transfusions No     Caffeine Concern No     Comment: 3-4 pop/d (diet)     Occupational Exposure No     Hobby Hazards No     Sleep Concern Yes     Comment: sleeps too much -- tired after 9-10 hrs/nite     Stress Concern Yes     Comment: holiday lonliness     Weight Concern Yes     Comment:  admits to eating more than she uses -- declines wt today     Special Diet No     Back Care No     Exercise Yes     Comment: sedentary -- plans to restart     Bike Helmet No     Seat Belt No     Self-Exams No     Parent/sibling w/ CABG, MI or angioplasty before 65F 55M? Not Asked   Social History Narrative    How much exercise per week? Stationary bike, recent weight loss    How much calcium per day? Supplement       How much caffeine per day? 2 cans a day    How much vitamin D per day? supplment    Do you/your family wear seatbelts?  Yes    Do you/your family use safety helmets? Yes    Do you/your family use sunscreen? Yes    Do you/your family keep firearms in the home? Yes, locked    Do you/your family have a smoke detector(s)? Yes        Do you feel safe in your home? Yes    Has anyone ever touched you in an unwanted manner? No     Explain     Updated 8/28 CHIKI Santiago          Social Drivers of Health     Financial Resource Strain: Low Risk  (7/31/2024)    Financial Resource Strain      Within the past 12 months, have you or your family members you live with been unable to get utilities (heat, electricity) when it was really needed?: No   Food Insecurity: Low Risk  (7/31/2024)    Food Insecurity      Within the past 12 months, did you worry that your food would run out before you got money to buy more?: No      Within the past 12 months, did the food you bought just not last and you didn t have money to get more?: No   Transportation Needs: Low Risk  (7/31/2024)    Transportation Needs      Within the past 12 months, has lack of transportation kept you from medical appointments, getting your medicines, non-medical meetings or appointments, work, or from getting things that you need?: No   Physical Activity: Unknown (7/31/2024)    Exercise Vital Sign      Days of Exercise per Week: 2 days      Minutes of Exercise per Session: Not on file   Stress: No Stress Concern Present (7/31/2024)    [place] [employer]  "Occupational Health - Occupational Stress Questionnaire      Feeling of Stress : Only a little   Social Connections: Unknown (7/31/2024)    Social Connection and Isolation Panel [NHANES]      Frequency of Communication with Friends and Family: Not on file      Frequency of Social Gatherings with Friends and Family: Once a week      Attends Mu-ism Services: Not on file      Active Member of Clubs or Organizations: Not on file      Attends Club or Organization Meetings: Not on file      Marital Status: Not on file   Interpersonal Safety: Low Risk  (7/31/2024)    Interpersonal Safety      Do you feel physically and emotionally safe where you currently live?: Yes      Within the past 12 months, have you been hit, slapped, kicked or otherwise physically hurt by someone?: No      Within the past 12 months, have you been humiliated or emotionally abused in other ways by your partner or ex-partner?: No   Housing Stability: Low Risk  (7/31/2024)    Housing Stability      Do you have housing? : Yes      Are you worried about losing your housing?: No       FAMILY HISTORY:  Family History   Problem Relation Age of Onset     Alcohol/Drug Father      Depression Father      Alcohol/Drug Brother      Breast Cancer Mother      Depression Mother      Diabetes Maternal Grandfather      Cancer Brother 68        blood cancer     Breast Cancer Cousin      Breast Cancer Cousin      Breast Cancer Cousin      Leukemia Brother        Past/family/social history reviewed and no changes    PHYSICAL EXAMINATION:  Vitals BP (!) 146/76 (BP Location: Left arm, Patient Position: Sitting, Cuff Size: Adult Large)   Pulse 52   Ht 1.638 m (5' 4.5\")   Wt 88 kg (193 lb 14.4 oz)   LMP  (LMP Unknown)   SpO2 97%   BMI 32.77 kg/m     Wt   Wt Readings from Last 2 Encounters:   05/21/25 88 kg (193 lb 14.4 oz)   01/29/25 87.8 kg (193 lb 8 oz)      Gen: Resting comfortably in an exam chair, alert, no distress  Eyes: sclerae anicteric  ENT:  OP clear, " MMM  Resp: No increased respiratory effort  Skin: Visible skin clear. No significant rash, abnormal pigmentation or lesions.  Neuro: Cranial nerves grossly intact.  Mentation and speech appropriate for age.  Psych: Appropriate affect, tone, and pace of words    PERTINENT STUDIES:  Reviewed      Again, thank you for allowing me to participate in the care of your patient.        Sincerely,        Carine Prabhakar PA-C    Electronically signed

## 2025-05-22 LAB
IGA SERPL-MCNC: 376 MG/DL (ref 84–499)
TTG IGA SER-ACNC: 0.8 U/ML
TTG IGG SER-ACNC: <0.6 U/ML

## 2025-06-18 ENCOUNTER — OFFICE VISIT (OUTPATIENT)
Dept: INTERNAL MEDICINE | Facility: CLINIC | Age: 80
End: 2025-06-18
Payer: MEDICARE

## 2025-06-18 VITALS
WEIGHT: 190.8 LBS | TEMPERATURE: 98.4 F | SYSTOLIC BLOOD PRESSURE: 129 MMHG | BODY MASS INDEX: 32.24 KG/M2 | OXYGEN SATURATION: 96 % | HEART RATE: 48 BPM | DIASTOLIC BLOOD PRESSURE: 64 MMHG

## 2025-06-18 DIAGNOSIS — E78.00 HYPERCHOLESTEROLEMIA: ICD-10-CM

## 2025-06-18 DIAGNOSIS — R73.03 PREDIABETES: ICD-10-CM

## 2025-06-18 DIAGNOSIS — I10 ESSENTIAL HYPERTENSION: Primary | ICD-10-CM

## 2025-06-18 DIAGNOSIS — E66.811 CLASS 1 OBESITY DUE TO EXCESS CALORIES WITH SERIOUS COMORBIDITY IN ADULT, UNSPECIFIED BMI: ICD-10-CM

## 2025-06-18 DIAGNOSIS — E66.09 CLASS 1 OBESITY DUE TO EXCESS CALORIES WITH SERIOUS COMORBIDITY IN ADULT, UNSPECIFIED BMI: ICD-10-CM

## 2025-06-18 DIAGNOSIS — F33.42 RECURRENT MAJOR DEPRESSIVE DISORDER, IN FULL REMISSION: ICD-10-CM

## 2025-06-18 DIAGNOSIS — G47.00 PERSISTENT INSOMNIA: ICD-10-CM

## 2025-06-18 DIAGNOSIS — F41.9 ANXIETY: ICD-10-CM

## 2025-06-18 LAB
ALBUMIN SERPL BCG-MCNC: 4.1 G/DL (ref 3.5–5.2)
ALP SERPL-CCNC: 77 U/L (ref 40–150)
ALT SERPL W P-5'-P-CCNC: 24 U/L (ref 0–50)
ANION GAP SERPL CALCULATED.3IONS-SCNC: 10 MMOL/L (ref 7–15)
AST SERPL W P-5'-P-CCNC: 24 U/L (ref 0–45)
BILIRUB SERPL-MCNC: 0.5 MG/DL
BUN SERPL-MCNC: 11.5 MG/DL (ref 8–23)
CALCIUM SERPL-MCNC: 9.5 MG/DL (ref 8.8–10.4)
CHLORIDE SERPL-SCNC: 106 MMOL/L (ref 98–107)
CHOLEST SERPL-MCNC: 219 MG/DL
CREAT SERPL-MCNC: 0.9 MG/DL (ref 0.51–0.95)
EGFRCR SERPLBLD CKD-EPI 2021: 65 ML/MIN/1.73M2
EST. AVERAGE GLUCOSE BLD GHB EST-MCNC: 111 MG/DL
FASTING STATUS PATIENT QL REPORTED: YES
FASTING STATUS PATIENT QL REPORTED: YES
GLUCOSE SERPL-MCNC: 160 MG/DL (ref 70–99)
HBA1C MFR BLD: 5.5 % (ref 0–5.6)
HCO3 SERPL-SCNC: 23 MMOL/L (ref 22–29)
HDLC SERPL-MCNC: 40 MG/DL
LDLC SERPL CALC-MCNC: 140 MG/DL
NONHDLC SERPL-MCNC: 179 MG/DL
POTASSIUM SERPL-SCNC: 3.7 MMOL/L (ref 3.4–5.3)
PROT SERPL-MCNC: 7.1 G/DL (ref 6.4–8.3)
SODIUM SERPL-SCNC: 139 MMOL/L (ref 135–145)
TRIGL SERPL-MCNC: 193 MG/DL

## 2025-06-18 PROCEDURE — 3050F LDL-C >= 130 MG/DL: CPT | Performed by: INTERNAL MEDICINE

## 2025-06-18 PROCEDURE — 3074F SYST BP LT 130 MM HG: CPT | Performed by: INTERNAL MEDICINE

## 2025-06-18 PROCEDURE — 1126F AMNT PAIN NOTED NONE PRSNT: CPT | Performed by: INTERNAL MEDICINE

## 2025-06-18 PROCEDURE — 3044F HG A1C LEVEL LT 7.0%: CPT | Performed by: INTERNAL MEDICINE

## 2025-06-18 PROCEDURE — 3078F DIAST BP <80 MM HG: CPT | Performed by: INTERNAL MEDICINE

## 2025-06-18 PROCEDURE — G2211 COMPLEX E/M VISIT ADD ON: HCPCS | Performed by: INTERNAL MEDICINE

## 2025-06-18 PROCEDURE — 80053 COMPREHEN METABOLIC PANEL: CPT | Performed by: INTERNAL MEDICINE

## 2025-06-18 PROCEDURE — 83036 HEMOGLOBIN GLYCOSYLATED A1C: CPT | Performed by: INTERNAL MEDICINE

## 2025-06-18 PROCEDURE — 36415 COLL VENOUS BLD VENIPUNCTURE: CPT | Performed by: INTERNAL MEDICINE

## 2025-06-18 PROCEDURE — 99214 OFFICE O/P EST MOD 30 MIN: CPT | Performed by: INTERNAL MEDICINE

## 2025-06-18 PROCEDURE — 80061 LIPID PANEL: CPT | Performed by: INTERNAL MEDICINE

## 2025-06-18 RX ORDER — CITALOPRAM HYDROBROMIDE 40 MG/1
40 TABLET ORAL DAILY
Qty: 90 TABLET | Refills: 3 | Status: SHIPPED | OUTPATIENT
Start: 2025-06-18

## 2025-06-18 RX ORDER — TRAZODONE HYDROCHLORIDE 50 MG/1
50 TABLET ORAL AT BEDTIME
Qty: 90 TABLET | Refills: 3 | Status: SHIPPED | OUTPATIENT
Start: 2025-06-18

## 2025-06-18 ASSESSMENT — ANXIETY QUESTIONNAIRES
3. WORRYING TOO MUCH ABOUT DIFFERENT THINGS: NOT AT ALL
7. FEELING AFRAID AS IF SOMETHING AWFUL MIGHT HAPPEN: NOT AT ALL
GAD7 TOTAL SCORE: 1
GAD7 TOTAL SCORE: 1
4. TROUBLE RELAXING: NOT AT ALL
1. FEELING NERVOUS, ANXIOUS, OR ON EDGE: SEVERAL DAYS
7. FEELING AFRAID AS IF SOMETHING AWFUL MIGHT HAPPEN: NOT AT ALL
GAD7 TOTAL SCORE: 1
2. NOT BEING ABLE TO STOP OR CONTROL WORRYING: NOT AT ALL
6. BECOMING EASILY ANNOYED OR IRRITABLE: NOT AT ALL
5. BEING SO RESTLESS THAT IT IS HARD TO SIT STILL: NOT AT ALL

## 2025-06-18 ASSESSMENT — PATIENT HEALTH QUESTIONNAIRE - PHQ9
SUM OF ALL RESPONSES TO PHQ QUESTIONS 1-9: 4
SUM OF ALL RESPONSES TO PHQ QUESTIONS 1-9: 4

## 2025-06-18 ASSESSMENT — PAIN SCALES - GENERAL: PAINLEVEL_OUTOF10: NO PAIN (0)

## 2025-06-18 NOTE — PROGRESS NOTES
"  Assessment & Plan     (I10) Essential hypertension  (primary encounter diagnosis)  Comment: controlled  Plan: Will plan to continue on previous medication without changes     (E78.00) Hypercholesterolemia  Comment: on statin  Plan: Lipid panel reflex to direct LDL Fasting,         Comprehensive metabolic panel (BMP + Alb, Alk         Phos, ALT, AST, Total. Bili, TP)        Will plan to continue on previous medication without changes     (F33.42) Recurrent major depressive disorder, in full remission  Comment: on ssri, stable  Plan: Will plan to continue on previous medication without changes     (R73.03) Prediabetes  Comment: last A1c 5.8% last year  Plan: Hemoglobin A1c        Recheck today    (E66.811,  E66.09) Class 1 obesity due to excess calories with serious comorbidity in adult, unspecified BMI  Comment: interested on possible GLP-1  Plan: semaglutide (OZEMPIC) 2 MG/3ML pen        Will see if covered, discussed process with possible PA requirements. Discussed that she could review with her insurance as well.    (G47.00) Persistent insomnia  Comment: ongoing, trazodone is helpful  Plan: traZODone (DESYREL) 50 MG tablet        Will plan to continue on previous medication without changes     See again for a wellness visit in the coming year (was too early today)    The longitudinal plan of care for the diagnosis(es)/condition(s) as documented were addressed during this visit. Due to the added complexity in care, I will continue to support Elizabeth in the subsequent management and with ongoing continuity of care.           BMI  Estimated body mass index is 32.24 kg/m  as calculated from the following:    Height as of 5/21/25: 1.638 m (5' 4.5\").    Weight as of this encounter: 86.5 kg (190 lb 12.8 oz).             Subjective   Elizabeth is a 80 year old, presenting for the following health issues:  office visit (Pt is here for med check and follow up on depression/anxiety and high blood pressure/)        6/18/2025    " 10:46 AM   Additional Questions   Roomed by Valery   Accompanied by alone         6/18/2025    10:46 AM   Patient Reported Additional Medications   Patient reports taking the following new medications none     History of Present Illness       Mental Health Follow-up:  Patient presents to follow-up on Depression & Anxiety.Patient's depression since last visit has been:  Better  The patient is having other symptoms associated with depression.  Patient's anxiety since last visit has been:  Better  The patient is not having other symptoms associated with anxiety.  Any significant life events: grief or loss  Patient is not feeling anxious or having panic attacks.  Patient has no concerns about alcohol or drug use.    Hypertension: She presents for follow up of hypertension.  She does not check blood pressure  regularly outside of the clinic. Outpatient blood pressures have not been over 140/90. She does not follow a low salt diet.     Reason for visit:  Physical general    She eats 0-1 servings of fruits and vegetables daily.She consumes 2 sweetened beverage(s) daily.She exercises with enough effort to increase her heart rate 9 or less minutes per day.  She exercises with enough effort to increase her heart rate 3 or less days per week. She is missing 1 dose(s) of medications per week.                      Objective    /64 (BP Location: Left arm, Patient Position: Sitting, Cuff Size: Adult Regular)   Pulse (!) 48   Temp 98.4  F (36.9  C) (Temporal)   Wt 86.5 kg (190 lb 12.8 oz)   LMP  (LMP Unknown)   SpO2 96%   BMI 32.24 kg/m    Body mass index is 32.24 kg/m .  Physical Exam               Signed Electronically by: Brennan De Luna MD

## 2025-06-19 ENCOUNTER — RESULTS FOLLOW-UP (OUTPATIENT)
Dept: INTERNAL MEDICINE | Facility: CLINIC | Age: 80
End: 2025-06-19

## 2025-06-23 ENCOUNTER — APPOINTMENT (OUTPATIENT)
Dept: LAB | Facility: CLINIC | Age: 80
End: 2025-06-23
Payer: MEDICARE

## 2025-06-25 LAB
CALPROTECTIN STL-MCNT: 24.7 MG/KG (ref 0–49.9)
ELASTASE PANC STL-MCNT: >800 UG/G

## 2025-07-07 ENCOUNTER — PATIENT OUTREACH (OUTPATIENT)
Dept: CARE COORDINATION | Facility: CLINIC | Age: 80
End: 2025-07-07
Payer: MEDICARE

## 2025-07-21 ENCOUNTER — PATIENT OUTREACH (OUTPATIENT)
Dept: CARE COORDINATION | Facility: CLINIC | Age: 80
End: 2025-07-21
Payer: COMMERCIAL

## 2025-07-28 ENCOUNTER — PATIENT OUTREACH (OUTPATIENT)
Dept: OBGYN | Facility: CLINIC | Age: 80
End: 2025-07-28
Payer: COMMERCIAL

## 2025-08-08 ENCOUNTER — TELEPHONE (OUTPATIENT)
Dept: OBGYN | Facility: CLINIC | Age: 80
End: 2025-08-08
Payer: COMMERCIAL

## 2025-08-27 ENCOUNTER — TELEPHONE (OUTPATIENT)
Dept: INTERNAL MEDICINE | Facility: CLINIC | Age: 80
End: 2025-08-27
Payer: COMMERCIAL

## 2025-08-27 DIAGNOSIS — E66.09 CLASS 1 OBESITY DUE TO EXCESS CALORIES WITH SERIOUS COMORBIDITY IN ADULT, UNSPECIFIED BMI: ICD-10-CM

## 2025-08-27 DIAGNOSIS — E66.811 CLASS 1 OBESITY DUE TO EXCESS CALORIES WITH SERIOUS COMORBIDITY IN ADULT, UNSPECIFIED BMI: ICD-10-CM

## 2025-08-27 RX ORDER — SEMAGLUTIDE 0.68 MG/ML
0.5 INJECTION, SOLUTION SUBCUTANEOUS
Qty: 3 ML | Refills: 1 | Status: SHIPPED | OUTPATIENT
Start: 2025-08-27

## 2025-08-28 ENCOUNTER — MYC MEDICAL ADVICE (OUTPATIENT)
Dept: INTERNAL MEDICINE | Facility: CLINIC | Age: 80
End: 2025-08-28
Payer: COMMERCIAL

## 2025-09-02 ENCOUNTER — TELEPHONE (OUTPATIENT)
Dept: INTERNAL MEDICINE | Facility: CLINIC | Age: 80
End: 2025-09-02
Payer: COMMERCIAL

## (undated) DEVICE — GLOVE BIOGEL INDICATOR 7.5 LF 41675

## (undated) DEVICE — SUTURE VICRYL+ 2-0 CT-2 27" UND VCP269H

## (undated) DEVICE — DRSG ADAPTIC 3X8" 6113

## (undated) DEVICE — SU ETHILON 3-0 PS-1 18" 1663G

## (undated) DEVICE — GUIDE WIRE TROCAR TIP 1.35MM AR-8943-01

## (undated) DEVICE — ESU PENCIL SMOKE EVAC W/ROCKER SWITCH 0703-047-000

## (undated) DEVICE — DRILL BIT ARTHREX BB TAK MTP AR-13226

## (undated) DEVICE — BANDAGE ELASTIC 4X550 LF DBL 593-94LF

## (undated) DEVICE — SUTURE MONOCRYL 3-0 18 PS2 UND MCP497G

## (undated) DEVICE — DRAPE C-ARM 60X42" 1013

## (undated) DEVICE — DRSG GAUZE 4X4" TRAY 6939

## (undated) DEVICE — SOL NACL 0.9% IRRIG 1000ML BOTTLE 2F7124

## (undated) DEVICE — CAST PADDING 3" STERILE 9043S

## (undated) DEVICE — PREP POVIDONE-IODINE 10% SOLUTION 4OZ BOTTLE MDS093944

## (undated) DEVICE — PREP POVIDONE-IODINE 7.5% SCRUB 4OZ BOTTLE MDS093945

## (undated) DEVICE — SOLIDIFIER FLD 3.2OZ  CL-FREE F/ BIOHZRD WASTE 3000ML CANIST

## (undated) DEVICE — GLOVE UNDER INDICATOR PI SZ 6.5 LF 41665

## (undated) DEVICE — SU VICRYL 3-0 CT-2 27" UND J232H

## (undated) DEVICE — DRILL BIT ARTHREX 2.5MM AR-8943-30

## (undated) DEVICE — DRSG COTTON ROLL DEROYAL STERILE 9866-01

## (undated) DEVICE — CUSTOM PACK LOWER EXTREMITY SOP5BLEHEA

## (undated) DEVICE — DRILL BIT ARTHREX LPS CAN 3.5MM AR-4160-35

## (undated) DEVICE — CAST PLASTER SPLINT XTRA FAST 5X30" 7392

## (undated) DEVICE — DRILL BIT ARTHREX CAN 2.6MM AR-8943-02

## (undated) DEVICE — SUCTION MANIFOLD NEPTUNE 2 SYS 1 PORT 702-025-000

## (undated) DEVICE — GLOVE BIOGEL PI ULTRATOUCH G SZ 6.0 42160

## (undated) DEVICE — GOWN LG DISP 9515

## (undated) DEVICE — ESU GROUND PAD ADULT REM W/15' CORD E7507DB

## (undated) DEVICE — TRAY PREP DRY SKIN SCRUB 067

## (undated) DEVICE — GLOVE SURGEON PI ORTHO SZ 7 LF

## (undated) DEVICE — DRAPE STOCKINETTE IMPERVIOUS 12" 1587

## (undated) DEVICE — DRSG ABD TNDRSRB WET PRUF 8IN X 10IN STRL  9194A

## (undated) DEVICE — CAST PADDING 4" STERILE 9044S

## (undated) RX ORDER — FERRIC SUBSULFATE 0.21 G/G
LIQUID TOPICAL
Status: DISPENSED
Start: 2024-09-04

## (undated) RX ORDER — CEFAZOLIN SODIUM 1 G/3ML
INJECTION, POWDER, FOR SOLUTION INTRAMUSCULAR; INTRAVENOUS
Status: DISPENSED
Start: 2024-02-24